# Patient Record
Sex: FEMALE | Race: WHITE | NOT HISPANIC OR LATINO | URBAN - METROPOLITAN AREA
[De-identification: names, ages, dates, MRNs, and addresses within clinical notes are randomized per-mention and may not be internally consistent; named-entity substitution may affect disease eponyms.]

---

## 2018-06-03 ENCOUNTER — EMERGENCY (EMERGENCY)
Facility: HOSPITAL | Age: 48
LOS: 1 days | Discharge: HOME | End: 2018-06-03
Admitting: EMERGENCY MEDICINE
Payer: COMMERCIAL

## 2018-06-03 PROCEDURE — 85027 COMPLETE CBC AUTOMATED: CPT

## 2018-06-03 PROCEDURE — 70450 CT HEAD/BRAIN W/O DYE: CPT

## 2018-06-03 PROCEDURE — 70450 CT HEAD/BRAIN W/O DYE: CPT | Mod: 26

## 2018-06-03 PROCEDURE — 81025 URINE PREGNANCY TEST: CPT

## 2018-06-03 PROCEDURE — 99284 EMERGENCY DEPT VISIT MOD MDM: CPT

## 2018-06-03 PROCEDURE — 93005 ELECTROCARDIOGRAM TRACING: CPT

## 2018-06-03 PROCEDURE — 93010 ELECTROCARDIOGRAM REPORT: CPT

## 2018-06-03 PROCEDURE — 80048 BASIC METABOLIC PNL TOTAL CA: CPT

## 2018-06-03 PROCEDURE — 36415 COLL VENOUS BLD VENIPUNCTURE: CPT

## 2018-06-13 ENCOUNTER — RESULT REVIEW (OUTPATIENT)
Age: 48
End: 2018-06-13

## 2019-10-24 ENCOUNTER — RESULT REVIEW (OUTPATIENT)
Age: 49
End: 2019-10-24

## 2020-10-28 ENCOUNTER — RESULT REVIEW (OUTPATIENT)
Age: 50
End: 2020-10-28

## 2020-11-12 ENCOUNTER — OUTPATIENT (OUTPATIENT)
Dept: OUTPATIENT SERVICES | Facility: HOSPITAL | Age: 50
LOS: 1 days | End: 2020-11-12
Payer: COMMERCIAL

## 2020-11-12 VITALS
DIASTOLIC BLOOD PRESSURE: 74 MMHG | HEART RATE: 69 BPM | OXYGEN SATURATION: 100 % | TEMPERATURE: 99 F | SYSTOLIC BLOOD PRESSURE: 118 MMHG | RESPIRATION RATE: 14 BRPM | WEIGHT: 123.9 LBS | HEIGHT: 64 IN

## 2020-11-12 DIAGNOSIS — Z90.89 ACQUIRED ABSENCE OF OTHER ORGANS: Chronic | ICD-10-CM

## 2020-11-12 DIAGNOSIS — N84.0 POLYP OF CORPUS UTERI: ICD-10-CM

## 2020-11-12 DIAGNOSIS — Z98.890 OTHER SPECIFIED POSTPROCEDURAL STATES: Chronic | ICD-10-CM

## 2020-11-12 DIAGNOSIS — Z01.818 ENCOUNTER FOR OTHER PREPROCEDURAL EXAMINATION: ICD-10-CM

## 2020-11-12 LAB
HCT VFR BLD CALC: 31.2 % — LOW (ref 34.5–45)
HGB BLD-MCNC: 9.7 G/DL — LOW (ref 11.5–15.5)
MCHC RBC-ENTMCNC: 23.8 PG — LOW (ref 27–34)
MCHC RBC-ENTMCNC: 31.1 GM/DL — LOW (ref 32–36)
MCV RBC AUTO: 76.5 FL — LOW (ref 80–100)
NRBC # BLD: 0 /100 WBCS — SIGNIFICANT CHANGE UP (ref 0–0)
PLATELET # BLD AUTO: 325 K/UL — SIGNIFICANT CHANGE UP (ref 150–400)
RBC # BLD: 4.08 M/UL — SIGNIFICANT CHANGE UP (ref 3.8–5.2)
RBC # FLD: 15.1 % — HIGH (ref 10.3–14.5)
WBC # BLD: 6.12 K/UL — SIGNIFICANT CHANGE UP (ref 3.8–10.5)
WBC # FLD AUTO: 6.12 K/UL — SIGNIFICANT CHANGE UP (ref 3.8–10.5)

## 2020-11-12 PROCEDURE — G0463: CPT

## 2020-11-12 PROCEDURE — 85027 COMPLETE CBC AUTOMATED: CPT

## 2020-11-12 RX ORDER — LIDOCAINE HCL 20 MG/ML
0.2 VIAL (ML) INJECTION ONCE
Refills: 0 | Status: DISCONTINUED | OUTPATIENT
Start: 2020-11-17 | End: 2020-12-01

## 2020-11-12 RX ORDER — SODIUM CHLORIDE 9 MG/ML
3 INJECTION INTRAMUSCULAR; INTRAVENOUS; SUBCUTANEOUS EVERY 8 HOURS
Refills: 0 | Status: DISCONTINUED | OUTPATIENT
Start: 2020-11-17 | End: 2020-12-01

## 2020-11-12 NOTE — H&P PST ADULT - HISTORY OF PRESENT ILLNESS
49 yo female. . LMP 10/19/2020. c/o PMH fibroid, c/o RLQ pain and menorrhagia.  evaluated by Dr. Shah, diagnosed with fibroid and endometrial polyp. now presents to [facility] scheduled to D&C, removal of endometrial polyp.   covid test scheduled on  at [facility].

## 2020-11-12 NOTE — H&P PST ADULT - NSICDXPASTSURGICALHX_GEN_ALL_CORE_FT
PAST SURGICAL HISTORY:  H/O oral surgery under general anesthesia during childhood    S/P D&C (status post dilation and curettage) x2    S/P laparoscopy as part of infertility work up    S/P tonsillectomy

## 2020-11-12 NOTE — H&P PST ADULT - NSANTHOSAYNRD_GEN_A_CORE
No. DEANGELO screening performed.  STOP BANG Legend: 0-2 = LOW Risk; 3-4 = INTERMEDIATE Risk; 5-8 = HIGH Risk

## 2020-11-12 NOTE — H&P PST ADULT - NSICDXPROBLEM_GEN_ALL_CORE_FT
PROBLEM DIAGNOSES  Problem: Polyp of corpus uteri  Assessment and Plan: D&C  operative hysteroscopy  removal of endometrial polyp

## 2020-11-14 ENCOUNTER — OUTPATIENT (OUTPATIENT)
Dept: OUTPATIENT SERVICES | Facility: HOSPITAL | Age: 50
LOS: 1 days | End: 2020-11-14
Payer: COMMERCIAL

## 2020-11-14 DIAGNOSIS — Z11.59 ENCOUNTER FOR SCREENING FOR OTHER VIRAL DISEASES: ICD-10-CM

## 2020-11-14 DIAGNOSIS — Z90.89 ACQUIRED ABSENCE OF OTHER ORGANS: Chronic | ICD-10-CM

## 2020-11-14 DIAGNOSIS — Z98.890 OTHER SPECIFIED POSTPROCEDURAL STATES: Chronic | ICD-10-CM

## 2020-11-14 LAB — SARS-COV-2 RNA SPEC QL NAA+PROBE: SIGNIFICANT CHANGE UP

## 2020-11-14 PROCEDURE — U0003: CPT

## 2020-11-16 ENCOUNTER — TRANSCRIPTION ENCOUNTER (OUTPATIENT)
Age: 50
End: 2020-11-16

## 2020-11-16 RX ORDER — ONDANSETRON 8 MG/1
4 TABLET, FILM COATED ORAL ONCE
Refills: 0 | Status: DISCONTINUED | OUTPATIENT
Start: 2020-11-17 | End: 2020-12-01

## 2020-11-16 RX ORDER — OXYCODONE HYDROCHLORIDE 5 MG/1
5 TABLET ORAL ONCE
Refills: 0 | Status: DISCONTINUED | OUTPATIENT
Start: 2020-11-17 | End: 2020-11-17

## 2020-11-16 RX ORDER — SODIUM CHLORIDE 9 MG/ML
1000 INJECTION, SOLUTION INTRAVENOUS
Refills: 0 | Status: DISCONTINUED | OUTPATIENT
Start: 2020-11-17 | End: 2020-12-01

## 2020-11-17 ENCOUNTER — OUTPATIENT (OUTPATIENT)
Dept: OUTPATIENT SERVICES | Facility: HOSPITAL | Age: 50
LOS: 1 days | End: 2020-11-17
Payer: COMMERCIAL

## 2020-11-17 ENCOUNTER — RESULT REVIEW (OUTPATIENT)
Age: 50
End: 2020-11-17

## 2020-11-17 VITALS
OXYGEN SATURATION: 100 % | RESPIRATION RATE: 14 BRPM | HEART RATE: 86 BPM | DIASTOLIC BLOOD PRESSURE: 59 MMHG | SYSTOLIC BLOOD PRESSURE: 108 MMHG | TEMPERATURE: 98 F

## 2020-11-17 VITALS
WEIGHT: 123.9 LBS | DIASTOLIC BLOOD PRESSURE: 72 MMHG | SYSTOLIC BLOOD PRESSURE: 110 MMHG | TEMPERATURE: 98 F | OXYGEN SATURATION: 100 % | HEIGHT: 64 IN | HEART RATE: 76 BPM | RESPIRATION RATE: 18 BRPM

## 2020-11-17 DIAGNOSIS — Z98.890 OTHER SPECIFIED POSTPROCEDURAL STATES: Chronic | ICD-10-CM

## 2020-11-17 DIAGNOSIS — Z90.89 ACQUIRED ABSENCE OF OTHER ORGANS: Chronic | ICD-10-CM

## 2020-11-17 DIAGNOSIS — N84.0 POLYP OF CORPUS UTERI: ICD-10-CM

## 2020-11-17 DIAGNOSIS — N93.9 ABNORMAL UTERINE AND VAGINAL BLEEDING, UNSPECIFIED: ICD-10-CM

## 2020-11-17 DIAGNOSIS — Z01.818 ENCOUNTER FOR OTHER PREPROCEDURAL EXAMINATION: ICD-10-CM

## 2020-11-17 PROCEDURE — 88305 TISSUE EXAM BY PATHOLOGIST: CPT | Mod: 26

## 2020-11-17 PROCEDURE — 88305 TISSUE EXAM BY PATHOLOGIST: CPT

## 2020-11-17 PROCEDURE — 58558 HYSTEROSCOPY BIOPSY: CPT

## 2020-11-17 NOTE — ASU DISCHARGE PLAN (ADULT/PEDIATRIC) - NURSING INSTRUCTIONS
******************************************************************************************  Next dose of TYLENOL may be taken at or after _______5:30______ PM if needed. DO NOT take any additional products containing TYLENOL or ACETAMINOPHEN, such as VICODIN, PERCOCET, NORCO, EXCEDRIN, and any over-the-counter cold medications until this time. DO NOT CONSUME MORE THAN 0043-9071 MG OF TYLENOL (acetaminophen) in a 24-hour period.   ******************************************************************************************

## 2020-11-17 NOTE — BRIEF OPERATIVE NOTE - NSICDXBRIEFPROCEDURE_GEN_ALL_CORE_FT
PROCEDURES:  Hysteroscopy with dilation and curettage of uterus 17-Nov-2020 12:35:08  Cecile Shah Y

## 2020-11-17 NOTE — ASU PATIENT PROFILE, ADULT - PSH
H/O oral surgery  under general anesthesia during childhood  S/P D&C (status post dilation and curettage)  x2  S/P laparoscopy  as part of infertility work up  S/P tonsillectomy

## 2020-11-17 NOTE — BRIEF OPERATIVE NOTE - NSICDXBRIEFPREOP_GEN_ALL_CORE_FT
PRE-OP DIAGNOSIS:  Abnormal uterine bleeding due to endometrial polyp 17-Nov-2020 12:35:20  Cecile Shah Y

## 2020-11-17 NOTE — ASU DISCHARGE PLAN (ADULT/PEDIATRIC) - CARE PROVIDER_API CALL
[place] [address]  Phone: [phone]  Fax: [phone]  Established Patient  Follow Up Time: 2 weeks

## 2020-11-17 NOTE — BRIEF OPERATIVE NOTE - NSICDXBRIEFPOSTOP_GEN_ALL_CORE_FT
POST-OP DIAGNOSIS:  Abnormal uterine bleeding due to endometrial polyp 17-Nov-2020 12:35:32  Cecile Shah Y

## 2020-11-17 NOTE — ASU DISCHARGE PLAN (ADULT/PEDIATRIC) - ACTIVITY LEVEL
No tub baths/Nothing per rectum/No intercourse/No heavy lifting/No sports/gym/Nothing per vagina/No douching/No tampons/No excercise

## 2020-11-17 NOTE — ASU DISCHARGE PLAN (ADULT/PEDIATRIC) - CALL YOUR DOCTOR IF YOU HAVE ANY OF THE FOLLOWING:
Nausea and vomiting that does not stop/Unable to urinate/Excessive diarrhea/Inability to tolerate liquids or foods/Increased irritability or sluggishness/Fever greater than (need to indicate Fahrenheit or Celsius)/Bleeding that does not stop/Swelling that gets worse/Wound/Surgical Site with redness, or foul smelling discharge or pus/Numbness, tingling, color or temperature change to extremity/Pain not relieved by Medications

## 2020-11-17 NOTE — ASU DISCHARGE PLAN (ADULT/PEDIATRIC) - ASU DC SPECIAL INSTRUCTIONSFT
POSTOPERATIVE INSTRUCTIONS FOR HYSTEROSCOPY, D&C    After your surgery it is normal to experience:    •	Vaginal bleeding- can last 1-2 weeks and should not be heavier than a period. It may come and go and be red, brown or pink. Use pads, not tampons.  •	Cramping- Is common especially within the first 24 hours. This should be relieved by taking over the counter motrin, advil or Tylenol.  •	Watery discharge- can be seen for a day or two after hysteroscopy because some of the fluid that is put into the uterus during surgery may continue to leak out for a day or two.  •	Vaginal soreness/irritation- can occur in the first few days after surgery because of the instruments that were used in the vagina. Soreness can be treated with ice pack and irritation can be taken care of with an emollient such as balmex or aquaphor that you can put directly on the irritated area.    Restrictions: For 10-14 days after the surgery you should avoid the following:    •	Tampons  •	Sex  •	Vigorous gym exercise  •	Swimming  pools and tub baths  •	Wait a day or two before going back to work    Anesthesia Precautions:  For the next 12 hours do not:   •	drive a car,  •	 operate power tools or machinery,  •	drink alcohol, beer, or wine,   •	make important personal or business decisions    Diet:   •	Resume Regular diet but Progress diet slowly     Physician Notification- Warning signs to look out for  •	Heavy Vaginal Bleeding   •	Shortness of breath or chest pain  •	Severe Abdominal Pain  •	Persistent nausea and vomiting  •	Pain not relieved by medications  •	Fever greater than 100.5®F  •	Inability to tolerate liquids or foods  •	Unable to urinate after 8 hours    Discharge and Disposition  •	Discharge to home    Follow Up Care:  •	In the event that you develop a complication and you are unable to reach your own physician, you may contact:  [phone] or go to the nearest Emergency Room.   •	Please contact the office to make a follow up appointment with Dr. Shah in 2 weeks.

## 2021-02-07 ENCOUNTER — TRANSCRIPTION ENCOUNTER (OUTPATIENT)
Age: 51
End: 2021-02-07

## 2021-03-31 ENCOUNTER — APPOINTMENT (OUTPATIENT)
Dept: PEDIATRIC DEVELOPMENTAL SERVICES | Facility: CLINIC | Age: 51
End: 2021-03-31
Payer: COMMERCIAL

## 2021-03-31 PROBLEM — D21.9 BENIGN NEOPLASM OF CONNECTIVE AND OTHER SOFT TISSUE, UNSPECIFIED: Chronic | Status: ACTIVE | Noted: 2020-11-12

## 2021-03-31 PROCEDURE — 90791 PSYCH DIAGNOSTIC EVALUATION: CPT | Mod: 95

## 2021-04-28 ENCOUNTER — APPOINTMENT (OUTPATIENT)
Dept: PEDIATRIC DEVELOPMENTAL SERVICES | Facility: CLINIC | Age: 51
End: 2021-04-28
Payer: COMMERCIAL

## 2021-04-28 PROCEDURE — 90834 PSYTX W PT 45 MINUTES: CPT | Mod: 95

## 2021-05-26 ENCOUNTER — APPOINTMENT (OUTPATIENT)
Dept: PEDIATRIC DEVELOPMENTAL SERVICES | Facility: CLINIC | Age: 51
End: 2021-05-26
Payer: COMMERCIAL

## 2021-05-26 PROCEDURE — 90834 PSYTX W PT 45 MINUTES: CPT | Mod: 95

## 2021-06-23 ENCOUNTER — APPOINTMENT (OUTPATIENT)
Dept: PEDIATRIC DEVELOPMENTAL SERVICES | Facility: CLINIC | Age: 51
End: 2021-06-23
Payer: COMMERCIAL

## 2021-06-23 PROCEDURE — 90834 PSYTX W PT 45 MINUTES: CPT | Mod: 95

## 2021-07-21 ENCOUNTER — APPOINTMENT (OUTPATIENT)
Dept: PEDIATRIC DEVELOPMENTAL SERVICES | Facility: CLINIC | Age: 51
End: 2021-07-21
Payer: COMMERCIAL

## 2021-07-21 PROCEDURE — 90834 PSYTX W PT 45 MINUTES: CPT | Mod: 95

## 2021-08-31 NOTE — PRE-ANESTHESIA EVALUATION ADULT - NSANTHDISPORD_GEN_ALL_CORE
Left detailed message for mom to call the office back to re-schedule pts WCE for tomorrow.    Also asked mom in message if pt was seen in ER which was advised per Nurse Triage this am when mom called.   Other...

## 2021-09-03 NOTE — H&P PST ADULT - GENERAL
Pt accepted to [address]  Pt cleared to d/c there today but CM was unable to secure transportation  Over 12 companies called   Weekend CM will need to secure transport negative

## 2021-10-20 ENCOUNTER — APPOINTMENT (OUTPATIENT)
Dept: PEDIATRIC DEVELOPMENTAL SERVICES | Facility: CLINIC | Age: 51
End: 2021-10-20
Payer: COMMERCIAL

## 2021-10-20 PROCEDURE — 90834 PSYTX W PT 45 MINUTES: CPT | Mod: 95

## 2021-11-03 ENCOUNTER — APPOINTMENT (OUTPATIENT)
Dept: PEDIATRIC DEVELOPMENTAL SERVICES | Facility: CLINIC | Age: 51
End: 2021-11-03
Payer: COMMERCIAL

## 2021-11-03 PROCEDURE — 90834 PSYTX W PT 45 MINUTES: CPT | Mod: 95

## 2021-11-17 ENCOUNTER — APPOINTMENT (OUTPATIENT)
Dept: PEDIATRIC DEVELOPMENTAL SERVICES | Facility: CLINIC | Age: 51
End: 2021-11-17

## 2021-12-01 ENCOUNTER — APPOINTMENT (OUTPATIENT)
Dept: PEDIATRIC DEVELOPMENTAL SERVICES | Facility: CLINIC | Age: 51
End: 2021-12-01
Payer: COMMERCIAL

## 2021-12-01 PROCEDURE — 90834 PSYTX W PT 45 MINUTES: CPT

## 2021-12-05 ENCOUNTER — TRANSCRIPTION ENCOUNTER (OUTPATIENT)
Age: 51
End: 2021-12-05

## 2021-12-11 ENCOUNTER — TRANSCRIPTION ENCOUNTER (OUTPATIENT)
Age: 51
End: 2021-12-11

## 2021-12-15 ENCOUNTER — APPOINTMENT (OUTPATIENT)
Dept: PEDIATRIC DEVELOPMENTAL SERVICES | Facility: CLINIC | Age: 51
End: 2021-12-15
Payer: COMMERCIAL

## 2021-12-15 PROCEDURE — 90834 PSYTX W PT 45 MINUTES: CPT

## 2021-12-23 ENCOUNTER — APPOINTMENT (OUTPATIENT)
Dept: OBGYN | Facility: CLINIC | Age: 51
End: 2021-12-23
Payer: COMMERCIAL

## 2021-12-23 VITALS
WEIGHT: 122 LBS | HEIGHT: 64 IN | BODY MASS INDEX: 20.83 KG/M2 | SYSTOLIC BLOOD PRESSURE: 110 MMHG | DIASTOLIC BLOOD PRESSURE: 70 MMHG

## 2021-12-23 DIAGNOSIS — Z12.39 ENCOUNTER FOR OTHER SCREENING FOR MALIGNANT NEOPLASM OF BREAST: ICD-10-CM

## 2021-12-23 DIAGNOSIS — Z00.00 ENCOUNTER FOR GENERAL ADULT MEDICAL EXAMINATION W/OUT ABNORMAL FINDINGS: ICD-10-CM

## 2021-12-23 PROCEDURE — 82274 ASSAY TEST FOR BLOOD FECAL: CPT | Mod: QW

## 2021-12-23 PROCEDURE — 99396 PREV VISIT EST AGE 40-64: CPT

## 2021-12-23 NOTE — PLAN
[FreeTextEntry1] : Routine Gyn Exam:\par BSE taught\par Pap smear conducted - HPV + \par Rx given for mammogram and breast sonogram**\par Advised pt. to schedule colonoscopy**\par RTO in 1 year or PRN\par \par Menhorragia, fibroid uterus and anemia 2/2 blood loss\par RX TXA to be taken 1st two days of menses\par d/w pt IUD vs hysterectomy. will f/u with pelvic sono and to discuss further options (h/o ENDO)

## 2021-12-23 NOTE — HISTORY OF PRESENT ILLNESS
[FreeTextEntry1] : 2021. FELICITAS MCCLELLAND 51 year old female  LMP recently, presents for annual gyn exam, PMH\par \par \par She reports monthly menses which are heavy with passage of clots. Currently not on any OCPs for mgmt of bleeding.  She was seen at the PMD and found to have significant anemia H/H 8.7/28.5 and has been started on iron. \par \par \par She denies intermenstrual bleeding, abn vaginal discharge or vaginitis symptoms. No urinary complaints. BM is normal per patient. She denies abdominal and pelvic pain.\par \par Pt is   x 3 years. Is currently not sexually active.  Denies sexual dysfunction.\par \par \par \par PSH: 2006 C/S x1 \par  laparoscopy  for ENDOMETRIOSIS\par 2019 polypectomy \par \par  [Patient reported PAP Smear was abnormal] : Patient reported PAP Smear was abnormal [Mammogramdate] : 2020 [BreastSonogramDate] : 2020 [PapSmeardate] : 2020 [TextBox_37] : to be scheduled

## 2021-12-23 NOTE — PHYSICAL EXAM
[Appropriately responsive] : appropriately responsive [Alert] : alert [No Acute Distress] : no acute distress [No Lymphadenopathy] : no lymphadenopathy [Regular Rate Rhythm] : regular rate rhythm [No Murmurs] : no murmurs [Clear to Auscultation B/L] : clear to auscultation bilaterally [Soft] : soft [Non-tender] : non-tender [Non-distended] : non-distended [No HSM] : No HSM [No Lesions] : no lesions [No Mass] : no mass [Oriented x3] : oriented x3 [Examination Of The Breasts] : a normal appearance [No Masses] : no breast masses were palpable [Labia Majora] : normal [Labia Minora] : normal [Normal] : normal [Uterine Adnexae] : normal [FreeTextEntry6] : 18wk uterus, palpable fibroid right lateral nontender

## 2021-12-27 LAB — HPV HIGH+LOW RISK DNA PNL CVX: NOT DETECTED

## 2021-12-30 LAB — CYTOLOGY CVX/VAG DOC THIN PREP: ABNORMAL

## 2022-02-07 ENCOUNTER — TRANSCRIPTION ENCOUNTER (OUTPATIENT)
Age: 52
End: 2022-02-07

## 2022-02-10 ENCOUNTER — APPOINTMENT (OUTPATIENT)
Dept: OBGYN | Facility: CLINIC | Age: 52
End: 2022-02-10
Payer: COMMERCIAL

## 2022-02-10 DIAGNOSIS — R10.31 RIGHT LOWER QUADRANT PAIN: ICD-10-CM

## 2022-02-10 PROCEDURE — 58120 DILATION AND CURETTAGE: CPT

## 2022-02-10 PROCEDURE — 81025 URINE PREGNANCY TEST: CPT

## 2022-02-10 PROCEDURE — 36415 COLL VENOUS BLD VENIPUNCTURE: CPT

## 2022-02-10 PROCEDURE — 99214 OFFICE O/P EST MOD 30 MIN: CPT | Mod: 25

## 2022-02-11 NOTE — HISTORY OF PRESENT ILLNESS
[FreeTextEntry1] : 51 yr old  with reg cycles. large fibroids uterus with menorrhagia periods very heavy for 4 days and hx of anemia ( last Hgb/hct  8. by PMD iron deficient) and periods last 11 days.  No BTB otherwise.  Pt reports of right sided discomfort. and feels like fibroids are growing.  No fever, chllls, n/v, loss of appetite or urinary or bowel complaints.  No abl discharge.  Pain is mild-not doubled over.

## 2022-02-11 NOTE — PLAN
[FreeTextEntry1] : 51 yr old  hx of large fibroid uterus, AUB and Menorrhagia and iron deficiency anemia-mild abd pain-not acute abdomen\par \par UCG neg\par \par 1. D&C done with 2 passes with rocket pipelle\par 2. check FSH, LH, TSH, free T4 and estradiol, CBC, anemia panel\par 3. f/u with Heme for IV iron\par 4. poss pevic MRI\par 5. pt has rx for Lysteda and told to take-other alternative options Lupron, oriannh poss XLAP, BETO, SHARRON. d/wpt briefly\par 6. pt has TVS sched next week\par

## 2022-02-11 NOTE — PHYSICAL EXAM
[Soft] : soft [Labia Majora] : normal [Labia Minora] : normal [Normal] : normal [Enlarged ___ wks] : enlarged [unfilled] ~Uweeks [Uterine Adnexae] : normal [FreeTextEntry7] : palp fibroids about 18 weeks, no rebound or guarding or cvat tenderness [FreeTextEntry6] : Guaiac test negative, no masses noted

## 2022-02-11 NOTE — PROCEDURE
[Endometrial Biopsy] : Endometrial biopsy [Time out performed] : Pre-procedure time out performed.  Patient's name, date of birth and procedure confirmed. [Consent Obtained] : Consent obtained [Irregular Bleeding] : irregular uterine bleeding [Risks] : risks [Benefits] : benefits [Alternatives] : alternatives [Negative] : negative pregnancy test [None] : none [Tenaculum] : Tenaculum [Sounded to ___ cm] : sounded to [unfilled] ~Ucm [Moderate] : moderate [Sent to Pathology] : placed in buffered formalin and sent for pathology [Tolerated Well] : Patient tolerated the procedure well [No Complications] : No complications [de-identified] : rocket D&C used since fibroids 2 passes

## 2022-02-12 LAB
C TRACH RRNA SPEC QL NAA+PROBE: NOT DETECTED
N GONORRHOEA RRNA SPEC QL NAA+PROBE: NOT DETECTED
SOURCE AMPLIFICATION: NORMAL

## 2022-02-21 ENCOUNTER — ASOB RESULT (OUTPATIENT)
Age: 52
End: 2022-02-21

## 2022-02-21 ENCOUNTER — APPOINTMENT (OUTPATIENT)
Dept: OBGYN | Facility: CLINIC | Age: 52
End: 2022-02-21
Payer: COMMERCIAL

## 2022-02-21 PROCEDURE — 76830 TRANSVAGINAL US NON-OB: CPT

## 2022-02-24 ENCOUNTER — NON-APPOINTMENT (OUTPATIENT)
Age: 52
End: 2022-02-24

## 2022-02-25 LAB
BASOPHILS # BLD AUTO: 0.06 K/UL
BASOPHILS NFR BLD AUTO: 0.7 %
EOSINOPHIL # BLD AUTO: 0.05 K/UL
EOSINOPHIL NFR BLD AUTO: 0.6 %
ESTRADIOL SERPL-MCNC: 78 PG/ML
FERRITIN SERPL-MCNC: 48 NG/ML
FOLATE SERPL-MCNC: >20 NG/ML
FSH SERPL-MCNC: 21.5 IU/L
HAPTOGLOB SERPL-MCNC: 463 MG/DL
HCT VFR BLD CALC: 36.1 %
HGB A MFR BLD: 97.9 %
HGB A2 MFR BLD: 2.1 %
HGB BLD-MCNC: 10.8 G/DL
HGB FRACT BLD-IMP: NORMAL
IMM GRANULOCYTES NFR BLD AUTO: 0.2 %
IRON SATN MFR SERPL: 6 %
IRON SERPL-MCNC: 26 UG/DL
LH SERPL-ACNC: 31.3 IU/L
LYMPHOCYTES # BLD AUTO: 1.66 K/UL
LYMPHOCYTES NFR BLD AUTO: 20.7 %
MAN DIFF?: NORMAL
MCHC RBC-ENTMCNC: 23.4 PG
MCHC RBC-ENTMCNC: 29.9 GM/DL
MCV RBC AUTO: 78.1 FL
MONOCYTES # BLD AUTO: 0.62 K/UL
MONOCYTES NFR BLD AUTO: 7.7 %
NEUTROPHILS # BLD AUTO: 5.61 K/UL
NEUTROPHILS NFR BLD AUTO: 70.1 %
PLATELET # BLD AUTO: 433 K/UL
RBC # BLD: 4.62 M/UL
RBC # BLD: 4.62 M/UL
RBC # FLD: 21.4 %
RETICS # AUTO: 0.7 %
RETICS AGGREG/RBC NFR: 31.9 K/UL
T4 FREE SERPL-MCNC: 1.4 NG/DL
TIBC SERPL-MCNC: 415 UG/DL
TSH SERPL-ACNC: 1.44 UIU/ML
UIBC SERPL-MCNC: 389 UG/DL
VIT B12 SERPL-MCNC: 579 PG/ML
WBC # FLD AUTO: 8.02 K/UL

## 2022-08-22 ENCOUNTER — EMERGENCY (EMERGENCY)
Facility: HOSPITAL | Age: 52
LOS: 1 days | Discharge: HOME | End: 2022-08-22
Attending: EMERGENCY MEDICINE
Payer: COMMERCIAL

## 2022-08-22 VITALS
SYSTOLIC BLOOD PRESSURE: 123 MMHG | HEART RATE: 75 BPM | TEMPERATURE: 98 F | RESPIRATION RATE: 16 BRPM | OXYGEN SATURATION: 100 % | DIASTOLIC BLOOD PRESSURE: 64 MMHG

## 2022-08-22 VITALS
OXYGEN SATURATION: 100 % | HEART RATE: 94 BPM | HEIGHT: 64 IN | DIASTOLIC BLOOD PRESSURE: 82 MMHG | TEMPERATURE: 98 F | WEIGHT: 110.01 LBS | SYSTOLIC BLOOD PRESSURE: 135 MMHG | RESPIRATION RATE: 16 BRPM

## 2022-08-22 DIAGNOSIS — Z98.890 OTHER SPECIFIED POSTPROCEDURAL STATES: Chronic | ICD-10-CM

## 2022-08-22 DIAGNOSIS — Z90.89 ACQUIRED ABSENCE OF OTHER ORGANS: Chronic | ICD-10-CM

## 2022-08-22 LAB
ALBUMIN SERPL ELPH-MCNC: 4.7 G/DL — SIGNIFICANT CHANGE UP (ref 3.3–5)
ALP SERPL-CCNC: 48 U/L — SIGNIFICANT CHANGE UP (ref 40–120)
ALT FLD-CCNC: 10 U/L — SIGNIFICANT CHANGE UP (ref 10–45)
ANION GAP SERPL CALC-SCNC: 12 MMOL/L — SIGNIFICANT CHANGE UP (ref 5–17)
APPEARANCE UR: CLEAR — SIGNIFICANT CHANGE UP
AST SERPL-CCNC: 19 U/L — SIGNIFICANT CHANGE UP (ref 10–40)
BACTERIA # UR AUTO: NEGATIVE — SIGNIFICANT CHANGE UP
BASOPHILS # BLD AUTO: 0.04 K/UL — SIGNIFICANT CHANGE UP (ref 0–0.2)
BASOPHILS NFR BLD AUTO: 0.8 % — SIGNIFICANT CHANGE UP (ref 0–2)
BILIRUB SERPL-MCNC: 0.2 MG/DL — SIGNIFICANT CHANGE UP (ref 0.2–1.2)
BILIRUB UR-MCNC: NEGATIVE — SIGNIFICANT CHANGE UP
BUN SERPL-MCNC: 10 MG/DL — SIGNIFICANT CHANGE UP (ref 7–23)
CALCIUM SERPL-MCNC: 9 MG/DL — SIGNIFICANT CHANGE UP (ref 8.4–10.5)
CHLORIDE SERPL-SCNC: 102 MMOL/L — SIGNIFICANT CHANGE UP (ref 96–108)
CO2 SERPL-SCNC: 24 MMOL/L — SIGNIFICANT CHANGE UP (ref 22–31)
COLOR SPEC: SIGNIFICANT CHANGE UP
CREAT SERPL-MCNC: 0.7 MG/DL — SIGNIFICANT CHANGE UP (ref 0.5–1.3)
D DIMER BLD IA.RAPID-MCNC: 245 NG/ML DDU — HIGH
DIFF PNL FLD: NEGATIVE — SIGNIFICANT CHANGE UP
EGFR: 103 ML/MIN/1.73M2 — SIGNIFICANT CHANGE UP
EOSINOPHIL # BLD AUTO: 0.03 K/UL — SIGNIFICANT CHANGE UP (ref 0–0.5)
EOSINOPHIL NFR BLD AUTO: 0.6 % — SIGNIFICANT CHANGE UP (ref 0–6)
EPI CELLS # UR: 0 /HPF — SIGNIFICANT CHANGE UP
GLUCOSE SERPL-MCNC: 84 MG/DL — SIGNIFICANT CHANGE UP (ref 70–99)
GLUCOSE UR QL: NEGATIVE — SIGNIFICANT CHANGE UP
HCG UR QL: NEGATIVE — SIGNIFICANT CHANGE UP
HCT VFR BLD CALC: 31 % — LOW (ref 34.5–45)
HGB BLD-MCNC: 9.6 G/DL — LOW (ref 11.5–15.5)
HYALINE CASTS # UR AUTO: 0 /LPF — SIGNIFICANT CHANGE UP (ref 0–2)
IMM GRANULOCYTES NFR BLD AUTO: 0.4 % — SIGNIFICANT CHANGE UP (ref 0–1.5)
KETONES UR-MCNC: NEGATIVE — SIGNIFICANT CHANGE UP
LEUKOCYTE ESTERASE UR-ACNC: NEGATIVE — SIGNIFICANT CHANGE UP
LIDOCAIN IGE QN: 20 U/L — SIGNIFICANT CHANGE UP (ref 7–60)
LYMPHOCYTES # BLD AUTO: 0.69 K/UL — LOW (ref 1–3.3)
LYMPHOCYTES # BLD AUTO: 13.8 % — SIGNIFICANT CHANGE UP (ref 13–44)
MCHC RBC-ENTMCNC: 24.3 PG — LOW (ref 27–34)
MCHC RBC-ENTMCNC: 31 GM/DL — LOW (ref 32–36)
MCV RBC AUTO: 78.5 FL — LOW (ref 80–100)
MONOCYTES # BLD AUTO: 0.29 K/UL — SIGNIFICANT CHANGE UP (ref 0–0.9)
MONOCYTES NFR BLD AUTO: 5.8 % — SIGNIFICANT CHANGE UP (ref 2–14)
NEUTROPHILS # BLD AUTO: 3.94 K/UL — SIGNIFICANT CHANGE UP (ref 1.8–7.4)
NEUTROPHILS NFR BLD AUTO: 78.6 % — HIGH (ref 43–77)
NITRITE UR-MCNC: NEGATIVE — SIGNIFICANT CHANGE UP
NRBC # BLD: 0 /100 WBCS — SIGNIFICANT CHANGE UP (ref 0–0)
PH UR: 6 — SIGNIFICANT CHANGE UP (ref 5–8)
PLATELET # BLD AUTO: 416 K/UL — HIGH (ref 150–400)
POTASSIUM SERPL-MCNC: 4 MMOL/L — SIGNIFICANT CHANGE UP (ref 3.5–5.3)
POTASSIUM SERPL-SCNC: 4 MMOL/L — SIGNIFICANT CHANGE UP (ref 3.5–5.3)
PROT SERPL-MCNC: 7.4 G/DL — SIGNIFICANT CHANGE UP (ref 6–8.3)
PROT UR-MCNC: NEGATIVE — SIGNIFICANT CHANGE UP
RBC # BLD: 3.95 M/UL — SIGNIFICANT CHANGE UP (ref 3.8–5.2)
RBC # FLD: 15.6 % — HIGH (ref 10.3–14.5)
RBC CASTS # UR COMP ASSIST: 0 /HPF — SIGNIFICANT CHANGE UP (ref 0–4)
SODIUM SERPL-SCNC: 138 MMOL/L — SIGNIFICANT CHANGE UP (ref 135–145)
SP GR SPEC: 1.01 — SIGNIFICANT CHANGE UP (ref 1.01–1.02)
UROBILINOGEN FLD QL: NEGATIVE — SIGNIFICANT CHANGE UP
WBC # BLD: 5.01 K/UL — SIGNIFICANT CHANGE UP (ref 3.8–10.5)
WBC # FLD AUTO: 5.01 K/UL — SIGNIFICANT CHANGE UP (ref 3.8–10.5)
WBC UR QL: 0 /HPF — SIGNIFICANT CHANGE UP (ref 0–5)

## 2022-08-22 PROCEDURE — 93010 ELECTROCARDIOGRAM REPORT: CPT

## 2022-08-22 PROCEDURE — 85025 COMPLETE CBC W/AUTO DIFF WBC: CPT

## 2022-08-22 PROCEDURE — 81001 URINALYSIS AUTO W/SCOPE: CPT

## 2022-08-22 PROCEDURE — 36415 COLL VENOUS BLD VENIPUNCTURE: CPT

## 2022-08-22 PROCEDURE — 80053 COMPREHEN METABOLIC PANEL: CPT

## 2022-08-22 PROCEDURE — 99285 EMERGENCY DEPT VISIT HI MDM: CPT

## 2022-08-22 PROCEDURE — 74177 CT ABD & PELVIS W/CONTRAST: CPT | Mod: MA

## 2022-08-22 PROCEDURE — 84484 ASSAY OF TROPONIN QUANT: CPT

## 2022-08-22 PROCEDURE — 85379 FIBRIN DEGRADATION QUANT: CPT

## 2022-08-22 PROCEDURE — 81025 URINE PREGNANCY TEST: CPT

## 2022-08-22 PROCEDURE — 99285 EMERGENCY DEPT VISIT HI MDM: CPT | Mod: 25

## 2022-08-22 PROCEDURE — 74177 CT ABD & PELVIS W/CONTRAST: CPT | Mod: 26,MA

## 2022-08-22 PROCEDURE — 83690 ASSAY OF LIPASE: CPT

## 2022-08-22 PROCEDURE — 71046 X-RAY EXAM CHEST 2 VIEWS: CPT | Mod: 26

## 2022-08-22 PROCEDURE — 93005 ELECTROCARDIOGRAM TRACING: CPT

## 2022-08-22 PROCEDURE — 71046 X-RAY EXAM CHEST 2 VIEWS: CPT

## 2022-08-22 PROCEDURE — 71275 CT ANGIOGRAPHY CHEST: CPT | Mod: MA

## 2022-08-22 PROCEDURE — 71275 CT ANGIOGRAPHY CHEST: CPT | Mod: 26,MA

## 2022-08-22 RX ORDER — METHOCARBAMOL 500 MG/1
1 TABLET, FILM COATED ORAL
Qty: 28 | Refills: 0
Start: 2022-08-22 | End: 2022-08-28

## 2022-08-22 RX ORDER — SODIUM CHLORIDE 9 MG/ML
1000 INJECTION INTRAMUSCULAR; INTRAVENOUS; SUBCUTANEOUS ONCE
Refills: 0 | Status: COMPLETED | OUTPATIENT
Start: 2022-08-22 | End: 2022-08-22

## 2022-08-22 RX ORDER — ACETAMINOPHEN 500 MG
975 TABLET ORAL ONCE
Refills: 0 | Status: COMPLETED | OUTPATIENT
Start: 2022-08-22 | End: 2022-08-22

## 2022-08-22 RX ADMIN — Medication 975 MILLIGRAM(S): at 10:12

## 2022-08-22 RX ADMIN — SODIUM CHLORIDE 1000 MILLILITER(S): 9 INJECTION INTRAMUSCULAR; INTRAVENOUS; SUBCUTANEOUS at 10:12

## 2022-08-22 NOTE — ED PROVIDER NOTE - OBJECTIVE STATEMENT
52-year-old female with history of fibroid, who presents with left upper quadrant and left rib pain that started gradually 1 week ago, but worsened today without acute injury.  Patient reports pain worse with movement, including sitting up and to the side/performing a crunch movement, as well as taking deep breaths.  Patient denies any chest pain, shortness of breath, fevers, chills, hemoptysis, leg swelling, immobilization, surgery, trauma, history of blood clots in herself or family.  Patient denies any change in her bowel movements, no nausea, vomiting, diarrhea, constipation, no dysuria or hematuria.  No family history of MI at an early age, father had history of rheumatic heart disease, status post valve replacement and heart failure.

## 2022-08-22 NOTE — ED PROVIDER NOTE - PHYSICAL EXAMINATION
Physical Exam:   GEN: in no acute distress, AAOx3  HENT: NCAT, MMM, no stridor  EYES: PERRLA, EOMI  RESP: CTAB, no respiratory distress  CV: normal rate and regular rhythm, S1 S2  ABD: soft and ND, (+) minimal TTP to the LUQ  EXT: No edema, No bony deformity of extremities  SKIN: No skin breaks, skin color normal for race, no rash  NEURO: CN grossly intact, No focal motor or sensory deficits.

## 2022-08-22 NOTE — ED PROVIDER NOTE - NSFOLLOWUPINSTRUCTIONS_ED_ALL_ED_FT
1) Please follow-up with your Primary Medical Doctor in 3-5 days. If you do not have a primary doctor, please call the Physician Referral Service. If you have trouble making an appointment inform the office that you were recently seen in the Emergency Department and it is an urgent matter. Bring a copy of your results with you to your appointment.  2) Return to the Emergency Department for persistent, worsening, or new symptoms, or if you experience fever, chills, chest pain, shortness of breath, dizziness, fainting, abdominal pain, nausea or vomiting, inability to eat or drink, difficulty with urination, numbness, weakness, or inability to walk safely.   3) To alleviate the pain, please rest and take Tylenol 650 mg once every 6 hours and/or Naproxen 500 mg twice a day as needed. Take Robaxin as needed as a muscle relaxant, avoid driving on this medication as it can cause drowsiness.

## 2022-08-22 NOTE — ED ADULT NURSE NOTE - NS ED NURSE RECORD ANOTHER HT AND WT
Diego Meng is a 39 year old male who left voicemail stating he had right knee surgery on 9/7/18.  His surgery date got changed and his post op appointment is scheduled for 9/20/18.   He is wondering if it should be moved up to have his sutures removed.     Discussed with surgery scheduling. Ok to move appointment to 7-10 days post op.   Phone call to patient and appointment rescheduled to 9/17/18.  He verbalized understanding.     SERGIO Cazares RN    
No

## 2022-08-22 NOTE — ED ADULT TRIAGE NOTE - CHIEF COMPLAINT QUOTE
LUQ pain, worse when taking a deep breath and movement. denies trauma. denies SOB. denies nausea, vomiting, fever, diarrhea. reports normal BM this morning.

## 2022-08-22 NOTE — ED ADULT NURSE NOTE - OBJECTIVE STATEMENT
52 year old female c/o abdominal pain. Pt A+Ox3, reports intermittent LUQ abdominal pain x1 week and worsening this morning. Pt reports deep breaths and movement exacerbates pain, and laying down alleviates it. Upon assessment, pt presents well-appearing. Pt denies dizziness, chest pain, SOB, N/V, urinary or bowel symptoms, fevers or chills.

## 2022-08-22 NOTE — ED PROVIDER NOTE - PATIENT PORTAL LINK FT
You can access the FollowMyHealth Patient Portal offered by [facility] by registering at the following website: [email or web address]. By joining [employer]’s FollowMyHealth portal, you will also be able to view your health information using other applications (apps) compatible with our system.

## 2022-08-22 NOTE — ED ADULT NURSE REASSESSMENT NOTE - NS ED NURSE REASSESS COMMENT FT1
Pt A+Ox3, VSS, lab and CT results unremarkable. Pt cleared for d/c home with Naproxen and follow up with PCP.

## 2022-08-22 NOTE — ED PROVIDER NOTE - CLINICAL SUMMARY MEDICAL DECISION MAKING FREE TEXT BOX
Left upper quadrant/left rib pain that is atraumatic and worse with movement and deep breathing.  Differential is broad but includes pneumonia, pneumothorax, PE, renal colic, pyelonephritis, intra-abdominal pathology including spleen, pancreas, gastritis/GERD.  Also consider MSK etiology.  Patient has no PE risk factors, however patient cannot PERC out, will obtain D-dimer.  Will obtain EKG and troponin to assess for atypical presentation of ACS, however not likely.

## 2022-08-24 ENCOUNTER — NON-APPOINTMENT (OUTPATIENT)
Age: 52
End: 2022-08-24

## 2022-08-25 ENCOUNTER — APPOINTMENT (OUTPATIENT)
Dept: OBGYN | Facility: CLINIC | Age: 52
End: 2022-08-25

## 2022-09-13 ENCOUNTER — NON-APPOINTMENT (OUTPATIENT)
Age: 52
End: 2022-09-13

## 2022-09-21 ENCOUNTER — NON-APPOINTMENT (OUTPATIENT)
Age: 52
End: 2022-09-21

## 2022-10-13 ENCOUNTER — APPOINTMENT (OUTPATIENT)
Dept: OBGYN | Facility: CLINIC | Age: 52
End: 2022-10-13

## 2023-01-10 ENCOUNTER — LABORATORY RESULT (OUTPATIENT)
Age: 53
End: 2023-01-10

## 2023-01-10 ENCOUNTER — APPOINTMENT (OUTPATIENT)
Dept: OBGYN | Facility: CLINIC | Age: 53
End: 2023-01-10
Payer: COMMERCIAL

## 2023-01-10 ENCOUNTER — NON-APPOINTMENT (OUTPATIENT)
Age: 53
End: 2023-01-10

## 2023-01-10 VITALS
BODY MASS INDEX: 18.78 KG/M2 | WEIGHT: 110 LBS | SYSTOLIC BLOOD PRESSURE: 133 MMHG | HEIGHT: 64 IN | DIASTOLIC BLOOD PRESSURE: 74 MMHG

## 2023-01-10 DIAGNOSIS — Z01.419 ENCOUNTER FOR GYNECOLOGICAL EXAMINATION (GENERAL) (ROUTINE) W/OUT ABNORMAL FINDINGS: ICD-10-CM

## 2023-01-10 PROCEDURE — 99396 PREV VISIT EST AGE 40-64: CPT

## 2023-01-10 PROCEDURE — 36415 COLL VENOUS BLD VENIPUNCTURE: CPT

## 2023-01-24 LAB
CYTOLOGY CVX/VAG DOC THIN PREP: NORMAL
HBV SURFACE AG SER QL: NONREACTIVE
HCV AB SER QL: NONREACTIVE
HCV S/CO RATIO: 0.14 S/CO
HIV1+2 AB SPEC QL IA.RAPID: NONREACTIVE
T PALLIDUM AB SER QL IA: NEGATIVE

## 2023-01-26 LAB
BASOPHILS # BLD AUTO: 0.06 K/UL
BASOPHILS NFR BLD AUTO: 0.9 %
EOSINOPHIL # BLD AUTO: 0.18 K/UL
EOSINOPHIL NFR BLD AUTO: 2.6 %
HCT VFR BLD CALC: 31.9 %
HGB BLD-MCNC: 9.2 G/DL
HPV HIGH+LOW RISK DNA PNL CVX: DETECTED
LYMPHOCYTES # BLD AUTO: 1.91 K/UL
LYMPHOCYTES NFR BLD AUTO: 28.1 %
MAN DIFF?: NORMAL
MCHC RBC-ENTMCNC: 22.2 PG
MCHC RBC-ENTMCNC: 28.8 GM/DL
MCV RBC AUTO: 77.1 FL
MONOCYTES # BLD AUTO: 0.48 K/UL
MONOCYTES NFR BLD AUTO: 7 %
NEUTROPHILS # BLD AUTO: 4.05 K/UL
NEUTROPHILS NFR BLD AUTO: 59.6 %
PLATELET # BLD AUTO: 365 K/UL
RBC # BLD: 4.14 M/UL
RBC # FLD: 20.5 %
WBC # FLD AUTO: 6.8 K/UL

## 2023-04-05 ENCOUNTER — ASOB RESULT (OUTPATIENT)
Age: 53
End: 2023-04-05

## 2023-04-05 ENCOUNTER — APPOINTMENT (OUTPATIENT)
Dept: OBGYN | Facility: CLINIC | Age: 53
End: 2023-04-05
Payer: COMMERCIAL

## 2023-04-05 PROCEDURE — 76830 TRANSVAGINAL US NON-OB: CPT

## 2023-04-25 ENCOUNTER — NON-APPOINTMENT (OUTPATIENT)
Age: 53
End: 2023-04-25

## 2023-08-10 ENCOUNTER — NON-APPOINTMENT (OUTPATIENT)
Age: 53
End: 2023-08-10

## 2023-09-12 ENCOUNTER — NON-APPOINTMENT (OUTPATIENT)
Age: 53
End: 2023-09-12

## 2023-09-14 ENCOUNTER — APPOINTMENT (OUTPATIENT)
Dept: OBGYN | Facility: CLINIC | Age: 53
End: 2023-09-14
Payer: COMMERCIAL

## 2023-09-14 VITALS — WEIGHT: 110 LBS | SYSTOLIC BLOOD PRESSURE: 111 MMHG | BODY MASS INDEX: 18.88 KG/M2 | DIASTOLIC BLOOD PRESSURE: 76 MMHG

## 2023-09-14 DIAGNOSIS — N63.41 UNSPECIFIED LUMP IN RIGHT BREAST, SUBAREOLAR: ICD-10-CM

## 2023-09-14 LAB — HCG UR QL: NEGATIVE

## 2023-09-14 PROCEDURE — 99213 OFFICE O/P EST LOW 20 MIN: CPT

## 2023-09-14 PROCEDURE — 81025 URINE PREGNANCY TEST: CPT

## 2023-11-09 ENCOUNTER — NON-APPOINTMENT (OUTPATIENT)
Age: 53
End: 2023-11-09

## 2023-11-15 ENCOUNTER — EMERGENCY (EMERGENCY)
Facility: HOSPITAL | Age: 53
LOS: 1 days | Discharge: HOME | End: 2023-11-15
Attending: STUDENT IN AN ORGANIZED HEALTH CARE EDUCATION/TRAINING PROGRAM
Payer: COMMERCIAL

## 2023-11-15 VITALS
SYSTOLIC BLOOD PRESSURE: 109 MMHG | WEIGHT: 115.08 LBS | DIASTOLIC BLOOD PRESSURE: 77 MMHG | OXYGEN SATURATION: 97 % | TEMPERATURE: 99 F | RESPIRATION RATE: 18 BRPM | HEIGHT: 64 IN | HEART RATE: 113 BPM

## 2023-11-15 VITALS
DIASTOLIC BLOOD PRESSURE: 70 MMHG | HEART RATE: 72 BPM | SYSTOLIC BLOOD PRESSURE: 106 MMHG | RESPIRATION RATE: 18 BRPM | TEMPERATURE: 98 F | OXYGEN SATURATION: 98 %

## 2023-11-15 DIAGNOSIS — Z98.890 OTHER SPECIFIED POSTPROCEDURAL STATES: Chronic | ICD-10-CM

## 2023-11-15 DIAGNOSIS — Z90.89 ACQUIRED ABSENCE OF OTHER ORGANS: Chronic | ICD-10-CM

## 2023-11-15 LAB
ALBUMIN SERPL ELPH-MCNC: 3.6 G/DL — SIGNIFICANT CHANGE UP (ref 3.3–5)
ALBUMIN SERPL ELPH-MCNC: 3.6 G/DL — SIGNIFICANT CHANGE UP (ref 3.3–5)
ALP SERPL-CCNC: 75 U/L — SIGNIFICANT CHANGE UP (ref 40–120)
ALP SERPL-CCNC: 75 U/L — SIGNIFICANT CHANGE UP (ref 40–120)
ALT FLD-CCNC: 5 U/L — LOW (ref 10–45)
ALT FLD-CCNC: 5 U/L — LOW (ref 10–45)
ANION GAP SERPL CALC-SCNC: 14 MMOL/L — SIGNIFICANT CHANGE UP (ref 5–17)
ANION GAP SERPL CALC-SCNC: 14 MMOL/L — SIGNIFICANT CHANGE UP (ref 5–17)
APTT BLD: 29.9 SEC — SIGNIFICANT CHANGE UP (ref 24.5–35.6)
APTT BLD: 29.9 SEC — SIGNIFICANT CHANGE UP (ref 24.5–35.6)
AST SERPL-CCNC: 19 U/L — SIGNIFICANT CHANGE UP (ref 10–40)
AST SERPL-CCNC: 19 U/L — SIGNIFICANT CHANGE UP (ref 10–40)
BASOPHILS # BLD AUTO: 0 K/UL — SIGNIFICANT CHANGE UP (ref 0–0.2)
BASOPHILS # BLD AUTO: 0 K/UL — SIGNIFICANT CHANGE UP (ref 0–0.2)
BASOPHILS NFR BLD AUTO: 0 % — SIGNIFICANT CHANGE UP (ref 0–2)
BASOPHILS NFR BLD AUTO: 0 % — SIGNIFICANT CHANGE UP (ref 0–2)
BILIRUB SERPL-MCNC: 0.4 MG/DL — SIGNIFICANT CHANGE UP (ref 0.2–1.2)
BILIRUB SERPL-MCNC: 0.4 MG/DL — SIGNIFICANT CHANGE UP (ref 0.2–1.2)
BLD GP AB SCN SERPL QL: NEGATIVE — SIGNIFICANT CHANGE UP
BLD GP AB SCN SERPL QL: NEGATIVE — SIGNIFICANT CHANGE UP
BUN SERPL-MCNC: 11 MG/DL — SIGNIFICANT CHANGE UP (ref 7–23)
BUN SERPL-MCNC: 11 MG/DL — SIGNIFICANT CHANGE UP (ref 7–23)
BURR CELLS BLD QL SMEAR: PRESENT — SIGNIFICANT CHANGE UP
BURR CELLS BLD QL SMEAR: PRESENT — SIGNIFICANT CHANGE UP
CALCIUM SERPL-MCNC: 9 MG/DL — SIGNIFICANT CHANGE UP (ref 8.4–10.5)
CALCIUM SERPL-MCNC: 9 MG/DL — SIGNIFICANT CHANGE UP (ref 8.4–10.5)
CHLORIDE SERPL-SCNC: 97 MMOL/L — SIGNIFICANT CHANGE UP (ref 96–108)
CHLORIDE SERPL-SCNC: 97 MMOL/L — SIGNIFICANT CHANGE UP (ref 96–108)
CO2 SERPL-SCNC: 22 MMOL/L — SIGNIFICANT CHANGE UP (ref 22–31)
CO2 SERPL-SCNC: 22 MMOL/L — SIGNIFICANT CHANGE UP (ref 22–31)
CREAT SERPL-MCNC: 0.62 MG/DL — SIGNIFICANT CHANGE UP (ref 0.5–1.3)
CREAT SERPL-MCNC: 0.62 MG/DL — SIGNIFICANT CHANGE UP (ref 0.5–1.3)
EGFR: 105 ML/MIN/1.73M2 — SIGNIFICANT CHANGE UP
EGFR: 105 ML/MIN/1.73M2 — SIGNIFICANT CHANGE UP
EOSINOPHIL # BLD AUTO: 0.19 K/UL — SIGNIFICANT CHANGE UP (ref 0–0.5)
EOSINOPHIL # BLD AUTO: 0.19 K/UL — SIGNIFICANT CHANGE UP (ref 0–0.5)
EOSINOPHIL NFR BLD AUTO: 1.8 % — SIGNIFICANT CHANGE UP (ref 0–6)
EOSINOPHIL NFR BLD AUTO: 1.8 % — SIGNIFICANT CHANGE UP (ref 0–6)
GIANT PLATELETS BLD QL SMEAR: PRESENT — SIGNIFICANT CHANGE UP
GIANT PLATELETS BLD QL SMEAR: PRESENT — SIGNIFICANT CHANGE UP
GLUCOSE SERPL-MCNC: 111 MG/DL — HIGH (ref 70–99)
GLUCOSE SERPL-MCNC: 111 MG/DL — HIGH (ref 70–99)
HCT VFR BLD CALC: 23.5 % — LOW (ref 34.5–45)
HCT VFR BLD CALC: 23.5 % — LOW (ref 34.5–45)
HCT VFR BLD CALC: 24.9 % — LOW (ref 34.5–45)
HCT VFR BLD CALC: 24.9 % — LOW (ref 34.5–45)
HGB BLD-MCNC: 7.1 G/DL — LOW (ref 11.5–15.5)
HGB BLD-MCNC: 7.1 G/DL — LOW (ref 11.5–15.5)
HGB BLD-MCNC: 7.6 G/DL — LOW (ref 11.5–15.5)
HGB BLD-MCNC: 7.6 G/DL — LOW (ref 11.5–15.5)
HYPOCHROMIA BLD QL: SLIGHT — SIGNIFICANT CHANGE UP
HYPOCHROMIA BLD QL: SLIGHT — SIGNIFICANT CHANGE UP
INR BLD: 1.45 RATIO — HIGH (ref 0.85–1.18)
INR BLD: 1.45 RATIO — HIGH (ref 0.85–1.18)
LYMPHOCYTES # BLD AUTO: 1.51 K/UL — SIGNIFICANT CHANGE UP (ref 1–3.3)
LYMPHOCYTES # BLD AUTO: 1.51 K/UL — SIGNIFICANT CHANGE UP (ref 1–3.3)
LYMPHOCYTES # BLD AUTO: 14 % — SIGNIFICANT CHANGE UP (ref 13–44)
LYMPHOCYTES # BLD AUTO: 14 % — SIGNIFICANT CHANGE UP (ref 13–44)
MANUAL SMEAR VERIFICATION: SIGNIFICANT CHANGE UP
MANUAL SMEAR VERIFICATION: SIGNIFICANT CHANGE UP
MCHC RBC-ENTMCNC: 22 PG — LOW (ref 27–34)
MCHC RBC-ENTMCNC: 22 PG — LOW (ref 27–34)
MCHC RBC-ENTMCNC: 22.4 PG — LOW (ref 27–34)
MCHC RBC-ENTMCNC: 22.4 PG — LOW (ref 27–34)
MCHC RBC-ENTMCNC: 30.2 GM/DL — LOW (ref 32–36)
MCHC RBC-ENTMCNC: 30.2 GM/DL — LOW (ref 32–36)
MCHC RBC-ENTMCNC: 30.5 GM/DL — LOW (ref 32–36)
MCHC RBC-ENTMCNC: 30.5 GM/DL — LOW (ref 32–36)
MCV RBC AUTO: 73 FL — LOW (ref 80–100)
MCV RBC AUTO: 73 FL — LOW (ref 80–100)
MCV RBC AUTO: 73.2 FL — LOW (ref 80–100)
MCV RBC AUTO: 73.2 FL — LOW (ref 80–100)
MICROCYTES BLD QL: SLIGHT — SIGNIFICANT CHANGE UP
MICROCYTES BLD QL: SLIGHT — SIGNIFICANT CHANGE UP
MONOCYTES # BLD AUTO: 0.75 K/UL — SIGNIFICANT CHANGE UP (ref 0–0.9)
MONOCYTES # BLD AUTO: 0.75 K/UL — SIGNIFICANT CHANGE UP (ref 0–0.9)
MONOCYTES NFR BLD AUTO: 7 % — SIGNIFICANT CHANGE UP (ref 2–14)
MONOCYTES NFR BLD AUTO: 7 % — SIGNIFICANT CHANGE UP (ref 2–14)
NEUTROPHILS # BLD AUTO: 8.32 K/UL — HIGH (ref 1.8–7.4)
NEUTROPHILS # BLD AUTO: 8.32 K/UL — HIGH (ref 1.8–7.4)
NEUTROPHILS NFR BLD AUTO: 77.2 % — HIGH (ref 43–77)
NEUTROPHILS NFR BLD AUTO: 77.2 % — HIGH (ref 43–77)
NRBC # BLD: 0 /100 WBCS — SIGNIFICANT CHANGE UP (ref 0–0)
NRBC # BLD: 0 /100 WBCS — SIGNIFICANT CHANGE UP (ref 0–0)
OVALOCYTES BLD QL SMEAR: SLIGHT — SIGNIFICANT CHANGE UP
OVALOCYTES BLD QL SMEAR: SLIGHT — SIGNIFICANT CHANGE UP
PLAT MORPH BLD: NORMAL — SIGNIFICANT CHANGE UP
PLAT MORPH BLD: NORMAL — SIGNIFICANT CHANGE UP
PLATELET # BLD AUTO: 407 K/UL — HIGH (ref 150–400)
PLATELET # BLD AUTO: 407 K/UL — HIGH (ref 150–400)
PLATELET # BLD AUTO: 432 K/UL — HIGH (ref 150–400)
PLATELET # BLD AUTO: 432 K/UL — HIGH (ref 150–400)
POIKILOCYTOSIS BLD QL AUTO: SLIGHT — SIGNIFICANT CHANGE UP
POIKILOCYTOSIS BLD QL AUTO: SLIGHT — SIGNIFICANT CHANGE UP
POLYCHROMASIA BLD QL SMEAR: SLIGHT — SIGNIFICANT CHANGE UP
POLYCHROMASIA BLD QL SMEAR: SLIGHT — SIGNIFICANT CHANGE UP
POTASSIUM SERPL-MCNC: 3.9 MMOL/L — SIGNIFICANT CHANGE UP (ref 3.5–5.3)
POTASSIUM SERPL-MCNC: 3.9 MMOL/L — SIGNIFICANT CHANGE UP (ref 3.5–5.3)
POTASSIUM SERPL-SCNC: 3.9 MMOL/L — SIGNIFICANT CHANGE UP (ref 3.5–5.3)
POTASSIUM SERPL-SCNC: 3.9 MMOL/L — SIGNIFICANT CHANGE UP (ref 3.5–5.3)
PROT SERPL-MCNC: 7.5 G/DL — SIGNIFICANT CHANGE UP (ref 6–8.3)
PROT SERPL-MCNC: 7.5 G/DL — SIGNIFICANT CHANGE UP (ref 6–8.3)
PROTHROM AB SERPL-ACNC: 15.1 SEC — HIGH (ref 9.5–13)
PROTHROM AB SERPL-ACNC: 15.1 SEC — HIGH (ref 9.5–13)
RBC # BLD: 3.22 M/UL — LOW (ref 3.8–5.2)
RBC # BLD: 3.22 M/UL — LOW (ref 3.8–5.2)
RBC # BLD: 3.4 M/UL — LOW (ref 3.8–5.2)
RBC # BLD: 3.4 M/UL — LOW (ref 3.8–5.2)
RBC # FLD: 16.2 % — HIGH (ref 10.3–14.5)
RBC # FLD: 16.2 % — HIGH (ref 10.3–14.5)
RBC # FLD: 16.3 % — HIGH (ref 10.3–14.5)
RBC # FLD: 16.3 % — HIGH (ref 10.3–14.5)
RBC BLD AUTO: ABNORMAL
RBC BLD AUTO: ABNORMAL
RH IG SCN BLD-IMP: POSITIVE — SIGNIFICANT CHANGE UP
RH IG SCN BLD-IMP: POSITIVE — SIGNIFICANT CHANGE UP
SCHISTOCYTES BLD QL AUTO: SLIGHT — SIGNIFICANT CHANGE UP
SCHISTOCYTES BLD QL AUTO: SLIGHT — SIGNIFICANT CHANGE UP
SODIUM SERPL-SCNC: 133 MMOL/L — LOW (ref 135–145)
SODIUM SERPL-SCNC: 133 MMOL/L — LOW (ref 135–145)
WBC # BLD: 10.71 K/UL — HIGH (ref 3.8–10.5)
WBC # BLD: 10.71 K/UL — HIGH (ref 3.8–10.5)
WBC # BLD: 10.78 K/UL — HIGH (ref 3.8–10.5)
WBC # BLD: 10.78 K/UL — HIGH (ref 3.8–10.5)
WBC # FLD AUTO: 10.71 K/UL — HIGH (ref 3.8–10.5)
WBC # FLD AUTO: 10.71 K/UL — HIGH (ref 3.8–10.5)
WBC # FLD AUTO: 10.78 K/UL — HIGH (ref 3.8–10.5)
WBC # FLD AUTO: 10.78 K/UL — HIGH (ref 3.8–10.5)

## 2023-11-15 PROCEDURE — 86850 RBC ANTIBODY SCREEN: CPT

## 2023-11-15 PROCEDURE — 96374 THER/PROPH/DIAG INJ IV PUSH: CPT

## 2023-11-15 PROCEDURE — 86923 COMPATIBILITY TEST ELECTRIC: CPT

## 2023-11-15 PROCEDURE — 99285 EMERGENCY DEPT VISIT HI MDM: CPT

## 2023-11-15 PROCEDURE — 76856 US EXAM PELVIC COMPLETE: CPT | Mod: 26

## 2023-11-15 PROCEDURE — 80053 COMPREHEN METABOLIC PANEL: CPT

## 2023-11-15 PROCEDURE — 85730 THROMBOPLASTIN TIME PARTIAL: CPT

## 2023-11-15 PROCEDURE — 36430 TRANSFUSION BLD/BLD COMPNT: CPT

## 2023-11-15 PROCEDURE — 86901 BLOOD TYPING SEROLOGIC RH(D): CPT

## 2023-11-15 PROCEDURE — 85027 COMPLETE CBC AUTOMATED: CPT

## 2023-11-15 PROCEDURE — 85610 PROTHROMBIN TIME: CPT

## 2023-11-15 PROCEDURE — P9040: CPT

## 2023-11-15 PROCEDURE — 99285 EMERGENCY DEPT VISIT HI MDM: CPT | Mod: 25

## 2023-11-15 PROCEDURE — 36415 COLL VENOUS BLD VENIPUNCTURE: CPT

## 2023-11-15 PROCEDURE — 86900 BLOOD TYPING SEROLOGIC ABO: CPT

## 2023-11-15 PROCEDURE — 85025 COMPLETE CBC W/AUTO DIFF WBC: CPT

## 2023-11-15 PROCEDURE — 76856 US EXAM PELVIC COMPLETE: CPT

## 2023-11-15 RX ORDER — TRANEXAMIC ACID 100 MG/ML
2 INJECTION, SOLUTION INTRAVENOUS
Qty: 84 | Refills: 0
Start: 2023-11-15 | End: 2023-11-28

## 2023-11-15 RX ORDER — KETOROLAC TROMETHAMINE 30 MG/ML
30 SYRINGE (ML) INJECTION ONCE
Refills: 0 | Status: DISCONTINUED | OUTPATIENT
Start: 2023-11-15 | End: 2023-11-15

## 2023-11-15 RX ADMIN — Medication 30 MILLIGRAM(S): at 03:59

## 2023-11-15 RX ADMIN — Medication 30 MILLIGRAM(S): at 04:16

## 2023-11-15 NOTE — ED ADULT NURSE REASSESSMENT NOTE - NS ED NURSE REASSESS COMMENT FT1
2 RN's present at bedside for confirmation to begin 1 unit of PRBC at this time. Consent signed and placed in chart. Pt educated on s/s of transfusion reaction and verbalizes understanding.

## 2023-11-15 NOTE — ED PROVIDER NOTE - PATIENT PORTAL LINK FT
You can access the FollowMyHealth Patient Portal offered by [facility] by registering at the following website: [email or web address]. By joining [employer]’s FollowMyHealth portal, you will also be able to view your health information using other applications (apps) compatible with our system.

## 2023-11-15 NOTE — ED ADULT NURSE NOTE - NSFALLUNIVINTERV_ED_ALL_ED
Bed/Stretcher in lowest position, wheels locked, appropriate side rails in place/Call bell, personal items and telephone in reach/Instruct patient to call for assistance before getting out of bed/chair/stretcher/Non-slip footwear applied when patient is off stretcher/[place] to call system/Physically safe environment - no spills, clutter or unnecessary equipment/Purposeful proactive rounding/Room/bathroom lighting operational, light cord in reach

## 2023-11-15 NOTE — ED PROVIDER NOTE - PROGRESS NOTE DETAILS
Sandra Rick PA-C: spoke with gyn. pending consult at this time Sandra Rick PA-C: spoke with gyn. requesting repage after imaging resulted. Sandra Rick PA-C: patient seen by gyn. recommend 1 unit prbc. states patient can go home after 1 unit and be discharged on TXA for 14 days. states patient should follow up with her gyn in 2 weeks. patient verbalized understanding and is in agreement with plan.

## 2023-11-15 NOTE — ED PROVIDER NOTE - OBJECTIVE STATEMENT
54 y/o female, hx of fibroids, presents to the ER for vaginal bleeding and pelvic pain. states has a very large fibroid that has been monitored. currently not scheduled for surgery. states supposed to have an US tomorrow but due to the pain came to the ER. states at times passes clots. denies f/n/v/d, CP, SOB, HA, dizziness, urinary symptoms, cough.

## 2023-11-15 NOTE — ED ADULT NURSE NOTE - OBJECTIVE STATEMENT
Pt is a 54 y/o female with PMH uterine fibroids presenting to the ED c/o vaginal bleeding. Pt reports heavy bleeding with clots x4 days, pt states known h/x of fibroids that she has been following with outpatient GYN, was supposed to have outpatient ultrasound done today but came to ED due to severe R suprapubic pain with persistent bleeding. Pt denies AC use, dizziness, SOB, chest pain, lightheadedness. Round protruding mass noted to R suprapubic region. Abdomen soft, tender to palpation around mass.

## 2023-11-15 NOTE — ED PROVIDER NOTE - ATTENDING APP SHARED VISIT CONTRIBUTION OF CARE
I, Marvin Irvin, performed a history and physical exam of the patient and discussed their management with the resident and/or advanced care provider. I reviewed the resident and/or advanced care provider's note and agree with the documented findings and plan of care. I was present and available for all procedures.    54 y/o female, hx of fibroids, presents to the ER for vaginal bleeding and pelvic pain. states has a very large fibroid that has been monitored. currently not scheduled for surgery. states supposed to have an US tomorrow but due to the pain came to the ER. states at times passes clots. denies f/n/v/d, CP, SOB, HA, dizziness, urinary symptoms, cough.    Well appearing and in NAD, head normal appearing atraumatic, trachea midline, no respiratory distress, lungs cta bilaterally, rrr no murmurs, soft NT ND, Palpable fibroid transabdominally abdomen, no visible extremity deformities, Alert and oriented, non focal neuro exam, skin warm and dry, normal affect and mood, no leg swelling, calf ttp or jvd    Patient presenting with heavy vaginal bleeding as well as possibly symptomatic anemia with fatigue otherwise acute on chronic lower abdominal pain around the fibroid no blood thinners will evaluate with screening labs ultrasound OB consult disposition pending work-up and reevaluation discussed with patient agreeable with plan unlikely appendicitis or perforated viscus

## 2023-11-15 NOTE — ED PROVIDER NOTE - NS ED ATTENDING STATEMENT MOD
This was a shared visit with the CANDELARIA. I reviewed and verified the documentation and independently performed the documented:

## 2023-11-15 NOTE — CONSULT NOTE ADULT - SUBJECTIVE AND OBJECTIVE BOX
FELICITAS MCCLELLAND  53y  Female [id number]    HPI:        Name of GYN Physician:   OBHx:    GynHx: Denies fibroids, cysts, endometriosis, STI's, Abnormal pap smears   PMH:  PSH:  Meds:  Allx:  Social History:  Denies smoking use, drug use, alcohol use.   +occasional social alcohol use    Vital Signs Last 24 Hrs  T(C): 37.1 (15 Nov 2023 02:46), Max: 37.1 (15 Nov 2023 02:46)  T(F): 98.8 (15 Nov 2023 02:46), Max: 98.8 (15 Nov 2023 02:46)  HR: 113 (15 Nov 2023 02:46) (113 - 113)  BP: 109/77 (15 Nov 2023 02:46) (109/77 - 109/77)  BP(mean): --  RR: 18 (15 Nov 2023 02:46) (18 - 18)  SpO2: 97% (15 Nov 2023 02:46) (97% - 97%)    Parameters below as of 15 Nov 2023 02:46  Patient On (Oxygen Delivery Method): room air        Physical Exam:   General: sitting comfortably in bed, NAD   HEENT: neck supple, full ROM  CV: RRR  Lungs: CTA b/l, good air flow b/l   Back: No CVA tenderness  Abd: Soft, non-tender, non-distended.  Bowel sounds present.    :  No bleeding on pad.    External labia wnl.  Bimanual exam with no cervical motion tenderness, uterus wnl, adnexa non palpable b/l.  Cervix closed vs. Cervix dilated  Speculum Exam: No active bleeding from os.  Physiologic discharge.    Ext: non-tender b/l, no edema     LABS:                I&O's Detail          RADIOLOGY & ADDITIONAL STUDIES:     FELICITAS MCCLELLAND  53y  Female [id number]    HPI:  53y  LMP 23 PMH fibroid uterus and endometriosis presents to the ED with vaginal bleeding and abdominal pain. Pt states that the bleeding is normal for her but the pain has been worse than usual. Historically she had regular periods however over the last six months to a year she has had irregular bleeding. She went as long as two months without getting her period and when she does bleed it tends to be heavy. She has never been on medication to help her bleeding and her only blood transfusion was s/p  section. Pt states that she has been using 6 pads daily, saturating them with some clots. She took Midol at home which didn't help but feels some relief after Toradol in the ED. She endorses some chills and had some diarrhea after taking a stool softener. Denies She denies dizziness/lightheadedness, fever, SOB, palpitations, dysuria, hematuria, urinary frequency.       Name of GYN Physician: Dr. Shah  OBHx:    CS x1 c/b PPH requiring blood transfusion   x1  MAB x3 s/p D&C x3  GynHx: fibroid uterus, endometriosis s/p resection. Denies cysts, STI's, Abnormal pap smears   PMH: anemia  PSH: CS x1, D&C x3, resection of endometriosis, tonsillectomy, oral surgery  Meds: Midol, PO iron  Allx: NKDA  Social History:  Denies smoking use, drug use,  +occasional social alcohol use    Vital Signs Last 24 Hrs  T(C): 37.1 (15 Nov 2023 02:46), Max: 37.1 (15 Nov 2023 02:46)  T(F): 98.8 (15 Nov 2023 02:46), Max: 98.8 (15 Nov 2023 02:46)  HR: 113 (15 Nov 2023 02:46) (113 - 113)  BP: 109/77 (15 Nov 2023 02:46) (109/77 - 109/77)  BP(mean): --  RR: 18 (15 Nov 2023 02:46) (18 - 18)  SpO2: 97% (15 Nov 2023 02:46) (97% - 97%)    Parameters below as of 15 Nov 2023 02:46  Patient On (Oxygen Delivery Method): room air        Physical Exam:   General: sitting comfortably in bed, NAD   Lungs: breathing comfortably on RA  Abd: Soft, non-tender, non-distended, large fibroid uterus palpable above umbilicus  :  pad 50% saturated with red blood, last changed an hour prior.  External labia wnl.  Bimanual exam with no cervical motion tenderness, large fibroid uterus,  adnexa non palpable b/l.    Speculum Exam: 20cc of blood and clot evacuated from vault. No active bleeding from os.   Ext: non-tender b/l, no edema     LABS:                          7.1    10.71 )-----------( 407      ( 15 Nov 2023 06:06 )             23.5   11-15-23 @ 04:00    133<L>  |  97  |  11             --------------------------< 111<H>     3.9  |  22  | 0.62    eGFR AA: --  eGFR N-AA: --    Calcium: 9.0  Phosphorus: --  Magnesium: --    AST: 19    ALT: 5<L>  AlkPhos: 75  Protein: 7.5  Albumin: 3.6  TBili: 0.4  D-Bili: --      RADIOLOGY & ADDITIONAL STUDIES:  < from: US Pelvis Complete (11.15.23 @ 04:59) >  ACC: [id number] EXAM:  US PELVIC COMPLETE   ORDERED BY: YENI LARSON     PROCEDURE DATE:  11/15/2023          INTERPRETATION:  CLINICAL INFORMATION: Vaginal bleeding for 3 days.   History of fibroids.    LMP: 2023    COMPARISON: CT abdomen and pelvis with IV contrast 2022   sonohysterogram 2014 pelvic ultrasound 2014    TECHNIQUE:  Transabdominal pelvic sonogram only.    FINDINGS:  Uterus: 17.9 cm x 10.0 cm x 13.1 cm. uterus is enlarged with multiple   fibroids including a 9.4 cm fundal intramural leiomyoma and a 3.9 cm   intramural leiomyoma within the lower uterine segment.  Endometrium: 10 mm. Within normal limits.    Right ovary: 4.2 cm x 3.1 cm x 3.7 cm. a 3.3 cm simple ovarian cyst is   noted.  Left ovary: 4.4 cm x 2.5 cm x 4.6 cm. Within normal limits.    Fluid: None.    IMPRESSION:  Fibroid uterus.        --- End of Report ---          MARGARITA MCGOVERN MD; Resident Radiologist  This document has been electronically signed.  TERRELL GREGG MD; Attending Radiologist  This document has been electronically signed. Nov 15 2023  5:30AM    < end of copied text >

## 2023-11-15 NOTE — CONSULT NOTE ADULT - ASSESSMENT
53y  LMP 23 PMH fibroid uterus and endometriosis presents to the ED with vaginal bleeding and abdominal pain. Pt with Hgb of 7.1, denies symptoms of anemia. No active bleeding on exam. Clinically stable.     - Transfuse for Hgb >8  - Start TXA 1300mg q8h until bleeding stops (prescription for 2-3 weeks to be sent by ED)  - Continue pain management with Motrin and Tylenol  - Pt will f/u with her OBGYN Dr. Shah in 2 weeks for further workup including endometrial biopsy    D/w Dr. Pablo Pierce PGY2

## 2023-11-15 NOTE — ED ADULT NURSE REASSESSMENT NOTE - NS ED NURSE REASSESS COMMENT FT1
Received report from Nata RICHTER. Patient resting comfortably in stretcher. A&Ox4. Patient in no acute distress. Patient currently has 1 unit of PRBCs infusing; Denies chest pain, SOB, headache, N/V/D, abdominal pain, fever/chills currently. Plan of care discussed. Safety and comfort measures maintained.

## 2023-11-16 ENCOUNTER — APPOINTMENT (OUTPATIENT)
Dept: OBGYN | Facility: CLINIC | Age: 53
End: 2023-11-16
Payer: COMMERCIAL

## 2023-11-16 VITALS — DIASTOLIC BLOOD PRESSURE: 66 MMHG | SYSTOLIC BLOOD PRESSURE: 102 MMHG

## 2023-11-16 PROCEDURE — 99213 OFFICE O/P EST LOW 20 MIN: CPT | Mod: 25

## 2023-11-16 PROCEDURE — 81025 URINE PREGNANCY TEST: CPT

## 2023-11-16 PROCEDURE — 36415 COLL VENOUS BLD VENIPUNCTURE: CPT

## 2023-11-16 PROCEDURE — 58100 BIOPSY OF UTERUS LINING: CPT

## 2023-11-19 LAB
BASOPHILS # BLD AUTO: 0.06 K/UL
BASOPHILS NFR BLD AUTO: 0.7 %
EOSINOPHIL # BLD AUTO: 0.27 K/UL
EOSINOPHIL NFR BLD AUTO: 3 %
HCG UR QL: NEGATIVE
HCT VFR BLD CALC: 27.2 %
HGB BLD-MCNC: 8.3 G/DL
IMM GRANULOCYTES NFR BLD AUTO: 0.3 %
LYMPHOCYTES # BLD AUTO: 1 K/UL
LYMPHOCYTES NFR BLD AUTO: 11.2 %
MAN DIFF?: NORMAL
MCHC RBC-ENTMCNC: 22.8 PG
MCHC RBC-ENTMCNC: 30.5 GM/DL
MCV RBC AUTO: 74.7 FL
MONOCYTES # BLD AUTO: 1.05 K/UL
MONOCYTES NFR BLD AUTO: 11.7 %
NEUTROPHILS # BLD AUTO: 6.54 K/UL
NEUTROPHILS NFR BLD AUTO: 73.1 %
PLATELET # BLD AUTO: 476 K/UL
RBC # BLD: 3.64 M/UL
RBC # FLD: 16.2 %
WBC # FLD AUTO: 8.95 K/UL

## 2023-11-27 LAB — CORE LAB BIOPSY: NORMAL

## 2023-11-29 ENCOUNTER — NON-APPOINTMENT (OUTPATIENT)
Age: 53
End: 2023-11-29

## 2023-12-06 ENCOUNTER — NON-APPOINTMENT (OUTPATIENT)
Age: 53
End: 2023-12-06

## 2023-12-06 ENCOUNTER — RESULT REVIEW (OUTPATIENT)
Age: 53
End: 2023-12-06

## 2023-12-12 ENCOUNTER — NON-APPOINTMENT (OUTPATIENT)
Age: 53
End: 2023-12-12

## 2023-12-17 ENCOUNTER — APPOINTMENT (OUTPATIENT)
Dept: MRI IMAGING | Facility: CLINIC | Age: 53
End: 2023-12-17
Payer: COMMERCIAL

## 2023-12-17 ENCOUNTER — OUTPATIENT (OUTPATIENT)
Dept: OUTPATIENT SERVICES | Facility: HOSPITAL | Age: 53
LOS: 1 days | End: 2023-12-17
Payer: COMMERCIAL

## 2023-12-17 DIAGNOSIS — D25.9 LEIOMYOMA OF UTERUS, UNSPECIFIED: ICD-10-CM

## 2023-12-17 DIAGNOSIS — Z98.890 OTHER SPECIFIED POSTPROCEDURAL STATES: Chronic | ICD-10-CM

## 2023-12-17 DIAGNOSIS — Z90.89 ACQUIRED ABSENCE OF OTHER ORGANS: Chronic | ICD-10-CM

## 2023-12-17 DIAGNOSIS — Z00.8 ENCOUNTER FOR OTHER GENERAL EXAMINATION: ICD-10-CM

## 2023-12-17 PROCEDURE — 74183 MRI ABD W/O CNTR FLWD CNTR: CPT | Mod: 26

## 2023-12-17 PROCEDURE — 72197 MRI PELVIS W/O & W/DYE: CPT | Mod: 26

## 2023-12-17 PROCEDURE — 74183 MRI ABD W/O CNTR FLWD CNTR: CPT

## 2023-12-17 PROCEDURE — A9585: CPT

## 2023-12-17 PROCEDURE — 72197 MRI PELVIS W/O & W/DYE: CPT

## 2023-12-18 ENCOUNTER — APPOINTMENT (OUTPATIENT)
Dept: OBGYN | Facility: CLINIC | Age: 53
End: 2023-12-18
Payer: COMMERCIAL

## 2023-12-18 VITALS — DIASTOLIC BLOOD PRESSURE: 71 MMHG | SYSTOLIC BLOOD PRESSURE: 116 MMHG

## 2023-12-18 PROCEDURE — 99213 OFFICE O/P EST LOW 20 MIN: CPT

## 2023-12-20 ENCOUNTER — NON-APPOINTMENT (OUTPATIENT)
Age: 53
End: 2023-12-20

## 2023-12-20 ENCOUNTER — APPOINTMENT (OUTPATIENT)
Dept: FAMILY MEDICINE | Facility: CLINIC | Age: 53
End: 2023-12-20
Payer: COMMERCIAL

## 2023-12-20 VITALS
TEMPERATURE: 97.6 F | OXYGEN SATURATION: 100 % | BODY MASS INDEX: 19.12 KG/M2 | DIASTOLIC BLOOD PRESSURE: 66 MMHG | HEART RATE: 98 BPM | HEIGHT: 64 IN | WEIGHT: 112 LBS | SYSTOLIC BLOOD PRESSURE: 108 MMHG | RESPIRATION RATE: 15 BRPM

## 2023-12-20 DIAGNOSIS — Z78.9 OTHER SPECIFIED HEALTH STATUS: ICD-10-CM

## 2023-12-20 DIAGNOSIS — Z87.42 PERSONAL HISTORY OF OTHER DISEASES OF THE FEMALE GENITAL TRACT: ICD-10-CM

## 2023-12-20 DIAGNOSIS — N92.0 EXCESSIVE AND FREQUENT MENSTRUATION WITH REGULAR CYCLE: ICD-10-CM

## 2023-12-20 DIAGNOSIS — K59.09 OTHER CONSTIPATION: ICD-10-CM

## 2023-12-20 DIAGNOSIS — Z00.00 ENCOUNTER FOR GENERAL ADULT MEDICAL EXAMINATION W/OUT ABNORMAL FINDINGS: ICD-10-CM

## 2023-12-20 DIAGNOSIS — Z82.49 FAMILY HISTORY OF ISCHEMIC HEART DISEASE AND OTHER DISEASES OF THE CIRCULATORY SYSTEM: ICD-10-CM

## 2023-12-20 PROCEDURE — 99214 OFFICE O/P EST MOD 30 MIN: CPT | Mod: 25

## 2023-12-20 PROCEDURE — 99396 PREV VISIT EST AGE 40-64: CPT | Mod: 25

## 2023-12-20 PROCEDURE — 36415 COLL VENOUS BLD VENIPUNCTURE: CPT

## 2023-12-20 PROCEDURE — 93000 ELECTROCARDIOGRAM COMPLETE: CPT

## 2023-12-21 PROBLEM — N92.0 HEAVY MENSTRUAL BLEEDING: Status: ACTIVE | Noted: 2021-12-23

## 2023-12-21 PROBLEM — K59.09 CHRONIC CONSTIPATION: Status: ACTIVE | Noted: 2023-12-20

## 2023-12-21 PROBLEM — Z00.00 ANNUAL PHYSICAL EXAM: Status: ACTIVE | Noted: 2023-12-20

## 2023-12-21 LAB
ALBUMIN SERPL ELPH-MCNC: 3.8 G/DL
ALP BLD-CCNC: 98 U/L
ALT SERPL-CCNC: 10 U/L
ANION GAP SERPL CALC-SCNC: 14 MMOL/L
AST SERPL-CCNC: 16 U/L
BASOPHILS # BLD AUTO: 0.07 K/UL
BASOPHILS NFR BLD AUTO: 0.8 %
BILIRUB SERPL-MCNC: 0.2 MG/DL
BUN SERPL-MCNC: 10 MG/DL
CALCIUM SERPL-MCNC: 9 MG/DL
CHLORIDE SERPL-SCNC: 93 MMOL/L
CHOLEST SERPL-MCNC: 130 MG/DL
CO2 SERPL-SCNC: 24 MMOL/L
CREAT SERPL-MCNC: 0.53 MG/DL
EGFR: 109 ML/MIN/1.73M2
EOSINOPHIL # BLD AUTO: 0.3 K/UL
EOSINOPHIL NFR BLD AUTO: 3.3 %
ESTIMATED AVERAGE GLUCOSE: 117 MG/DL
FERRITIN SERPL-MCNC: 218 NG/ML
FOLATE SERPL-MCNC: 11.3 NG/ML
GLUCOSE SERPL-MCNC: 71 MG/DL
HBA1C MFR BLD HPLC: 5.7 %
HCT VFR BLD CALC: 25.7 %
HDLC SERPL-MCNC: 35 MG/DL
HGB BLD-MCNC: 7.6 G/DL
IMM GRANULOCYTES NFR BLD AUTO: 0.3 %
IRON SATN MFR SERPL: 4 %
IRON SERPL-MCNC: 12 UG/DL
LDLC SERPL CALC-MCNC: 84 MG/DL
LYMPHOCYTES # BLD AUTO: 1.37 K/UL
LYMPHOCYTES NFR BLD AUTO: 15.3 %
MAN DIFF?: NORMAL
MCHC RBC-ENTMCNC: 21.3 PG
MCHC RBC-ENTMCNC: 29.6 GM/DL
MCV RBC AUTO: 72 FL
MONOCYTES # BLD AUTO: 1.07 K/UL
MONOCYTES NFR BLD AUTO: 11.9 %
NEUTROPHILS # BLD AUTO: 6.14 K/UL
NEUTROPHILS NFR BLD AUTO: 68.4 %
NONHDLC SERPL-MCNC: 95 MG/DL
PLATELET # BLD AUTO: 723 K/UL
POTASSIUM SERPL-SCNC: 4.8 MMOL/L
PROT SERPL-MCNC: 7.5 G/DL
RBC # BLD: 3.46 M/UL
RBC # BLD: 3.57 M/UL
RBC # FLD: 17.7 %
RETICS # AUTO: 0.8 %
RETICS AGGREG/RBC NFR: 27 K/UL
SODIUM SERPL-SCNC: 130 MMOL/L
TIBC SERPL-MCNC: 267 UG/DL
TRANSFERRIN SERPL-MCNC: 206 MG/DL
TRIGL SERPL-MCNC: 48 MG/DL
TSH SERPL-ACNC: 1.76 UIU/ML
UIBC SERPL-MCNC: 255 UG/DL
VIT B12 SERPL-MCNC: 710 PG/ML
WBC # FLD AUTO: 8.98 K/UL

## 2023-12-21 NOTE — HISTORY OF PRESENT ILLNESS
[FreeTextEntry1] : cc: new pt , physical , Anemia, fibroids ,constipation, tired   [de-identified] : Patient presented to establish care, needs physical, Pt denies CP,SOB,abd pian, pt was dgn with uterine fibroids, in process of MRI , possible surgical treatment. Pt with Hx of  recent blood transfusion due symptomatic Anemia to bleeding

## 2023-12-21 NOTE — REVIEW OF SYSTEMS
[Abdominal Pain] : no abdominal pain [Nausea] : no nausea [Constipation] : no constipation [Diarrhea] : diarrhea [Vomiting] : no vomiting [Heartburn] : no heartburn [Melena] : no melena [Negative] : Psychiatric [FreeTextEntry7] : + abd bloating  [FreeTextEntry8] : vaginal bleeding

## 2023-12-21 NOTE — PHYSICAL EXAM
[Soft] : abdomen soft [Non Tender] : non-tender [No HSM] : no HSM [Normal] : affect was normal and insight and judgment were intact [de-identified] : + suprapubic mass ( fibroid)

## 2023-12-21 NOTE — HEALTH RISK ASSESSMENT
[Good] : ~his/her~  mood as  good [Yes] : Yes [Monthly or less (1 pt)] : Monthly or less (1 point) [1 or 2 (0 pts)] : 1 or 2 (0 points) [Never (0 pts)] : Never (0 points) [No] : In the past 12 months have you used drugs other than those required for medical reasons? No [No falls in past year] : Patient reported no falls in the past year [0] : 1) Little interest or pleasure doing things: Not at all (0) [PHQ-2 Negative - No further assessment needed] : PHQ-2 Negative - No further assessment needed [Patient reported PAP Smear was normal] : Patient reported PAP Smear was normal [Patient reported colonoscopy was normal] : Patient reported colonoscopy was normal [None] : None [With Family] : lives with family [Employed] : employed [Graduate School] : graduate school [] :  [] :  [# Of Children ___] : has [unfilled] children [Sexually Active] : sexually active [Feels Safe at Home] : Feels safe at home [Smoke Detector] : smoke detector [Carbon Monoxide Detector] : carbon monoxide detector [Seat Belt] :  uses seat belt [Sunscreen] : uses sunscreen [With Patient/Caregiver] : , with patient/caregiver [Aggressive treatment] : aggressive treatment [Never] : Never [FreeTextEntry1] : fibroids, anemia ,constipation  [de-identified] : CRISTIAN [Audit-CScore] : 1 [de-identified] : walking  [de-identified] : healthy  [YPJ2Piqft] : 0 [Change in mental status noted] : No change in mental status noted [Language] : denies difficulty with language [MammogramDate] : 10/23 [PapSmearDate] : 01/23 [ColonoscopyDate] : 05/21 [ColonoscopyComments] : 10 years  [HIVDate] : 01/23 [HepatitisCDate] : 01/23 [AdvancecareDate] : 12/23

## 2024-01-03 ENCOUNTER — TRANSCRIPTION ENCOUNTER (OUTPATIENT)
Age: 54
End: 2024-01-03

## 2024-01-04 ENCOUNTER — APPOINTMENT (OUTPATIENT)
Dept: FAMILY MEDICINE | Facility: CLINIC | Age: 54
End: 2024-01-04
Payer: COMMERCIAL

## 2024-01-04 VITALS
DIASTOLIC BLOOD PRESSURE: 86 MMHG | WEIGHT: 112 LBS | SYSTOLIC BLOOD PRESSURE: 122 MMHG | HEART RATE: 110 BPM | RESPIRATION RATE: 16 BRPM | TEMPERATURE: 97.3 F | BODY MASS INDEX: 19.12 KG/M2 | HEIGHT: 64 IN | OXYGEN SATURATION: 98 %

## 2024-01-04 DIAGNOSIS — F41.8 OTHER SPECIFIED ANXIETY DISORDERS: ICD-10-CM

## 2024-01-04 DIAGNOSIS — D50.0 IRON DEFICIENCY ANEMIA SECONDARY TO BLOOD LOSS (CHRONIC): ICD-10-CM

## 2024-01-04 DIAGNOSIS — R93.89 ABNORMAL FINDINGS ON DIAGNOSTIC IMAGING OF OTHER SPECIFIED BODY STRUCTURES: ICD-10-CM

## 2024-01-04 DIAGNOSIS — E87.1 HYPO-OSMOLALITY AND HYPONATREMIA: ICD-10-CM

## 2024-01-04 PROCEDURE — 99215 OFFICE O/P EST HI 40 MIN: CPT

## 2024-01-04 NOTE — HISTORY OF PRESENT ILLNESS
[Family Member] : family member [FreeTextEntry1] : cc; abn MRI . Anemia, low Na [de-identified] : Patient presented with her siter, worried abut her last abd MRI results -uterine  fibroids problem and ling nodule ,new, Pt is non smoker, She denies CP,SOB,abd pain, Her loast BW show ANemia due to menorrhagia,, Hematol eval 01/16/24 re: FE IV  her NA is low. too. pt is very Anxious due to her dgn and incidental lung, finding,

## 2024-01-04 NOTE — HEALTH RISK ASSESSMENT
[Yes] : Yes [1 or 2 (0 pts)] : 1 or 2 (0 points) [Never (0 pts)] : Never (0 points) [No] : In the past 12 months have you used drugs other than those required for medical reasons? No [Audit-CScore] : 1

## 2024-01-04 NOTE — PHYSICAL EXAM
[No Edema] : there was no peripheral edema [Soft] : abdomen soft [No HSM] : no HSM [Normal] : affect was normal and insight and judgment were intact [de-identified] : + enlarge uterus

## 2024-01-04 NOTE — REVIEW OF SYSTEMS
[Dysmenorrhea] : dysmenorrhea [Insomnia] : insomnia [Anxiety] : anxiety [Depression] : no depression [Negative] : Neurological

## 2024-01-08 ENCOUNTER — APPOINTMENT (OUTPATIENT)
Dept: PULMONOLOGY | Facility: CLINIC | Age: 54
End: 2024-01-08
Payer: COMMERCIAL

## 2024-01-08 VITALS
HEIGHT: 64 IN | BODY MASS INDEX: 19.12 KG/M2 | OXYGEN SATURATION: 100 % | SYSTOLIC BLOOD PRESSURE: 130 MMHG | DIASTOLIC BLOOD PRESSURE: 83 MMHG | HEART RATE: 115 BPM | WEIGHT: 112 LBS

## 2024-01-08 PROCEDURE — ZZZZZ: CPT

## 2024-01-08 PROCEDURE — 94726 PLETHYSMOGRAPHY LUNG VOLUMES: CPT

## 2024-01-08 PROCEDURE — 94729 DIFFUSING CAPACITY: CPT

## 2024-01-08 PROCEDURE — 99204 OFFICE O/P NEW MOD 45 MIN: CPT | Mod: 25

## 2024-01-08 PROCEDURE — 94010 BREATHING CAPACITY TEST: CPT

## 2024-01-08 NOTE — HISTORY OF PRESENT ILLNESS
[TextBox_4] : 53F with uterine fibroid, went for MRI abdomen which showed a 2cm lung lesion.  no prior history of pulmonary disease. no sob/cough/wheeze no asthma  never smoker.

## 2024-01-08 NOTE — PROCEDURE
[FreeTextEntry1] : PFT: Normal spirometry.  Lung volumes normal. DLCO normal.  Reviewed:  EXAM: [id number] - MR PELVIS WAW IC - ORDERED BY: PETER JASON  EXAM: [id number] - MR ABDOMEN WAW IC - ORDERED BY: PETER JASON   PROCEDURE DATE: 2023    INTERPRETATION: CLINICAL INFORMATION: Uterine fibroids.. Endometriosis. Surgical planning.  COMPARISON: CT chest abdomen pelvis 2022. Pelvic sonogram 11/15/2023.  CONTRAST/COMPLICATIONS: IV Contrast: Gadavist 5 cc administered 2.5 cc discarded Oral Contrast: NONE Complications: None reported at time of study completion  PROCEDURE: MRI of the abdomen and pelvis was performed.   FINDINGS: LOWER CHEST: A new right lower lobe 2.1 cm enhancing nodule.  LIVER: Within normal limits. BILE DUCTS: Normal caliber. GALLBLADDER: Within normal limits. SPLEEN: Within normal limits. PANCREAS: Within normal limits. ADRENALS: Within normal limits. KIDNEYS/URETERS: Within normal limits.  BLADDER: Within normal limits. REPRODUCTIVE ORGANS: Status post . Enlarged leiomyomatous uterus measuring 19.1 x 9.9 x 14.1 cm. Reference fibroids: -A left fundal subserosal broad-based exophytic fibroid with moderate enhancement measuring 4.4 x 4.0 x 5.2 cm. -An anterior intramural fibroid with areas of degeneration demonstrating heterogeneous enhancement measuring 11.4 x 7.4 x 9.7 cm with endometrial distortion. -An anterior lower uterine segment intramural fibroid measuring 4.8 x 4.1 x 4.1 cm with homogeneous enhancement with central necrosis causing endometrial distortion. Additional smaller intramural fibroids without significant enhancement.  BOWEL: No bowel obstruction. PERITONEUM: No ascites. VESSELS: Within normal limits. RETROPERITONEUM/LYMPH NODES: No lymphadenopathy. ABDOMINAL WALL: Within normal limits. BONES: Within normal limits.  IMPRESSION: 1. Enlarged uterus with multiple uterine fibroids as described above. 2. No evidence of endometriosis. 3. A new 2.1 cm right lower lobe enhancing nodule. Recommend further evaluation with CT chest.   --- End of Report ---       AZ COKER MD; Attending Radiologist This document has been electronically signed. Dec 29 2023 8:53AM     ACC: [id number] EXAM: CT ANGIO CHEST PULM ART [facility] ACC: [id number] EXAM: CT ABDOMEN AND PELVIS IC  PROCEDURE DATE: 2022    INTERPRETATION: CLINICAL INFORMATION: Elevated d-dimer. Left chest and left upper quadrant pain  COMPARISON: CT abdomen pelvis 2007  CONTRAST/COMPLICATIONS: IV Contrast: IV contrast documented in associated exam (accession [id number]), Omnipaque 350 (accession [id number]) 90 cc administered 0 cc discarded Oral Contrast: NONE Complications: None reported at time of study completion  PROCEDURE: CT Angiography of the Chest was performed followed by portal venous phase imaging of the Abdomen and Pelvis. Sagittal and coronal reformats were performed as well as 3D (MIP) reconstructions.  FINDINGS: Motion artifacts degrade some of the imaging. CHEST: LUNGS AND LARGE AIRWAYS: Respiratory motion artifact. Mild dependent atelectasis. Mild biapical scarring. Few scattered noncalcified pulmonary nodules measuring under 3 mm for example within the right lower lobe PLEURA: No pleural effusion. VESSELS: Within normal limits. HEART: Heart size is normal. No pericardial effusion. MEDIASTINUM AND PEDRO LUIS: No lymphadenopathy. CHEST WALL AND LOWER NECK: No significant acute abnormality.  ABDOMEN AND PELVIS: LIVER: Within normal limits. BILE DUCTS: Normal caliber. GALLBLADDER: Suggests sludge versus noncalcified gallstones. SPLEEN: Within normal limits. PANCREAS: Within normal limits. ADRENALS: Within normal limits. KIDNEYS/URETERS: Symmetric renal enhancement without hydronephrosis.  BLADDER: Within normal limits. REPRODUCTIVE ORGANS: Enlarged lobular leiomyomatous uterus with fundus which almost extends to the level of the umbilicus and containing numerous some sizable myomata. Right adnexal 1.8 cm cystic lesion  BOWEL: No bowel obstruction. Appendix is not visualized. No evidence of inflammation in the pericecal region. Colonic diverticulosis. Decompressed stomach likely accentuates gastric wall thickness PERITONEUM: No ascites. VESSELS: Within normal limits. RETROPERITONEUM/LYMPH NODES: No lymphadenopathy. ABDOMINAL WALL: Mid abdominal contour deformity to the right of midline is secondary to the enlarged leiomyomatous uterus BONES: No significant acute bony abnormality.  IMPRESSION:  No acute pulmonary embolism or acute pulmonary disease  Enlarged leiomyomatous uterus which results in abdominal surface contour deformity   --- End of Report ---      YOLI COSTA MD; Attending Radiologist This document has been electronically signed. Aug 22 2022 1:15PM

## 2024-01-09 ENCOUNTER — OUTPATIENT (OUTPATIENT)
Dept: OUTPATIENT SERVICES | Facility: HOSPITAL | Age: 54
LOS: 1 days | Discharge: HOME | End: 2024-01-09

## 2024-01-09 DIAGNOSIS — D50.9 IRON DEFICIENCY ANEMIA, UNSPECIFIED: ICD-10-CM

## 2024-01-09 DIAGNOSIS — Z98.890 OTHER SPECIFIED POSTPROCEDURAL STATES: Chronic | ICD-10-CM

## 2024-01-09 DIAGNOSIS — Z90.89 ACQUIRED ABSENCE OF OTHER ORGANS: Chronic | ICD-10-CM

## 2024-01-11 ENCOUNTER — APPOINTMENT (OUTPATIENT)
Dept: OBGYN | Facility: CLINIC | Age: 54
End: 2024-01-11
Payer: COMMERCIAL

## 2024-01-11 VITALS
HEIGHT: 64 IN | WEIGHT: 109 LBS | DIASTOLIC BLOOD PRESSURE: 87 MMHG | BODY MASS INDEX: 18.61 KG/M2 | SYSTOLIC BLOOD PRESSURE: 132 MMHG

## 2024-01-11 PROCEDURE — 99213 OFFICE O/P EST LOW 20 MIN: CPT

## 2024-01-11 PROCEDURE — 99203 OFFICE O/P NEW LOW 30 MIN: CPT

## 2024-01-13 ENCOUNTER — APPOINTMENT (OUTPATIENT)
Dept: CT IMAGING | Facility: CLINIC | Age: 54
End: 2024-01-13
Payer: COMMERCIAL

## 2024-01-13 ENCOUNTER — OUTPATIENT (OUTPATIENT)
Dept: OUTPATIENT SERVICES | Facility: HOSPITAL | Age: 54
LOS: 1 days | End: 2024-01-13
Payer: COMMERCIAL

## 2024-01-13 DIAGNOSIS — Z90.89 ACQUIRED ABSENCE OF OTHER ORGANS: Chronic | ICD-10-CM

## 2024-01-13 DIAGNOSIS — Z98.890 OTHER SPECIFIED POSTPROCEDURAL STATES: Chronic | ICD-10-CM

## 2024-01-13 DIAGNOSIS — R91.8 OTHER NONSPECIFIC ABNORMAL FINDING OF LUNG FIELD: ICD-10-CM

## 2024-01-13 PROCEDURE — 71250 CT THORAX DX C-: CPT

## 2024-01-13 PROCEDURE — 71250 CT THORAX DX C-: CPT | Mod: 26

## 2024-01-15 PROBLEM — Z63.5 DIVORCED: Status: ACTIVE | Noted: 2024-01-15

## 2024-01-15 NOTE — PLAN
[FreeTextEntry1] : Discussed the option of continued conservative observation of fibroids vs surgical removal. Treatment options of myomectomy, hysterectomy, ufe and continued observation were also outlined. A detailed discussion regarding the option of myomectomy vs hysterectomy was also held and the risks, benefits, and alternatives provided. All questions answered with large mass and mri findings to schedule SHARRON bilateral salpingectomy pt seeing pulmonary/Hem. eval of abn MR/CT. During this visit 40 minutes were spent face-to-face with greater than 50% of the time dedicated to counseling.

## 2024-01-15 NOTE — HISTORY OF PRESENT ILLNESS
[FreeTextEntry1] : Pt is a 54 y/o  referred by Dr. Shah for consultation for laparoscopic hysterectomy for uterine fibroids. She first noticed the bulk a few years ago while lying flat. She bleeds heavily for long periods of time and passes large blood clots. She has had multiple blood transfusions for symptomatic anemia and is now on iron pills twice a day. Periods are painful. Reports occasional constipation as well. She is currently taking medroxyprogesterone to help with bleeding as well as Lysteda during periods. Last Hgb 7.6 on 23. Not actively bleeding at this time.  MRI 23: REPRODUCTIVE ORGANS: Status post . Enlarged leiomyomatous uterus measuring 19.1 x 9.9 x 14.1 cm. Reference fibroids: -A left fundal subserosal broad-based exophytic fibroid with moderate enhancement measuring 4.4 x 4.0 x 5.2 cm. -An anterior intramural fibroid with areas of degeneration demonstrating heterogeneous enhancement measuring 11.4 x 7.4 x 9.7 cm with endometrial distortion. -An anterior lower uterine segment intramural fibroid measuring 4.8 x 4.1 x 4.1 cm with homogeneous enhancement with central necrosis causing endometrial distortion. Additional smaller intramural fibroids without significant enhancement. IMPRESSION: 1.  Enlarged uterus with multiple uterine fibroids as described above. 2.  No evidence of endometriosis. 3.  A new 2.1 cm right lower lobe enhancing nodule. Recommend further evaluation with CT chest.  PMH: lung nodule > now s/p pulm c/s and awaiting CT scan PSH: 1x ?classical CS, endometrial polypectomy, 2xD&C, laparosopic removal of endometriosis OBHx: 1xSVD, 1xCS with hemorrhage, 2xTAB, 1xSAB GYNHx: h/o NILM, HPV+, endometriosis Meds: medroxyprogesterone, lysteda All: shellfish SocHx: 2 glasses of wine weekly, no smoking/drug use FamHx: maternal grandmother with endometrial ca (dx at 73), no bleeding/clotting disorders

## 2024-01-15 NOTE — PHYSICAL EXAM
[Chaperone Present] : A chaperone was present in the examining room during all aspects of the physical examination [FreeTextEntry1] : radha [Appropriately responsive] : appropriately responsive [No Lymphadenopathy] : no lymphadenopathy [Soft] : soft [Non-tender] : non-tender [FreeTextEntry7] : large mass 22-24 wk bulky mass

## 2024-01-16 ENCOUNTER — APPOINTMENT (OUTPATIENT)
Dept: HEMATOLOGY ONCOLOGY | Facility: CLINIC | Age: 54
End: 2024-01-16
Payer: COMMERCIAL

## 2024-01-16 ENCOUNTER — NON-APPOINTMENT (OUTPATIENT)
Age: 54
End: 2024-01-16

## 2024-01-16 VITALS
RESPIRATION RATE: 17 BRPM | WEIGHT: 112.41 LBS | HEIGHT: 63.78 IN | TEMPERATURE: 98.1 F | BODY MASS INDEX: 19.43 KG/M2 | DIASTOLIC BLOOD PRESSURE: 72 MMHG | SYSTOLIC BLOOD PRESSURE: 113 MMHG | HEART RATE: 100 BPM | OXYGEN SATURATION: 99 %

## 2024-01-16 DIAGNOSIS — Z63.5 DISRUPTION OF FAMILY BY SEPARATION AND DIVORCE: ICD-10-CM

## 2024-01-16 DIAGNOSIS — Z87.42 PERSONAL HISTORY OF OTHER DISEASES OF THE FEMALE GENITAL TRACT: ICD-10-CM

## 2024-01-16 PROCEDURE — 99205 OFFICE O/P NEW HI 60 MIN: CPT

## 2024-01-16 SDOH — SOCIAL STABILITY - SOCIAL INSECURITY: DISRUPTION OF FAMILY BY SEPARATION AND DIVORCE: Z63.5

## 2024-01-16 NOTE — CONSULT LETTER
[Dear  ___] : Dear  [unfilled], [Consult Letter:] : I had the pleasure of evaluating your patient, [unfilled]. [Please see my note below.] : Please see my note below. [Consult Closing:] : Thank you very much for allowing me to participate in the care of this patient.  If you have any questions, please do not hesitate to contact me. [Sincerely,] : Sincerely, [FreeTextEntry2] : Vanessa Schaefer MD [FreeTextEntry3] : AYANA BUSTOS M.D. Hematology/ Oncology [place] [address] Telephone: [phone] Fax: [phone]

## 2024-01-16 NOTE — HISTORY OF PRESENT ILLNESS
[de-identified] : Ms. MCCLELLAND is a 53 F, perimenopausal, with PMHx of uterine fibroid ( 24 week sized uterus), symptomatic anemia requiring PRBC transfusion x 1 in November 2023, endometriosis, referred for hematologic consultation of iron deficiency anemia ( hemoglobin 7.6g/dL, iron saturation 4%). Patient is premenopausal, mother of 2, endorsing heavy, irregular menses, sometimes lasting 1 month, some dizziness, fatigue. She underwent trial of Provera per GYN, now discontinued. Continues on Lysteda. Received PRBC transfusion , but never parenteral iron supplementation. Gives family history of endometrial cancer ( mother) and CHF ( father) Patient is preparing for total hysterectomy in February, under the care of Dr. Bruce London.    [FreeTextEntry1] :  parenteral iron supplementation

## 2024-01-16 NOTE — ASSESSMENT
[FreeTextEntry1] : Ms. MCCLELLAND 's questions were answered to her satisfaction.  She  expressed her  understanding and willingness to comply with the above recommendations, and  will return to the office in  6 weeks.

## 2024-01-17 ENCOUNTER — APPOINTMENT (OUTPATIENT)
Dept: PULMONOLOGY | Facility: CLINIC | Age: 54
End: 2024-01-17
Payer: COMMERCIAL

## 2024-01-17 PROCEDURE — 99213 OFFICE O/P EST LOW 20 MIN: CPT | Mod: 95

## 2024-01-18 ENCOUNTER — APPOINTMENT (OUTPATIENT)
Dept: FAMILY MEDICINE | Facility: CLINIC | Age: 54
End: 2024-01-18
Payer: COMMERCIAL

## 2024-01-18 VITALS
SYSTOLIC BLOOD PRESSURE: 118 MMHG | DIASTOLIC BLOOD PRESSURE: 78 MMHG | HEIGHT: 63.78 IN | TEMPERATURE: 97.5 F | HEART RATE: 79 BPM | OXYGEN SATURATION: 99 % | RESPIRATION RATE: 17 BRPM | BODY MASS INDEX: 19.36 KG/M2 | WEIGHT: 112 LBS

## 2024-01-18 DIAGNOSIS — R39.15 URGENCY OF URINATION: ICD-10-CM

## 2024-01-18 PROCEDURE — 99213 OFFICE O/P EST LOW 20 MIN: CPT

## 2024-01-18 NOTE — ASSESSMENT
[FreeTextEntry1] : Nonspecific urinary symptoms-mild suspicion for UTI-will send out UA/UC to further evaluate Encouraged to increase hydration

## 2024-01-18 NOTE — HISTORY OF PRESENT ILLNESS
[FreeTextEntry8] : Patient presents with symptoms of urinary urgency, and darker than usual urine. Mild odor.  Denies fever, chills, or back pains. Is suffering from intermittent abdominal pain that is likely from fibroids per patient. Symptoms overall mild.  Taking azo

## 2024-01-18 NOTE — PHYSICAL EXAM
[Normal] : no joint swelling and grossly normal strength and tone [de-identified] : Suprapubic mass noted

## 2024-01-19 LAB
APPEARANCE: CLEAR
BACTERIA: NEGATIVE /HPF
BILIRUBIN URINE: NEGATIVE
BLOOD URINE: NEGATIVE
CAST: 0 /LPF
COLOR: YELLOW
EPITHELIAL CELLS: 2 /HPF
GLUCOSE QUALITATIVE U: NEGATIVE MG/DL
KETONES URINE: NEGATIVE MG/DL
LEUKOCYTE ESTERASE URINE: ABNORMAL
MICROSCOPIC-UA: NORMAL
NITRITE URINE: NEGATIVE
PH URINE: 7.5
PROTEIN URINE: NEGATIVE MG/DL
RED BLOOD CELLS URINE: 2 /HPF
SPECIFIC GRAVITY URINE: 1.01
UROBILINOGEN URINE: 0.2 MG/DL
WHITE BLOOD CELLS URINE: 7 /HPF

## 2024-01-22 ENCOUNTER — APPOINTMENT (OUTPATIENT)
Dept: INFUSION THERAPY | Facility: HOSPITAL | Age: 54
End: 2024-01-22

## 2024-01-22 LAB — BACTERIA UR CULT: ABNORMAL

## 2024-01-22 RX ORDER — IBUPROFEN 200 MG
1 TABLET ORAL
Qty: 0 | Refills: 0 | DISCHARGE

## 2024-01-22 RX ORDER — ACETAMINOPHEN 500 MG
2 TABLET ORAL
Qty: 0 | Refills: 0 | DISCHARGE

## 2024-01-24 ENCOUNTER — NON-APPOINTMENT (OUTPATIENT)
Age: 54
End: 2024-01-24

## 2024-01-28 ENCOUNTER — NON-APPOINTMENT (OUTPATIENT)
Age: 54
End: 2024-01-28

## 2024-01-29 ENCOUNTER — APPOINTMENT (OUTPATIENT)
Dept: THORACIC SURGERY | Facility: CLINIC | Age: 54
End: 2024-01-29
Payer: COMMERCIAL

## 2024-01-29 ENCOUNTER — APPOINTMENT (OUTPATIENT)
Dept: INFUSION THERAPY | Facility: HOSPITAL | Age: 54
End: 2024-01-29

## 2024-01-29 ENCOUNTER — NON-APPOINTMENT (OUTPATIENT)
Age: 54
End: 2024-01-29

## 2024-01-29 VITALS
RESPIRATION RATE: 16 BRPM | HEIGHT: 64 IN | OXYGEN SATURATION: 99 % | DIASTOLIC BLOOD PRESSURE: 65 MMHG | WEIGHT: 112 LBS | BODY MASS INDEX: 19.12 KG/M2 | HEART RATE: 125 BPM | SYSTOLIC BLOOD PRESSURE: 111 MMHG

## 2024-01-29 PROCEDURE — 99244 OFF/OP CNSLTJ NEW/EST MOD 40: CPT

## 2024-01-29 RX ORDER — NITROFURANTOIN (MONOHYDRATE/MACROCRYSTALS) 25; 75 MG/1; MG/1
100 CAPSULE ORAL
Qty: 10 | Refills: 0 | Status: COMPLETED | COMMUNITY
Start: 2024-01-19 | End: 2024-01-29

## 2024-01-29 RX ORDER — TRANEXAMIC ACID 650 MG/1
650 TABLET ORAL
Qty: 10 | Refills: 4 | Status: COMPLETED | COMMUNITY
Start: 2021-12-23 | End: 2024-01-29

## 2024-01-29 RX ORDER — AZITHROMYCIN 250 MG/1
250 TABLET, FILM COATED ORAL
Qty: 6 | Refills: 0 | Status: COMPLETED | COMMUNITY
Start: 2023-07-26 | End: 2024-01-29

## 2024-02-01 NOTE — PHYSICAL EXAM
[General Appearance - Alert] : alert [General Appearance - In No Acute Distress] : in no acute distress [Sclera] : the sclera and conjunctiva were normal [PERRL With Normal Accommodation] : pupils were equal in size, round, and reactive to light [Extraocular Movements] : extraocular movements were intact [Outer Ear] : the ears and nose were normal in appearance [Oropharynx] : the oropharynx was normal [Neck Appearance] : the appearance of the neck was normal [Neck Cervical Mass (___cm)] : no neck mass was observed [Jugular Venous Distention Increased] : there was no jugular-venous distention [Thyroid Diffuse Enlargement] : the thyroid was not enlarged [Thyroid Nodule] : there were no palpable thyroid nodules [Auscultation Breath Sounds / Voice Sounds] : lungs were clear to auscultation bilaterally [Heart Rate And Rhythm] : heart rate was normal and rhythm regular [Heart Sounds] : normal S1 and S2 [Heart Sounds Gallop] : no gallops [Murmurs] : no murmurs [Heart Sounds Pericardial Friction Rub] : no pericardial rub [Examination Of The Chest] : the chest was normal in appearance [Chest Visual Inspection Thoracic Asymmetry] : no chest asymmetry [Diminished Respiratory Excursion] : normal chest expansion [2+] : left 2+ [Breast Appearance] : normal in appearance [Breast Palpation Mass] : no palpable masses [Bowel Sounds] : normal bowel sounds [Abdomen Tenderness] : non-tender [Cervical Lymph Nodes Enlarged Posterior Bilaterally] : posterior cervical [Cervical Lymph Nodes Enlarged Anterior Bilaterally] : anterior cervical [Supraclavicular Lymph Nodes Enlarged Bilaterally] : supraclavicular [No CVA Tenderness] : no ~M costovertebral angle tenderness [No Spinal Tenderness] : no spinal tenderness [Abnormal Walk] : normal gait [Nail Clubbing] : no clubbing  or cyanosis of the fingernails [Musculoskeletal - Swelling] : no joint swelling seen [Motor Tone] : muscle strength and tone were normal [Skin Color & Pigmentation] : normal skin color and pigmentation [Skin Turgor] : normal skin turgor [] : no rash [Deep Tendon Reflexes (DTR)] : deep tendon reflexes were 2+ and symmetric [Sensation] : the sensory exam was normal to light touch and pinprick [No Focal Deficits] : no focal deficits [Oriented To Time, Place, And Person] : oriented to person, place, and time [Impaired Insight] : insight and judgment were intact [Affect] : the affect was normal [FreeTextEntry1] : Deferred

## 2024-02-01 NOTE — CONSULT LETTER
[FreeTextEntry2] : Ora Andino MD (ref/pulm) [FreeTextEntry3] : Melissa Pope MD Attending Surgeon Division of Thoracic Surgery , [facility] [employer] [facility]

## 2024-02-01 NOTE — HISTORY OF PRESENT ILLNESS
Pt went into ER on 12/24 after being in A Fib all day Sunday. Pt took the PRN dose of Diltiazem, which did nothing. Pt was told to move up her OV from 1/17, but Pt feels that she is doing fine and would like to keep as is.  Pt is aware that if she has another episode to call the clinic and will get in for an EKG and plan. Pt states understanding. JNelsonRN   [FreeTextEntry1] : Ms. FELICITAS MCCLELLAND, 53 year old female, never smoker, w/ hx of TING, anxiety, uterine fibroid f/u GYN, plan for hysterectomy on 03/05/2024, preop MRI abdomen showed lung nodules incidentally, who referred by Dr. Andino for evaluation.   PFTs on 1/8/24: %, FEV1 93%, DLCO 75%.   CT Chest on 1/13/24: - multiple new solid nodules in both lungs, largest 2.5 cm in the posterior basal segment of the right lower lobe - new punctate branching nodules in the left lower lobe, likely due to bronchiolar impaction  Pt presents today for CT Sx consultation. Patient had cold when she was taking CT chest, no Abx. Patient denies shortness of breath, cough, chest pain, fever, chills, loss of appetite, weight loss, or hemoptysis.

## 2024-02-06 ENCOUNTER — APPOINTMENT (OUTPATIENT)
Dept: OBGYN | Facility: CLINIC | Age: 54
End: 2024-02-06
Payer: COMMERCIAL

## 2024-02-06 VITALS — DIASTOLIC BLOOD PRESSURE: 71 MMHG | SYSTOLIC BLOOD PRESSURE: 112 MMHG

## 2024-02-06 DIAGNOSIS — Z01.818 ENCOUNTER FOR OTHER PREPROCEDURAL EXAMINATION: ICD-10-CM

## 2024-02-06 DIAGNOSIS — B97.7 PAPILLOMAVIRUS AS THE CAUSE OF DISEASES CLASSIFIED ELSEWHERE: ICD-10-CM

## 2024-02-06 PROCEDURE — 99214 OFFICE O/P EST MOD 30 MIN: CPT

## 2024-02-07 ENCOUNTER — APPOINTMENT (OUTPATIENT)
Dept: NUCLEAR MEDICINE | Facility: CLINIC | Age: 54
End: 2024-02-07
Payer: COMMERCIAL

## 2024-02-07 ENCOUNTER — OUTPATIENT (OUTPATIENT)
Dept: OUTPATIENT SERVICES | Facility: HOSPITAL | Age: 54
LOS: 1 days | End: 2024-02-07
Payer: COMMERCIAL

## 2024-02-07 DIAGNOSIS — Z98.890 OTHER SPECIFIED POSTPROCEDURAL STATES: Chronic | ICD-10-CM

## 2024-02-07 DIAGNOSIS — R91.1 SOLITARY PULMONARY NODULE: ICD-10-CM

## 2024-02-07 DIAGNOSIS — Z90.89 ACQUIRED ABSENCE OF OTHER ORGANS: Chronic | ICD-10-CM

## 2024-02-07 PROCEDURE — 78815 PET IMAGE W/CT SKULL-THIGH: CPT | Mod: 26,PI

## 2024-02-07 PROCEDURE — 78815 PET IMAGE W/CT SKULL-THIGH: CPT

## 2024-02-07 PROCEDURE — A9552: CPT

## 2024-02-13 DIAGNOSIS — R39.9 UNSPECIFIED SYMPTOMS AND SIGNS INVOLVING THE GENITOURINARY SYSTEM: ICD-10-CM

## 2024-02-13 LAB
BACTERIA UR CULT: ABNORMAL
CYTOLOGY CVX/VAG DOC THIN PREP: ABNORMAL
HPV HIGH+LOW RISK DNA PNL CVX: NOT DETECTED

## 2024-02-14 ENCOUNTER — APPOINTMENT (OUTPATIENT)
Dept: PULMONOLOGY | Facility: CLINIC | Age: 54
End: 2024-02-14
Payer: COMMERCIAL

## 2024-02-14 VITALS
SYSTOLIC BLOOD PRESSURE: 124 MMHG | TEMPERATURE: 97 F | DIASTOLIC BLOOD PRESSURE: 77 MMHG | WEIGHT: 109 LBS | HEART RATE: 112 BPM | BODY MASS INDEX: 18.61 KG/M2 | RESPIRATION RATE: 15 BRPM | OXYGEN SATURATION: 100 % | HEIGHT: 64 IN

## 2024-02-14 DIAGNOSIS — D25.9 LEIOMYOMA OF UTERUS, UNSPECIFIED: ICD-10-CM

## 2024-02-14 LAB
APTT BLD: 38.3 SEC
INR PPP: 1.24 RATIO
PT BLD: 14.1 SEC

## 2024-02-14 PROCEDURE — 99205 OFFICE O/P NEW HI 60 MIN: CPT

## 2024-02-14 NOTE — PHYSICAL EXAM
[No Acute Distress] : no acute distress [Normal Oropharynx] : normal oropharynx [I] : Mallampati Class: I [Normal Appearance] : normal appearance [No Neck Mass] : no neck mass [No Resp Distress] : no resp distress [Rhonchi] : rhonchi [No Abnormalities] : no abnormalities [Normal Gait] : normal gait [No Clubbing] : no clubbing [No Cyanosis] : no cyanosis [No Edema] : no edema [Normal Color/ Pigmentation] : normal color/ pigmentation [No Focal Deficits] : no focal deficits [Oriented x3] : oriented x3 [Normal Affect] : normal affect [TextBox_54] : tachycardic regular rhythm [TextBox_89] : tender, with lower quadrant midline mass noted on palpation

## 2024-02-14 NOTE — REVIEW OF SYSTEMS
[Fatigue] : fatigue [Recent Wt Loss (___ Lbs)] : ~T recent [unfilled] lb weight loss [Abdominal Pain] : abdominal pain [Constipation] : constipation [Irregular Menses] : irregular menses [Fever] : no fever [Chills] : no chills [Cough] : no cough [Hemoptysis] : no hemoptysis [Sputum] : no sputum [Dyspnea] : no dyspnea [Edema] : no edema [Palpitations] : no palpitations

## 2024-02-14 NOTE — HISTORY OF PRESENT ILLNESS
[Never] : never [TextBox_4] : Interventional Pulmonology Consultation Note First Visit with IP:   Ms. MCCLELLAND is a 53 year old never smoker with history of uterine fibroid with previously planned hysterectomy who is here today for nodule evaluation found on preop MRI.  History of increasing pelvic pain and menorrhagia starting 11/2023. Multiple fibroids noted (9.4cm at largest). a CT done 12/2023 demonstrated a new right lower lobe 2.1 cm enhancing nodule. Repeat ct 1/2024 demonstrating now multiple nodules (largest 2.5cm in posterior basal segment of RLL. Some noted to be cavitary. No pleural effusion noted. PFT at the time with Normal breanna / volumes / dlco.  Sent to Thoracic surgery who then referred to IP for evaluation in the context of PET scan 2/2024 showing FDG avid and enlarging uterine mass along with enlarging FDG avid nodules and highly avid (SUV30) RLL mass 3.1x2.9cm  (grown from 2.6 x2.6cm 1/2024 CT). No previous lung imaging. no history of pulmonary infection, nodules, previous lung history. History of endometriosis 2003.   Today denies shortness of breath or hemoptysis. Notes abdominal fullness, bowel movement changes and weight loss 5lbs in last month.     Emphysema: no Personal History of Lung Cancer: no Family History of Lung Cancer: no Previous imaging or biopsy:  no History of Fungal or Mycobacterial Infections:  no History of Autoimmune Conditions: no

## 2024-02-14 NOTE — DISCUSSION/SUMMARY
[FreeTextEntry1] : The patients most recent CT scan was reviewed by my independently and my interpretation is stated below:  12/2023 demonstrated a new right lower lobe 2.1 cm enhancing nodule. Repeat ct 1/2024 demonstrating now multiple nodules (largest 2.5cm in.  PET scan 2/2024 showing FDG avid and enlarging uterine mass along with enlarging FDG avid nodules and highly avid (SUV30) RLL mass 3.1x2.9cm (grown from 2.6 x2.6cm 1/2024 CT) posterior basal segment of RLL. Some noted to be cavitary. No pleural effusion noted

## 2024-02-14 NOTE — CONSULT LETTER
[Dear  ___] : Dear  [unfilled], [Consult Letter:] : I had the pleasure of evaluating your patient, [unfilled]. [Please see my note below.] : Please see my note below. [Consult Closing:] : Thank you very much for allowing me to participate in the care of this patient.  If you have any questions, please do not hesitate to contact me. [Sincerely,] : Sincerely, [FreeTextEntry3] : Bruce Blevins MD Interventional Pulmonology Pulmonary and Critical Care Medicine [facility] Office: [phone]

## 2024-02-14 NOTE — ASSESSMENT
[FreeTextEntry1] : Ms. MCCLELLAND is a 53 year old never smoker with history of uterine fibroid with previously planned hysterectomy who is here today for lung mass evaluation  (3.1 x 2.9cm- SUV 30) with scattered JANIE.   PET scan 2/2024 showing FDG avid and enlarging uterine mass along with enlarging FDG avid nodules and highly avid (SUV30) RLL mass 3.1x2.9cm (grown from 2.6 x2.6cm 1/2024 CT). Given growing and pet avid uterine mass concern for malignancy, Other causes of DPLD (LIP)  and infection are less likely but possible.   The patient was advised the importance of follow up and further work up as malignancy is in the differential for this lung nodule. The patient was advised that in the absence of diagnosis and without surveillance imaging and adequate follow up as recommended by current guidelines, there is a risk of progression of malignancy. The importance of surveillance imaging as per the current Fleischner Society/Lung Rads guidelines was discussed. The patient demonstrated understanding of the same with verbal feedback.  The risk and benefits of bronchoscopy with transbronchial biopsy including risk of bleeding and risk pneumothorax was discussed with the patient and they demonstrated understanding. In case of pneumothorax, we discussed the need for in hospital monitoring and chest tube placement. In case of bleeding, we explained that they may require inpatient or ICU admission if persistent. Educational reading material regarding the procedure, risks and benefits provided to the patient in paper format.The risk and benefits of bronchoscopy with transbronchial biopsy including risk of bleeding and risk pneumothorax was discussed with the patient and they demonstrated understanding. In case of pneumothorax, we discussed the need for in hospital monitoring and chest tube placement. In case of bleeding, we explained that they may require inpatient or ICU admission if persistent. Educational reading material regarding the procedure, risks and benefits provided to the patient in paper format.  Plan - Galaxy RLL Biopsy EBUS evaluation     Bruce Blevins MD Interventional Pulmonology Pulmonary and Critical Care Medicine [facility] Office: [phone]

## 2024-02-15 ENCOUNTER — NON-APPOINTMENT (OUTPATIENT)
Age: 54
End: 2024-02-15

## 2024-02-15 LAB
ALBUMIN SERPL ELPH-MCNC: 3.7 G/DL
ALP BLD-CCNC: 97 U/L
ALT SERPL-CCNC: 13 U/L
ANION GAP SERPL CALC-SCNC: 17 MMOL/L
AST SERPL-CCNC: 14 U/L
BILIRUB SERPL-MCNC: 0.2 MG/DL
BUN SERPL-MCNC: 10 MG/DL
CALCIUM SERPL-MCNC: 9.7 MG/DL
CHLORIDE SERPL-SCNC: 96 MMOL/L
CO2 SERPL-SCNC: 21 MMOL/L
CREAT SERPL-MCNC: 0.56 MG/DL
EGFR: 108 ML/MIN/1.73M2
GLUCOSE SERPL-MCNC: 114 MG/DL
POTASSIUM SERPL-SCNC: 5.1 MMOL/L
PROT SERPL-MCNC: 7.6 G/DL
SODIUM SERPL-SCNC: 134 MMOL/L

## 2024-02-20 ENCOUNTER — NON-APPOINTMENT (OUTPATIENT)
Age: 54
End: 2024-02-20

## 2024-02-28 ENCOUNTER — APPOINTMENT (OUTPATIENT)
Dept: PULMONOLOGY | Facility: HOSPITAL | Age: 54
End: 2024-02-28

## 2024-02-29 ENCOUNTER — APPOINTMENT (OUTPATIENT)
Dept: HEMATOLOGY ONCOLOGY | Facility: CLINIC | Age: 54
End: 2024-02-29

## 2024-02-29 DIAGNOSIS — D50.9 IRON DEFICIENCY ANEMIA, UNSPECIFIED: ICD-10-CM

## 2024-03-01 PROBLEM — D50.9 IRON DEFICIENCY ANEMIA: Status: ACTIVE | Noted: 2021-12-23

## 2024-03-01 NOTE — HISTORY OF PRESENT ILLNESS
[de-identified] : Here for follow up; last seen in office in January; received 2 doses of parenteral iron supplementation with iron sucrose, last 11/29/2024.  Feeling better, more energetic, increased her range of physical activity.  B symptoms resolved.  No new medications reported, no hospitalizations or transfusion requirements.  Of note, patient presented with a hemoglobin of 7.6 g/dL and reactive thrombocytosis at 723,000.  On 1/29/2024 patient had a PET scan to assess pulmonary nodules.  PET scan was consistent with and FDG avid enlarging uterine masses with multiple lobulated appearance concerning for malignancy, several areas of FDG avidity outside of the uterus suggest extrauterine extension or possible lymphadenopathy for which biopsy was recommended.  The pulmonary nodules with were FDG avid, multiple, enlarging, concerning for malignancy.  Right lower lobe mass biopsy recommended.      [FreeTextEntry1] :  parenteral iron supplementation [de-identified] : 53 F, perimenopausal, PMHx of uterine fibroid ( 24 week sized uterus), symptomatic anemia requiring PRBC transfusion x 1 in November 2023, endometriosis, returning for hematology follow-up of iron deficiency anemia.  CASE SYNOPSIS:  1/13/2024 CT chest-IMPRESSION: Multiple round lung nodules, some cavitary, new since August 2022, and largest 2.5 cm in the right lower lobe which corresponds to abnormality on recent MRI abdomen/pelvis. In the setting of fibroid uterus, these lung nodules may be leiomyomas. Differential diagnosis includes other neoplasm, infection (consider fungus or granulomatous disease) and if prior nodules (such as vasculitis).  2/7/2024 PET-IMPRESSION: 1. FDG avid enlarging uterus masses with multiple lobulated masses, concerning for malignancy. Several areas of FDG avidity outside of the uterus suggest extrauterine extension or possible lymphadenopathy, as above. Recommend correlation with biopsy and consider updated contrast-enhanced anatomic imaging. 2. Multiple enlarging FDG avid pulmonary nodules, concerning for malignancy. Recommend correlation with upcoming biopsy of right lower lobe mass.

## 2024-03-01 NOTE — ASSESSMENT
[FreeTextEntry1] : Ms. MCCLELLAND 's questions were answered to her satisfaction.  She  expressed her  understanding and willingness to comply with the above recommendations, and  will return to the office in 3-month.  Anemia iron deficiency- reviewed with patient results of BW; hematologic picture stable since last parenteral iron supplementation. Today's blood work consistent with hemoglobin of  g/dL; pending serum ferritin. No need for further parenteral iron supplementation for now.  Reviewed results of PET scan from 2/7/2024; patient follows up with pulmonary for lung mass biopsy as well as GYN oncology for workup of suspicion of uterine neoplasm. Will have regular blood work ; advised to continue on oral vitamin C 500 mg daily and follow up in office in 3 months.

## 2024-03-05 ENCOUNTER — APPOINTMENT (OUTPATIENT)
Dept: OBGYN | Facility: HOSPITAL | Age: 54
End: 2024-03-05

## 2024-03-05 ENCOUNTER — APPOINTMENT (OUTPATIENT)
Dept: OBGYN | Facility: CLINIC | Age: 54
End: 2024-03-05

## 2024-03-11 ENCOUNTER — INPATIENT (INPATIENT)
Facility: HOSPITAL | Age: 54
LOS: 10 days | Discharge: HOME HEALTH | DRG: 167 | End: 2024-03-22
Attending: SURGERY | Admitting: HOSPITALIST
Payer: COMMERCIAL

## 2024-03-11 VITALS
OXYGEN SATURATION: 100 % | DIASTOLIC BLOOD PRESSURE: 79 MMHG | TEMPERATURE: 98 F | HEART RATE: 110 BPM | HEIGHT: 64 IN | WEIGHT: 108.91 LBS | SYSTOLIC BLOOD PRESSURE: 124 MMHG | RESPIRATION RATE: 18 BRPM

## 2024-03-11 DIAGNOSIS — F41.9 ANXIETY DISORDER, UNSPECIFIED: ICD-10-CM

## 2024-03-11 DIAGNOSIS — Z98.890 OTHER SPECIFIED POSTPROCEDURAL STATES: Chronic | ICD-10-CM

## 2024-03-11 DIAGNOSIS — C54.1 MALIGNANT NEOPLASM OF ENDOMETRIUM: ICD-10-CM

## 2024-03-11 DIAGNOSIS — D63.0 ANEMIA IN NEOPLASTIC DISEASE: ICD-10-CM

## 2024-03-11 DIAGNOSIS — J93.12 SECONDARY SPONTANEOUS PNEUMOTHORAX: ICD-10-CM

## 2024-03-11 DIAGNOSIS — C78.02 SECONDARY MALIGNANT NEOPLASM OF LEFT LUNG: ICD-10-CM

## 2024-03-11 DIAGNOSIS — C78.00 SECONDARY MALIGNANT NEOPLASM OF UNSPECIFIED LUNG: ICD-10-CM

## 2024-03-11 DIAGNOSIS — Z90.89 ACQUIRED ABSENCE OF OTHER ORGANS: Chronic | ICD-10-CM

## 2024-03-11 LAB
ALBUMIN SERPL ELPH-MCNC: 2.4 G/DL — LOW (ref 3.3–5)
ALP SERPL-CCNC: 112 U/L — SIGNIFICANT CHANGE UP (ref 40–120)
ALT FLD-CCNC: 19 U/L — SIGNIFICANT CHANGE UP (ref 12–78)
ANION GAP SERPL CALC-SCNC: 7 MMOL/L — SIGNIFICANT CHANGE UP (ref 5–17)
APTT BLD: 36.2 SEC — HIGH (ref 24.5–35.6)
AST SERPL-CCNC: 26 U/L — SIGNIFICANT CHANGE UP (ref 15–37)
BASOPHILS # BLD AUTO: 0.07 K/UL — SIGNIFICANT CHANGE UP (ref 0–0.2)
BASOPHILS NFR BLD AUTO: 0.8 % — SIGNIFICANT CHANGE UP (ref 0–2)
BILIRUB SERPL-MCNC: 0.3 MG/DL — SIGNIFICANT CHANGE UP (ref 0.2–1.2)
BUN SERPL-MCNC: 17 MG/DL — SIGNIFICANT CHANGE UP (ref 7–23)
CALCIUM SERPL-MCNC: 10 MG/DL — SIGNIFICANT CHANGE UP (ref 8.5–10.1)
CHLORIDE SERPL-SCNC: 104 MMOL/L — SIGNIFICANT CHANGE UP (ref 96–108)
CO2 SERPL-SCNC: 25 MMOL/L — SIGNIFICANT CHANGE UP (ref 22–31)
CREAT SERPL-MCNC: 0.61 MG/DL — SIGNIFICANT CHANGE UP (ref 0.5–1.3)
EGFR: 106 ML/MIN/1.73M2 — SIGNIFICANT CHANGE UP
EOSINOPHIL # BLD AUTO: 0.34 K/UL — SIGNIFICANT CHANGE UP (ref 0–0.5)
EOSINOPHIL NFR BLD AUTO: 3.7 % — SIGNIFICANT CHANGE UP (ref 0–6)
GLUCOSE SERPL-MCNC: 90 MG/DL — SIGNIFICANT CHANGE UP (ref 70–99)
HCT VFR BLD CALC: 29.4 % — LOW (ref 34.5–45)
HGB BLD-MCNC: 8.7 G/DL — LOW (ref 11.5–15.5)
HYPOCHROMIA BLD QL: SLIGHT — SIGNIFICANT CHANGE UP
IMM GRANULOCYTES NFR BLD AUTO: 0.7 % — SIGNIFICANT CHANGE UP (ref 0–0.9)
INR BLD: 1.45 RATIO — HIGH (ref 0.85–1.18)
LYMPHOCYTES # BLD AUTO: 1.31 K/UL — SIGNIFICANT CHANGE UP (ref 1–3.3)
LYMPHOCYTES # BLD AUTO: 14.4 % — SIGNIFICANT CHANGE UP (ref 13–44)
MANUAL SMEAR VERIFICATION: SIGNIFICANT CHANGE UP
MCHC RBC-ENTMCNC: 23.6 PG — LOW (ref 27–34)
MCHC RBC-ENTMCNC: 29.6 GM/DL — LOW (ref 32–36)
MCV RBC AUTO: 79.7 FL — LOW (ref 80–100)
MICROCYTES BLD QL: SLIGHT — SIGNIFICANT CHANGE UP
MONOCYTES # BLD AUTO: 0.49 K/UL — SIGNIFICANT CHANGE UP (ref 0–0.9)
MONOCYTES NFR BLD AUTO: 5.4 % — SIGNIFICANT CHANGE UP (ref 2–14)
NEUTROPHILS # BLD AUTO: 6.81 K/UL — SIGNIFICANT CHANGE UP (ref 1.8–7.4)
NEUTROPHILS NFR BLD AUTO: 75 % — SIGNIFICANT CHANGE UP (ref 43–77)
PLAT MORPH BLD: NORMAL — SIGNIFICANT CHANGE UP
PLATELET # BLD AUTO: 799 K/UL — HIGH (ref 150–400)
POLYCHROMASIA BLD QL SMEAR: SLIGHT — SIGNIFICANT CHANGE UP
POTASSIUM SERPL-MCNC: 5 MMOL/L — SIGNIFICANT CHANGE UP (ref 3.5–5.3)
POTASSIUM SERPL-SCNC: 5 MMOL/L — SIGNIFICANT CHANGE UP (ref 3.5–5.3)
PROT SERPL-MCNC: 8.5 GM/DL — HIGH (ref 6–8.3)
PROTHROM AB SERPL-ACNC: 16.2 SEC — HIGH (ref 9.5–13)
RBC # BLD: 3.69 M/UL — LOW (ref 3.8–5.2)
RBC # FLD: 20.7 % — HIGH (ref 10.3–14.5)
RBC BLD AUTO: SIGNIFICANT CHANGE UP
SODIUM SERPL-SCNC: 136 MMOL/L — SIGNIFICANT CHANGE UP (ref 135–145)
TROPONIN I, HIGH SENSITIVITY RESULT: <3 NG/L — SIGNIFICANT CHANGE UP
TROPONIN I, HIGH SENSITIVITY RESULT: <3 NG/L — SIGNIFICANT CHANGE UP
WBC # BLD: 9.08 K/UL — SIGNIFICANT CHANGE UP (ref 3.8–10.5)
WBC # FLD AUTO: 9.08 K/UL — SIGNIFICANT CHANGE UP (ref 3.8–10.5)

## 2024-03-11 PROCEDURE — 88307 TISSUE EXAM BY PATHOLOGIST: CPT

## 2024-03-11 PROCEDURE — 36430 TRANSFUSION BLD/BLD COMPNT: CPT

## 2024-03-11 PROCEDURE — 82306 VITAMIN D 25 HYDROXY: CPT

## 2024-03-11 PROCEDURE — 83735 ASSAY OF MAGNESIUM: CPT

## 2024-03-11 PROCEDURE — 93005 ELECTROCARDIOGRAM TRACING: CPT

## 2024-03-11 PROCEDURE — 99291 CRITICAL CARE FIRST HOUR: CPT

## 2024-03-11 PROCEDURE — S2900: CPT

## 2024-03-11 PROCEDURE — 86923 COMPATIBILITY TEST ELECTRIC: CPT

## 2024-03-11 PROCEDURE — 86850 RBC ANTIBODY SCREEN: CPT

## 2024-03-11 PROCEDURE — 82746 ASSAY OF FOLIC ACID SERUM: CPT

## 2024-03-11 PROCEDURE — 93970 EXTREMITY STUDY: CPT

## 2024-03-11 PROCEDURE — 85025 COMPLETE CBC W/AUTO DIFF WBC: CPT

## 2024-03-11 PROCEDURE — 77012 CT SCAN FOR NEEDLE BIOPSY: CPT

## 2024-03-11 PROCEDURE — 32557 INSERT CATH PLEURA W/ IMAGE: CPT | Mod: LT

## 2024-03-11 PROCEDURE — 80053 COMPREHEN METABOLIC PANEL: CPT

## 2024-03-11 PROCEDURE — 82607 VITAMIN B-12: CPT

## 2024-03-11 PROCEDURE — 85610 PROTHROMBIN TIME: CPT

## 2024-03-11 PROCEDURE — 36415 COLL VENOUS BLD VENIPUNCTURE: CPT

## 2024-03-11 PROCEDURE — 71045 X-RAY EXAM CHEST 1 VIEW: CPT

## 2024-03-11 PROCEDURE — 99497 ADVNCD CARE PLAN 30 MIN: CPT | Mod: 25

## 2024-03-11 PROCEDURE — 88341 IMHCHEM/IMCYTCHM EA ADD ANTB: CPT

## 2024-03-11 PROCEDURE — 71275 CT ANGIOGRAPHY CHEST: CPT | Mod: 26,MC

## 2024-03-11 PROCEDURE — 88342 IMHCHEM/IMCYTCHM 1ST ANTB: CPT

## 2024-03-11 PROCEDURE — 88172 CYTP DX EVAL FNA 1ST EA SITE: CPT

## 2024-03-11 PROCEDURE — 83550 IRON BINDING TEST: CPT

## 2024-03-11 PROCEDURE — 71046 X-RAY EXAM CHEST 2 VIEWS: CPT | Mod: 26

## 2024-03-11 PROCEDURE — 85027 COMPLETE CBC AUTOMATED: CPT

## 2024-03-11 PROCEDURE — 86901 BLOOD TYPING SEROLOGIC RH(D): CPT

## 2024-03-11 PROCEDURE — 84443 ASSAY THYROID STIM HORMONE: CPT

## 2024-03-11 PROCEDURE — 49180 BIOPSY ABDOMINAL MASS: CPT

## 2024-03-11 PROCEDURE — 99223 1ST HOSP IP/OBS HIGH 75: CPT

## 2024-03-11 PROCEDURE — C1889: CPT

## 2024-03-11 PROCEDURE — 93306 TTE W/DOPPLER COMPLETE: CPT

## 2024-03-11 PROCEDURE — 71045 X-RAY EXAM CHEST 1 VIEW: CPT | Mod: 26,59

## 2024-03-11 PROCEDURE — 82728 ASSAY OF FERRITIN: CPT

## 2024-03-11 PROCEDURE — P9016: CPT

## 2024-03-11 PROCEDURE — 83540 ASSAY OF IRON: CPT

## 2024-03-11 PROCEDURE — 80048 BASIC METABOLIC PNL TOTAL CA: CPT

## 2024-03-11 PROCEDURE — 88305 TISSUE EXAM BY PATHOLOGIST: CPT

## 2024-03-11 PROCEDURE — 85730 THROMBOPLASTIN TIME PARTIAL: CPT

## 2024-03-11 PROCEDURE — 99252 IP/OBS CONSLTJ NEW/EST SF 35: CPT | Mod: 25

## 2024-03-11 PROCEDURE — 86140 C-REACTIVE PROTEIN: CPT

## 2024-03-11 PROCEDURE — 84100 ASSAY OF PHOSPHORUS: CPT

## 2024-03-11 PROCEDURE — 86900 BLOOD TYPING SEROLOGIC ABO: CPT

## 2024-03-11 RX ORDER — ALPRAZOLAM 0.25 MG
0.25 TABLET ORAL ONCE
Refills: 0 | Status: DISCONTINUED | OUTPATIENT
Start: 2024-03-11 | End: 2024-03-11

## 2024-03-11 RX ORDER — ACETAMINOPHEN 500 MG
650 TABLET ORAL ONCE
Refills: 0 | Status: DISCONTINUED | OUTPATIENT
Start: 2024-03-11 | End: 2024-03-11

## 2024-03-11 RX ORDER — ALPRAZOLAM 0.25 MG
0.12 TABLET ORAL AT BEDTIME
Refills: 0 | Status: DISCONTINUED | OUTPATIENT
Start: 2024-03-11 | End: 2024-03-14

## 2024-03-11 RX ORDER — ACETAMINOPHEN 500 MG
750 TABLET ORAL ONCE
Refills: 0 | Status: COMPLETED | OUTPATIENT
Start: 2024-03-11 | End: 2024-03-11

## 2024-03-11 RX ORDER — LIDOCAINE 4 G/100G
1 CREAM TOPICAL DAILY
Refills: 0 | Status: DISCONTINUED | OUTPATIENT
Start: 2024-03-11 | End: 2024-03-12

## 2024-03-11 RX ORDER — SODIUM CHLORIDE 9 MG/ML
500 INJECTION INTRAMUSCULAR; INTRAVENOUS; SUBCUTANEOUS ONCE
Refills: 0 | Status: COMPLETED | OUTPATIENT
Start: 2024-03-11 | End: 2024-03-11

## 2024-03-11 RX ORDER — ACETAMINOPHEN 500 MG
650 TABLET ORAL EVERY 6 HOURS
Refills: 0 | Status: DISCONTINUED | OUTPATIENT
Start: 2024-03-11 | End: 2024-03-22

## 2024-03-11 RX ORDER — MORPHINE SULFATE 50 MG/1
2 CAPSULE, EXTENDED RELEASE ORAL EVERY 4 HOURS
Refills: 0 | Status: DISCONTINUED | OUTPATIENT
Start: 2024-03-11 | End: 2024-03-12

## 2024-03-11 RX ORDER — MORPHINE SULFATE 50 MG/1
4 CAPSULE, EXTENDED RELEASE ORAL ONCE
Refills: 0 | Status: DISCONTINUED | OUTPATIENT
Start: 2024-03-11 | End: 2024-03-11

## 2024-03-11 RX ORDER — MEDROXYPROGESTERONE ACETATE 150 MG/ML
10 INJECTION, SUSPENSION, EXTENDED RELEASE INTRAMUSCULAR DAILY
Refills: 0 | Status: DISCONTINUED | OUTPATIENT
Start: 2024-03-11 | End: 2024-03-22

## 2024-03-11 RX ADMIN — LIDOCAINE 1 PATCH: 4 CREAM TOPICAL at 17:20

## 2024-03-11 RX ADMIN — Medication 0.25 MILLIGRAM(S): at 15:52

## 2024-03-11 RX ADMIN — MORPHINE SULFATE 4 MILLIGRAM(S): 50 CAPSULE, EXTENDED RELEASE ORAL at 17:30

## 2024-03-11 RX ADMIN — MORPHINE SULFATE 2 MILLIGRAM(S): 50 CAPSULE, EXTENDED RELEASE ORAL at 23:28

## 2024-03-11 RX ADMIN — Medication 750 MILLIGRAM(S): at 17:30

## 2024-03-11 RX ADMIN — SODIUM CHLORIDE 1000 MILLILITER(S): 9 INJECTION INTRAMUSCULAR; INTRAVENOUS; SUBCUTANEOUS at 15:10

## 2024-03-11 RX ADMIN — MORPHINE SULFATE 4 MILLIGRAM(S): 50 CAPSULE, EXTENDED RELEASE ORAL at 16:21

## 2024-03-11 RX ADMIN — LIDOCAINE 1 PATCH: 4 CREAM TOPICAL at 20:40

## 2024-03-11 RX ADMIN — Medication 300 MILLIGRAM(S): at 17:19

## 2024-03-11 NOTE — CONSULT NOTE ADULT - ASSESSMENT
50 y/o female with a PMHx of fibroids, Pt of [facility] [place]. s/p  IR biopsy Rt lung nodule at [facility] 2/26 .  PT currently undergoing workup for recently diagnosed endometrial cancer presents to the ED c/o left sided CP. Pt reports she had a pain in her left chest that felt like a gas bubble that was progressively worsening. Denies rash. No other complaints at this time.  Pt noted to have a moderate PTX on left side . Now s/p Left pigtail placement     Plan  Admit to medicine  maintain pigtail to WS   IS  pain mgmt  will contact [facility] in am   pt thinking of transfering to [facility] being that she has a planned biopsy this Thursday   Pt onc is Dr Calixto at [facility]  CXR in am   DW Dr Cuevas

## 2024-03-11 NOTE — ED STATDOCS - NS ED ATTENDING STATEMENT MOD
This was a shared visit with the CANDELARIA. I reviewed and verified the documentation. I have personally provided the amount of critical care time documented below excluding time spent on separate procedures.

## 2024-03-11 NOTE — ED ADULT TRIAGE NOTE - CHIEF COMPLAINT QUOTE
pt presents to Ed with complaints of chest pain x 1 hr PTA. no cardiac hx. endorses pain started while at work. endorses a lot of recent stressors. EKG in progress.

## 2024-03-11 NOTE — H&P ADULT - ASSESSMENT
- discussed pt's presentation with Oncology consultant Dr. Black, who will see pt Pt is admitted w/    Left side chest pain  Spontaneous Left  Pneumothorax  Abd pain  Endometrial ca work up in progress  Anemia  Abn CT chest with New small left pneumothorax and Increased size and number of pulmonary metastases, some cavitary. ( see full report)    - adm to [address] [place] [address]  - s/p chest tube placed  in the ED  - s/p iv ofirmev, lidocaine patch and  Morphine ,  cont  pain meds,  will add oxycodone PRN  - s/p 500 ml NS IVF in ED  - s/p po xanax x1 in ED  - apprec CT surg  consult   - discussed pt's presentation with Oncology consultant Dr. Black, who will see pt  - pt and family are concerned about bx scheduled for 3/14/24 at [facility]  -  check AM labs  - DVT proph :  lovenox  - Full Code

## 2024-03-11 NOTE — H&P ADULT - HISTORY OF PRESENT ILLNESS
50 y/o female with a PMHx of fibroids, currently undergoing workup for recently diagnosed endometrial cancer presents to the ED c/o left sided CP. Pt reports she had a pain in her left chest that felt like a gas bubble that was progressively worsening. Denies rash. Pt is a pleasant  52 y/o female with a PMHx of fibroids, who is under evaluation  for recently diagnosed endometrial cancer (with planned biopsy on 3/14/24) and now presents to [facility] c/o left sided chest pain.   Pt was at work today at 11:30 am when she experienced a pain in her left chest that felt like a gas bubble that was progressively worsening and associated with a tingling in her left arm.   Pt reports having recent  abdominal pain related to her uterine mass in her left lower abdomen and a diffuse crampy discomfort.  For this she took  2 tylenols at 8pm last night,  200mg advil at 10pm last night and 0.125mg of xanax at 3am this morning.      Pt denies any fevers, no cough, no nausea/vomiting/diarrhea,  no urinary or resp complaints, pt denies rash.

## 2024-03-11 NOTE — H&P ADULT - NSHPLABSRESULTS_GEN_ALL_CORE
my interpretation    EKG Sinus tachycardia 105 bpm      < from: CT Angio Chest PE Protocol w/ IV Cont (03.11.24 @ 13:51) >    ACC: [id number] EXAM:  CT ANGIO CHEST PULM ART WAWIC   ORDERED BY: RHINA GRANDE     PROCEDURE DATE:  03/11/2024      INTERPRETATION:  INDICATION: Chest pain, endometrial cancer    TECHNIQUE: Helical acquisition of the chest after the administration of  70 mL of Omnipaque 350. Maximum intensity projection images were   generated.    COMPARISON: PET/CT 2/7/2024    FINDINGS:    HEART/VASCULATURE: No pulmonary embolus. Normal heart size. No   pericardial effusion.    LUNGS/AIRWAYS/PLEURA: Patent trachea and bronchi. New small left   pneumothorax. No pleural effusion. Increased size and number of cavitary   and noncavitary nodules and masses in both lungs, for example, 3.4 cm   right lower lobe mass (2-84), prior 3.0 cm, and new 0.7 cm cavitary   nodule in the left upper lobe (2-37).    LYMPH NODES/MEDIASTINUM: No lymphadenopathy.  UPPER ABDOMEN: Unremarkable.  BONES/SOFT TISSUES: Unremarkable.      IMPRESSION:    Since 2/7/2024:    New small left pneumothorax. This was discussed with Dr. Grande at 2:01 PM   on 3/11/2024.    Increased size and number of pulmonary metastases, some cavitary.    --- End of Report ---    ANTONIO JOHNSON M.D., ATTENDING RADIOLOGIST  This document has been electronically signed. Mar 11 2024  2:02PM    < end of copied text >

## 2024-03-11 NOTE — ED STATDOCS - CLINICAL SUMMARY MEDICAL DECISION MAKING FREE TEXT BOX
Differential diagnosis includes stress/anxiety reaction vs PE vs hemorrhage. Pt with sudden onset of left sided chest discomfort radiating to left arm. Pt with outpatient CT scan at the end of Feb showing nodules with microhemorrhage. Plan: labs, CT.

## 2024-03-11 NOTE — ED STATDOCS - PROGRESS NOTE DETAILS
50 y/o F with PMH of endometrial CA not yet started on treatment presents with left sided CP radiating into left arm this AM which has since subsided. Described pain as a gas bubble. Denies fever, chills SOB, cough. Pt had outpatient CT last week which showed concern for pulmonary mets and intraparenchymal hemorrhage. Pt admits to having high level of stress over new diagnosis. PE: Well appearing. Cardiac: s1s2, RRR. lungs: CTAB. Abdomen: NBS x4, soft, nontender. PV:No LE edema, calf tenderness. A/p: r/o ACS, PE, plan for labs, CTA chest, EKG, reassess. - Dwight Mcguire PA-C CT read pending. Observed pneumothorax on CTA. Thoracic called for consult with no answer, left message to return call to ED. - Dwight Mcguire PA-C No PE on CTA. +PTX. D/w thoracic, will see patient in ED. - Dwight Mcguire PA-C

## 2024-03-11 NOTE — ED STATDOCS - ATTENDING APP SHARED VISIT CONTRIBUTION OF CARE
I, Christa Dong MD,  performed the initial face to face bedside interview with this patient regarding history of present illness, review of symptoms and relevant past medical, social and family history.  I completed an independent physical examination.  I was the initial provider who evaluated this patient.   I personally saw the patient and performed a substantive portion of the visit including all aspects of the medical decision making.  I have signed out the follow up of any pending tests (i.e. labs, radiological studies) to the CANDELARIA.  I have communicated the patient’s plan of care and disposition with the CANDELARIA.  The history, relevant review of systems, past medical and surgical history, medical decision making, and physical examination was documented by the scribe in my presence and I attest to the accuracy of the documentation.

## 2024-03-11 NOTE — ED ADULT NURSE REASSESSMENT NOTE - NS ED NURSE REASSESS COMMENT FT1
Assumed care of patient, patient AxOx4, in no acute distress. Left pigtail catheter in place to suction. Patient tolerating well, dressing clean, dry and intact. Patient denies pain at present, eating dinner, significant other at bedside. Comfort and safety measures maintained, side rails up. All needs addressed. Updated on plan of care, all questions and concerns addressed. Will continue to monitor.

## 2024-03-11 NOTE — ED STATDOCS - OBJECTIVE STATEMENT
52 y/o female with a PMHx of fibroids, currently undergoing workup for recently diagnosed endometrial cancer presents to the ED c/o left sided CP. Pt reports she had a pain in her left chest that felt like a gas bubble that was progressively worsening. Denies rash. No other complaints at this time.

## 2024-03-11 NOTE — ED ADULT NURSE REASSESSMENT NOTE - NS ED NURSE REASSESS COMMENT FT1
Report given to ROBER DUMONT. Transport pending. Patient updated and resting comfortably in bed. Chest tube to suction. Will continue to monitor.

## 2024-03-11 NOTE — ED ADULT NURSE REASSESSMENT NOTE - NS ED NURSE REASSESS COMMENT FT1
Pt care assumed from previous RN. Pt brought to main and placed in stretcher. pt c/o 5/10 left-sided chest pain. JILL Quintanilla aware. pt aware of POC for thoracic consult. Safety and comfort measures in place.

## 2024-03-11 NOTE — PHARMACOTHERAPY INTERVENTION NOTE - COMMENTS
Medication reconciliation completed.  Reviewed Medication list and confirmed med allergies with patient; confirmed with Dr. First Medtae.

## 2024-03-11 NOTE — H&P ADULT - NSHPPHYSICALEXAM_GEN_ALL_CORE
ICU Vital Signs Last 24 Hrs  T(C): 36.6 (11 Mar 2024 20:40), Max: 36.8 (11 Mar 2024 19:32)  T(F): 97.8 (11 Mar 2024 20:40), Max: 98.2 (11 Mar 2024 19:32)  HR: 98 (11 Mar 2024 20:40) (88 - 120)  BP: 110/63 (11 Mar 2024 20:40) (105/60 - 131/81)  BP(mean): 74 (11 Mar 2024 19:32) (74 - 74)    RR: 17 (11 Mar 2024 20:40) (17 - 20)  SpO2: 100% (11 Mar 2024 20:40) (100% - 100%)    O2 Parameters below as of 11 Mar 2024 20:40  Patient On (Oxygen Delivery Method): room air

## 2024-03-11 NOTE — H&P ADULT - CONVERSATION DETAILS
Pt is Full code.   Pt states she is Full Code and would like all measures of resuscitation including  CPR, intubation, IVF, antibiotics,  HD, feeding tube as needed.      She states  her sisters ,  would help with decision making as her  HCP, including   1) Suzie Olguin  2)Carol Fuentes
Kalpana Fonseca

## 2024-03-11 NOTE — H&P ADULT - NSICDXFAMILYHX_GEN_ALL_CORE_FT
FAMILY HISTORY:  Mother  Still living? Unknown  Family history of uterine cancer, Age at diagnosis: 71-80

## 2024-03-11 NOTE — ED ADULT NURSE REASSESSMENT NOTE - NS ED NURSE REASSESS COMMENT FT1
Pt received A+Ox4. VSS. Pt c/o 6/10 pleuritic LCW pain. Morphine given with good results. LCW pigtail placed by JILL Gaines. Pt tolerated without difficulty. LCT-LCS as ordered. Pt c/o pain after procedure to L anterior CW. IV tylenol and lidoderm applied as ordered. Pt more comfortable now. No crepitus, dssg CD+I. Pt instructed in incentive spirometer. Family at bedside. Monitored closely. Call bell in reach.

## 2024-03-11 NOTE — ED STATDOCS - CRITICAL CARE ATTENDING CONTRIBUTION TO CARE
Elements for critical care include direct patient care (not related to procedure), additional history taking, interpretation of diagnostic studies, documentation, consultation with other physicians, consult w/ pt's family directly relating to pts condition     I, Christa Dong MD, personally saw the patient with CANDELARIA.  I have personally performed a face to face diagnostic evaluation on this patient.  I have reviewed the CANDELARIA note and agree with the history, exam, and plan of care, except as noted.  I personally saw the patient and performed a substantive portion of the visit including all aspects of the medical decision making.    This was an CANDELARIA attending shared visit

## 2024-03-11 NOTE — PATIENT PROFILE ADULT - FALL HARM RISK - RISK INTERVENTIONS
Assistance with ambulation/Communicate Fall Risk and Risk Factors to all staff, patient, and family/Reinforce activity limits and safety measures with patient and family/Visual Cue: Yellow wristband/Bed in lowest position, wheels locked, appropriate side rails in place/Call bell, personal items and telephone in reach/Instruct patient to call for assistance before getting out of bed or chair/Non-slip footwear when patient is out of bed/[place] to call system/Physically safe environment - no spills, clutter or unnecessary equipment/Purposeful Proactive Rounding/Room/bathroom lighting operational, light cord in reach

## 2024-03-11 NOTE — H&P ADULT - NSHPSOCIALHISTORY_GEN_ALL_CORE
Pt lives with her significant other.  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.  She  denies tob/drug use. She reports 4-5 glasses of wine on the weekends.

## 2024-03-12 LAB
ANION GAP SERPL CALC-SCNC: 7 MMOL/L — SIGNIFICANT CHANGE UP (ref 5–17)
BUN SERPL-MCNC: 10 MG/DL — SIGNIFICANT CHANGE UP (ref 7–23)
CALCIUM SERPL-MCNC: 9.4 MG/DL — SIGNIFICANT CHANGE UP (ref 8.5–10.1)
CHLORIDE SERPL-SCNC: 106 MMOL/L — SIGNIFICANT CHANGE UP (ref 96–108)
CO2 SERPL-SCNC: 24 MMOL/L — SIGNIFICANT CHANGE UP (ref 22–31)
CREAT SERPL-MCNC: 0.46 MG/DL — LOW (ref 0.5–1.3)
EGFR: 113 ML/MIN/1.73M2 — SIGNIFICANT CHANGE UP
GLUCOSE SERPL-MCNC: 93 MG/DL — SIGNIFICANT CHANGE UP (ref 70–99)
HCT VFR BLD CALC: 26.3 % — LOW (ref 34.5–45)
HGB BLD-MCNC: 7.7 G/DL — LOW (ref 11.5–15.5)
MCHC RBC-ENTMCNC: 23.3 PG — LOW (ref 27–34)
MCHC RBC-ENTMCNC: 29.3 GM/DL — LOW (ref 32–36)
MCV RBC AUTO: 79.7 FL — LOW (ref 80–100)
PLATELET # BLD AUTO: 639 K/UL — HIGH (ref 150–400)
POTASSIUM SERPL-MCNC: 4.3 MMOL/L — SIGNIFICANT CHANGE UP (ref 3.5–5.3)
POTASSIUM SERPL-SCNC: 4.3 MMOL/L — SIGNIFICANT CHANGE UP (ref 3.5–5.3)
RBC # BLD: 3.3 M/UL — LOW (ref 3.8–5.2)
RBC # FLD: 20.6 % — HIGH (ref 10.3–14.5)
SODIUM SERPL-SCNC: 137 MMOL/L — SIGNIFICANT CHANGE UP (ref 135–145)
WBC # BLD: 7.28 K/UL — SIGNIFICANT CHANGE UP (ref 3.8–10.5)
WBC # FLD AUTO: 7.28 K/UL — SIGNIFICANT CHANGE UP (ref 3.8–10.5)

## 2024-03-12 PROCEDURE — 71045 X-RAY EXAM CHEST 1 VIEW: CPT | Mod: 26,76

## 2024-03-12 PROCEDURE — 99233 SBSQ HOSP IP/OBS HIGH 50: CPT

## 2024-03-12 PROCEDURE — 99231 SBSQ HOSP IP/OBS SF/LOW 25: CPT

## 2024-03-12 RX ORDER — LIDOCAINE 4 G/100G
2 CREAM TOPICAL DAILY
Refills: 0 | Status: DISCONTINUED | OUTPATIENT
Start: 2024-03-12 | End: 2024-03-22

## 2024-03-12 RX ORDER — SENNA PLUS 8.6 MG/1
2 TABLET ORAL AT BEDTIME
Refills: 0 | Status: DISCONTINUED | OUTPATIENT
Start: 2024-03-12 | End: 2024-03-18

## 2024-03-12 RX ORDER — ENOXAPARIN SODIUM 100 MG/ML
40 INJECTION SUBCUTANEOUS EVERY 24 HOURS
Refills: 0 | Status: DISCONTINUED | OUTPATIENT
Start: 2024-03-12 | End: 2024-03-14

## 2024-03-12 RX ORDER — OXYCODONE HYDROCHLORIDE 5 MG/1
2.5 TABLET ORAL
Refills: 0 | Status: DISCONTINUED | OUTPATIENT
Start: 2024-03-12 | End: 2024-03-12

## 2024-03-12 RX ORDER — OXYCODONE HYDROCHLORIDE 5 MG/1
2.5 TABLET ORAL EVERY 4 HOURS
Refills: 0 | Status: DISCONTINUED | OUTPATIENT
Start: 2024-03-12 | End: 2024-03-15

## 2024-03-12 RX ORDER — MORPHINE SULFATE 50 MG/1
2 CAPSULE, EXTENDED RELEASE ORAL ONCE
Refills: 0 | Status: DISCONTINUED | OUTPATIENT
Start: 2024-03-12 | End: 2024-03-12

## 2024-03-12 RX ORDER — LIDOCAINE 4 G/100G
1 CREAM TOPICAL ONCE
Refills: 0 | Status: COMPLETED | OUTPATIENT
Start: 2024-03-12 | End: 2024-03-12

## 2024-03-12 RX ORDER — MORPHINE SULFATE 50 MG/1
2 CAPSULE, EXTENDED RELEASE ORAL EVERY 4 HOURS
Refills: 0 | Status: DISCONTINUED | OUTPATIENT
Start: 2024-03-12 | End: 2024-03-13

## 2024-03-12 RX ORDER — ACETAMINOPHEN 500 MG
650 TABLET ORAL EVERY 8 HOURS
Refills: 0 | Status: DISCONTINUED | OUTPATIENT
Start: 2024-03-12 | End: 2024-03-15

## 2024-03-12 RX ADMIN — MORPHINE SULFATE 2 MILLIGRAM(S): 50 CAPSULE, EXTENDED RELEASE ORAL at 06:40

## 2024-03-12 RX ADMIN — MEDROXYPROGESTERONE ACETATE 10 MILLIGRAM(S): 150 INJECTION, SUSPENSION, EXTENDED RELEASE INTRAMUSCULAR at 08:02

## 2024-03-12 RX ADMIN — Medication 650 MILLIGRAM(S): at 08:02

## 2024-03-12 RX ADMIN — LIDOCAINE 2 PATCH: 4 CREAM TOPICAL at 21:45

## 2024-03-12 RX ADMIN — MORPHINE SULFATE 2 MILLIGRAM(S): 50 CAPSULE, EXTENDED RELEASE ORAL at 16:12

## 2024-03-12 RX ADMIN — Medication 650 MILLIGRAM(S): at 15:04

## 2024-03-12 RX ADMIN — Medication 650 MILLIGRAM(S): at 22:14

## 2024-03-12 RX ADMIN — OXYCODONE HYDROCHLORIDE 2.5 MILLIGRAM(S): 5 TABLET ORAL at 12:00

## 2024-03-12 RX ADMIN — LIDOCAINE 1 PATCH: 4 CREAM TOPICAL at 00:35

## 2024-03-12 RX ADMIN — LIDOCAINE 1 PATCH: 4 CREAM TOPICAL at 13:11

## 2024-03-12 RX ADMIN — Medication 650 MILLIGRAM(S): at 02:20

## 2024-03-12 RX ADMIN — Medication 650 MILLIGRAM(S): at 15:55

## 2024-03-12 RX ADMIN — OXYCODONE HYDROCHLORIDE 2.5 MILLIGRAM(S): 5 TABLET ORAL at 11:20

## 2024-03-12 RX ADMIN — OXYCODONE HYDROCHLORIDE 2.5 MILLIGRAM(S): 5 TABLET ORAL at 23:52

## 2024-03-12 RX ADMIN — MORPHINE SULFATE 2 MILLIGRAM(S): 50 CAPSULE, EXTENDED RELEASE ORAL at 00:00

## 2024-03-12 RX ADMIN — LIDOCAINE 1 PATCH: 4 CREAM TOPICAL at 05:20

## 2024-03-12 RX ADMIN — MORPHINE SULFATE 2 MILLIGRAM(S): 50 CAPSULE, EXTENDED RELEASE ORAL at 02:00

## 2024-03-12 RX ADMIN — Medication 650 MILLIGRAM(S): at 21:44

## 2024-03-12 RX ADMIN — MORPHINE SULFATE 2 MILLIGRAM(S): 50 CAPSULE, EXTENDED RELEASE ORAL at 21:03

## 2024-03-12 RX ADMIN — LIDOCAINE 1 PATCH: 4 CREAM TOPICAL at 07:30

## 2024-03-12 RX ADMIN — MORPHINE SULFATE 2 MILLIGRAM(S): 50 CAPSULE, EXTENDED RELEASE ORAL at 01:28

## 2024-03-12 RX ADMIN — Medication 650 MILLIGRAM(S): at 01:46

## 2024-03-12 RX ADMIN — MORPHINE SULFATE 2 MILLIGRAM(S): 50 CAPSULE, EXTENDED RELEASE ORAL at 21:33

## 2024-03-12 RX ADMIN — Medication 650 MILLIGRAM(S): at 08:50

## 2024-03-12 RX ADMIN — ENOXAPARIN SODIUM 40 MILLIGRAM(S): 100 INJECTION SUBCUTANEOUS at 15:03

## 2024-03-12 RX ADMIN — MORPHINE SULFATE 2 MILLIGRAM(S): 50 CAPSULE, EXTENDED RELEASE ORAL at 15:57

## 2024-03-12 NOTE — DIETITIAN INITIAL EVALUATION ADULT - ADD RECOMMEND
1. C/w regular to maximize caloric and protein intake   2. Encourage protein-rich foods, maximize food preferences   3. Ensure Max BID to optimize nutritional needs (provides 150 kcal, 30 g protein/ shake)   4. Consider obtaining vitamin D 25OH level to assess nutriture   5. Monitor bowel movements, if no BM for >3 days, consider implementing bowel regimen.   6. Recommend MVI w/ minerals daily to ensure 100% RDA met   7. If wt loss continues despite adequate PO intake w/ high-kcal, high-protein diet, would consider TF if remains within GOC   RD will continue to monitor PO intake, labs, hydration, and wt prn.

## 2024-03-12 NOTE — PROGRESS NOTE ADULT - SUBJECTIVE AND OBJECTIVE BOX
HPI:  Pt is a pleasant  50 y/o female with a PMHx of fibroids, who is under evaluation  for recently diagnosed endometrial cancer (with planned biopsy on 3/14/24) and now presents to [place] ED c/o left sided chest pain.   Pt was at work today at 11:30 am when she experienced a pain in her left chest that felt like a gas bubble that was progressively worsening and associated with a tingling in her left arm.   Pt reports having recent  abdominal pain related to her uterine mass in her left lower abdomen and a diffuse crampy discomfort.  For this she took  2 tylenols at 8pm last night,  200mg advil at 10pm last night and 0.125mg of xanax at 3am this morning.      Pt denies any fevers, no cough, no nausea/vomiting/diarrhea,  no urinary or resp complaints, pt denies rash.    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 The patient recently had a CT guided biopsy of the lung lesion and was noted to had chest pain and found to have PTX. paitetn now with Chest tube placed. Now with pain a t site of chest tube   PAST MEDICAL & SURGICAL HISTORY:  Fibroid      H/O oral surgery  under general anesthesia during childhood      S/P D&C (status post dilation and curettage)  x2      S/P laparoscopy  as part of infertility work up      S/P tonsillectomy          Allergies    No Known Allergies    Intolerances    shellfish (Vomiting)      MEDICATIONS  (STANDING):  acetaminophen     Tablet .. 650 milliGRAM(s) Oral every 8 hours  enoxaparin Injectable 40 milliGRAM(s) SubCutaneous every 24 hours  lidocaine   4% Patch 2 Patch Transdermal daily  medroxyPROGESTERone 10 milliGRAM(s) Oral daily    MEDICATIONS  (PRN):  acetaminophen     Tablet .. 650 milliGRAM(s) Oral every 6 hours PRN Mild Pain (1 - 3)  ALPRAZolam 0.125 milliGRAM(s) Oral at bedtime PRN sleep  morphine  - Injectable 2 milliGRAM(s) IV Push every 4 hours PRN Severe Pain (7 - 10)  oxyCODONE    IR 2.5 milliGRAM(s) Oral every 4 hours PRN Moderate Pain (4 - 6)  senna 2 Tablet(s) Oral at bedtime PRN Constipation      FAMILY HISTORY:  Family history of uterine cancer (Mother)        SOCIAL HISTORY: No EtOH, no tobacco    REVIEW OF SYSTEMS:    CONSTITUTIONAL: No weakness, fevers or chills  EYES/ENT: No visual changes;  No vertigo or throat pain   NECK: No pain or stiffness  RESPIRATORY: No cough, wheezing, hemoptysis; No shortness of breath  CARDIOVASCULAR: No chest pain or palpitations  GASTROINTESTINAL: No abdominal or epigastric pain. No nausea, vomiting, or hematemesis; No diarrhea or constipation. No melena or hematochezia.  GENITOURINARY: No dysuria, frequency or hematuria  NEUROLOGICAL: No numbness or weakness  SKIN: No itching, burning, rashes, or lesions   All other review of systems is negative unless indicated above.        T(F): 98.4 (03-12-24 @ 08:43), Max: 98.5 (03-12-24 @ 05:08)  HR: 100 (03-12-24 @ 08:43)  BP: 112/68 (03-12-24 @ 08:43)  RR: 17 (03-12-24 @ 08:43)  SpO2: 97% (03-12-24 @ 08:43)  Wt(kg): --    GENERAL: NAD, well-developed  HEAD:  Atraumatic, Normocephalic  EYES: EOMI, PERRLA, conjunctiva and sclera clear  NECK: Supple, No JVD  CHEST/LUNG: Clear to auscultation bilaterally; No wheeze  HEART: Regular rate and rhythm; No murmurs, rubs, or gallops  ABDOMEN: Soft, Nontender, Nondistended; Bowel sounds present  EXTREMITIES:  2+ Peripheral Pulses, No clubbing, cyanosis, or edema  NEUROLOGY: non-focal  SKIN: No rashes or lesions                          7.7    7.28  )-----------( 639      ( 12 Mar 2024 07:07 )             26.3       03-12    137  |  106  |  10  ----------------------------<  93  4.3   |  24  |  0.46<L>    Ca    9.4      12 Mar 2024 07:07    TPro  8.5<H>  /  Alb  2.4<L>  /  TBili  0.3  /  DBili  x   /  AST  26  /  ALT  19  /  AlkPhos  112  03-11          PT/INR - ( 11 Mar 2024 13:27 )   PT: 16.2 sec;   INR: 1.45 ratio         PTT - ( 11 Mar 2024 13:27 )  PTT:36.2 sec

## 2024-03-12 NOTE — DIETITIAN INITIAL EVALUATION ADULT - OTHER INFO
Pt is a pleasant  50 y/o female with a PMHx of fibroids, who is under evaluation  for recently diagnosed endometrial cancer (with planned biopsy on 3/14/24) and now presents to [place] ED c/o left sided chest pain. Pt was at work today at 11:30 am when she experienced a pain in her left chest that felt like a gas bubble that was progressively worsening and associated with a tingling in her left arm. Pt reports having recent abdominal pain related to her uterine mass in her left lower abdomen and a diffuse crampy discomfort. Admit for secondary spontaneous pneumothorax.     S/p left pigtail placement yesterday 2/2 pneumothorax. Upon RD visit this morning, obtained limited diet / wt hx 2/2 pt uncomfortable and in pain s/p chest tube placement. Reports "good" appetite, observed breakfast of oatmeal, avocado toast, scrambled eggs, and fruit delivered upon visit this morning, pt was preparing to eat, likely consuming ~75% ENN. Not forthcoming w/ UBW, however, states she has had trouble keeping wt on x last few months and noticed continuos wt loss despite having a "good" appetite. Wt hx as per previous triage notes: 115# on 11/15/23 and 110# on 8/22/22, likely accurate. RD unable to obtain bed scale wt 2/2 bed scale error upon visit. EMR wt doc'd wt 109# on 3/11, appears accurate. Unintentional wt loss 6# / 5.2% x4 mo - not clin sig. Appears thin. Limited NFPE obtained 2/2 not appropriate at time of visit as pt was in pain, however, observable severe upper muscle wasting noted. Currently on regular diet, recommend to continue to maximize caloric and protein intake. Will trial Ensure Max BID as pt requested ONS lower in sugar to optimize nutritional needs (provides 150 kcal, 30 g protein/ shake), pt was receptive. Noted FULL CODE status - desires all measures including feeding tube as per GOC conversation on 3/11. Planned for biopsy 3/14, ? w/ IR vs. MSK as per thoracic surgery. RD provided verbal and written nutrition education on high-kcal, high protein diet using NCM "High-Kcal, High-Protein Nutrition Therapy" and "Tips for Increasing Kcals" handouts, pt was very receptive. See below for other recs.  Pt is a pleasant  50 y/o female with a PMHx of fibroids, who is under evaluation  for recently diagnosed endometrial cancer (with planned biopsy on 3/14/24) and now presents to [place] ED c/o left sided chest pain. Pt was at work today at 11:30 am when she experienced a pain in her left chest that felt like a gas bubble that was progressively worsening and associated with a tingling in her left arm. Pt reports having recent abdominal pain related to her uterine mass in her left lower abdomen and a diffuse crampy discomfort. Admit for secondary spontaneous pneumothorax.     S/p left pigtail placement yesterday 2/2 pneumothorax. Upon RD visit this morning, obtained limited diet / wt hx 2/2 pt uncomfortable and in pain s/p chest tube placement. Reports "good" appetite, observed breakfast of oatmeal, avocado toast, scrambled eggs, and fruit delivered upon visit this morning, pt was preparing to eat, likely consuming ~75% ENN. Not forthcoming w/ UBW, however, states she has had trouble keeping wt on x last few months and noticed continuos wt loss despite having a "good" appetite. Wt hx as per previous triage notes: 115# on 11/15/23 and 110# on 8/22/22, likely accurate. RD unable to obtain bed scale wt 2/2 bed scale error upon visit. EMR wt doc'd wt 109# on 3/11, appears accurate. Unintentional wt loss 6# / 5.2% x4 mo - not clin sig. Appears thin. Limited NFPE obtained 2/2 not appropriate at time of visit as pt was in pain, however, observable severe upper muscle wasting noted - does not currently meet criteria for PCM, however, likely at HIGH RISK for malnutrition. Currently on regular diet, recommend to continue to maximize caloric and protein intake. Will trial Ensure Max BID as pt requested ONS lower in sugar to optimize nutritional needs (provides 150 kcal, 30 g protein/ shake), pt was receptive. Noted FULL CODE status - desires all measures including feeding tube as per GOC conversation on 3/11. Planned for biopsy 3/14, ? w/ IR vs. MSK as per thoracic surgery. RD provided verbal and written nutrition education on high-kcal, high protein diet using NCM "High-Kcal, High-Protein Nutrition Therapy" and "Tips for Increasing Kcals" handouts, pt was very receptive. See below for other recs.

## 2024-03-12 NOTE — DIETITIAN INITIAL EVALUATION ADULT - NUTRITION CONSULT
Care Management Initial Consult    General Information  Assessment completed with: Patient,    Type of CM/SW Visit: Initial Assessment    Primary Care Provider verified and updated as needed: Yes   Readmission within the last 30 days: no previous admission in last 30 days      Reason for Consult: discharge planning  Advance Care Planning: Advance Care Planning Reviewed: present on chart          Communication Assessment  Patient's communication style: spoken language (English or Bilingual)    Hearing Difficulty or Deaf: no   Wear Glasses or Blind: yes    Cognitive  Cognitive/Neuro/Behavioral: WDL        Orientation: oriented x 4  Mood/Behavior: calm, cooperative, behavior appropriate to situation     Speech: clear, spontaneous, logical    Living Environment:   People in home: other (see comments)  Anibal Salmeron  Current living Arrangements: house      Able to return to prior arrangements: yes       Family/Social Support:  Care provided by: self  Provides care for: no one, unable/limited ability to care for self  Marital Status:   Children, Other (specify) (niece)          Description of Support System: Supportive, Involved         Current Resources:   Patient receiving home care services: No     Community Resources: Other (see comment) ([facility] meals)  Equipment currently used at home: walker, rolling, wheelchair, manual, grab bar, tub/shower, shower chair, grab bar, toilet, raised toilet seat, orthosis, dressing device  Supplies currently used at home: Incontinence Supplies    Employment/Financial:  Employment Status: retired        Financial Concerns:             Does the patient's insurance plan have a 3 day qualifying hospital stay waiver?  No    Lifestyle & Psychosocial Needs:  Social Determinants of Health     Food Insecurity: Not on file   Depression: Not on file   Housing Stability: Not on file   Tobacco Use: Medium Risk (2/7/2024)    Patient History     Smoking Tobacco Use: Former     Smokeless  Tobacco Use: Unknown     Passive Exposure: Not on file   Financial Resource Strain: Not on file   Alcohol Use: Not on file   Transportation Needs: Not on file   Physical Activity: Not on file   Interpersonal Safety: Not on file   Stress: Not on file   Social Connections: Not on file       Functional Status:  Prior to admission patient needed assistance:   Dependent ADLs:: Ambulation-walker, Independent  Dependent IADLs:: Cleaning, Laundry, Shopping, Medication Management, Transportation, Cooking       Mental Health Status:  Mental Health Status: No Current Concerns       Chemical Dependency Status:  Chemical Dependency Status: No Current Concerns             Values/Beliefs:  Spiritual, Cultural Beliefs, Baptist Practices, Values that affect care:                 Additional Information:  Met with pt. Introduced self and CM role. Pt lives alone in her home. She does have a mother-in-law apartment in the house and her niece Faviola lives there with her family. Faviola helps the pt out daily 1.5 hrs/day. Faviola helps with cleaning, medications, laundry, cooking and pt's daughter helps with shopping. Pt is independent with ADLs. Pt does receive a meal once a week from [facility].    Pt ambulates with a walker at home and has a w/c for when she goes out. Pt does have an orthosis that she has in her shoe d/t one leg being longer than the other.     Pt's daughter will transport the pt back to home.     Jossue Levine RN       no

## 2024-03-12 NOTE — PROGRESS NOTE ADULT - ASSESSMENT
50 yo female with histoyr of endometrial sarcomatoid carcinoma now here with pneumothorax   - patient now seen by CTX   - CT placed and doing better   - Dsaily chest xray   - Discussed case with Dr. Calixto and would recommend holding on biopsy here at  due to the need for a targeted biopsy guided by FDG avidity noted on prior PET CT   - - continue with supportive care.   - no plan to transfer

## 2024-03-12 NOTE — PROGRESS NOTE ADULT - SUBJECTIVE AND OBJECTIVE BOX
Subjective:  Chief complain :   Patient is a 53y old  Female who presents with a chief complaint of Left side chest pain due to L Pneumothorax     HPI:       50 y/o female with a PMHx of fibroids, who is under evaluation  for recently diagnosed endometrial cancer (with planned biopsy on 3/14/24) and now presents to [place] ED on 3/11/24 with  c/o left sided chest pain.   Pt was at work today at 11:30 am when she experienced a pain in her left chest that felt like a gas bubble that was progressively worsening and associated with a tingling in her left arm.   Pt reports having recent  abdominal pain related to her uterine mass in her left lower abdomen and a diffuse crampy discomfort.  For this she took  2 tylenols at 8pm last night,  200mg advil at 10pm last night and 0.125mg of xanax at 3am this morning.      3/12 -  Patient seen and examined at bedside earlier today, left sided chest pain persist, denies dyspnea, abdominal pain, tolerating po diet, afebrile, plan discussed     Review of system- Rest of the review of system are negative except mentioned in HPI     Vital sings reviewed for last 24 h  T(C): 37.4 (03-12-24 @ 15:28), Max: 37.4 (03-12-24 @ 15:28)  HR: 111 (03-12-24 @ 15:28) (88 - 111)  BP: 109/66 (03-12-24 @ 15:28) (105/60 - 124/83)  RR: 18 (03-12-24 @ 15:28) (17 - 18)  SpO2: 98% (03-12-24 @ 15:28) (96% - 100%)      Physical exam:   General : NAD, appear to be of stated age , well groomed   NERVOUS SYSTEM:  Alert & Oriented X3, non- focal exam, Motor Strength 5/5 B/L upper and lower extremities; DTRs 2+ intact and symmetric  HEAD:  Atraumatic, Normocephalic  EYES: EOMI, PERRLA, conjunctiva and sclera clear  HEENT: Moist mucous membranes, Supple neck , No JVD  CHEST: BS decreased at bases,  No rales, no rhonchi, no wheezing, + Chest tube  HEART: Regular rate and rhythm; No murmurs, no rubs or gallops  ABDOMEN: Soft, Non-tender, Non-distended; Bowel sounds present, no guarding , no peritoneal irritation   GENITOURINARY- Voiding, no suprapubic tenderness  EXTREMITIES:  2+ Peripheral Pulses, No clubbing, cyanosis,   edema  MUSCULOSKELETAL:- No muscle tenderness, Muscle tone normal, No joint tenderness, no Joint swelling,  Joint ROM –normal   SKIN-no rash, no lesion    Labs radiologic and other test : all reviewed and interpreted :                         7.7    7.28  )-----------( 639      ( 12 Mar 2024 07:07 )             26.3     03-12    137  |  106  |  10  ----------------------------<  93  4.3   |  24  |  0.46<L>    Ca    9.4      12 Mar 2024 07:07    TPro  8.5<H>  /  Alb  2.4<L>  /  TBili  0.3  /  DBili  x   /  AST  26  /  ALT  19  /  AlkPhos  112  03-11    PT/INR - ( 11 Mar 2024 13:27 )   PT: 16.2 sec;   INR: 1.45 ratio         PTT - ( 11 Mar 2024 13:27 )  PTT:36.2 sec       Xray Chest 1 View- PORTABLE-Urgent (Xray Chest 1 View- PORTABLE-Urgent .) (03.12.24 @ 16:07) >    Heart normal for projection. Catheter left chest tube remains.    On present examination left pneumothorax seen earlier in the day is   markedly improved with slight residual.    IMPRESSION: Markedly improved left pneumothorax.      < from: Xray Chest 1 View- PORTABLE-Urgent (Xray Chest 1 View- PORTABLE-Urgent .) (03.12.24 @ 14:55) >  INTERPRETATION:  AP chest on March 12, 2024 2:47 PM. Patient is being   followed for left chest tube. There is history of uterinecancer with   local spread and PET positive lung metastases.    Heart normal for projection.    Catheter left chest tube remains.    There is increasing left pneumothorax now approximately 10-15% compared   to March 12 earlier study.      Small nodular densities in the right midlung field and more at the right   base laterally again noted.  IMPRESSION: Increasing left pneumothorax noted now 15%. Persistent lung   nodules.    < end of copied text >    < from: CT Angio Chest PE Protocol w/ IV Cont (03.11.24 @ 13:51) >  IMPRESSION:    Since 2/7/2024:    New small left pneumothorax. This was discussed with Dr. Dong at 2:01 PM   on 3/11/2024.    Increased size and number of pulmonary metastases, some cavitary.      < end of copied text >      RECENT CULTURES:      Cardiac testing : reviewed   EKG < from: 12 Lead ECG (03.11.24 @ 12:26) >  Ventricular Rate 103 BPM    Atrial Rate 103 BPM    P-R Interval 124 ms    QRS Duration 72 ms    Q-T Interval 348 ms    QTC Calculation(Bazett) 455 ms    P Axis 69 degrees    R Axis 51 degrees    T Axis 30 degrees    Diagnosis Line Sinus tachycardia  Otherwise normal ECG  No previous ECGs available    < end of copied text >  Troponin I, High Sensitivity Result: <3.00: Serial measurements of high sensitivity troponin I (hs TnI) showing rapid  upward or downward changes suggest acute myocardial injury.  Please note  that a sustained elevation of hsTnI can be caused by renal disease,  chronic heart failure, sepsis, pulmonary embolism and other clinical  conditions.  High Sensitivity Troponin and New Male & Female Reference ranges in  effect as of 10/05/2021.     Female reference range < 53.7 ng/L     Male reference range <78.5 ng/L ng/L (03.11.24 @ 14:39)        Procedures :     Devices:     Current medications:  acetaminophen     Tablet .. 650 milliGRAM(s) Oral every 6 hours PRN  acetaminophen     Tablet .. 650 milliGRAM(s) Oral every 8 hours  ALPRAZolam 0.125 milliGRAM(s) Oral at bedtime PRN  enoxaparin Injectable 40 milliGRAM(s) SubCutaneous every 24 hours  lidocaine   4% Patch 2 Patch Transdermal daily  medroxyPROGESTERone 10 milliGRAM(s) Oral daily  morphine  - Injectable 2 milliGRAM(s) IV Push every 4 hours PRN  oxyCODONE    IR 2.5 milliGRAM(s) Oral every 4 hours PRN  senna 2 Tablet(s) Oral at bedtime PRN

## 2024-03-12 NOTE — CHART NOTE - NSCHARTNOTEFT_GEN_A_CORE
CXR on waterseal w moderate PTX. Pigtail flushed and placed to suction w repeat CXR lung reexpanded. Pt w much discomfort in chest. Chest tube placed to waterseal, morphine given and instructed RN to place to -10 suction at 4:30pm.   Will cont to moniter. Chest tube remains in place.

## 2024-03-12 NOTE — PROGRESS NOTE ADULT - SUBJECTIVE AND OBJECTIVE BOX
Subjective:  Pt seen, anxious to have biopsy done to start treatment. Pt breathing better since chest tube but states feels air bubbles in chest on left side and discomfort from chest    Vital Signs:  Vital Signs Last 24 Hrs  T(C): 36.9 (03-12-24 @ 08:43), Max: 36.9 (03-12-24 @ 05:08)  T(F): 98.4 (03-12-24 @ 08:43), Max: 98.5 (03-12-24 @ 05:08)  HR: 100 (03-12-24 @ 08:43) (88 - 120)  BP: 112/68 (03-12-24 @ 08:43) (105/60 - 131/81)  RR: 17 (03-12-24 @ 08:43) (17 - 20)  SpO2: 97% (03-12-24 @ 08:43) (96% - 100%) on (O2)    Telemetry/Alarms:    Relevant labs, radiology and Medications reviewed                        7.7    7.28  )-----------( 639      ( 12 Mar 2024 07:07 )             26.3     03-12    137  |  106  |  10  ----------------------------<  93  4.3   |  24  |  0.46<L>    Ca    9.4      12 Mar 2024 07:07    TPro  8.5<H>  /  Alb  2.4<L>  /  TBili  0.3  /  DBili  x   /  AST  26  /  ALT  19  /  AlkPhos  112  03-11    PT/INR - ( 11 Mar 2024 13:27 )   PT: 16.2 sec;   INR: 1.45 ratio         PTT - ( 11 Mar 2024 13:27 )  PTT:36.2 sec  MEDICATIONS  (STANDING):  acetaminophen     Tablet .. 650 milliGRAM(s) Oral every 8 hours  enoxaparin Injectable 40 milliGRAM(s) SubCutaneous every 24 hours  lidocaine   4% Patch 2 Patch Transdermal daily  medroxyPROGESTERone 10 milliGRAM(s) Oral daily    MEDICATIONS  (PRN):  acetaminophen     Tablet .. 650 milliGRAM(s) Oral every 6 hours PRN Mild Pain (1 - 3)  ALPRAZolam 0.125 milliGRAM(s) Oral at bedtime PRN sleep  morphine  - Injectable 2 milliGRAM(s) IV Push every 4 hours PRN Severe Pain (7 - 10)  oxyCODONE    IR 2.5 milliGRAM(s) Oral every 4 hours PRN Moderate Pain (4 - 6)  senna 2 Tablet(s) Oral at bedtime PRN Constipation      Physical exam  Gen NAD  Neuro AAOx3  Card RRR  Pulm equal  Abd distended, slighly hard  Ext warm    Tubes: left pigtail to suction, no AL    I&O's Summary      Assessment  53y Female  w/ PAST MEDICAL & SURGICAL HISTORY:  Fibroid      H/O oral surgery  under general anesthesia during childhood      S/P D&C (status post dilation and curettage)  x2      S/P laparoscopy  as part of infertility work up      S/P tonsillectomy      admitted with complaints of Patient is a 53y old  Female who presents with a chief complaint of Left side chest pain  L Pneumothorax (11 Mar 2024 22:49)  .  52 y/o female with a PMHx of fibroids, Pt of [facility] [place]. s/p  IR biopsy Rt lung nodule at [facility] 2/26 .  PT currently undergoing workup for recently diagnosed endometrial cancer presents to the ED c/o left sided CP. Pt reports she had a pain in her left chest that felt like a gas bubble that was progressively worsening. Denies rash. No other complaints at this time.  Pt noted to have a moderate PTX on left side . Now s/p Left pigtail placement     Plan  switched pigtail to WS , CXR this afternoon  IS  pain mgmt  possible biopsy w IR vs [facility] being that she has a planned biopsy this Thursday   Dr. Black to follow up  Pt onc is Dr Calixto at [facility]  CXR in am     Discussed with Cardiothoracic Team at AM rounds.

## 2024-03-12 NOTE — DIETITIAN INITIAL EVALUATION ADULT - PERTINENT MEDS FT
MEDICATIONS  (STANDING):  acetaminophen     Tablet .. 650 milliGRAM(s) Oral every 8 hours  enoxaparin Injectable 40 milliGRAM(s) SubCutaneous every 24 hours  lidocaine   4% Patch 2 Patch Transdermal daily  medroxyPROGESTERone 10 milliGRAM(s) Oral daily    MEDICATIONS  (PRN):  acetaminophen     Tablet .. 650 milliGRAM(s) Oral every 6 hours PRN Mild Pain (1 - 3)  ALPRAZolam 0.125 milliGRAM(s) Oral at bedtime PRN sleep  morphine  - Injectable 2 milliGRAM(s) IV Push every 4 hours PRN Severe Pain (7 - 10)  oxyCODONE    IR 2.5 milliGRAM(s) Oral every 4 hours PRN Moderate Pain (4 - 6)  senna 2 Tablet(s) Oral at bedtime PRN Constipation

## 2024-03-12 NOTE — DIETITIAN INITIAL EVALUATION ADULT - PERTINENT LABORATORY DATA
03-12    137  |  106  |  10  ----------------------------<  93  4.3   |  24  |  0.46<L>    Ca    9.4      12 Mar 2024 07:07    TPro  8.5<H>  /  Alb  2.4<L>  /  TBili  0.3  /  DBili  x   /  AST  26  /  ALT  19  /  AlkPhos  112  03-11

## 2024-03-12 NOTE — DIETITIAN INITIAL EVALUATION ADULT - ORAL INTAKE PTA/DIET HISTORY
Obtained limited details w/ diet hx 2/2 pt uncomfortable and in pain s/p chest tube placement this AM. Reports "good" appetite, usually eats 3 meals/day plus snacks, likely consuming ~75% ENN, however, states that she has trouble keeping wt on x lat few months. States she usually has abdominal pain r/t known uterine mass. Planned for biopsy of uterine mass (? endometrial CA) this Thursday 3/14 as per H&P.

## 2024-03-12 NOTE — PROGRESS NOTE ADULT - ASSESSMENT
52 y/o female with a PMHx of fibroids, who is under evaluation  for recently diagnosed endometrial cancer (with planned biopsy on 3/14/24) and now presents to [facility] on 3/11/24 with  c/o left sided chest pain.   Pt was at work today at 11:30 am when she experienced a pain in her left chest that felt like a gas bubble that was progressively worsening and associated with a tingling in her left arm.   Pt reports having recent  abdominal pain related to her uterine mass in her left lower abdomen and a diffuse crampy discomfort.  For this she took  2 tylenols at 8pm last night,  200mg advil at 10pm last night and 0.125mg of xanax at 3am this morning.      Left side chest pain due to left sided Spontaneous  Pneumothorax s/p left pigtail chest tube placement   - as per CT surgery team   - EKG no ischemic changes, troponin neg   - CT management   - serial CXR   - pain management - oxycodone, morphine IV , lidocaine, tylenol    Abd pain due to Endometrial ca work up in progress  Pulmonary metastasis seen on CTA   - [place] [facility] [place]   - d/w Dr Calixto primary oncologist cell [phone] , no point of transferring the patient while has chest tube , biopsy can be postponed   - CTA - no PE, +left sided PTX , pulmonary masses   -Oncology consultant Dr. Black     Microcytic anemia   - check iron studies, FOBT     Anxiety   -  xanax  prn     DVT proph :  lovenox    - Full Code       d/w family at bedside

## 2024-03-13 LAB
24R-OH-CALCIDIOL SERPL-MCNC: 14 NG/ML — LOW (ref 30–80)
ALBUMIN SERPL ELPH-MCNC: 2.2 G/DL — LOW (ref 3.3–5)
ALP SERPL-CCNC: 105 U/L — SIGNIFICANT CHANGE UP (ref 40–120)
ALT FLD-CCNC: 17 U/L — SIGNIFICANT CHANGE UP (ref 12–78)
ANION GAP SERPL CALC-SCNC: 6 MMOL/L — SIGNIFICANT CHANGE UP (ref 5–17)
AST SERPL-CCNC: 13 U/L — LOW (ref 15–37)
BASOPHILS # BLD AUTO: 0.06 K/UL — SIGNIFICANT CHANGE UP (ref 0–0.2)
BASOPHILS NFR BLD AUTO: 0.7 % — SIGNIFICANT CHANGE UP (ref 0–2)
BILIRUB SERPL-MCNC: 0.3 MG/DL — SIGNIFICANT CHANGE UP (ref 0.2–1.2)
BUN SERPL-MCNC: 12 MG/DL — SIGNIFICANT CHANGE UP (ref 7–23)
CALCIUM SERPL-MCNC: 9.3 MG/DL — SIGNIFICANT CHANGE UP (ref 8.5–10.1)
CHLORIDE SERPL-SCNC: 104 MMOL/L — SIGNIFICANT CHANGE UP (ref 96–108)
CO2 SERPL-SCNC: 26 MMOL/L — SIGNIFICANT CHANGE UP (ref 22–31)
CREAT SERPL-MCNC: 0.59 MG/DL — SIGNIFICANT CHANGE UP (ref 0.5–1.3)
CRP SERPL-MCNC: 206 MG/L — HIGH
EGFR: 106 ML/MIN/1.73M2 — SIGNIFICANT CHANGE UP
EOSINOPHIL # BLD AUTO: 0.35 K/UL — SIGNIFICANT CHANGE UP (ref 0–0.5)
EOSINOPHIL NFR BLD AUTO: 4 % — SIGNIFICANT CHANGE UP (ref 0–6)
FERRITIN SERPL-MCNC: 849 NG/ML — HIGH (ref 13–330)
FOLATE SERPL-MCNC: 5.9 NG/ML — SIGNIFICANT CHANGE UP
GLUCOSE SERPL-MCNC: 99 MG/DL — SIGNIFICANT CHANGE UP (ref 70–99)
HCT VFR BLD CALC: 28.5 % — LOW (ref 34.5–45)
HGB BLD-MCNC: 8.2 G/DL — LOW (ref 11.5–15.5)
IMM GRANULOCYTES NFR BLD AUTO: 0.7 % — SIGNIFICANT CHANGE UP (ref 0–0.9)
IRON SATN MFR SERPL: 11 UG/DL — LOW (ref 30–160)
IRON SATN MFR SERPL: 7 % — LOW (ref 14–50)
LYMPHOCYTES # BLD AUTO: 1.5 K/UL — SIGNIFICANT CHANGE UP (ref 1–3.3)
LYMPHOCYTES # BLD AUTO: 17 % — SIGNIFICANT CHANGE UP (ref 13–44)
MAGNESIUM SERPL-MCNC: 2.2 MG/DL — SIGNIFICANT CHANGE UP (ref 1.6–2.6)
MCHC RBC-ENTMCNC: 23 PG — LOW (ref 27–34)
MCHC RBC-ENTMCNC: 28.8 GM/DL — LOW (ref 32–36)
MCV RBC AUTO: 79.8 FL — LOW (ref 80–100)
MONOCYTES # BLD AUTO: 0.87 K/UL — SIGNIFICANT CHANGE UP (ref 0–0.9)
MONOCYTES NFR BLD AUTO: 9.8 % — SIGNIFICANT CHANGE UP (ref 2–14)
NEUTROPHILS # BLD AUTO: 6 K/UL — SIGNIFICANT CHANGE UP (ref 1.8–7.4)
NEUTROPHILS NFR BLD AUTO: 67.8 % — SIGNIFICANT CHANGE UP (ref 43–77)
PHOSPHATE SERPL-MCNC: 4.4 MG/DL — SIGNIFICANT CHANGE UP (ref 2.5–4.5)
PLATELET # BLD AUTO: 732 K/UL — HIGH (ref 150–400)
POTASSIUM SERPL-MCNC: 4.1 MMOL/L — SIGNIFICANT CHANGE UP (ref 3.5–5.3)
POTASSIUM SERPL-SCNC: 4.1 MMOL/L — SIGNIFICANT CHANGE UP (ref 3.5–5.3)
PROT SERPL-MCNC: 7.9 GM/DL — SIGNIFICANT CHANGE UP (ref 6–8.3)
RBC # BLD: 3.57 M/UL — LOW (ref 3.8–5.2)
RBC # FLD: 20.5 % — HIGH (ref 10.3–14.5)
SODIUM SERPL-SCNC: 136 MMOL/L — SIGNIFICANT CHANGE UP (ref 135–145)
TIBC SERPL-MCNC: 164 UG/DL — LOW (ref 220–430)
TSH SERPL-MCNC: 1.36 UU/ML — SIGNIFICANT CHANGE UP (ref 0.34–4.82)
UIBC SERPL-MCNC: 153 UG/DL — SIGNIFICANT CHANGE UP (ref 110–370)
VIT B12 SERPL-MCNC: 608 PG/ML — SIGNIFICANT CHANGE UP (ref 232–1245)
WBC # BLD: 8.84 K/UL — SIGNIFICANT CHANGE UP (ref 3.8–10.5)
WBC # FLD AUTO: 8.84 K/UL — SIGNIFICANT CHANGE UP (ref 3.8–10.5)

## 2024-03-13 PROCEDURE — 32551 INSERTION OF CHEST TUBE: CPT | Mod: RT

## 2024-03-13 PROCEDURE — 71045 X-RAY EXAM CHEST 1 VIEW: CPT | Mod: 26,77

## 2024-03-13 PROCEDURE — 99233 SBSQ HOSP IP/OBS HIGH 50: CPT

## 2024-03-13 PROCEDURE — 93010 ELECTROCARDIOGRAM REPORT: CPT

## 2024-03-13 PROCEDURE — 99233 SBSQ HOSP IP/OBS HIGH 50: CPT | Mod: 25

## 2024-03-13 PROCEDURE — 71045 X-RAY EXAM CHEST 1 VIEW: CPT | Mod: 26

## 2024-03-13 RX ORDER — MORPHINE SULFATE 50 MG/1
4 CAPSULE, EXTENDED RELEASE ORAL EVERY 4 HOURS
Refills: 0 | Status: DISCONTINUED | OUTPATIENT
Start: 2024-03-13 | End: 2024-03-15

## 2024-03-13 RX ORDER — ZOLPIDEM TARTRATE 10 MG/1
5 TABLET ORAL AT BEDTIME
Refills: 0 | Status: DISCONTINUED | OUTPATIENT
Start: 2024-03-13 | End: 2024-03-18

## 2024-03-13 RX ORDER — LIDOCAINE 4 G/100G
1 CREAM TOPICAL DAILY
Refills: 0 | Status: DISCONTINUED | OUTPATIENT
Start: 2024-03-13 | End: 2024-03-22

## 2024-03-13 RX ADMIN — ZOLPIDEM TARTRATE 5 MILLIGRAM(S): 10 TABLET ORAL at 21:55

## 2024-03-13 RX ADMIN — OXYCODONE HYDROCHLORIDE 2.5 MILLIGRAM(S): 5 TABLET ORAL at 17:30

## 2024-03-13 RX ADMIN — OXYCODONE HYDROCHLORIDE 2.5 MILLIGRAM(S): 5 TABLET ORAL at 07:14

## 2024-03-13 RX ADMIN — LIDOCAINE 2 PATCH: 4 CREAM TOPICAL at 07:00

## 2024-03-13 RX ADMIN — Medication 650 MILLIGRAM(S): at 14:45

## 2024-03-13 RX ADMIN — LIDOCAINE 2 PATCH: 4 CREAM TOPICAL at 10:22

## 2024-03-13 RX ADMIN — Medication 650 MILLIGRAM(S): at 21:01

## 2024-03-13 RX ADMIN — Medication 650 MILLIGRAM(S): at 14:03

## 2024-03-13 RX ADMIN — LIDOCAINE 2 PATCH: 4 CREAM TOPICAL at 19:30

## 2024-03-13 RX ADMIN — MORPHINE SULFATE 4 MILLIGRAM(S): 50 CAPSULE, EXTENDED RELEASE ORAL at 13:19

## 2024-03-13 RX ADMIN — Medication 650 MILLIGRAM(S): at 05:49

## 2024-03-13 RX ADMIN — MEDROXYPROGESTERONE ACETATE 10 MILLIGRAM(S): 150 INJECTION, SUSPENSION, EXTENDED RELEASE INTRAMUSCULAR at 10:21

## 2024-03-13 RX ADMIN — Medication 650 MILLIGRAM(S): at 06:19

## 2024-03-13 RX ADMIN — OXYCODONE HYDROCHLORIDE 2.5 MILLIGRAM(S): 5 TABLET ORAL at 16:31

## 2024-03-13 RX ADMIN — MORPHINE SULFATE 4 MILLIGRAM(S): 50 CAPSULE, EXTENDED RELEASE ORAL at 13:50

## 2024-03-13 RX ADMIN — LIDOCAINE 2 PATCH: 4 CREAM TOPICAL at 10:20

## 2024-03-13 RX ADMIN — OXYCODONE HYDROCHLORIDE 2.5 MILLIGRAM(S): 5 TABLET ORAL at 00:20

## 2024-03-13 RX ADMIN — LIDOCAINE 2 PATCH: 4 CREAM TOPICAL at 22:02

## 2024-03-13 RX ADMIN — LIDOCAINE 1 PATCH: 4 CREAM TOPICAL at 21:01

## 2024-03-13 RX ADMIN — MORPHINE SULFATE 4 MILLIGRAM(S): 50 CAPSULE, EXTENDED RELEASE ORAL at 18:04

## 2024-03-13 NOTE — PROCEDURE NOTE - NSINFORMCONSENT_GEN_A_CORE
Benefits, risks, and possible complications of procedure explained to patient/caregiver who verbalized understanding and gave verbal consent.
Benefits, risks, and possible complications of procedure explained to patient/caregiver who verbalized understanding and gave written consent.
None known

## 2024-03-13 NOTE — PROGRESS NOTE ADULT - SUBJECTIVE AND OBJECTIVE BOX
Subjective:  Pt seen, chest pain much improved. C/o abd pain.    Vital Signs:  Vital Signs Last 24 Hrs  T(C): 36.8 (03-13-24 @ 08:03), Max: 37.4 (03-12-24 @ 15:28)  T(F): 98.3 (03-13-24 @ 08:03), Max: 99.3 (03-12-24 @ 15:28)  HR: 105 (03-13-24 @ 08:03) (104 - 111)  BP: 108/57 (03-13-24 @ 08:03) (108/57 - 119/73)  RR: 17 (03-13-24 @ 08:03) (17 - 18)  SpO2: 97% (03-13-24 @ 08:03) (96% - 98%) on (O2)    Telemetry/Alarms:    Relevant labs, radiology and Medications reviewed                        8.2    8.84  )-----------( 732      ( 13 Mar 2024 06:31 )             28.5     03-13    136  |  104  |  12  ----------------------------<  99  4.1   |  26  |  0.59    Ca    9.3      13 Mar 2024 06:31  Phos  4.4     03-13  Mg     2.2     03-13    TPro  7.9  /  Alb  2.2<L>  /  TBili  0.3  /  DBili  x   /  AST  13<L>  /  ALT  17  /  AlkPhos  105  03-13    PT/INR - ( 11 Mar 2024 13:27 )   PT: 16.2 sec;   INR: 1.45 ratio         PTT - ( 11 Mar 2024 13:27 )  PTT:36.2 sec  MEDICATIONS  (STANDING):  acetaminophen     Tablet .. 650 milliGRAM(s) Oral every 8 hours  enoxaparin Injectable 40 milliGRAM(s) SubCutaneous every 24 hours  lidocaine   4% Patch 2 Patch Transdermal daily  medroxyPROGESTERone 10 milliGRAM(s) Oral daily    MEDICATIONS  (PRN):  acetaminophen     Tablet .. 650 milliGRAM(s) Oral every 6 hours PRN Mild Pain (1 - 3)  ALPRAZolam 0.125 milliGRAM(s) Oral at bedtime PRN sleep  morphine  - Injectable 2 milliGRAM(s) IV Push every 4 hours PRN Severe Pain (7 - 10)  oxyCODONE    IR 2.5 milliGRAM(s) Oral every 4 hours PRN Moderate Pain (4 - 6)  senna 2 Tablet(s) Oral at bedtime PRN Constipation      Physical exam  Gen NAD  Neuro AAOx3  Card RRR  Pulm equal  Abd distended, slighly hard  Ext warm    Tubes: left pigtail to suction, intermittent AL noted    I&O's Summary      Assessment  53y Female  w/ PAST MEDICAL & SURGICAL HISTORY:  Fibroid      H/O oral surgery  under general anesthesia during childhood      S/P D&C (status post dilation and curettage)  x2      S/P laparoscopy  as part of infertility work up      S/P tonsillectomy      admitted with complaints of Patient is a 53y old  Female who presents with a chief complaint of Left side chest pain  L Pneumothorax (12 Mar 2024 17:00)    52 y/o female with a PMHx of fibroids, Pt of [facility] [place] city. s/p  IR biopsy Rt lung nodule at [facility] 2/26 .  PT currently undergoing workup for recently diagnosed endometrial cancer presents to the ED c/o left sided CP. Pt reports she had a pain in her left chest that felt like a gas bubble that was progressively worsening. Denies rash. No other complaints at this time.  Pt noted to have a moderate PTX on left side . Now s/p Left pigtail placement     Failed WS trial yesterday (tube most likely clogged), CXR w PTX, tube flushed and placed to -10 suction w much improvement  CXR this am w small apical PTX, suction increased to -20 and tube flushed again  cont tube to suction today  CXR in AM  pain control  d/w pt and family at bedside, Dr. Templeton, Faith Black    Discussed with Cardiothoracic Team at AM rounds.

## 2024-03-13 NOTE — CONSULT NOTE ADULT - ATTENDING COMMENTS
Fellow Attestation: patient seen and examined at bedside. In brief this is a 53y  w recent diagnosis of endometrial cancer and lung mets, gyn onc consulted for management of vaginal bleeding.  Overall feels well and notes VB improved since overnight. She denies any anemia symptoms and denies any pelvic pain. Notes Provera previously improved bleeding and currently on daily use. The r/b/a of continuing proveria discussed with patient in detail, and patients priortiy of continuing care at [facility] [place] [facility] initially discussed. Given Bx with [facility] scheduled for this week patient currently electing to continue care with [facility] [place] [facility]. If that changes patient will let primary team know and further recommendations to be discussed with patient. Bleeding precautions reviewed. d/w Dr. Hubbard Fellow Attestation: patient seen and examined at bedside. In brief this is a 53y  w recent diagnosis of endometrial cancer and lung mets, gyn onc consulted for management of vaginal bleeding.  Overall feels well and notes VB improved since overnight. She denies any anemia symptoms and denies any pelvic pain. Notes Provera previously improved bleeding and currently on daily use. The r/b/a of continuing provera discussed with patient in detail, and patients priortiy of continuing care at [facility] initially discussed. Given Bx with [facility] scheduled for this week patient currently electing to continue care with [facility]. If that changes patient will let primary team know and further recommendations to be discussed with patient. Bleeding precautions reviewed. d/w Dr. Hubbard    Attending Attestation  - Delay in documentation; Agreed with above plan at time of initial consult. Pt now transferring care to [facility]. Please see subsequent documentation for further details.    Abbi Hubbard MD  Gynecologic Oncology  3/17/24

## 2024-03-13 NOTE — PROCEDURAL SAFETY CHECKLIST WITH OR WITHOUT SEDATION - NSPOSTCOMMENTFT_GEN_ALL_CORE
pt tolerated procedure without incident. right chest tube placed to -20 wall suction
Pt tolerated procedure without difficulty..

## 2024-03-13 NOTE — CONSULT NOTE ADULT - ASSESSMENT
53y  w recent diagnosis of endometrial cancer and lung mets, gyn onc consulted for management of vaginal bleeding.     - declined management by our gyn onc team. Prefers to continue with primary management by [facility] [place] [place].   - declines EMB in house.   - VE without heavy vaginal bleeding. Can uptitrate provera to TID.   - In case of catastrophic vaginal bleeding, intervention would be necessary, and please call back.   - Recommend pRBC transfusion for Hgb <8.    discussed with Dr. Pham, pelvic fellow and Dr. Hubbard, gyn onc attending

## 2024-03-13 NOTE — PROGRESS NOTE ADULT - SUBJECTIVE AND OBJECTIVE BOX
INTERVAL HPI/OVERNIGHT EVENTS:  Patient S&E at bedside.   Patent noted with on     VITAL SIGNS:  T(F): 98.7 (03-13-24 @ 16:05)  HR: 112 (03-13-24 @ 16:05)  BP: 117/74 (03-13-24 @ 16:05)  RR: 17 (03-13-24 @ 16:05)  SpO2: 99% (03-13-24 @ 16:05)  Wt(kg): --    PHYSICAL EXAM:    Constitutional: NAD  Eyes: EOMI, sclera non-icteric  Neck: supple, no masses, no JVD  Respiratory: CTA b/l, good air entry b/l  Gastrointestinal: soft, NTND, no masses palpable, + BS, no hepatosplenomegaly  Extremities: no c/c/e      MEDICATIONS  (STANDING):  acetaminophen     Tablet .. 650 milliGRAM(s) Oral every 8 hours  enoxaparin Injectable 40 milliGRAM(s) SubCutaneous every 24 hours  lidocaine   4% Patch 1 Patch Transdermal daily  lidocaine   4% Patch 2 Patch Transdermal daily  medroxyPROGESTERone 10 milliGRAM(s) Oral daily    MEDICATIONS  (PRN):  acetaminophen     Tablet .. 650 milliGRAM(s) Oral every 6 hours PRN Mild Pain (1 - 3)  ALPRAZolam 0.125 milliGRAM(s) Oral at bedtime PRN sleep  morphine  - Injectable 4 milliGRAM(s) IV Push every 4 hours PRN Severe Pain (7 - 10)  oxyCODONE    IR 2.5 milliGRAM(s) Oral every 4 hours PRN Moderate Pain (4 - 6)  senna 2 Tablet(s) Oral at bedtime PRN Constipation  zolpidem 5 milliGRAM(s) Oral at bedtime PRN Insomnia      Allergies    No Known Allergies    Intolerances    shellfish (Vomiting)      LABS:                        8.2    8.84  )-----------( 732      ( 13 Mar 2024 06:31 )             28.5     03-13    136  |  104  |  12  ----------------------------<  99  4.1   |  26  |  0.59    Ca    9.3      13 Mar 2024 06:31  Phos  4.4     03-13  Mg     2.2     03-13    TPro  7.9  /  Alb  2.2<L>  /  TBili  0.3  /  DBili  x   /  AST  13<L>  /  ALT  17  /  AlkPhos  105  03-13      Urinalysis Basic - ( 13 Mar 2024 06:31 )    Color: x / Appearance: x / SG: x / pH: x  Gluc: 99 mg/dL / Ketone: x  / Bili: x / Urobili: x   Blood: x / Protein: x / Nitrite: x   Leuk Esterase: x / RBC: x / WBC x   Sq Epi: x / Non Sq Epi: x / Bacteria: x        RADIOLOGY & ADDITIONAL TESTS:  Studies reviewed.    ASSESSMENT & PLAN:

## 2024-03-13 NOTE — PROGRESS NOTE ADULT - ASSESSMENT
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 Calixto and would recommend holding on biopsy here at  due to the need for a targeted biopsy guided by FDG avidity noted on prior PET CT   - - continue with supportive care.

## 2024-03-13 NOTE — CHART NOTE - NSCHARTNOTEFT_GEN_A_CORE
Called by RN for right sided chest pain and tachycardic. Requested urgent CXR w demonstrated moderate right PTX.  Urgent right pigtail cath placed after obtaining consent.   Dr. Cuevas at bedside. Pt w relief and post placement CXR shows expanded lung on right.    Plan for Left VATS, pleurodesis, pleurectomy and possible wedge resection.  Will order preop labs for AM  medical clearance requested from Dr. Templeton.   Dr. Cuevas d/w family at bedside.

## 2024-03-13 NOTE — PROGRESS NOTE ADULT - ASSESSMENT
52 y/o female with a PMHx of fibroids, who is under evaluation  for recently diagnosed endometrial cancer (with planned biopsy on 3/14/24) and now presents to [place] ED on 3/11/24 with  c/o left sided chest pain.   Pt was at work today at 11:30 am when she experienced a pain in her left chest that felt like a gas bubble that was progressively worsening and associated with a tingling in her left arm.   Pt reports having recent  abdominal pain related to her uterine mass in her left lower abdomen and a diffuse crampy discomfort.  For this she took  2 tylenols at 8pm last night,  200mg advil at 10pm last night and 0.125mg of xanax at 3am this morning.      # Left side chest pain due to left sided Spontaneous  Pneumothorax s/p left pigtail chest tube placement   - as per CT surgery team   - EKG no ischemic changes, troponin neg   - CT management   - serial CXR   - pain management - oxycodone, morphine IV , lidocaine, tylenol    # Abd pain due to Endometrial ca work up in progress  # Pulmonary metastasis seen on CTA   - plan for endometrial biopsy at [facility] on Thursday 3/14   - d/w Dr Calixto primary oncologist cell [phone] , no point of transferring the patient while has chest tube , biopsy can be postponed   - CTA - no PE, +left sided PTX , pulmonary masses   -Oncology consultant Dr. Black     # Anxiety   -  xanax  prn     # DVT proph :    - lovenox     50 y/o female with a PMHx of fibroids, who is under evaluation  for recently diagnosed endometrial cancer (with planned biopsy on 3/14/24) and now presents to [place] ED on 3/11/24 with  c/o left sided chest pain.   Pt was at work today at 11:30 am when she experienced a pain in her left chest that felt like a gas bubble that was progressively worsening and associated with a tingling in her left arm.   Pt reports having recent  abdominal pain related to her uterine mass in her left lower abdomen and a diffuse crampy discomfort.  For this she took  2 tylenols at 8pm last night,  200mg advil at 10pm last night and 0.125mg of xanax at 3am this morning.      # Left side chest pain due to left sided Spontaneous  Pneumothorax s/p left pigtail chest tube placement   - as per CT surgery team   - EKG no ischemic changes, troponin neg   - CT management   - serial CXR   - pain management - oxycodone, morphine IV , lidocaine, tylenol  - for Left VATS, pleurodesis, pleurectomy and possible wedge resection  - New right side pneumothorax, sp CT  - Patient optimized for Left vats/wedge    # Abd pain due to Endometrial ca work up in progress  # Pulmonary metastasis seen on CTA   - [place] [facility] [place]   - d/w Dr Calixto primary oncologist cell [phone] , no point of transferring the patient while has chest tube , biopsy can be postponed   - CTA - no PE, +left sided PTX , pulmonary masses   -Oncology consultant Dr. Black     # Anxiety   -  xanax prn     # DVT proph :    - lovenox

## 2024-03-13 NOTE — PROGRESS NOTE ADULT - SUBJECTIVE AND OBJECTIVE BOX
HOSPITALIST ATTENDING PROGRESS NOTE    Chart and meds reviewed.  Patient seen and examined.    CC: SOB    Subjective: Pain well controlled. no chest pain. Some pleuritic CP.    All other systems reviewed and found to be negative with the exception of what has been described above.    MEDICATIONS  (STANDING):  acetaminophen     Tablet .. 650 milliGRAM(s) Oral every 8 hours  enoxaparin Injectable 40 milliGRAM(s) SubCutaneous every 24 hours  lidocaine   4% Patch 2 Patch Transdermal daily  medroxyPROGESTERone 10 milliGRAM(s) Oral daily    MEDICATIONS  (PRN):  acetaminophen     Tablet .. 650 milliGRAM(s) Oral every 6 hours PRN Mild Pain (1 - 3)  ALPRAZolam 0.125 milliGRAM(s) Oral at bedtime PRN sleep  morphine  - Injectable 2 milliGRAM(s) IV Push every 4 hours PRN Severe Pain (7 - 10)  oxyCODONE    IR 2.5 milliGRAM(s) Oral every 4 hours PRN Moderate Pain (4 - 6)  senna 2 Tablet(s) Oral at bedtime PRN Constipation      Vital Signs Last 24 Hrs  T(C): 36.8 (13 Mar 2024 08:03), Max: 37.4 (12 Mar 2024 15:28)  T(F): 98.3 (13 Mar 2024 08:03), Max: 99.3 (12 Mar 2024 15:28)  HR: 105 (13 Mar 2024 08:03) (104 - 111)  BP: 108/57 (13 Mar 2024 08:03) (108/57 - 119/73)  RR: 17 (13 Mar 2024 08:03) (17 - 18)  SpO2: 97% (13 Mar 2024 08:03) (96% - 98%)    GEN: NAD  HEENT:  pupils equal and reactive, EOMI, no oropharyngeal lesions, erythema, exudates, oral thrush  NECK:   supple, no carotid bruits, no palpable lymph nodes, no thyromegaly  CV:  +S1, +S2, regular, no murmurs or rubs  RESP:   lungs clear to auscultation bilaterally, no wheezing, rales, rhonchi, good air entry bilaterally + CT  GI:  abdomen soft, non-tender, non-distended, normal BS, no bruits, no abdominal masses, no palpable masses  EXT:  no clubbing, no cyanosis, no edema, no calf pain, swelling or erythema  NEURO:  AAOX3, no focal neurological deficits, follows all commands, able to move extremities spontaneously  SKIN:  no ulcers, lesions or rashes    LABS:                          8.2    8.84  )-----------( 732      ( 13 Mar 2024 06:31 )             28.5     03-13    136  |  104  |  12  ----------------------------<  99  4.1   |  26  |  0.59    Ca    9.3      13 Mar 2024 06:31  Phos  4.4     03-13  Mg     2.2     03-13    TPro  7.9  /  Alb  2.2<L>  /  TBili  0.3  /  DBili  x   /  AST  13<L>  /  ALT  17  /  AlkPhos  105  03-13    LIVER FUNCTIONS - ( 13 Mar 2024 06:31 )  Alb: 2.2 g/dL / Pro: 7.9 gm/dL / ALK PHOS: 105 U/L / ALT: 17 U/L / AST: 13 U/L / GGT: x           PT/INR - ( 11 Mar 2024 13:27 )   PT: 16.2 sec;   INR: 1.45 ratio    PTT - ( 11 Mar 2024 13:27 )  PTT:36.2 sec    Urinalysis Basic - ( 13 Mar 2024 06:31 )  Color: x / Appearance: x / SG: x / pH: x  Gluc: 99 mg/dL / Ketone: x  / Bili: x / Urobili: x   Blood: x / Protein: x / Nitrite: x   Leuk Esterase: x / RBC: x / WBC x   Sq Epi: x / Non Sq Epi: x / Bacteria: x

## 2024-03-14 LAB
ANION GAP SERPL CALC-SCNC: 5 MMOL/L — SIGNIFICANT CHANGE UP (ref 5–17)
APTT BLD: 40.6 SEC — HIGH (ref 24.5–35.6)
BLD GP AB SCN SERPL QL: SIGNIFICANT CHANGE UP
BUN SERPL-MCNC: 12 MG/DL — SIGNIFICANT CHANGE UP (ref 7–23)
CALCIUM SERPL-MCNC: 9.9 MG/DL — SIGNIFICANT CHANGE UP (ref 8.5–10.1)
CHLORIDE SERPL-SCNC: 102 MMOL/L — SIGNIFICANT CHANGE UP (ref 96–108)
CO2 SERPL-SCNC: 29 MMOL/L — SIGNIFICANT CHANGE UP (ref 22–31)
CREAT SERPL-MCNC: 0.64 MG/DL — SIGNIFICANT CHANGE UP (ref 0.5–1.3)
EGFR: 104 ML/MIN/1.73M2 — SIGNIFICANT CHANGE UP
GLUCOSE SERPL-MCNC: 114 MG/DL — HIGH (ref 70–99)
HCT VFR BLD CALC: 27.1 % — LOW (ref 34.5–45)
HGB BLD-MCNC: 7.9 G/DL — LOW (ref 11.5–15.5)
INR BLD: 1.44 RATIO — HIGH (ref 0.85–1.18)
MCHC RBC-ENTMCNC: 23.4 PG — LOW (ref 27–34)
MCHC RBC-ENTMCNC: 29.2 GM/DL — LOW (ref 32–36)
MCV RBC AUTO: 80.4 FL — SIGNIFICANT CHANGE UP (ref 80–100)
PLATELET # BLD AUTO: 695 K/UL — HIGH (ref 150–400)
POTASSIUM SERPL-MCNC: 4.6 MMOL/L — SIGNIFICANT CHANGE UP (ref 3.5–5.3)
POTASSIUM SERPL-SCNC: 4.6 MMOL/L — SIGNIFICANT CHANGE UP (ref 3.5–5.3)
PROTHROM AB SERPL-ACNC: 16.1 SEC — HIGH (ref 9.5–13)
RBC # BLD: 3.37 M/UL — LOW (ref 3.8–5.2)
RBC # FLD: 20.3 % — HIGH (ref 10.3–14.5)
SODIUM SERPL-SCNC: 136 MMOL/L — SIGNIFICANT CHANGE UP (ref 135–145)
WBC # BLD: 9.97 K/UL — SIGNIFICANT CHANGE UP (ref 3.8–10.5)
WBC # FLD AUTO: 9.97 K/UL — SIGNIFICANT CHANGE UP (ref 3.8–10.5)

## 2024-03-14 PROCEDURE — 99232 SBSQ HOSP IP/OBS MODERATE 35: CPT

## 2024-03-14 PROCEDURE — 71045 X-RAY EXAM CHEST 1 VIEW: CPT | Mod: 26,76

## 2024-03-14 RX ORDER — ALPRAZOLAM 0.25 MG
0.5 TABLET ORAL ONCE
Refills: 0 | Status: DISCONTINUED | OUTPATIENT
Start: 2024-03-14 | End: 2024-03-14

## 2024-03-14 RX ORDER — POLYETHYLENE GLYCOL 3350 17 G/17G
17 POWDER, FOR SOLUTION ORAL ONCE
Refills: 0 | Status: COMPLETED | OUTPATIENT
Start: 2024-03-14 | End: 2024-03-14

## 2024-03-14 RX ORDER — SODIUM CHLORIDE 9 MG/ML
1000 INJECTION INTRAMUSCULAR; INTRAVENOUS; SUBCUTANEOUS
Refills: 0 | Status: DISCONTINUED | OUTPATIENT
Start: 2024-03-14 | End: 2024-03-15

## 2024-03-14 RX ORDER — IRON SUCROSE 20 MG/ML
100 INJECTION, SOLUTION INTRAVENOUS ONCE
Refills: 0 | Status: COMPLETED | OUTPATIENT
Start: 2024-03-14 | End: 2024-03-14

## 2024-03-14 RX ORDER — HEPARIN SODIUM 5000 [USP'U]/ML
5000 INJECTION INTRAVENOUS; SUBCUTANEOUS EVERY 8 HOURS
Refills: 0 | Status: DISCONTINUED | OUTPATIENT
Start: 2024-03-14 | End: 2024-03-14

## 2024-03-14 RX ORDER — IBUPROFEN 200 MG
400 TABLET ORAL ONCE
Refills: 0 | Status: COMPLETED | OUTPATIENT
Start: 2024-03-14 | End: 2024-03-14

## 2024-03-14 RX ORDER — ALPRAZOLAM 0.25 MG
0.25 TABLET ORAL EVERY 8 HOURS
Refills: 0 | Status: DISCONTINUED | OUTPATIENT
Start: 2024-03-14 | End: 2024-03-20

## 2024-03-14 RX ADMIN — MORPHINE SULFATE 4 MILLIGRAM(S): 50 CAPSULE, EXTENDED RELEASE ORAL at 15:10

## 2024-03-14 RX ADMIN — Medication 650 MILLIGRAM(S): at 21:39

## 2024-03-14 RX ADMIN — Medication 650 MILLIGRAM(S): at 14:52

## 2024-03-14 RX ADMIN — ZOLPIDEM TARTRATE 5 MILLIGRAM(S): 10 TABLET ORAL at 21:37

## 2024-03-14 RX ADMIN — IRON SUCROSE 210 MILLIGRAM(S): 20 INJECTION, SOLUTION INTRAVENOUS at 16:34

## 2024-03-14 RX ADMIN — Medication 650 MILLIGRAM(S): at 15:10

## 2024-03-14 RX ADMIN — OXYCODONE HYDROCHLORIDE 2.5 MILLIGRAM(S): 5 TABLET ORAL at 23:57

## 2024-03-14 RX ADMIN — Medication 650 MILLIGRAM(S): at 06:04

## 2024-03-14 RX ADMIN — POLYETHYLENE GLYCOL 3350 17 GRAM(S): 17 POWDER, FOR SOLUTION ORAL at 08:30

## 2024-03-14 RX ADMIN — Medication 400 MILLIGRAM(S): at 02:15

## 2024-03-14 RX ADMIN — MEDROXYPROGESTERONE ACETATE 10 MILLIGRAM(S): 150 INJECTION, SUSPENSION, EXTENDED RELEASE INTRAMUSCULAR at 09:36

## 2024-03-14 RX ADMIN — Medication 400 MILLIGRAM(S): at 02:45

## 2024-03-14 RX ADMIN — Medication 0.5 MILLIGRAM(S): at 17:30

## 2024-03-14 RX ADMIN — MORPHINE SULFATE 4 MILLIGRAM(S): 50 CAPSULE, EXTENDED RELEASE ORAL at 14:51

## 2024-03-14 NOTE — PROGRESS NOTE ADULT - ASSESSMENT
50 y/o female with a PMHx of fibroids, who is under evaluation  for recently diagnosed endometrial cancer (with planned biopsy on 3/14/24) and now presents to [place] ED on 3/11/24 with  c/o left sided chest pain.   Pt was at work today at 11:30 am when she experienced a pain in her left chest that felt like a gas bubble that was progressively worsening and associated with a tingling in her left arm.   Pt reports having recent  abdominal pain related to her uterine mass in her left lower abdomen and a diffuse crampy discomfort.  For this she took  2 tylenols at 8pm last night,  200mg advil at 10pm last night and 0.125mg of xanax at 3am this morning.      # Left side chest pain due to left sided Spontaneous  Pneumothorax s/p left pigtail chest tube placement   - as per CT surgery team   - EKG no ischemic changes, troponin neg   - CT management   - serial CXR   - pain management - oxycodone, morphine IV , lidocaine, tylenol  - for Left VATS, pleurodesis, pleurectomy and possible wedge resection  - New right side pneumothorax, sp CT 3/13  - Patient optimized for Left vats/wedge 3/15    # Abd pain due to Endometrial ca work up in progress  # Pulmonary metastasis seen on CTA   - plan for endometrial biopsy at [facility] on Thursday 3/14   - d/w Dr Calixto primary oncologist cell [phone] , no point of transferring the patient while has chest tube , biopsy can be postponed   - CTA - no PE, +left sided PTX , pulmonary masses   -Oncology consultant Dr. Black   - Gyn consult appreciated    # Anxiety   -  xanax prn     # DVT proph :    - lovenox

## 2024-03-14 NOTE — PROGRESS NOTE ADULT - ASSESSMENT
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  The patient initially presented with a spontaneous contralateral pneumothorax.  The patient had undergone a right-sided percutaneous biopsy of the lung mass.  While here her hospital course has been complicated whereby she failed waterseal test 2 nights ago and subsequently following developed acute right-sided chest discomfort and pain and was found to have developed a subsequent right spontaneous pneumothorax.  The patient at this time is presently being actively managed by cardiothoracic surgery.  She has bilateral chest tubes in place.  She is presently pending cardiothoracic evaluation and intervention tomorrow whereby she will undergo VATS with pleurodesis.  She is following with Dr. Millan and tentatively is planning to likely initiate on anthracycline based chemotherapy.  The patient was also noted to have had a PET/CT which demonstrated multiple areas of FDG avidity within the uterus concerning for malignancy whereby she was planned to undergo a percutaneous biopsy in order to better elucidate and clarify site of primary and tissue procurement.  At this time it appears as if this patient discussion between cardiothoracic as well as interventional radiology about proceeding with percutaneous biopsy of the endometrial mass while inpatient as her inpatient stay is anticipated to be throughout the weekend.  - Discussed case with CT surgery and will be planning to proceed iwht with VATS tomorrow due to pTX   Anemia secondary to menorrhagia and VENOFER has been administered.

## 2024-03-14 NOTE — PROGRESS NOTE ADULT - SUBJECTIVE AND OBJECTIVE BOX
HOSPITALIST ATTENDING PROGRESS NOTE    Chart and meds reviewed.  Patient seen and examined.    CC: SOB    Subjective: Pain well controlled. No fever. SP Spontaneous Pneumothorax on right side with new CT    All other systems reviewed and found to be negative with the exception of what has been described above.    MEDICATIONS  (STANDING):  acetaminophen     Tablet .. 650 milliGRAM(s) Oral every 8 hours  enoxaparin Injectable 40 milliGRAM(s) SubCutaneous every 24 hours  lidocaine   4% Patch 2 Patch Transdermal daily  lidocaine   4% Patch 1 Patch Transdermal daily  medroxyPROGESTERone 10 milliGRAM(s) Oral daily    MEDICATIONS  (PRN):  acetaminophen     Tablet .. 650 milliGRAM(s) Oral every 6 hours PRN Mild Pain (1 - 3)  ALPRAZolam 0.125 milliGRAM(s) Oral at bedtime PRN sleep  morphine  - Injectable 4 milliGRAM(s) IV Push every 4 hours PRN Severe Pain (7 - 10)  oxyCODONE    IR 2.5 milliGRAM(s) Oral every 4 hours PRN Moderate Pain (4 - 6)  senna 2 Tablet(s) Oral at bedtime PRN Constipation  zolpidem 5 milliGRAM(s) Oral at bedtime PRN Insomnia    Vital Signs Last 24 Hrs  T(C): 36.2 (14 Mar 2024 08:11), Max: 37.7 (14 Mar 2024 00:15)  T(F): 97.2 (14 Mar 2024 08:11), Max: 99.8 (14 Mar 2024 00:15)  HR: 96 (14 Mar 2024 08:11) (96 - 139)  BP: 109/62 (14 Mar 2024 08:11) (109/62 - 140/72)  BP(mean): 83 (13 Mar 2024 20:35) (83 - 83)  RR: 18 (14 Mar 2024 08:11) (17 - 19)  SpO2: 100% (14 Mar 2024 08:11) (99% - 100%)    GEN: NAD  HEENT:  pupils equal and reactive, EOMI, no oropharyngeal lesions, erythema, exudates, oral thrush  NECK:   supple, no carotid bruits, no palpable lymph nodes, no thyromegaly  CV:  +S1, +S2, regular, no murmurs or rubs  RESP:   lungs clear to auscultation bilaterally, no wheezing, rales, rhonchi, good air entry bilaterally + Bilateral CT  GI:  abdomen soft, non-tender, non-distended, normal BS, no bruits, no abdominal masses, no palpable masses  EXT:  no clubbing, no cyanosis, no edema, no calf pain, swelling or erythema  NEURO:  AAOX3, no focal neurological deficits, follows all commands, able to move extremities spontaneously  SKIN:  no ulcers, lesions or rashes    LABS:                          7.9    9.97  )-----------( 695      ( 14 Mar 2024 05:52 )             27.1     03-14    136  |  102  |  12  ----------------------------<  114<H>  4.6   |  29  |  0.64    Ca    9.9      14 Mar 2024 05:52  Phos  4.4     03-13  Mg     2.2     03-13    TPro  7.9  /  Alb  2.2<L>  /  TBili  0.3  /  DBili  x   /  AST  13<L>  /  ALT  17  /  AlkPhos  105  03-13        LIVER FUNCTIONS - ( 13 Mar 2024 06:31 )  Alb: 2.2 g/dL / Pro: 7.9 gm/dL / ALK PHOS: 105 U/L / ALT: 17 U/L / AST: 13 U/L / GGT: x           PT/INR - ( 14 Mar 2024 05:52 )   PT: 16.1 sec;   INR: 1.44 ratio   PTT - ( 14 Mar 2024 05:52 )  PTT:40.6 sec    Urinalysis Basic - ( 14 Mar 2024 05:52 )  Color: x / Appearance: x / SG: x / pH: x  Gluc: 114 mg/dL / Ketone: x  / Bili: x / Urobili: x   Blood: x / Protein: x / Nitrite: x   Leuk Esterase: x / RBC: x / WBC x   Sq Epi: x / Non Sq Epi: x / Bacteria: x

## 2024-03-14 NOTE — PROGRESS NOTE ADULT - ASSESSMENT
52 y/o female with a PMHx of fibroids, [place] s/p  IR biopsy Rt lung nodule at [facility] [place] 2/26 .  PT currently undergoing workup for recently diagnosed endometrial cancer presents to the ED c/o left sided CP. Pt reports she had a pain in her left chest that felt like a gas bubble that was progressively worsening. Denies rash. No other complaints at this time.  Pt noted to have a moderate PTX on left side . Now s/p Left pigtail placement  3/11 , Rt Pigtail placed 3/13     Plan   NPO p midnight for OR in am - Left VATS pleurectomy pleurodesis   labs in am   maintain pigtails to -20 LWCS today   start LR at 75cc/hr tonight   Dr Cuevas spoke with Dr King, will perform uterine biopsy next week   Lovenox d/c, heparin SQ started   CXR in AM  pain control  Will infuse Iron today for anemia   2 units on hold for or tomorrow , T&S completed   d/w pt and family at bedside    Discussed with Cardiothoracic Team at AM rounds.

## 2024-03-14 NOTE — PROGRESS NOTE ADULT - SUBJECTIVE AND OBJECTIVE BOX
Subjective:  pt in bed NAD no issues overnight . Noted airleak from Rt pigtail today     T(C): 36.2 (03-14-24 @ 08:11), Max: 37.7 (03-14-24 @ 00:15)  HR: 96 (03-14-24 @ 08:11) (96 - 112)  BP: 109/62 (03-14-24 @ 08:11) (109/62 - 121/75)  ABP: --  ABP(mean): --  RR: 18 (03-14-24 @ 08:11) (17 - 18)  SpO2: 100% (03-14-24 @ 08:11) (99% - 100%) 2 L NC   Wt(kg): --  CVP(mm Hg): --  CO: --  CI: --  PA: --                                              Tele: SR    CHEST TUBE: Rt 10cc + AL    Lt 15cc  - AL                              OUTPUT:     per 24 hours             03-14    136  |  102  |  12  ----------------------------<  114<H>  4.6   |  29  |  0.64    Ca    9.9      14 Mar 2024 05:52  Phos  4.4     03-13  Mg     2.2     03-13    TPro  7.9  /  Alb  2.2<L>  /  TBili  0.3  /  DBili  x   /  AST  13<L>  /  ALT  17  /  AlkPhos  105  03-13                               7.9    9.97  )-----------( 695      ( 14 Mar 2024 05:52 )             27.1        PT/INR - ( 14 Mar 2024 05:52 )   PT: 16.1 sec;   INR: 1.44 ratio         PTT - ( 14 Mar 2024 05:52 )  PTT:40.6 sec         CAPILLARY BLOOD GLUCOSE               CXR:  < from: Xray Chest 1 View- PORTABLE-Urgent (Xray Chest 1 View- PORTABLE-Urgent .) (03.13.24 @ 14:05) >  1. Interval insertion of a right basilar chest tube with marked   improvement in the right pneumothorax, now seen to the level of the right   third posterior rib.  2. Left-sided chest tube remains unchanged as does the left pneumothorax,   extending to the left fourth posterior rib.  3. Bilateral pulmonary nodules consistent with metastatic disease, the   largestseen near the cardiophrenic angle on the right, measuring   approximately 3.5 cm.    < end of copied text >          Exam   Neuro:  Alert Awake NAD   Pulm: decreased b/l + b/l pigtails   CV: RRR  Abd: soft nt mild distention   Extremities: warm           Assessment:  53yFemale    with PAST MEDICAL & SURGICAL HISTORY:  Fibroid      H/O oral surgery  under general anesthesia during childhood      S/P D&C (status post dilation and curettage)  x2      S/P laparoscopy  as part of infertility work up      S/P tonsillectomy            Plan:

## 2024-03-14 NOTE — PROGRESS NOTE ADULT - SUBJECTIVE AND OBJECTIVE BOX
INTERVAL HPI/OVERNIGHT EVENTS:  Patient S&E at bedside. No o/n events, patient resting comfortably.  patient noted with bilateral chest tubes   VITAL SIGNS:  T(F): 98 (03-14-24 @ 15:33)  HR: 120 (03-14-24 @ 15:33)  BP: 120/69 (03-14-24 @ 15:33)  RR: 18 (03-14-24 @ 15:33)  SpO2: 100% (03-14-24 @ 15:33)  Wt(kg): --    PHYSICAL EXAM:    Constitutional: NAD  Eyes: EOMI, sclera non-icteric  Neck: supple, no masses, no JVD  Respiratory: CTA b/l, good air entry b/l/ bilateral chest tubes  Cardiovascular: RRR, no M/R/G  Gastrointestinal: soft, NTND, no masses palpable, + BS, no hepatosplenomegaly  Extremities: no c/c/e  Neurological: AAOx3      MEDICATIONS  (STANDING):  acetaminophen     Tablet .. 650 milliGRAM(s) Oral every 8 hours  lidocaine   4% Patch 1 Patch Transdermal daily  lidocaine   4% Patch 2 Patch Transdermal daily  medroxyPROGESTERone 10 milliGRAM(s) Oral daily  sodium chloride 0.9%. 1000 milliLiter(s) (75 mL/Hr) IV Continuous <Continuous>    MEDICATIONS  (PRN):  acetaminophen     Tablet .. 650 milliGRAM(s) Oral every 6 hours PRN Mild Pain (1 - 3)  ALPRAZolam 0.25 milliGRAM(s) Oral every 8 hours PRN anxiety  morphine  - Injectable 4 milliGRAM(s) IV Push every 4 hours PRN Severe Pain (7 - 10)  oxyCODONE    IR 2.5 milliGRAM(s) Oral every 4 hours PRN Moderate Pain (4 - 6)  senna 2 Tablet(s) Oral at bedtime PRN Constipation  zolpidem 5 milliGRAM(s) Oral at bedtime PRN Insomnia      Allergies    No Known Allergies    Intolerances    shellfish (Vomiting)      LABS:                        7.9    9.97  )-----------( 695      ( 14 Mar 2024 05:52 )             27.1     03-14    136  |  102  |  12  ----------------------------<  114<H>  4.6   |  29  |  0.64    Ca    9.9      14 Mar 2024 05:52  Phos  4.4     03-13  Mg     2.2     03-13    TPro  7.9  /  Alb  2.2<L>  /  TBili  0.3  /  DBili  x   /  AST  13<L>  /  ALT  17  /  AlkPhos  105  03-13    PT/INR - ( 14 Mar 2024 05:52 )   PT: 16.1 sec;   INR: 1.44 ratio         PTT - ( 14 Mar 2024 05:52 )  PTT:40.6 sec  Urinalysis Basic - ( 14 Mar 2024 05:52 )    Color: x / Appearance: x / SG: x / pH: x  Gluc: 114 mg/dL / Ketone: x  / Bili: x / Urobili: x   Blood: x / Protein: x / Nitrite: x   Leuk Esterase: x / RBC: x / WBC x   Sq Epi: x / Non Sq Epi: x / Bacteria: x        RADIOLOGY & ADDITIONAL TESTS:  Studies reviewed.    ASSESSMENT & PLAN:

## 2024-03-15 ENCOUNTER — APPOINTMENT (OUTPATIENT)
Dept: THORACIC SURGERY | Facility: HOSPITAL | Age: 54
End: 2024-03-15

## 2024-03-15 ENCOUNTER — RESULT REVIEW (OUTPATIENT)
Age: 54
End: 2024-03-15

## 2024-03-15 DIAGNOSIS — N85.8 OTHER SPECIFIED NONINFLAMMATORY DISORDERS OF UTERUS: ICD-10-CM

## 2024-03-15 LAB
ANION GAP SERPL CALC-SCNC: 6 MMOL/L — SIGNIFICANT CHANGE UP (ref 5–17)
ANION GAP SERPL CALC-SCNC: 9 MMOL/L — SIGNIFICANT CHANGE UP (ref 5–17)
ANION GAP SERPL CALC-SCNC: 9 MMOL/L — SIGNIFICANT CHANGE UP (ref 5–17)
BASOPHILS # BLD AUTO: 0.02 K/UL — SIGNIFICANT CHANGE UP (ref 0–0.2)
BASOPHILS NFR BLD AUTO: 0.2 % — SIGNIFICANT CHANGE UP (ref 0–2)
BUN SERPL-MCNC: 12 MG/DL — SIGNIFICANT CHANGE UP (ref 7–23)
BUN SERPL-MCNC: 12 MG/DL — SIGNIFICANT CHANGE UP (ref 7–23)
BUN SERPL-MCNC: 13 MG/DL — SIGNIFICANT CHANGE UP (ref 7–23)
CALCIUM SERPL-MCNC: 9.3 MG/DL — SIGNIFICANT CHANGE UP (ref 8.5–10.1)
CALCIUM SERPL-MCNC: 9.5 MG/DL — SIGNIFICANT CHANGE UP (ref 8.5–10.1)
CALCIUM SERPL-MCNC: 9.6 MG/DL — SIGNIFICANT CHANGE UP (ref 8.5–10.1)
CHLORIDE SERPL-SCNC: 104 MMOL/L — SIGNIFICANT CHANGE UP (ref 96–108)
CHLORIDE SERPL-SCNC: 105 MMOL/L — SIGNIFICANT CHANGE UP (ref 96–108)
CHLORIDE SERPL-SCNC: 105 MMOL/L — SIGNIFICANT CHANGE UP (ref 96–108)
CO2 SERPL-SCNC: 24 MMOL/L — SIGNIFICANT CHANGE UP (ref 22–31)
CO2 SERPL-SCNC: 25 MMOL/L — SIGNIFICANT CHANGE UP (ref 22–31)
CO2 SERPL-SCNC: 26 MMOL/L — SIGNIFICANT CHANGE UP (ref 22–31)
CREAT SERPL-MCNC: 0.4 MG/DL — LOW (ref 0.5–1.3)
CREAT SERPL-MCNC: 0.4 MG/DL — LOW (ref 0.5–1.3)
CREAT SERPL-MCNC: 0.47 MG/DL — LOW (ref 0.5–1.3)
EGFR: 112 ML/MIN/1.73M2 — SIGNIFICANT CHANGE UP
EGFR: 117 ML/MIN/1.73M2 — SIGNIFICANT CHANGE UP
EGFR: 117 ML/MIN/1.73M2 — SIGNIFICANT CHANGE UP
EOSINOPHIL # BLD AUTO: 0 K/UL — SIGNIFICANT CHANGE UP (ref 0–0.5)
EOSINOPHIL NFR BLD AUTO: 0 % — SIGNIFICANT CHANGE UP (ref 0–6)
GLUCOSE SERPL-MCNC: 121 MG/DL — HIGH (ref 70–99)
GLUCOSE SERPL-MCNC: 148 MG/DL — HIGH (ref 70–99)
GLUCOSE SERPL-MCNC: 99 MG/DL — SIGNIFICANT CHANGE UP (ref 70–99)
HCT VFR BLD CALC: 25.7 % — LOW (ref 34.5–45)
HCT VFR BLD CALC: 28.6 % — LOW (ref 34.5–45)
HGB BLD-MCNC: 7.8 G/DL — LOW (ref 11.5–15.5)
HGB BLD-MCNC: 8.7 G/DL — LOW (ref 11.5–15.5)
IMM GRANULOCYTES NFR BLD AUTO: 0.5 % — SIGNIFICANT CHANGE UP (ref 0–0.9)
LYMPHOCYTES # BLD AUTO: 0.53 K/UL — LOW (ref 1–3.3)
LYMPHOCYTES # BLD AUTO: 4.1 % — LOW (ref 13–44)
MAGNESIUM SERPL-MCNC: 2 MG/DL — SIGNIFICANT CHANGE UP (ref 1.6–2.6)
MCHC RBC-ENTMCNC: 24.2 PG — LOW (ref 27–34)
MCHC RBC-ENTMCNC: 24.5 PG — LOW (ref 27–34)
MCHC RBC-ENTMCNC: 30.4 GM/DL — LOW (ref 32–36)
MCHC RBC-ENTMCNC: 30.4 GM/DL — LOW (ref 32–36)
MCV RBC AUTO: 79.7 FL — LOW (ref 80–100)
MCV RBC AUTO: 80.8 FL — SIGNIFICANT CHANGE UP (ref 80–100)
MONOCYTES # BLD AUTO: 0.24 K/UL — SIGNIFICANT CHANGE UP (ref 0–0.9)
MONOCYTES NFR BLD AUTO: 1.9 % — LOW (ref 2–14)
NEUTROPHILS # BLD AUTO: 12.08 K/UL — HIGH (ref 1.8–7.4)
NEUTROPHILS NFR BLD AUTO: 93.3 % — HIGH (ref 43–77)
PHOSPHATE SERPL-MCNC: 4 MG/DL — SIGNIFICANT CHANGE UP (ref 2.5–4.5)
PLATELET # BLD AUTO: 635 K/UL — HIGH (ref 150–400)
PLATELET # BLD AUTO: 645 K/UL — HIGH (ref 150–400)
POTASSIUM SERPL-MCNC: 3.9 MMOL/L — SIGNIFICANT CHANGE UP (ref 3.5–5.3)
POTASSIUM SERPL-MCNC: 4.3 MMOL/L — SIGNIFICANT CHANGE UP (ref 3.5–5.3)
POTASSIUM SERPL-MCNC: 4.3 MMOL/L — SIGNIFICANT CHANGE UP (ref 3.5–5.3)
POTASSIUM SERPL-SCNC: 3.9 MMOL/L — SIGNIFICANT CHANGE UP (ref 3.5–5.3)
POTASSIUM SERPL-SCNC: 4.3 MMOL/L — SIGNIFICANT CHANGE UP (ref 3.5–5.3)
POTASSIUM SERPL-SCNC: 4.3 MMOL/L — SIGNIFICANT CHANGE UP (ref 3.5–5.3)
RBC # BLD: 3.18 M/UL — LOW (ref 3.8–5.2)
RBC # BLD: 3.59 M/UL — LOW (ref 3.8–5.2)
RBC # FLD: 18.9 % — HIGH (ref 10.3–14.5)
RBC # FLD: 19.1 % — HIGH (ref 10.3–14.5)
SODIUM SERPL-SCNC: 137 MMOL/L — SIGNIFICANT CHANGE UP (ref 135–145)
SODIUM SERPL-SCNC: 138 MMOL/L — SIGNIFICANT CHANGE UP (ref 135–145)
SODIUM SERPL-SCNC: 138 MMOL/L — SIGNIFICANT CHANGE UP (ref 135–145)
WBC # BLD: 12.93 K/UL — HIGH (ref 3.8–10.5)
WBC # BLD: 8.85 K/UL — SIGNIFICANT CHANGE UP (ref 3.8–10.5)
WBC # FLD AUTO: 12.93 K/UL — HIGH (ref 3.8–10.5)
WBC # FLD AUTO: 8.85 K/UL — SIGNIFICANT CHANGE UP (ref 3.8–10.5)

## 2024-03-15 PROCEDURE — 32666 THORACOSCOPY W/WEDGE RESECT: CPT | Mod: LT

## 2024-03-15 PROCEDURE — S2900 ROBOTIC SURGICAL SYSTEM: CPT | Mod: NC

## 2024-03-15 PROCEDURE — 32650 THORACOSCOPY W/PLEURODESIS: CPT | Mod: AS,LT,59

## 2024-03-15 PROCEDURE — 99232 SBSQ HOSP IP/OBS MODERATE 35: CPT

## 2024-03-15 PROCEDURE — 32656 THORACOSCOPY W/PLEURECTOMY: CPT | Mod: LT

## 2024-03-15 PROCEDURE — 32650 THORACOSCOPY W/PLEURODESIS: CPT | Mod: 59,LT

## 2024-03-15 PROCEDURE — 71045 X-RAY EXAM CHEST 1 VIEW: CPT | Mod: 26

## 2024-03-15 PROCEDURE — 99221 1ST HOSP IP/OBS SF/LOW 40: CPT

## 2024-03-15 PROCEDURE — 88307 TISSUE EXAM BY PATHOLOGIST: CPT | Mod: 26

## 2024-03-15 PROCEDURE — 32666 THORACOSCOPY W/WEDGE RESECT: CPT | Mod: AS,LT

## 2024-03-15 PROCEDURE — 32656 THORACOSCOPY W/PLEURECTOMY: CPT | Mod: AS,LT

## 2024-03-15 PROCEDURE — 88305 TISSUE EXAM BY PATHOLOGIST: CPT | Mod: 26

## 2024-03-15 RX ORDER — NALOXONE HYDROCHLORIDE 4 MG/.1ML
0.1 SPRAY NASAL
Refills: 0 | Status: DISCONTINUED | OUTPATIENT
Start: 2024-03-15 | End: 2024-03-22

## 2024-03-15 RX ORDER — ONDANSETRON 8 MG/1
4 TABLET, FILM COATED ORAL EVERY 6 HOURS
Refills: 0 | Status: DISCONTINUED | OUTPATIENT
Start: 2024-03-15 | End: 2024-03-22

## 2024-03-15 RX ORDER — ONDANSETRON 8 MG/1
4 TABLET, FILM COATED ORAL EVERY 6 HOURS
Refills: 0 | Status: DISCONTINUED | OUTPATIENT
Start: 2024-03-15 | End: 2024-03-18

## 2024-03-15 RX ORDER — HYDROMORPHONE HYDROCHLORIDE 2 MG/ML
30 INJECTION INTRAMUSCULAR; INTRAVENOUS; SUBCUTANEOUS
Refills: 0 | Status: DISCONTINUED | OUTPATIENT
Start: 2024-03-15 | End: 2024-03-18

## 2024-03-15 RX ORDER — HYDROMORPHONE HYDROCHLORIDE 2 MG/ML
0.5 INJECTION INTRAMUSCULAR; INTRAVENOUS; SUBCUTANEOUS
Refills: 0 | Status: DISCONTINUED | OUTPATIENT
Start: 2024-03-15 | End: 2024-03-18

## 2024-03-15 RX ORDER — HYDROMORPHONE HYDROCHLORIDE 2 MG/ML
0.5 INJECTION INTRAMUSCULAR; INTRAVENOUS; SUBCUTANEOUS
Refills: 0 | Status: DISCONTINUED | OUTPATIENT
Start: 2024-03-15 | End: 2024-03-15

## 2024-03-15 RX ORDER — SODIUM CHLORIDE 9 MG/ML
1000 INJECTION, SOLUTION INTRAVENOUS
Refills: 0 | Status: DISCONTINUED | OUTPATIENT
Start: 2024-03-15 | End: 2024-03-15

## 2024-03-15 RX ORDER — SODIUM CHLORIDE 9 MG/ML
1000 INJECTION, SOLUTION INTRAVENOUS
Refills: 0 | Status: DISCONTINUED | OUTPATIENT
Start: 2024-03-15 | End: 2024-03-18

## 2024-03-15 RX ORDER — FENTANYL CITRATE 50 UG/ML
50 INJECTION INTRAVENOUS
Refills: 0 | Status: DISCONTINUED | OUTPATIENT
Start: 2024-03-15 | End: 2024-03-15

## 2024-03-15 RX ORDER — SODIUM CHLORIDE 9 MG/ML
1000 INJECTION INTRAMUSCULAR; INTRAVENOUS; SUBCUTANEOUS
Refills: 0 | Status: DISCONTINUED | OUTPATIENT
Start: 2024-03-15 | End: 2024-03-15

## 2024-03-15 RX ORDER — ONDANSETRON 8 MG/1
4 TABLET, FILM COATED ORAL ONCE
Refills: 0 | Status: DISCONTINUED | OUTPATIENT
Start: 2024-03-15 | End: 2024-03-15

## 2024-03-15 RX ADMIN — HYDROMORPHONE HYDROCHLORIDE 30 MILLILITER(S): 2 INJECTION INTRAMUSCULAR; INTRAVENOUS; SUBCUTANEOUS at 10:45

## 2024-03-15 RX ADMIN — MEDROXYPROGESTERONE ACETATE 10 MILLIGRAM(S): 150 INJECTION, SUSPENSION, EXTENDED RELEASE INTRAMUSCULAR at 16:54

## 2024-03-15 RX ADMIN — LIDOCAINE 2 PATCH: 4 CREAM TOPICAL at 16:11

## 2024-03-15 RX ADMIN — SODIUM CHLORIDE 75 MILLILITER(S): 9 INJECTION, SOLUTION INTRAVENOUS at 11:08

## 2024-03-15 RX ADMIN — HYDROMORPHONE HYDROCHLORIDE 0.5 MILLIGRAM(S): 2 INJECTION INTRAMUSCULAR; INTRAVENOUS; SUBCUTANEOUS at 12:39

## 2024-03-15 RX ADMIN — LIDOCAINE 2 PATCH: 4 CREAM TOPICAL at 20:24

## 2024-03-15 RX ADMIN — SODIUM CHLORIDE 75 MILLILITER(S): 9 INJECTION INTRAMUSCULAR; INTRAVENOUS; SUBCUTANEOUS at 00:55

## 2024-03-15 NOTE — PROGRESS NOTE ADULT - ASSESSMENT
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  The patient initially presented with a spontaneous contralateral pneumothorax.  The patient had undergone a right-sided percutaneous biopsy of the lung mass.  While here her hospital course has been complicated whereby she failed waterseal test 2 nights ago and subsequently following developed acute right-sided chest discomfort and pain and was found to have developed a subsequent right spontaneous pneumothorax.  The patient at this time is presently being actively managed by cardiothoracic surgery.    s/p  VATS with pleurodesis.    As per GYN ONC planned for possible adjuvant therapy ? yet patient with noted metastatic disease with biopsy proven parenchymal lung disease  therapy at this time is likely to be palliative in the setting of metastatic disease.   per GYN planned for hysterectomy and ? debulking sx   - at this time discussed with patient and she is opting to transition her care to [facility]   - Will sign off case

## 2024-03-15 NOTE — PROGRESS NOTE ADULT - SUBJECTIVE AND OBJECTIVE BOX
HOSPITALIST ATTENDING PROGRESS NOTE    Chart and meds reviewed.  Patient seen and examined.    CC: SOB    Subjective: Patient ready for OR. No acute issues overnight. No fever.     All other systems reviewed and found to be negative with the exception of what has been described above.    MEDICATIONS  (STANDING):  lidocaine   4% Patch 1 Patch Transdermal daily  lidocaine   4% Patch 2 Patch Transdermal daily  medroxyPROGESTERone 10 milliGRAM(s) Oral daily    MEDICATIONS  (PRN):  acetaminophen     Tablet .. 650 milliGRAM(s) Oral every 6 hours PRN Mild Pain (1 - 3)  ALPRAZolam 0.25 milliGRAM(s) Oral every 8 hours PRN anxiety  senna 2 Tablet(s) Oral at bedtime PRN Constipation  zolpidem 5 milliGRAM(s) Oral at bedtime PRN Insomnia      Vital Signs Last 24 Hrs  T(C): 37.3 (15 Mar 2024 00:52), Max: 37.3 (15 Mar 2024 00:52)  T(F): 99.2 (15 Mar 2024 00:52), Max: 99.2 (15 Mar 2024 00:52)  HR: 116 (15 Mar 2024 00:52) (116 - 121)  BP: 110/66 (15 Mar 2024 00:52) (110/66 - 120/69)  RR: 18 (15 Mar 2024 00:52) (18 - 18)  SpO2: 97% (15 Mar 2024 00:52) (97% - 100%)    GEN: NAD  HEENT:  pupils equal and reactive, EOMI, no oropharyngeal lesions, erythema, exudates, oral thrush  NECK:   supple, no carotid bruits, no palpable lymph nodes, no thyromegaly  CV:  +S1, +S2, regular, no murmurs or rubs  RESP:   lungs clear to auscultation bilaterally, no wheezing, rales, rhonchi, good air entry bilaterally B/L CT  GI:  abdomen soft, non-tender, non-distended, normal BS, no bruits, no abdominal masses, no palpable masses  EXT:  no clubbing, no cyanosis, no edema, no calf pain, swelling or erythema  NEURO:  AAOX3, no focal neurological deficits, follows all commands, able to move extremities spontaneously  SKIN:  no ulcers, lesions or rashes    LABS:                          8.7    8.85  )-----------( 645      ( 15 Mar 2024 07:14 )             28.6     03-15    138  |  104  |  12  ----------------------------<  99  3.9   |  25  |  0.40<L>    Ca    9.5      15 Mar 2024 07:14  Phos  4.0     03-15  Mg     2.0     03-15            PT/INR - ( 14 Mar 2024 05:52 )   PT: 16.1 sec;   INR: 1.44 ratio    PTT - ( 14 Mar 2024 05:52 )  PTT:40.6 sec    Urinalysis Basic - ( 15 Mar 2024 07:14 )  Color: x / Appearance: x / SG: x / pH: x  Gluc: 99 mg/dL / Ketone: x  / Bili: x / Urobili: x   Blood: x / Protein: x / Nitrite: x   Leuk Esterase: x / RBC: x / WBC x   Sq Epi: x / Non Sq Epi: x / Bacteria: x                                            Additional results/Imaging, I have personally reviewed:    Telemetry, personally reviewed:

## 2024-03-15 NOTE — PROGRESS NOTE ADULT - ASSESSMENT
50 y/o female with a PMHx of fibroids, who is under evaluation  for recently diagnosed endometrial cancer (with planned biopsy on 3/14/24) and now presents to [place] ED on 3/11/24 with  c/o left sided chest pain.   Pt was at work today at 11:30 am when she experienced a pain in her left chest that felt like a gas bubble that was progressively worsening and associated with a tingling in her left arm.   Pt reports having recent  abdominal pain related to her uterine mass in her left lower abdomen and a diffuse crampy discomfort.  For this she took  2 tylenols at 8pm last night,  200mg advil at 10pm last night and 0.125mg of xanax at 3am this morning.      # Left side chest pain due to left sided Spontaneous  Pneumothorax s/p left pigtail chest tube placement   - as per CT surgery team   - EKG no ischemic changes, troponin neg   - CT management   - serial CXR   - pain management - oxycodone, morphine IV , lidocaine, tylenol  - for Left VATS, pleurodesis, pleurectomy and possible wedge resection  - New right side pneumothorax, sp CT 3/13  - Patient optimized for Left vats/wedge 3/15    # Abd pain due to Endometrial ca work up in progress  # Pulmonary metastasis seen on CTA   - plan for endometrial biopsy at [facility] on Thursday 3/14   - d/w Dr Calixto primary oncologist cell [phone] , no point of transferring the patient while has chest tube , biopsy can be postponed   - CTA - no PE, +left sided PTX , pulmonary masses   -Oncology consultant Dr. Black   - Gyn consult appreciated    # Anxiety   -  xanax prn     # DVT proph :    - lovenox

## 2024-03-15 NOTE — PROGRESS NOTE ADULT - SUBJECTIVE AND OBJECTIVE BOX
Patient is a 53y old  Female who presents with a chief complaint of Left side chest pain  L Pneumothorax (15 Mar 2024 13:48)      BRIEF HOSPITAL COURSE: 52 yo female with a PMHx of fibroids, who is under evaluation  for recently diagnosed endometrial cancer (with planned biopsy on 3/14/24) and now presents to [place] ED c/o left sided chest pain.   Pt was at work today at 11:30 am when she experienced a pain in her left chest that felt like a gas bubble that was progressively worsening and associated with a tingling in her left arm.   Pt reports having recent  abdominal pain related to her uterine mass in her left lower abdomen and a diffuse crampy discomfort.    Admitted for spontaneous L PTX  Had L pigtail placed by CTS 3/11    3/15 pt seen in PACU, having pain at chest tube sites. breathing feels ok. no air leak on L side.     PAST MEDICAL & SURGICAL HISTORY:  Fibroid  H/O oral surgery  under general anesthesia during childhoo  S/P D&C (status post dilation and curettage)  x2  S/P laparoscopy  as part of infertility work up  S/P tonsillectomy        Medications:  acetaminophen     Tablet .. 650 milliGRAM(s) Oral every 6 hours PRN  ALPRAZolam 0.25 milliGRAM(s) Oral every 8 hours PRN  HYDROmorphone PCA (1 mG/mL) 30 milliLiter(s) PCA Continuous PCA Continuous  HYDROmorphone PCA (1 mG/mL) Rescue Clinician Bolus 0.5 milliGRAM(s) IV Push every 15 minutes PRN  ondansetron Injectable 4 milliGRAM(s) IV Push every 6 hours PRN  ondansetron Injectable 4 milliGRAM(s) IV Push every 6 hours PRN  zolpidem 5 milliGRAM(s) Oral at bedtime PRN  senna 2 Tablet(s) Oral at bedtime PRN  medroxyPROGESTERone 10 milliGRAM(s) Oral daily  sodium chloride 0.9%. 1000 milliLiter(s) IV Continuous <Continuous>  lidocaine   4% Patch 2 Patch Transdermal daily  lidocaine   4% Patch 1 Patch Transdermal daily  naloxone Injectable 0.1 milliGRAM(s) IV Push every 3 minutes PRN    ICU Vital Signs Last 24 Hrs  T(C): 36.1 (15 Mar 2024 13:00), Max: 37.3 (15 Mar 2024 00:52)  T(F): 97 (15 Mar 2024 13:00), Max: 99.2 (15 Mar 2024 00:52)  HR: 90 (15 Mar 2024 13:00) (90 - 121)  BP: 91/62 (15 Mar 2024 13:00) (90/72 - 120/69)  BP(mean): --  ABP: 94/58 (15 Mar 2024 13:00) (91/54 - 116/65)  ABP(mean): --  RR: 10 (15 Mar 2024 13:00) (10 - 29)  SpO2: 100% (15 Mar 2024 13:00) (93% - 100%)    O2 Parameters below as of 15 Mar 2024 13:00  Patient On (Oxygen Delivery Method): nasal cannula  O2 Flow (L/min): 2      I&O's Detail    14 Mar 2024 07:01  -  15 Mar 2024 07:00  --------------------------------------------------------  IN:    PRBCs (Packed Red Blood Cells): 330 mL  Total IN: 330 mL    OUT:    Chest Tube (mL): 15 mL    Chest Tube (mL): 10 mL  Total OUT: 25 mL    Total NET: 305 mL      15 Mar 2024 07:01  -  15 Mar 2024 15:05  --------------------------------------------------------  IN:    Lactated Ringers: 339 mL    Other (mL): 1000 mL  Total IN: 1339 mL    OUT:    Chest Tube (mL): 30 mL    Chest Tube (mL): 25 mL    Indwelling Catheter - Urethral (mL): 1650 mL    Other (mL): 100 mL  Total OUT: 1805 mL    Total NET: -466 mL      LABS:                        8.7    8.85  )-----------( 645      ( 15 Mar 2024 07:14 )             28.6     03-15    138  |  105  |  12  ----------------------------<  148<H>  4.3   |  24  |  0.47<L>    Ca    9.3      15 Mar 2024 10:40  Phos  4.0     03-15  Mg     2.0     03-15      CAPILLARY BLOOD GLUCOSE    PT/INR - ( 14 Mar 2024 05:52 )   PT: 16.1 sec;   INR: 1.44 ratio      PTT - ( 14 Mar 2024 05:52 )  PTT:40.6 sec  Urinalysis Basic - ( 15 Mar 2024 10:40 )    Color: x / Appearance: x / SG: x / pH: x  Gluc: 148 mg/dL / Ketone: x  / Bili: x / Urobili: x   Blood: x / Protein: x / Nitrite: x   Leuk Esterase: x / RBC: x / WBC x   Sq Epi: x / Non Sq Epi: x / Bacteria: x    CULTURES:    Physical Examination:    General: No acute distress.    HEENT: Pupils equal, reactive to light.  Symmetric.  PULM: Clear to auscultation bilaterally  CVS: Regular rate and rhythm, no murmurs  ABD: Soft, nondistended, nontender  EXT: No edema, nontender  SKIN: Warm and well perfused, no rashes noted.  NEURO: Alert, oriented, interactive,    DEVICES:        Patient is a 53y old  Female who presents with a chief complaint of Left side chest pain  L Pneumothorax (15 Mar 2024 13:48)      BRIEF HOSPITAL COURSE: 52 yo female with a PMHx of fibroids, who is under evaluation  for recently diagnosed endometrial cancer (with planned biopsy on 3/14/24) and now presents to [place] ED c/o left sided chest pain.   Pt was at work today at 11:30 am when she experienced a pain in her left chest that felt like a gas bubble that was progressively worsening and associated with a tingling in her left arm.   Pt reports having recent  abdominal pain related to her uterine mass in her left lower abdomen and a diffuse crampy discomfort.      Admitted for spontaneous L PTX  Had L pigtail placed by CTS 3/11  Developed R PTX with subsequent R pigtail placement on 3/14    Take to OR on 3/15 for robot assisted L VATS parietal pleurectomy, pleurodesis, SERINA wedge resection   EBL 50cc    3/15 pt seen in PACU, having pain at chest tube sites. breathing feels ok. no air leak on L side.     PAST MEDICAL & SURGICAL HISTORY:  Fibroid  H/O oral surgery  under general anesthesia during childhoo  S/P D&C (status post dilation and curettage)  x2  S/P laparoscopy  as part of infertility work up  S/P tonsillectomy        Medications:  acetaminophen     Tablet .. 650 milliGRAM(s) Oral every 6 hours PRN  ALPRAZolam 0.25 milliGRAM(s) Oral every 8 hours PRN  HYDROmorphone PCA (1 mG/mL) 30 milliLiter(s) PCA Continuous PCA Continuous  HYDROmorphone PCA (1 mG/mL) Rescue Clinician Bolus 0.5 milliGRAM(s) IV Push every 15 minutes PRN  ondansetron Injectable 4 milliGRAM(s) IV Push every 6 hours PRN  ondansetron Injectable 4 milliGRAM(s) IV Push every 6 hours PRN  zolpidem 5 milliGRAM(s) Oral at bedtime PRN  senna 2 Tablet(s) Oral at bedtime PRN  medroxyPROGESTERone 10 milliGRAM(s) Oral daily  sodium chloride 0.9%. 1000 milliLiter(s) IV Continuous <Continuous>  lidocaine   4% Patch 2 Patch Transdermal daily  lidocaine   4% Patch 1 Patch Transdermal daily  naloxone Injectable 0.1 milliGRAM(s) IV Push every 3 minutes PRN    ICU Vital Signs Last 24 Hrs  T(C): 36.1 (15 Mar 2024 13:00), Max: 37.3 (15 Mar 2024 00:52)  T(F): 97 (15 Mar 2024 13:00), Max: 99.2 (15 Mar 2024 00:52)  HR: 90 (15 Mar 2024 13:00) (90 - 121)  BP: 91/62 (15 Mar 2024 13:00) (90/72 - 120/69)  BP(mean): --  ABP: 94/58 (15 Mar 2024 13:00) (91/54 - 116/65)  ABP(mean): --  RR: 10 (15 Mar 2024 13:00) (10 - 29)  SpO2: 100% (15 Mar 2024 13:00) (93% - 100%)    O2 Parameters below as of 15 Mar 2024 13:00  Patient On (Oxygen Delivery Method): nasal cannula  O2 Flow (L/min): 2      I&O's Detail    14 Mar 2024 07:01  -  15 Mar 2024 07:00  --------------------------------------------------------  IN:    PRBCs (Packed Red Blood Cells): 330 mL  Total IN: 330 mL    OUT:    Chest Tube (mL): 15 mL    Chest Tube (mL): 10 mL  Total OUT: 25 mL    Total NET: 305 mL      15 Mar 2024 07:01  -  15 Mar 2024 15:05  --------------------------------------------------------  IN:    Lactated Ringers: 339 mL    Other (mL): 1000 mL  Total IN: 1339 mL    OUT:    Chest Tube (mL): 30 mL    Chest Tube (mL): 25 mL    Indwelling Catheter - Urethral (mL): 1650 mL    Other (mL): 100 mL  Total OUT: 1805 mL    Total NET: -466 mL      LABS:                        8.7    8.85  )-----------( 645      ( 15 Mar 2024 07:14 )             28.6     03-15    138  |  105  |  12  ----------------------------<  148<H>  4.3   |  24  |  0.47<L>    Ca    9.3      15 Mar 2024 10:40  Phos  4.0     03-15  Mg     2.0     03-15      CAPILLARY BLOOD GLUCOSE    PT/INR - ( 14 Mar 2024 05:52 )   PT: 16.1 sec;   INR: 1.44 ratio      PTT - ( 14 Mar 2024 05:52 )  PTT:40.6 sec  Urinalysis Basic - ( 15 Mar 2024 10:40 )    Color: x / Appearance: x / SG: x / pH: x  Gluc: 148 mg/dL / Ketone: x  / Bili: x / Urobili: x   Blood: x / Protein: x / Nitrite: x   Leuk Esterase: x / RBC: x / WBC x   Sq Epi: x / Non Sq Epi: x / Bacteria: x    CULTURES:    Physical Examination:    General: No acute distress.    HEENT: Pupils equal, reactive to light.  Symmetric.  PULM: dec breath sounds at bases b/l. L CT dark sanguinous, R pigtail - serosanguinous  CVS: Regular rate and rhythm, no murmurs  ABD: Soft, nondistended, nontender  EXT: No edema, nontender  SKIN: Warm and well perfused, no rashes noted.  NEURO: Alert, oriented, interactive,    DEVICES:   R pigtail  L CT  hernandez  A line

## 2024-03-15 NOTE — CONSULT NOTE ADULT - PROBLEM SELECTOR RECOMMENDATION 9
- Reviewed with Dr. King, plan to review imaging with Dr. Corbin before making final decision on biopsy

## 2024-03-15 NOTE — BRIEF OPERATIVE NOTE - NSICDXBRIEFPOSTOP_GEN_ALL_CORE_FT
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

## 2024-03-15 NOTE — CHART NOTE - NSCHARTNOTEFT_GEN_A_CORE
Gyn Oncology Consult Note    Ms. 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 Gyn Oncology consulted per patient request for transfer of care from [facility] to [facility].    Pt has a history of uterine fibroids, which have become progressively more symptomatic with pain and heavy vaginal bleeding over the past few months, requiring numerous blood transfusions for symptomatic anemia and initiation of progesterone and Lysteda during menses. She was scheduled to undergo surgical resection, however underwent pre-operative MRI (12/17/2023) which demonstrated incidental 2.1cm right lower lobe lung nodule. She subsequently presented to Pul and underwent CT chest (01/13/24) which demonstrated numerous pulmonary nodules c/f metastases. Pt underwent PET CT (2/7/24) which demonstrated FDG avid enlarging uterine masses c/f malignancy, with several areas of FDG avidity outside of uterus suggesting extrauterine extension/possible lymphadenopathy; Multiple enlarging FDG avid pulmonary noduels c/f malignancy. Pt underwent consult at [facility] and had an IR guided biopsy of RLL nodule (2/26/24). Gyn Oncology Consult Note    Ms. 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 Gyn Oncology consulted per patient request for transfer of care from [facility] to [facility].    Pt has a history of uterine fibroids, which have become progressively more symptomatic with pain and heavy vaginal bleeding over the past few months, requiring numerous blood transfusions for symptomatic anemia and initiation of progesterone and Lysteda during menses. She was scheduled to undergo surgical resection, however underwent pre-operative MRI (12/17/2023) which demonstrated incidental 2.1cm right lower lobe lung nodule. She subsequently presented to Pul and underwent CT chest (01/13/24) which demonstrated numerous pulmonary nodules c/f metastases. Pt underwent PET CT (2/7/24) which demonstrated FDG avid enlarging uterine masses c/f malignancy, with several areas of FDG avidity outside of uterus suggesting extrauterine extension/possible lymphadenopathy; Multiple enlarging FDG avid pulmonary nodules c/f malignancy. Pt underwent consult at [facility] and had an IR guided biopsy of RLL nodule (2/26/24). Pathology: Malignant high grade mesenchymal neoplasm; Overall findings consistent with high grade mesenchymal neoplasm; DDx includes metastatic high grade endometrial stromal sarcoma and GLI-1 rearranged sarcoma, among others. Additional IHC stains and Salazar RNA sequencing are in progress and will help to further characterize the tumor. Gyn Oncology Consult Note    Ms. 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 Gyn Oncology consulted per patient request for transfer of care from [facility] to [facility].    Pt has a history of uterine fibroids, which have become progressively more symptomatic with pain and heavy vaginal bleeding over the past few months, requiring numerous blood transfusions for symptomatic anemia and initiation of progesterone and Lysteda during menses. EMB 11/15/23: benign endometrial tissue. She was scheduled to undergo surgical resection, however underwent pre-operative MRI (12/17/2023) which demonstrated incidental 2.1cm right lower lobe lung nodule. She subsequently presented to Pul and underwent CT chest (01/13/24) which demonstrated numerous pulmonary nodules c/f metastases.    Pt underwent PET CT (2/7/24) which demonstrated FDG avid enlarging uterine masses c/f malignancy, with several areas of FDG avidity outside of uterus suggesting extrauterine extension/possible lymphadenopathy; Multiple enlarging FDG avid pulmonary nodules c/f malignancy. ABDOMEN/PELVIS/REPRODUCTIVE ORGANS: Enlarged uterus measuring 19 x 9.9 x 14 cm on MR pelvis 12/17/2023. There are multiple areas of FDG avidity within the uterus,, with central photopenia suggesting areas of necrosis or retained blood products, SUV 39 (image 196). Extension is evident into the mesentery along the posterior lateral right aspect of the uterus, SUV 19 (image 192). Other multifocal areas of FDG avidity around the enlarged uterus suggest extrauterine extension, for example an FDG avid focus on image 180 to the right anterior aspect, and a focus in the left pelvic sidewall which may be an enlarged lymph node, image 221. These are difficult to correlate on noncontrast CT. There does appear to be interval enlargement of these mass lesions including a 6 x 4 cm peripherally FDG avid mass at the most superior aspect just right of midline in the abdomen, image 174, not evident to this extent on MR 12/17/2023.    Pt underwent consult at [facility] and had an IR guided biopsy of RLL nodule (2/26/24).   Pathology: Malignant high grade mesenchymal neoplasm; Overall findings consistent with high grade mesenchymal neoplasm;   IHC  Pos stain: CD10, SMA, ER (focal weak), CD56, and DOG1 (weak)  Does not express: claudin-4, pan-CK (AE1/AE3), TTF-1, p40, GATA3, PAX8, desmin, SOX10, S100, synaptophysin, chormogranin, CD31, STAT6  ERG shows weak patchy, non-specific staining  DDx includes metastatic high grade endometrial stromal sarcoma and GLI-1 rearranged sarcoma, among others. Additional IHC stains and Funes RNA sequencing are in progress and will help to further characterize the tumor.    Pt subsequently underwent CT C/A/P (3/7/24) at [facility]:  Lungs: Incresed pulm mets since 1/13/24; RLL 3.4 x2.8cm  Pelvic Organs: Dominant heterogenous partially necrotic partially calcified uterine mass measuring up to 12.2 x 11.9cm on coronal. Soft tissue density in the right gonadal vein, probably tumor thrombus measuring 2.7 x 0.8cm. Dilated gonadal veins.  Mesentery/Bowel: Increased mid abdominal mass which is inseparable form the uterine mass showing multiple internal septations measuring up to 11.3 x 8.9cm previously 7.9 x 5.4cm; No bowel obstruction. No ascites. No intra-abdominal fluid collection.    Pt last seen at [facility] on 3/7/24 by Dr. 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 FUNES RNA testing pending for further characterization of disease.    Pt presented to [facility] Gyn Oncology Note    Ms. 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 Gyn Oncology consulted per patient request for transfer of care from [facility].    Pt has a history of uterine fibroids, which have become progressively more symptomatic with pain and heavy vaginal bleeding over the past few months, requiring numerous blood transfusions for symptomatic anemia and initiation of progesterone and Lysteda during menses. EMB 11/15/23: benign endometrial tissue. She was scheduled to undergo surgical resection, however underwent pre-operative MRI (12/17/2023) which demonstrated incidental 2.1cm right lower lobe lung nodule. She subsequently presented to Pul and underwent CT chest (01/13/24) which demonstrated numerous pulmonary nodules c/f metastases.    Pt underwent PET CT (2/7/24) which demonstrated FDG avid enlarging uterine masses c/f malignancy, with several areas of FDG avidity outside of uterus suggesting extrauterine extension/possible lymphadenopathy; Multiple enlarging FDG avid pulmonary nodules c/f malignancy. ABDOMEN/PELVIS/REPRODUCTIVE ORGANS: Enlarged uterus measuring 19 x 9.9 x 14 cm on MR pelvis 12/17/2023. There are multiple areas of FDG avidity within the uterus,, with central photopenia suggesting areas of necrosis or retained blood products, SUV 39 (image 196). Extension is evident into the mesentery along the posterior lateral right aspect of the uterus, SUV 19 (image 192). Other multifocal areas of FDG avidity around the enlarged uterus suggest extrauterine extension, for example an FDG avid focus on image 180 to the right anterior aspect, and a focus in the left pelvic sidewall which may be an enlarged lymph node, image 221. These are difficult to correlate on noncontrast CT. There does appear to be interval enlargement of these mass lesions including a 6 x 4 cm peripherally FDG avid mass at the most superior aspect just right of midline in the abdomen, image 174, not evident to this extent on MR 12/17/2023.    Pt underwent consult at [facility] and had an IR guided biopsy of RLL nodule (2/26/24).   Pathology: Malignant high grade mesenchymal neoplasm; Overall findings consistent with high grade mesenchymal neoplasm;   IHC  Pos stain: CD10, SMA, ER (focal weak), CD56, and DOG1 (weak)  Does not express: claudin-4, pan-CK (AE1/AE3), TTF-1, p40, GATA3, PAX8, desmin, SOX10, S100, synaptophysin, chormogranin, CD31, STAT6  ERG shows weak patchy, non-specific staining  DDx includes metastatic high grade endometrial stromal sarcoma and GLI-1 rearranged sarcoma, among others. Additional IHC stains and Funes RNA sequencing are in progress and will help to further characterize the tumor.    Pt subsequently underwent CT C/A/P (3/7/24) at [facility]:  Lungs: Incresed pulm mets since 1/13/24; RLL 3.4 x2.8cm  Pelvic Organs: Dominant heterogenous partially necrotic partially calcified uterine mass measuring up to 12.2 x 11.9cm on coronal. Soft tissue density in the right gonadal vein, probably tumor thrombus measuring 2.7 x 0.8cm. Dilated gonadal veins.  Mesentery/Bowel: Increased mid abdominal mass which is inseparable form the uterine mass showing multiple internal septations measuring up to 11.3 x 8.9cm previously 7.9 x 5.4cm; No bowel obstruction. No ascites. No intra-abdominal fluid collection.    Pt last seen at [facility] on 3/7/24 by Dr. 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 FUNES RNA testing pending for further characterization of disease.    Pt presented to  on 03/11/24 with complaints of dyspnea/left sided chest pain. Pt was noted to have left pneumothorax and chest tube was placed. Pt has been monitored closely by Pulmonology. Gyn Oncology originally given pt's ongoing malignancy work-up and heavy vaginal bleeding. Pt at that time initially declined evaluation by [facility] Gyn Oncology team and had preferred to maintain care with [facility]. Since that time, pt has changed her mind and Gyn Oncology reconsulted. Pt underwent VATS procedure and chemical pleurodesis today (3/15/24). Chest tube in left side remains to suction at this time. Small to moderate apical pneumothorax on right also noted (no chest tube or intervention at this time). Medical history reviewed with patient and release of information paperwork signed to obtain complete paperwork from [facility]. Pt's family had copies of some paperwork at bedside which I have summarized and included in this note. Given pt's ongoing vaginal bleeding, discussed potential need for palliative hysterectomy/debulking procedure once she is more stable from a respiratory standpoint. Reviewed that the goal of this procedure would be to remove the uterus and diseased tissue in the abdomen, but this would not be a completely cytoreductive surgery given the widespread burden of disease into the thoracic cavity. Pt will need adjuvant chemotherapy. Alternatively, pt may be a candidate for neoadjuvant chemotherapy to try to reduce the size of the uterine mass/metastases and decrease the joellen-operative associated risks. Will try to reach [facility] to assess need for IR biopsy fo further characterization of tumor. Overall, strong suspicion of widely metastatic HGESS Gyn Oncology will continue to follow.    Abbi Hubbard MD  Gynecologic Oncology  3/15/24 Gyn Oncology Note    Ms. 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 Gyn Oncology consulted per patient request for transfer of care from [facility] to [facility].    Pt has a history of uterine fibroids, which have become progressively more symptomatic with pain and heavy vaginal bleeding over the past few months, requiring numerous blood transfusions for symptomatic anemia and initiation of progesterone and Lysteda during menses. EMB 11/15/23: benign endometrial tissue. She was scheduled to undergo surgical resection, however underwent pre-operative MRI (12/17/2023) which demonstrated incidental 2.1cm right lower lobe lung nodule. She subsequently presented to Pul and underwent CT chest (01/13/24) which demonstrated numerous pulmonary nodules c/f metastases.    Pt underwent PET CT (2/7/24) which demonstrated FDG avid enlarging uterine masses c/f malignancy, with several areas of FDG avidity outside of uterus suggesting extrauterine extension/possible lymphadenopathy; Multiple enlarging FDG avid pulmonary nodules c/f malignancy. ABDOMEN/PELVIS/REPRODUCTIVE ORGANS: Enlarged uterus measuring 19 x 9.9 x 14 cm on MR pelvis 12/17/2023. There are multiple areas of FDG avidity within the uterus,, with central photopenia suggesting areas of necrosis or retained blood products, SUV 39 (image 196). Extension is evident into the mesentery along the posterior lateral right aspect of the uterus, SUV 19 (image 192). Other multifocal areas of FDG avidity around the enlarged uterus suggest extrauterine extension, for example an FDG avid focus on image 180 to the right anterior aspect, and a focus in the left pelvic sidewall which may be an enlarged lymph node, image 221. These are difficult to correlate on noncontrast CT. There does appear to be interval enlargement of these mass lesions including a 6 x 4 cm peripherally FDG avid mass at the most superior aspect just right of midline in the abdomen, image 174, not evident to this extent on MR 12/17/2023.    Pt underwent consult at [facility] and had an IR guided biopsy of RLL nodule (2/26/24).   Pathology: Malignant high grade mesenchymal neoplasm; Overall findings consistent with high grade mesenchymal neoplasm;   IHC  Pos stain: CD10, SMA, ER (focal weak), CD56, and DOG1 (weak)  Does not express: claudin-4, pan-CK (AE1/AE3), TTF-1, p40, GATA3, PAX8, desmin, SOX10, S100, synaptophysin, chormogranin, CD31, STAT6  ERG shows weak patchy, non-specific staining  DDx includes metastatic high grade endometrial stromal sarcoma and GLI-1 rearranged sarcoma, among others. Additional IHC stains and Funes RNA sequencing are in progress and will help to further characterize the tumor.    Pt subsequently underwent CT C/A/P (3/7/24) at [facility]:  Lungs: Incresed pulm mets since 1/13/24; RLL 3.4 x2.8cm  Pelvic Organs: Dominant heterogenous partially necrotic partially calcified uterine mass measuring up to 12.2 x 11.9cm on coronal. Soft tissue density in the right gonadal vein, probably tumor thrombus measuring 2.7 x 0.8cm. Dilated gonadal veins.  Mesentery/Bowel: Increased mid abdominal mass which is inseparable form the uterine mass showing multiple internal septations measuring up to 11.3 x 8.9cm previously 7.9 x 5.4cm; No bowel obstruction. No ascites. No intra-abdominal fluid collection.    Pt last seen at [facility] on 3/7/24 by Dr. 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 FUNES RNA testing pending for further characterization of disease.    Pt presented to  on 03/11/24 with complaints of dyspnea/left sided chest pain. Pt was noted to have left pneumothorax and chest tube was placed. Pt has been monitored closely by Pulmonology. Gyn Oncology originally consulted given pt's ongoing malignancy work-up and heavy vaginal bleeding. Pt at that time initially declined evaluation by [facility] Gyn Oncology team and had preferred to maintain care with [facility]. Since that time, pt has changed her mind and Gyn Oncology reconsulted. Pt underwent VATS procedure and chemical pleurodesis today (3/15/24). Chest tube in left side remains to suction at this time. Small to moderate apical pneumothorax on right also noted (no chest tube or intervention at this time). Medical history reviewed with patient and release of information paperwork signed to obtain complete paperwork from [facility]. Pt's family had copies of some paperwork at bedside which I have summarized and included in this note. Given pt's ongoing vaginal bleeding, discussed potential need for palliative hysterectomy/debulking procedure once she is more stable from a respiratory standpoint. Reviewed that the goal of this procedure would be to remove the uterus and diseased tissue in the abdomen, but this would not be a completely cytoreductive surgery given the widespread burden of disease into the thoracic cavity. Pt will need adjuvant chemotherapy. Alternatively, pt may be a candidate for neoadjuvant chemotherapy to try to reduce the size of the uterine mass/metastases and decrease the joellen-operative associated risks. Will try to reach [facility] to assess need for IR biopsy fo further characterization of tumor. Overall, strong suspicion of widely metastatic HGESS. Gyn Oncology will continue to follow.    Abbi Hubbard MD  Gynecologic Oncology  3/15/24

## 2024-03-15 NOTE — PROGRESS NOTE ADULT - SUBJECTIVE AND OBJECTIVE BOX
INTERVAL HPI/OVERNIGHT EVENTS:  Patient S&E at bedside.  Patient went for L VATS and pleurodiesis     VITAL SIGNS:  T(F): 97.4 (03-15-24 @ 16:55)  HR: 95 (03-15-24 @ 18:00)  BP: 109/70 (03-15-24 @ 18:00)  RR: 26 (03-15-24 @ 18:00)  SpO2: 99% (03-15-24 @ 18:00)  Wt(kg): --    PHYSICAL EXAM:    Constitutional: NAD  Eyes: EOMI, sclera non-icteric  Respiratory:  unlabored breathing  Extremities: no c/c/e      MEDICATIONS  (STANDING):  HYDROmorphone PCA (1 mG/mL) 30 milliLiter(s) PCA Continuous PCA Continuous  lidocaine   4% Patch 2 Patch Transdermal daily  lidocaine   4% Patch 1 Patch Transdermal daily  medroxyPROGESTERone 10 milliGRAM(s) Oral daily  sodium chloride 0.9%. 1000 milliLiter(s) (125 mL/Hr) IV Continuous <Continuous>    MEDICATIONS  (PRN):  acetaminophen     Tablet .. 650 milliGRAM(s) Oral every 6 hours PRN Mild Pain (1 - 3)  ALPRAZolam 0.25 milliGRAM(s) Oral every 8 hours PRN anxiety  HYDROmorphone PCA (1 mG/mL) Rescue Clinician Bolus 0.5 milliGRAM(s) IV Push every 15 minutes PRN for Pain Scale GREATER THAN 6  naloxone Injectable 0.1 milliGRAM(s) IV Push every 3 minutes PRN For ANY of the following changes in patient status:  A. RR LESS THAN 10 breaths per minute, B. Oxygen saturation LESS THAN 90%, C. Sedation score of 6  ondansetron Injectable 4 milliGRAM(s) IV Push every 6 hours PRN Nausea  ondansetron Injectable 4 milliGRAM(s) IV Push every 6 hours PRN Nausea  senna 2 Tablet(s) Oral at bedtime PRN Constipation  zolpidem 5 milliGRAM(s) Oral at bedtime PRN Insomnia      Allergies    No Known Allergies    Intolerances    shellfish (Vomiting)      LABS:                        7.8    12.93 )-----------( 635      ( 15 Mar 2024 16:48 )             25.7     03-15    137  |  105  |  13  ----------------------------<  121<H>  4.3   |  26  |  0.40<L>    Ca    9.6      15 Mar 2024 16:48  Phos  4.0     03-15  Mg     2.0     03-15      PT/INR - ( 14 Mar 2024 05:52 )   PT: 16.1 sec;   INR: 1.44 ratio         PTT - ( 14 Mar 2024 05:52 )  PTT:40.6 sec  Urinalysis Basic - ( 15 Mar 2024 16:48 )    Color: x / Appearance: x / SG: x / pH: x  Gluc: 121 mg/dL / Ketone: x  / Bili: x / Urobili: x   Blood: x / Protein: x / Nitrite: x   Leuk Esterase: x / RBC: x / WBC x   Sq Epi: x / Non Sq Epi: x / Bacteria: x        RADIOLOGY & ADDITIONAL TESTS:  Studies reviewed.    ASSESSMENT & PLAN:

## 2024-03-15 NOTE — CHART NOTE - NSCHARTNOTEFT_GEN_A_CORE
Patient/ family asked  GYN oncology to be involved in her care.  Dr Mart medical GYN Onc came here in the morning-  patient in OR.  Patient seen in later today in PACU.   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 PET Feb 2024 showed enlarged uterus with multiple FDG avid lobular masses with extrauterine extension/ adenopathy and multiple avid pulmonary nodules. S/p biopsy of RLL lesion at [facility]. Pathology - at [facility]. Evaluated by medical oncology at [facility] Dr Calixto. Plan was for FDG avidity guided biopsy of uterine mass by IR at [facility] next week but now she is  admitted to  with bilat pneumothorax s/p bilat chest tubes. Today she underwent left thoracoscopy, parietal pleural biopsy and chemical pleurodesis.  Patient is now in PACU stable, recovering from surgery. Sleepy but able to answer simple questions and asking about  uterine biopsy here on Monday- arranged by medical team with IR Dr King.  Dr Mart will follow up on Monday.

## 2024-03-15 NOTE — PROGRESS NOTE ADULT - ASSESSMENT
ASSESSMENT  52 yo female with a PMHx of fibroids, who is under evaluation  for recently diagnosed endometrial cancer (with planned biopsy on 3/14/24) and now presents to [place] ED c/o left sided chest pain.   Pt reports having recent  abdominal pain related to her uterine mass in her left lower abdomen and a diffuse crampy discomfort.      Admitted for spontaneous L PTX  Had L pigtail placed by CTS 3/11  Developed R PTX with subsequent R pigtail placement on 3/14    Take to OR on 3/15 for robot assisted L VATS parietal pleurectomy, pleurodesis, SERINA wedge resection   EBL 50cc    PLAN    Neuro: AAOx 3. pain control prn dilaudid PCA, tylenol, No nsaids.   CV: BP low normal. cont to monitor.   Pulm: on 2L sating 100%. post op CXR no PTX on L side, R side still with small PTX  GI: PO diet. PPI. bowel regimen prn  Nephro: monitor I & Os. Trend renal fxn. hernandez in. cont LR @ 125.   ID: monitor off abx for now. trend WBC. monitor temps..   Heme: start chemical DVT ppx when ok with thoracic. SCDs. heme onc following, IR was consulted for possible uterine mass biopsy. ( pt was supposed to have one at [facility] 3/14).   PT eval    Dispo: SICU. monitor CT output. pain control. IV fluids. f/u surgical pathology    Will discuss with Dr. Escobedo

## 2024-03-15 NOTE — BRIEF OPERATIVE NOTE - NSICDXBRIEFPROCEDURE_GEN_ALL_CORE_FT
PROCEDURES:  Robot-assisted thoracoscopy with parietal pleurectomy 15-Mar-2024 10:02:58 left sided. chemical pleurodesis Redd Hathaway   PROCEDURES:  Robot-assisted thoracoscopy with parietal pleurectomy 15-Mar-2024 10:02:58 left sided. chemical pleurodesis Redd Hathaway  Wedge resection, lung 15-Mar-2024 10:39:43 left upper lobe Rded Hathaway

## 2024-03-15 NOTE — CONSULT NOTE ADULT - ASSESSMENT
52yo with pelvic mass referred to IR for evaluation for biopsy  - On lovenox/heparin  - Platelets/INr OK  - VSS

## 2024-03-15 NOTE — BRIEF OPERATIVE NOTE - NSICDXBRIEFPREOP_GEN_ALL_CORE_FT
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

## 2024-03-16 LAB
ALBUMIN SERPL ELPH-MCNC: 1.8 G/DL — LOW (ref 3.3–5)
ALP SERPL-CCNC: 79 U/L — SIGNIFICANT CHANGE UP (ref 40–120)
ALT FLD-CCNC: 11 U/L — LOW (ref 12–78)
ANION GAP SERPL CALC-SCNC: 8 MMOL/L — SIGNIFICANT CHANGE UP (ref 5–17)
AST SERPL-CCNC: 13 U/L — LOW (ref 15–37)
BASOPHILS # BLD AUTO: 0.01 K/UL — SIGNIFICANT CHANGE UP (ref 0–0.2)
BASOPHILS NFR BLD AUTO: 0.1 % — SIGNIFICANT CHANGE UP (ref 0–2)
BILIRUB SERPL-MCNC: 0.2 MG/DL — SIGNIFICANT CHANGE UP (ref 0.2–1.2)
BUN SERPL-MCNC: 12 MG/DL — SIGNIFICANT CHANGE UP (ref 7–23)
CALCIUM SERPL-MCNC: 9.2 MG/DL — SIGNIFICANT CHANGE UP (ref 8.5–10.1)
CHLORIDE SERPL-SCNC: 103 MMOL/L — SIGNIFICANT CHANGE UP (ref 96–108)
CO2 SERPL-SCNC: 26 MMOL/L — SIGNIFICANT CHANGE UP (ref 22–31)
CREAT SERPL-MCNC: 0.45 MG/DL — LOW (ref 0.5–1.3)
EGFR: 114 ML/MIN/1.73M2 — SIGNIFICANT CHANGE UP
EOSINOPHIL # BLD AUTO: 0 K/UL — SIGNIFICANT CHANGE UP (ref 0–0.5)
EOSINOPHIL NFR BLD AUTO: 0 % — SIGNIFICANT CHANGE UP (ref 0–6)
GLUCOSE SERPL-MCNC: 142 MG/DL — HIGH (ref 70–99)
HCT VFR BLD CALC: 24.3 % — LOW (ref 34.5–45)
HGB BLD-MCNC: 7.4 G/DL — LOW (ref 11.5–15.5)
IMM GRANULOCYTES NFR BLD AUTO: 0.5 % — SIGNIFICANT CHANGE UP (ref 0–0.9)
LYMPHOCYTES # BLD AUTO: 1.45 K/UL — SIGNIFICANT CHANGE UP (ref 1–3.3)
LYMPHOCYTES # BLD AUTO: 11.2 % — LOW (ref 13–44)
MAGNESIUM SERPL-MCNC: 1.9 MG/DL — SIGNIFICANT CHANGE UP (ref 1.6–2.6)
MCHC RBC-ENTMCNC: 24.6 PG — LOW (ref 27–34)
MCHC RBC-ENTMCNC: 30.5 GM/DL — LOW (ref 32–36)
MCV RBC AUTO: 80.7 FL — SIGNIFICANT CHANGE UP (ref 80–100)
MONOCYTES # BLD AUTO: 1.02 K/UL — HIGH (ref 0–0.9)
MONOCYTES NFR BLD AUTO: 7.8 % — SIGNIFICANT CHANGE UP (ref 2–14)
NEUTROPHILS # BLD AUTO: 10.45 K/UL — HIGH (ref 1.8–7.4)
NEUTROPHILS NFR BLD AUTO: 80.4 % — HIGH (ref 43–77)
PHOSPHATE SERPL-MCNC: 3.5 MG/DL — SIGNIFICANT CHANGE UP (ref 2.5–4.5)
PLATELET # BLD AUTO: 631 K/UL — HIGH (ref 150–400)
POTASSIUM SERPL-MCNC: 4.2 MMOL/L — SIGNIFICANT CHANGE UP (ref 3.5–5.3)
POTASSIUM SERPL-SCNC: 4.2 MMOL/L — SIGNIFICANT CHANGE UP (ref 3.5–5.3)
PROT SERPL-MCNC: 6.9 GM/DL — SIGNIFICANT CHANGE UP (ref 6–8.3)
RBC # BLD: 3.01 M/UL — LOW (ref 3.8–5.2)
RBC # FLD: 19 % — HIGH (ref 10.3–14.5)
SODIUM SERPL-SCNC: 137 MMOL/L — SIGNIFICANT CHANGE UP (ref 135–145)
WBC # BLD: 13 K/UL — HIGH (ref 3.8–10.5)
WBC # FLD AUTO: 13 K/UL — HIGH (ref 3.8–10.5)

## 2024-03-16 PROCEDURE — 71045 X-RAY EXAM CHEST 1 VIEW: CPT | Mod: 26

## 2024-03-16 RX ORDER — ACETAMINOPHEN 500 MG
750 TABLET ORAL ONCE
Refills: 0 | Status: DISCONTINUED | OUTPATIENT
Start: 2024-03-16 | End: 2024-03-22

## 2024-03-16 RX ORDER — ACETAMINOPHEN 500 MG
750 TABLET ORAL ONCE
Refills: 0 | Status: COMPLETED | OUTPATIENT
Start: 2024-03-16 | End: 2024-03-16

## 2024-03-16 RX ADMIN — Medication 300 MILLIGRAM(S): at 23:46

## 2024-03-16 RX ADMIN — LIDOCAINE 1 PATCH: 4 CREAM TOPICAL at 21:34

## 2024-03-16 RX ADMIN — LIDOCAINE 2 PATCH: 4 CREAM TOPICAL at 21:34

## 2024-03-16 RX ADMIN — MEDROXYPROGESTERONE ACETATE 10 MILLIGRAM(S): 150 INJECTION, SUSPENSION, EXTENDED RELEASE INTRAMUSCULAR at 10:08

## 2024-03-16 RX ADMIN — LIDOCAINE 1 PATCH: 4 CREAM TOPICAL at 10:07

## 2024-03-16 RX ADMIN — LIDOCAINE 1 PATCH: 4 CREAM TOPICAL at 21:59

## 2024-03-16 RX ADMIN — LIDOCAINE 2 PATCH: 4 CREAM TOPICAL at 21:59

## 2024-03-16 RX ADMIN — LIDOCAINE 2 PATCH: 4 CREAM TOPICAL at 10:07

## 2024-03-16 RX ADMIN — LIDOCAINE 2 PATCH: 4 CREAM TOPICAL at 04:40

## 2024-03-16 NOTE — CHART NOTE - NSCHARTNOTEFT_GEN_A_CORE
Gyn Oncology Note    Ms. 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 Pt overall reports feeling relatively well this AM. Mild episode of left sided chest pain at site of VATS procedure, but otherwise minimal complaints. Reports light vaginal bleeding. Denies passage of clots. Denies abdominal pain. Tolerating PO without issue.      O:  CU Vital Signs Last 24 Hrs  T(C): 36.8 (16 Mar 2024 09:04), Max: 36.8 (16 Mar 2024 09:04)  T(F): 98.2 (16 Mar 2024 09:04), Max: 98.2 (16 Mar 2024 09:04)  HR: 97 (16 Mar 2024 08:00) (80 - 119)  BP: 117/73 (16 Mar 2024 08:00) (90/72 - 129/89)  BP(mean): 86 (16 Mar 2024 08:00) (72 - 101)  ABP: 105/60 (15 Mar 2024 18:00) (91/54 - 116/65)  ABP(mean): 78 (15 Mar 2024 18:00) (72 - 85)  RR: 20 (16 Mar 2024 08:00) (10 - 29)  SpO2: 99% (16 Mar 2024 08:00) (93% - 100%)    O2 Parameters below as of 16 Mar 2024 08:00  Patient On (Oxygen Delivery Method): nasal cannula  O2 Flow (L/min): 3      I&O's Detail    15 Mar 2024 07:01  -  16 Mar 2024 07:00  --------------------------------------------------------  IN:    Lactated Ringers: 339 mL    Lactated Ringers: 911 mL    Other (mL): 1000 mL    sodium chloride 0.9%: 574 mL  Total IN: 2824 mL    OUT:    Chest Tube (mL): 35 mL    Chest Tube (mL): 64 mL    Indwelling Catheter - Urethral (mL): 851 mL    Other (mL): 100 mL  Total OUT: 1050 mL    Total NET: 1774 mL    PE  Gen: Mod well appearing, thin; NAD  Abd: soft, non-tender; palpable mass measuring approx 2 finger breadths above umbilicus  : Deferred      LABS:    CBC Full  -  ( 16 Mar 2024 05:48 )  WBC Count : 13.00 K/uL  RBC Count : 3.01 M/uL  Hemoglobin : 7.4 g/dL  Hematocrit : 24.3 %  Platelet Count - Automated : 631 K/uL  Mean Cell Volume : 80.7 fl  Mean Cell Hemoglobin : 24.6 pg  Mean Cell Hemoglobin Concentration : 30.5 gm/dL  Auto Neutrophil # : 10.45 K/uL  Auto Lymphocyte # : 1.45 K/uL  Auto Monocyte # : 1.02 K/uL  Auto Eosinophil # : 0.00 K/uL  Auto Basophil # : 0.01 K/uL  Auto Neutrophil % : 80.4 %  Auto Lymphocyte % : 11.2 %  Auto Monocyte % : 7.8 %  Auto Eosinophil % : 0.0 %  Auto Basophil % : 0.1 %    03-16    137  |  103  |  12  ----------------------------<  142<H>  4.2   |  26  |  0.45<L>    Ca    9.2      16 Mar 2024 05:48  Phos  3.5     03-16  Mg     1.9     03-16    TPro  6.9  /  Alb  1.8<L>  /  TBili  0.2  /  DBili  x   /  AST  13<L>  /  ALT  11<L>  /  AlkPhos  79  03-16      A/P:  Overall, Ms. 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 Pt overall doing moderately well.Care per cardiothoracic/SICU team.  Please transfuse 1u pRBC to maintain Hbg >8.0 in pt with active malignancy. Given ongoing vaginal bleeding and mod chemoresponsiveness to HGESS, likely plan for initial cytoreductive surgery, will try to facilitate for scheduling, possibly this week, pending stability from cardiopulmonary standpoint. Unsure of benefit of IR biopsy of abdominal mass to assist in narrowing tissue diagnosis as surgical intervention with removal of uterus indicated and would provide larger source of pathologic tissue to evaluate. Gyn Oncology will continue to follow.    Abbi Hubbard MD  Gynecologic Oncology  3/16/24

## 2024-03-16 NOTE — PROGRESS NOTE ADULT - ASSESSMENT
A:    52 y/o female with a PMHx of fibroids, Pt of [facility] in the city. s/p  IR biopsy Rt lung nodule at [facility] 2/26 .  PT currently undergoing workup for recently diagnosed endometrial cancer presents to the ED c/o left sided CP. Pt reports she had a pain in her left chest that felt like a gas bubble that was progressively worsening. Denies rash. No other complaints at this time.  Pt noted to have a moderate PTX on left side . Now s/p Left pigtail placement  3/11 , Rt Pigtail placed 3/13    This patient requires a moderate level of care due to the complexity and severity of their condition which increases the amount and complexity of data to be reviewed and analyzed in addition to the risk of complications, morbidity and mortality of patient management    P:    POD # 1 s/p Lt sided wedge rxn, pleurodesis    Doing well overall post op  CXR reviewed, clear, tubes in place, no effusions  Maintain CTs to sxn through wknd    Pending pathology and final studies  Plan for GYN Onc hydetrectomy/debulking later this week    Cont pain control via PCA  Cont hydration for now, can likely d/c later today if PO intake adequate  Regular diet  IS, ambulate as able; Pt motivated to do so  VTE ppx  Anemia chronic disease + surgery; transfuse as needed to maintain Hgb > 7  f/u labs, replete lyes PRN    Remainder of care per medicine    Dispo: Cont care.    Time spent on this patient encounter: 55+ minutes    Date of entry of this note is equal to the date of services rendered    This includes assessment of the presenting problems with associated risks, reviewing the meeical record to prepare for the encounter and meeting face to face with the patient to obtain additional history. I have also performed an appropiate physical exam, made interventions listed and ordered and interpreted appropiate diagnostic studies as documented. This also included communication and coordination of care with the multidisciplinary team including the bedside nurse, appropriate attending of record and consultants as needed.

## 2024-03-16 NOTE — PROGRESS NOTE ADULT - SUBJECTIVE AND OBJECTIVE BOX
CTS Progress Note    HPI:    S:    Pt seen and examined  52 y/o female with a PMHx of fibroids, Pt of [facility] in the city. s/p  IR biopsy Rt lung nodule at [facility] 2/26 .  PT currently undergoing workup for recently diagnosed endometrial cancer presents to the ED c/o left sided CP. Pt reports she had a pain in her left chest that felt like a gas bubble that was progressively worsening. Denies rash. No other complaints at this time.  Pt noted to have a moderate PTX on left side . Now s/p Left pigtail placement  3/11 , Rt Pigtail placed 3/13     POD # 1 s/p Lt sided wedge rxn, pleurodesis    3/16: Doing well post op. B/L CT in [place] to sxn, output minimal. No AL. Pending cultures and path. GYN Onc note appreciated, seems plan for hysterectomy/debulking this coming week.     ROS: + post op pain  Remainder of systems reviewed, negative    Allergies    No Known Allergies    Intolerances    shellfish (Vomiting)      MEDICATIONS  (STANDING):  HYDROmorphone PCA (1 mG/mL) 30 milliLiter(s) PCA Continuous PCA Continuous  lactated ringers. 1000 milliLiter(s) (75 mL/Hr) IV Continuous <Continuous>  lidocaine   4% Patch 2 Patch Transdermal daily  lidocaine   4% Patch 1 Patch Transdermal daily  medroxyPROGESTERone 10 milliGRAM(s) Oral daily    MEDICATIONS  (PRN):  acetaminophen     Tablet .. 650 milliGRAM(s) Oral every 6 hours PRN Mild Pain (1 - 3)  ALPRAZolam 0.25 milliGRAM(s) Oral every 8 hours PRN anxiety  HYDROmorphone PCA (1 mG/mL) Rescue Clinician Bolus 0.5 milliGRAM(s) IV Push every 15 minutes PRN for Pain Scale GREATER THAN 6  naloxone Injectable 0.1 milliGRAM(s) IV Push every 3 minutes PRN For ANY of the following changes in patient status:  A. RR LESS THAN 10 breaths per minute, B. Oxygen saturation LESS THAN 90%, C. Sedation score of 6  ondansetron Injectable 4 milliGRAM(s) IV Push every 6 hours PRN Nausea  ondansetron Injectable 4 milliGRAM(s) IV Push every 6 hours PRN Nausea  senna 2 Tablet(s) Oral at bedtime PRN Constipation  zolpidem 5 milliGRAM(s) Oral at bedtime PRN Insomnia      Drug Dosing Weight  Height (cm): 162.6 (11 Mar 2024 12:18)  Weight (kg): 49.4 (11 Mar 2024 12:18)  BMI (kg/m2): 18.7 (11 Mar 2024 12:18)  BSA (m2): 1.51 (11 Mar 2024 12:18)    PAST MEDICAL & SURGICAL HISTORY:  Fibroid      H/O oral surgery  under general anesthesia during childhood      S/P D&C (status post dilation and curettage)  x2      S/P laparoscopy  as part of infertility work up      S/P tonsillectomy          FAMILY HISTORY:  Family history of uterine cancer (Mother)            O:    ICU Vital Signs Last 24 Hrs  T(C): 36.8 (16 Mar 2024 09:04), Max: 36.8 (16 Mar 2024 09:04)  T(F): 98.2 (16 Mar 2024 09:04), Max: 98.2 (16 Mar 2024 09:04)  HR: 97 (16 Mar 2024 08:00) (80 - 119)  BP: 117/73 (16 Mar 2024 08:00) (90/72 - 129/89)  BP(mean): 86 (16 Mar 2024 08:00) (72 - 101)  ABP: 105/60 (15 Mar 2024 18:00) (91/54 - 116/65)  ABP(mean): 78 (15 Mar 2024 18:00) (72 - 85)  RR: 20 (16 Mar 2024 08:00) (10 - 29)  SpO2: 99% (16 Mar 2024 08:00) (93% - 100%)    O2 Parameters below as of 16 Mar 2024 08:00  Patient On (Oxygen Delivery Method): nasal cannula  O2 Flow (L/min): 3              I&O's Detail    15 Mar 2024 07:01  -  16 Mar 2024 07:00  --------------------------------------------------------  IN:    Lactated Ringers: 339 mL    Lactated Ringers: 911 mL    Other (mL): 1000 mL    sodium chloride 0.9%: 574 mL  Total IN: 2824 mL    OUT:    Chest Tube (mL): 35 mL    Chest Tube (mL): 64 mL    Indwelling Catheter - Urethral (mL): 851 mL    Other (mL): 100 mL  Total OUT: 1050 mL    Total NET: 1774 mL              PE:    Adult lying in bed  No JVD trachea midline  Normocephalic, atraumatic  S1S2+  CTA B/L + B/L chest tube; R sided pigtail, Lt sided chest tube  Abd soft NTND  No leg swelling/edema noted  Awake and alert  Skin pink, warm    LABS:    CBC Full  -  ( 16 Mar 2024 05:48 )  WBC Count : 13.00 K/uL  RBC Count : 3.01 M/uL  Hemoglobin : 7.4 g/dL  Hematocrit : 24.3 %  Platelet Count - Automated : 631 K/uL  Mean Cell Volume : 80.7 fl  Mean Cell Hemoglobin : 24.6 pg  Mean Cell Hemoglobin Concentration : 30.5 gm/dL  Auto Neutrophil # : 10.45 K/uL  Auto Lymphocyte # : 1.45 K/uL  Auto Monocyte # : 1.02 K/uL  Auto Eosinophil # : 0.00 K/uL  Auto Basophil # : 0.01 K/uL  Auto Neutrophil % : 80.4 %  Auto Lymphocyte % : 11.2 %  Auto Monocyte % : 7.8 %  Auto Eosinophil % : 0.0 %  Auto Basophil % : 0.1 %    03-16    137  |  103  |  12  ----------------------------<  142<H>  4.2   |  26  |  0.45<L>    Ca    9.2      16 Mar 2024 05:48  Phos  3.5     03-16  Mg     1.9     03-16    TPro  6.9  /  Alb  1.8<L>  /  TBili  0.2  /  DBili  x   /  AST  13<L>  /  ALT  11<L>  /  AlkPhos  79  03-16      Urinalysis Basic - ( 16 Mar 2024 05:48 )    Color: x / Appearance: x / SG: x / pH: x  Gluc: 142 mg/dL / Ketone: x  / Bili: x / Urobili: x   Blood: x / Protein: x / Nitrite: x   Leuk Esterase: x / RBC: x / WBC x   Sq Epi: x / Non Sq Epi: x / Bacteria: x      CAPILLARY BLOOD GLUCOSE            LIVER FUNCTIONS - ( 16 Mar 2024 05:48 )  Alb: 1.8 g/dL / Pro: 6.9 gm/dL / ALK PHOS: 79 U/L / ALT: 11 U/L / AST: 13 U/L / GGT: x

## 2024-03-17 LAB
ALBUMIN SERPL ELPH-MCNC: 2 G/DL — LOW (ref 3.3–5)
ALP SERPL-CCNC: 89 U/L — SIGNIFICANT CHANGE UP (ref 40–120)
ALT FLD-CCNC: 20 U/L — SIGNIFICANT CHANGE UP (ref 12–78)
ANION GAP SERPL CALC-SCNC: 11 MMOL/L — SIGNIFICANT CHANGE UP (ref 5–17)
AST SERPL-CCNC: 24 U/L — SIGNIFICANT CHANGE UP (ref 15–37)
BILIRUB SERPL-MCNC: 0.3 MG/DL — SIGNIFICANT CHANGE UP (ref 0.2–1.2)
BUN SERPL-MCNC: 9 MG/DL — SIGNIFICANT CHANGE UP (ref 7–23)
CALCIUM SERPL-MCNC: 9.4 MG/DL — SIGNIFICANT CHANGE UP (ref 8.5–10.1)
CHLORIDE SERPL-SCNC: 97 MMOL/L — SIGNIFICANT CHANGE UP (ref 96–108)
CO2 SERPL-SCNC: 28 MMOL/L — SIGNIFICANT CHANGE UP (ref 22–31)
CREAT SERPL-MCNC: 0.51 MG/DL — SIGNIFICANT CHANGE UP (ref 0.5–1.3)
EGFR: 110 ML/MIN/1.73M2 — SIGNIFICANT CHANGE UP
GLUCOSE SERPL-MCNC: 85 MG/DL — SIGNIFICANT CHANGE UP (ref 70–99)
HCT VFR BLD CALC: 30.4 % — LOW (ref 34.5–45)
HGB BLD-MCNC: 9.1 G/DL — LOW (ref 11.5–15.5)
MAGNESIUM SERPL-MCNC: 2 MG/DL — SIGNIFICANT CHANGE UP (ref 1.6–2.6)
MCHC RBC-ENTMCNC: 24.5 PG — LOW (ref 27–34)
MCHC RBC-ENTMCNC: 29.9 GM/DL — LOW (ref 32–36)
MCV RBC AUTO: 81.7 FL — SIGNIFICANT CHANGE UP (ref 80–100)
PHOSPHATE SERPL-MCNC: 3.9 MG/DL — SIGNIFICANT CHANGE UP (ref 2.5–4.5)
PLATELET # BLD AUTO: 759 K/UL — HIGH (ref 150–400)
POTASSIUM SERPL-MCNC: 3.9 MMOL/L — SIGNIFICANT CHANGE UP (ref 3.5–5.3)
POTASSIUM SERPL-SCNC: 3.9 MMOL/L — SIGNIFICANT CHANGE UP (ref 3.5–5.3)
PROT SERPL-MCNC: 7.6 GM/DL — SIGNIFICANT CHANGE UP (ref 6–8.3)
RBC # BLD: 3.72 M/UL — LOW (ref 3.8–5.2)
RBC # FLD: 19.7 % — HIGH (ref 10.3–14.5)
SODIUM SERPL-SCNC: 136 MMOL/L — SIGNIFICANT CHANGE UP (ref 135–145)
WBC # BLD: 11.24 K/UL — HIGH (ref 3.8–10.5)
WBC # FLD AUTO: 11.24 K/UL — HIGH (ref 3.8–10.5)

## 2024-03-17 RX ORDER — ACETAMINOPHEN 500 MG
750 TABLET ORAL ONCE
Refills: 0 | Status: COMPLETED | OUTPATIENT
Start: 2024-03-17 | End: 2024-03-17

## 2024-03-17 RX ORDER — POLYETHYLENE GLYCOL 3350 17 G/17G
17 POWDER, FOR SOLUTION ORAL
Refills: 0 | Status: DISCONTINUED | OUTPATIENT
Start: 2024-03-17 | End: 2024-03-22

## 2024-03-17 RX ADMIN — LIDOCAINE 1 PATCH: 4 CREAM TOPICAL at 11:17

## 2024-03-17 RX ADMIN — LIDOCAINE 2 PATCH: 4 CREAM TOPICAL at 11:16

## 2024-03-17 RX ADMIN — Medication 300 MILLIGRAM(S): at 22:23

## 2024-03-17 RX ADMIN — LIDOCAINE 1 PATCH: 4 CREAM TOPICAL at 23:00

## 2024-03-17 RX ADMIN — LIDOCAINE 2 PATCH: 4 CREAM TOPICAL at 23:00

## 2024-03-17 RX ADMIN — MEDROXYPROGESTERONE ACETATE 10 MILLIGRAM(S): 150 INJECTION, SUSPENSION, EXTENDED RELEASE INTRAMUSCULAR at 11:16

## 2024-03-17 RX ADMIN — LIDOCAINE 1 PATCH: 4 CREAM TOPICAL at 20:20

## 2024-03-17 RX ADMIN — Medication 750 MILLIGRAM(S): at 22:23

## 2024-03-17 RX ADMIN — POLYETHYLENE GLYCOL 3350 17 GRAM(S): 17 POWDER, FOR SOLUTION ORAL at 06:02

## 2024-03-17 RX ADMIN — Medication 750 MILLIGRAM(S): at 00:01

## 2024-03-17 RX ADMIN — LIDOCAINE 2 PATCH: 4 CREAM TOPICAL at 20:20

## 2024-03-17 NOTE — PROGRESS NOTE ADULT - SUBJECTIVE AND OBJECTIVE BOX
Patient is a 53y old  Female who presents with a chief complaint of Left side chest pain  L Pneumothorax (17 Mar 2024 09:01)    BRIEF HOSPITAL COURSE:  50yo F with PMH: Fibroids, endometrial cancer recently diagnosed, undergoing workup at [facility]. Recent right lung nodule biopsy 2/26/24.    Presented to ED 3/11 with left chest pain, progressively worsening. Found to have pneumothorax on left side, left pigtail placed 3/11, right pigtail placed 3/13 after pneumo on that side occurred  Now POD2 from left sided wedge resection/VATS on 3/15  Minimal to no drainage, catheters kept to suction, no obvious air leak    Noted Hgb 7.4 yesterday, but repeat today 9.1, no need for urgent transfusion at this time.       PAST MEDICAL & SURGICAL HISTORY:  Fibroid  H/O oral surgery, under general anesthesia during childhood  S/P D&C (status post dilation and curettage), x2  S/P laparoscopy, as part of infertility work up  S/P tonsillectomy      Review of Systems:  CONSTITUTIONAL: No fever, chills, easily fatigued  EYES: No eye pain, visual disturbances, or discharge  ENMT:  No difficulty hearing, tinnitus, vertigo; No sinus or throat pain  NECK: No pain or stiffness  RESPIRATORY: No cough, wheezing, chills or hemoptysis; Feels shortness of breath with exertion  CARDIOVASCULAR: No chest pain, palpitations, dizziness, or leg swelling  GASTROINTESTINAL: + abdominal pain mild. No nausea, vomiting, or hematemesis; No diarrhea or constipation. No melena or hematochezia.  GENITOURINARY: No dysuria, frequency, hematuria, or incontinence  NEUROLOGICAL: No headaches, memory loss, loss of strength, numbness, or tremors  SKIN: No itching, burning, rashes, or lesions   MUSCULOSKELETAL: No joint pain or swelling; No muscle, back, or extremity pain  PSYCHIATRIC: Scared, anxious but No depression, anxiety, mood swings, or difficulty sleeping      Medications:    acetaminophen     Tablet .. 650 milliGRAM(s) Oral every 6 hours PRN  acetaminophen   IVPB .. 750 milliGRAM(s) IV Intermittent once PRN  ALPRAZolam 0.25 milliGRAM(s) Oral every 8 hours PRN  HYDROmorphone PCA (1 mG/mL) 30 milliLiter(s) PCA Continuous PCA Continuous  HYDROmorphone PCA (1 mG/mL) Rescue Clinician Bolus 0.5 milliGRAM(s) IV Push every 15 minutes PRN  ondansetron Injectable 4 milliGRAM(s) IV Push every 6 hours PRN  ondansetron Injectable 4 milliGRAM(s) IV Push every 6 hours PRN  zolpidem 5 milliGRAM(s) Oral at bedtime PRN    polyethylene glycol 3350 17 Gram(s) Oral two times a day PRN  senna 2 Tablet(s) Oral at bedtime PRN    medroxyPROGESTERone 10 milliGRAM(s) Oral daily    lactated ringers. 1000 milliLiter(s) IV Continuous <Continuous>    lidocaine   4% Patch 2 Patch Transdermal daily  lidocaine   4% Patch 1 Patch Transdermal daily    naloxone Injectable 0.1 milliGRAM(s) IV Push every 3 minutes PRN      ICU Vital Signs Last 24 Hrs  T(C): 36.7 (17 Mar 2024 09:11), Max: 36.9 (16 Mar 2024 20:39)  T(F): 98.1 (17 Mar 2024 09:11), Max: 98.4 (16 Mar 2024 20:39)  HR: 124 (17 Mar 2024 12:00) (94 - 124)  BP: 121/75 (17 Mar 2024 12:00) (105/66 - 133/115)  BP(mean): 89 (17 Mar 2024 12:00) (78 - 122)  RR: 25 (17 Mar 2024 12:00) (13 - 26)  SpO2: 93% (17 Mar 2024 12:00) (93% - 97%)    O2 Parameters below as of 17 Mar 2024 12:00  Patient On (Oxygen Delivery Method): room air    I&O's Detail    16 Mar 2024 07:01  -  17 Mar 2024 07:00  --------------------------------------------------------  IN:    Lactated Ringers: 331 mL  Total IN: 331 mL    OUT:    Chest Tube (mL): 15 mL    Chest Tube (mL): 5 mL    Indwelling Catheter - Urethral (mL): 550 mL    Voided (mL): 850 mL  Total OUT: 1420 mL    Total NET: -1089 mL    LABS:                        9.1    11.24 )-----------( 759      ( 17 Mar 2024 11:19 )             30.4     03-17    136  |  97  |  9   ----------------------------<  85  3.9   |  28  |  0.51    Ca    9.4      17 Mar 2024 11:19  Phos  3.9     03-17  Mg     2.0     03-17    TPro  7.6  /  Alb  2.0<L>  /  TBili  0.3  /  DBili  x   /  AST  24  /  ALT  20  /  AlkPhos  89  03-17      Urinalysis Basic - ( 17 Mar 2024 11:19 )  Color: x / Appearance: x / SG: x / pH: x  Gluc: 85 mg/dL / Ketone: x  / Bili: x / Urobili: x   Blood: x / Protein: x / Nitrite: x   Leuk Esterase: x / RBC: x / WBC x   Sq Epi: x / Non Sq Epi: x / Bacteria: x      CULTURES: NGSF      Physical Examination:  General: Pleasant, cooperative, tired appearing, no acute distress.    HEENT: Pupils equal, reactive to light.  Symmetric.  PULM: Clear to auscultation bilaterally, no significant sputum production, pigtail dressings intact, mild tender to palpate  NECK: Supple, no lymphadenopathy, trachea midline  CVS: Regular rate and rhythm, no murmurs, rubs, or gallops  ABD: Soft, nondistended, nontender, normoactive bowel sounds, no masses  EXT: No edema, nontender  SKIN: Warm and well perfused, no rashes noted.  NEURO: Alert, oriented, interactive, nonfocal    DEVICES: bilt chest tubes    RADIOLOGY:  Chest one view 3/14/2024 3:38 PM  Compared to the prior study, bilateral pneumothoraces have increased in   size.    Chest one view 3/15/2024 10:14 AM  Compared to the prior study, left chest tube replaces left pigtail   catheter. Right pneumothorax is smaller. No left pneumothorax is   identified.    Chest one view 3/15/2024 11:08 AM  Pneumothoraces as noted. The lungs show similar left upper chest nodule with smaller left apical pneumothorax and there is no evidence of. Right apical pneumothorax is slightly increased and there is no evidence of pleural effusion.      CRITICAL CARE TIME SPENT: 40

## 2024-03-17 NOTE — PROGRESS NOTE ADULT - ASSESSMENT
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     Neuro: Pain well controlled. Continue current pain regimen.  CV: Blood pressure well controlled.  Pulm: Saturating well  GI: Tolerating PO diet  : Voiding spontaneously.  Heme: Hgb 7.4, recommend transfusion of pRBC for goal Hgb >8.0 in pt with active malignancy. Recommend rpt labs this morning  ID: Afebrile  Endo: No active disease.   FEN: Electrolytes WNL  Skin: No active disease.   Psych: No active disease.   DVT ppx: Ambulation encouraged, SCDs when in bed, lovenox ordered.  Dispo: plan for cytoreductive surgery this week, pending cardiopulmonary stability and clearance    discussed with Dr. Hubbard

## 2024-03-17 NOTE — PROGRESS NOTE ADULT - ASSESSMENT
CTS Progress note:  52yo F with PMH: Fibroids, endometrial cancer recently diagnosed, [place] [facility] [place] nodule biopsy 2/26/24.    Presented to ED 3/11 with left chest pain, progressively worsening. Found to have pneumothorax on left side, left pigtail placed 3/11, right pigtail placed 3/13 after pneumo on that side occurred  Now POD2 from left sided wedge resection/VATS on 3/15  Minimal to no drainage, catheters kept to suction, no obvious air leak    Noted Hgb 7.4 yesterday, but repeat today 9.1, no need for urgent transfusion at this time.     This patient requires a moderate level of care due to the complexity and severity of their condition which increases the amount and complexity of data to be reviewed and analyzed in addition to the risk of complications, morbidity and mortality of patient management    Doing well overall post op  repeat CXR in AM to monitor  Maintain CTs to sxn through wknd    Pending pathology and final studies  Per GYN Onc- plan is for hydetrectomy/debulking later this week    Cont pain control via PCA  Cont hydration for now, will keep for another day  Regular diet, add protein supplements with albumin 1.8  Incentive spirometry, encourage ambulate as able-can have tubes to water seal for short period;  VTE ppx with SCDs only  Anemia chronic disease + surgery;  transfuse as needed to maintain Hgb > 7, although GYN suggests >8,   Daily labwork, maintain electrolytes, goals K>4.0, Phos>3.0, Mg>2.0  consider iron supplement with Iron level 11    Refer to medical team for remainder of care      Time spent on this patient encounter: 55+ minutes    Date of entry of this note is equal to the date of services rendered    This includes assessment of the presenting problems with associated risks, reviewing the meeical record to prepare for the encounter and meeting face to face with the patient to obtain additional history. I have also performed an appropiate physical exam, made interventions listed and ordered and interpreted appropiate diagnostic studies as documented. This also included communication and coordination of care with the multidisciplinary team including the bedside nurse, appropriate attending of record and consultants as needed.

## 2024-03-17 NOTE — PROGRESS NOTE ADULT - SUBJECTIVE AND OBJECTIVE BOX
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     Seen at bedside this AM.  Patient has no complaints other than chest discomfort 2/2 VATS procedure.   States her vaginal bleeding as been decreasing, no passage of clots. Denies lightheadedness/dizziness.  Denies abdominal pain.  Tolerating PO w/o n/v.   States she did not get a pRBC transfusion yesterday.     O:   T(C): 36.8 (03-17-24 @ 06:12), Max: 36.9 (03-16-24 @ 20:39)  HR: 101 (03-17-24 @ 08:00) (94 - 120)  BP: 110/72 (03-17-24 @ 08:00) (105/66 - 133/115)  RR: 16 (03-17-24 @ 08:00) (13 - 27)  SpO2: 96% (03-17-24 @ 08:00) (93% - 96%)    I&O's Detail    16 Mar 2024 07:01  -  17 Mar 2024 07:00  --------------------------------------------------------  IN:    Lactated Ringers: 331 mL  Total IN: 331 mL    OUT:    Chest Tube (mL): 15 mL    Chest Tube (mL): 5 mL    Indwelling Catheter - Urethral (mL): 550 mL    Voided (mL): 850 mL  Total OUT: 1420 mL    Total NET: -1089 mL      Gen: NAD, AAOx3  Resp: breathing comfortably on RA  Abdomen: Soft, nontender, palpable mass ~2 finger breadths above umbilicus  Pelvic: no active vaginal bleeding     Labs:                         7.4    13.00 )-----------( 631      ( 16 Mar 2024 05:48 )             24.3     03-16    137  |  103  |  12  ----------------------------<  142<H>  4.2   |  26  |  0.45<L>    Ca    9.2      16 Mar 2024 05:48  Phos  3.5     03-16  Mg     1.9     03-16    TPro  6.9  /  Alb  1.8<L>  /  TBili  0.2  /  DBili  x   /  AST  13<L>  /  ALT  11<L>  /  AlkPhos  79  03-16

## 2024-03-18 LAB
ALBUMIN SERPL ELPH-MCNC: 1.9 G/DL — LOW (ref 3.3–5)
ALP SERPL-CCNC: 82 U/L — SIGNIFICANT CHANGE UP (ref 40–120)
ALT FLD-CCNC: 15 U/L — SIGNIFICANT CHANGE UP (ref 12–78)
ANION GAP SERPL CALC-SCNC: 8 MMOL/L — SIGNIFICANT CHANGE UP (ref 5–17)
AST SERPL-CCNC: 16 U/L — SIGNIFICANT CHANGE UP (ref 15–37)
BILIRUB SERPL-MCNC: 0.4 MG/DL — SIGNIFICANT CHANGE UP (ref 0.2–1.2)
BUN SERPL-MCNC: 10 MG/DL — SIGNIFICANT CHANGE UP (ref 7–23)
CALCIUM SERPL-MCNC: 9.5 MG/DL — SIGNIFICANT CHANGE UP (ref 8.5–10.1)
CHLORIDE SERPL-SCNC: 98 MMOL/L — SIGNIFICANT CHANGE UP (ref 96–108)
CO2 SERPL-SCNC: 28 MMOL/L — SIGNIFICANT CHANGE UP (ref 22–31)
CREAT SERPL-MCNC: 0.4 MG/DL — LOW (ref 0.5–1.3)
EGFR: 117 ML/MIN/1.73M2 — SIGNIFICANT CHANGE UP
GLUCOSE SERPL-MCNC: 93 MG/DL — SIGNIFICANT CHANGE UP (ref 70–99)
HCT VFR BLD CALC: 26.6 % — LOW (ref 34.5–45)
HCT VFR BLD CALC: 29.4 % — LOW (ref 34.5–45)
HGB BLD-MCNC: 8 G/DL — LOW (ref 11.5–15.5)
HGB BLD-MCNC: 8.7 G/DL — LOW (ref 11.5–15.5)
MAGNESIUM SERPL-MCNC: 2 MG/DL — SIGNIFICANT CHANGE UP (ref 1.6–2.6)
MCHC RBC-ENTMCNC: 24.3 PG — LOW (ref 27–34)
MCHC RBC-ENTMCNC: 24.5 PG — LOW (ref 27–34)
MCHC RBC-ENTMCNC: 29.6 GM/DL — LOW (ref 32–36)
MCHC RBC-ENTMCNC: 30.1 GM/DL — LOW (ref 32–36)
MCV RBC AUTO: 81.3 FL — SIGNIFICANT CHANGE UP (ref 80–100)
MCV RBC AUTO: 82.1 FL — SIGNIFICANT CHANGE UP (ref 80–100)
PHOSPHATE SERPL-MCNC: 5.3 MG/DL — HIGH (ref 2.5–4.5)
PLATELET # BLD AUTO: 646 K/UL — HIGH (ref 150–400)
PLATELET # BLD AUTO: 710 K/UL — HIGH (ref 150–400)
POTASSIUM SERPL-MCNC: 4 MMOL/L — SIGNIFICANT CHANGE UP (ref 3.5–5.3)
POTASSIUM SERPL-SCNC: 4 MMOL/L — SIGNIFICANT CHANGE UP (ref 3.5–5.3)
PROT SERPL-MCNC: 7.1 GM/DL — SIGNIFICANT CHANGE UP (ref 6–8.3)
RBC # BLD: 3.27 M/UL — LOW (ref 3.8–5.2)
RBC # BLD: 3.58 M/UL — LOW (ref 3.8–5.2)
RBC # FLD: 19.9 % — HIGH (ref 10.3–14.5)
RBC # FLD: 20 % — HIGH (ref 10.3–14.5)
SODIUM SERPL-SCNC: 134 MMOL/L — LOW (ref 135–145)
WBC # BLD: 10.71 K/UL — HIGH (ref 3.8–10.5)
WBC # BLD: 10.77 K/UL — HIGH (ref 3.8–10.5)
WBC # FLD AUTO: 10.71 K/UL — HIGH (ref 3.8–10.5)
WBC # FLD AUTO: 10.77 K/UL — HIGH (ref 3.8–10.5)

## 2024-03-18 PROCEDURE — 99255 IP/OBS CONSLTJ NEW/EST HI 80: CPT

## 2024-03-18 PROCEDURE — 99232 SBSQ HOSP IP/OBS MODERATE 35: CPT | Mod: GC

## 2024-03-18 PROCEDURE — 71045 X-RAY EXAM CHEST 1 VIEW: CPT | Mod: 26

## 2024-03-18 PROCEDURE — 99232 SBSQ HOSP IP/OBS MODERATE 35: CPT

## 2024-03-18 RX ORDER — ACETAMINOPHEN 500 MG
1000 TABLET ORAL ONCE
Refills: 0 | Status: COMPLETED | OUTPATIENT
Start: 2024-03-18 | End: 2024-03-19

## 2024-03-18 RX ORDER — SODIUM CHLORIDE 9 MG/ML
1000 INJECTION INTRAMUSCULAR; INTRAVENOUS; SUBCUTANEOUS
Refills: 0 | Status: DISCONTINUED | OUTPATIENT
Start: 2024-03-18 | End: 2024-03-22

## 2024-03-18 RX ORDER — OXYCODONE HYDROCHLORIDE 5 MG/1
10 TABLET ORAL EVERY 6 HOURS
Refills: 0 | Status: DISCONTINUED | OUTPATIENT
Start: 2024-03-18 | End: 2024-03-20

## 2024-03-18 RX ORDER — SODIUM CHLORIDE 9 MG/ML
500 INJECTION INTRAMUSCULAR; INTRAVENOUS; SUBCUTANEOUS ONCE
Refills: 0 | Status: COMPLETED | OUTPATIENT
Start: 2024-03-18 | End: 2024-03-18

## 2024-03-18 RX ORDER — SENNA PLUS 8.6 MG/1
2 TABLET ORAL AT BEDTIME
Refills: 0 | Status: DISCONTINUED | OUTPATIENT
Start: 2024-03-18 | End: 2024-03-22

## 2024-03-18 RX ORDER — METOPROLOL TARTRATE 50 MG
2.5 TABLET ORAL ONCE
Refills: 0 | Status: COMPLETED | OUTPATIENT
Start: 2024-03-18 | End: 2024-03-18

## 2024-03-18 RX ORDER — HEPARIN SODIUM 5000 [USP'U]/ML
5000 INJECTION INTRAVENOUS; SUBCUTANEOUS EVERY 8 HOURS
Refills: 0 | Status: DISCONTINUED | OUTPATIENT
Start: 2024-03-18 | End: 2024-03-20

## 2024-03-18 RX ORDER — OXYCODONE HYDROCHLORIDE 5 MG/1
5 TABLET ORAL EVERY 4 HOURS
Refills: 0 | Status: DISCONTINUED | OUTPATIENT
Start: 2024-03-18 | End: 2024-03-20

## 2024-03-18 RX ADMIN — OXYCODONE HYDROCHLORIDE 10 MILLIGRAM(S): 5 TABLET ORAL at 21:01

## 2024-03-18 RX ADMIN — SODIUM CHLORIDE 500 MILLILITER(S): 9 INJECTION INTRAMUSCULAR; INTRAVENOUS; SUBCUTANEOUS at 12:42

## 2024-03-18 RX ADMIN — HYDROMORPHONE HYDROCHLORIDE 30 MILLILITER(S): 2 INJECTION INTRAMUSCULAR; INTRAVENOUS; SUBCUTANEOUS at 06:15

## 2024-03-18 RX ADMIN — LIDOCAINE 2 PATCH: 4 CREAM TOPICAL at 10:53

## 2024-03-18 RX ADMIN — OXYCODONE HYDROCHLORIDE 10 MILLIGRAM(S): 5 TABLET ORAL at 11:51

## 2024-03-18 RX ADMIN — OXYCODONE HYDROCHLORIDE 5 MILLIGRAM(S): 5 TABLET ORAL at 15:51

## 2024-03-18 RX ADMIN — ZOLPIDEM TARTRATE 5 MILLIGRAM(S): 10 TABLET ORAL at 21:42

## 2024-03-18 RX ADMIN — SODIUM CHLORIDE 500 MILLILITER(S): 9 INJECTION INTRAMUSCULAR; INTRAVENOUS; SUBCUTANEOUS at 15:46

## 2024-03-18 RX ADMIN — Medication 2.5 MILLIGRAM(S): at 18:45

## 2024-03-18 RX ADMIN — OXYCODONE HYDROCHLORIDE 5 MILLIGRAM(S): 5 TABLET ORAL at 15:01

## 2024-03-18 RX ADMIN — OXYCODONE HYDROCHLORIDE 10 MILLIGRAM(S): 5 TABLET ORAL at 20:01

## 2024-03-18 RX ADMIN — HEPARIN SODIUM 5000 UNIT(S): 5000 INJECTION INTRAVENOUS; SUBCUTANEOUS at 21:42

## 2024-03-18 RX ADMIN — SODIUM CHLORIDE 75 MILLILITER(S): 9 INJECTION INTRAMUSCULAR; INTRAVENOUS; SUBCUTANEOUS at 14:51

## 2024-03-18 RX ADMIN — MEDROXYPROGESTERONE ACETATE 10 MILLIGRAM(S): 150 INJECTION, SUSPENSION, EXTENDED RELEASE INTRAMUSCULAR at 10:52

## 2024-03-18 RX ADMIN — OXYCODONE HYDROCHLORIDE 10 MILLIGRAM(S): 5 TABLET ORAL at 10:51

## 2024-03-18 RX ADMIN — LIDOCAINE 1 PATCH: 4 CREAM TOPICAL at 10:53

## 2024-03-18 RX ADMIN — SENNA PLUS 2 TABLET(S): 8.6 TABLET ORAL at 21:42

## 2024-03-18 RX ADMIN — HEPARIN SODIUM 5000 UNIT(S): 5000 INJECTION INTRAVENOUS; SUBCUTANEOUS at 14:52

## 2024-03-18 NOTE — PROGRESS NOTE ADULT - ASSESSMENT
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     Neuro: Pain well controlled. Continue current pain regimen.  CV: Blood pressure well controlled.  Pulm: Saturating well  GI: Tolerating PO diet  : Voiding spontaneously.  Heme: Hgb 8 this AM, recommend transfusion of pRBC for goal Hgb >8.0 in pt with active malignancy.   ID: Afebrile  Endo: No active disease.   FEN: Electrolytes WNL  Skin: No active disease.   Psych: No active disease.   DVT ppx: Ambulation encouraged, SCDs when in bed  Dispo: plan for cytoreductive surgery this week, pending cardiopulmonary stability and clearance

## 2024-03-18 NOTE — CONSULT NOTE ADULT - ASSESSMENT
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     # Metastatic high grade neoplasm likely uterine in setting of uterine mass- initial w/u started at [facility]     -below records per portal notes from [facility], shared by family  -p/w significant uterine bleed in November, US was unremarkable except for leiomyoma, Endometrial Bx-11/16/24- was benign. She was scheduled for hysterectomy.  -As part of preoperative w/u she underwent MRI-A/P11/15/23- which showed an incidental finding of a 2.1 cm RLL nodule. She followed up with pulmonologist who recommended CT-chest- which showed multiple pulmonary nodules and was referred to thoracic surgery- who sent her for PET/CT  -PET/CT-2/7/24--. FDG avid enlarging uterus masses with multiple lobulated masses, concerning for malignancy. Several areas of FDG avidity outside of the uterus suggest extrauterine extension or possible lymphadenopathy. Multiple enlarging FDG avid pulmonary nodules, concerning for malignancy.   -2/26/24: she underwent right lung mass biopsy at [facility] -path c/w malignant high grade mesenchymal neoplasm; differential includes metastatic high grade endometrial stromal sarcoma and GLI1- rearranged sarcoma. FUNES RNA sequencing pending.    -Seen by Dr. Black ([facility] associate) this admission-->now transitioned care to [facility]  -Gyn/Onc following--plan for cytoreductive surgery this week, pending cardiopulmonary stability and clearance  -Dr. Mart & Dr. Abel spoke in length: plan to obtain pathology slides from [facility] for [facility] review. Pending path review, pending hysterectomy and surgical pathology results, case will be presented on TB. Patient and family informed in details of stepwise approach in planning/ diagnosis/ treatment.       # Chest pain/SOB in setting of PTX    -CTA-Chest-3/11-New small left pneumothorax. Increased size and number of pulmonary metastases, some cavitary.  -s/p Left pigtail placement -3/11/24  -s/p Right pigtail placed 3/13/24  -s/p L wedge resection/pleurodesis-3/15/24 for complex PTX secondary to pulmonary metastases, path pending.  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     # Metastatic high grade neoplasm likely uterine in setting of uterine mass- initial [place] [facility]     [place] records per portal [place] [facility] shared by family  -p/w significant uterine bleed in November, US was unremarkable except for leiomyoma, Endometrial Bx-11/16/24- was benign. She was scheduled for hysterectomy.  -As part of preoperative w/u she underwent MRI-A/P11/15/23- which showed an incidental finding of a 2.1 cm RLL nodule. She followed up with pulmonologist who recommended CT-chest- which showed multiple pulmonary nodules and was referred to thoracic surgery- who sent her for PET/CT  -PET/CT-2/7/24--. FDG avid enlarging uterus masses with multiple lobulated masses, concerning for malignancy. Several areas of FDG avidity outside of the uterus suggest extrauterine extension or possible lymphadenopathy. Multiple enlarging FDG avid pulmonary nodules, concerning for malignancy.   -2/26/24: she underwent right lung [place] biopsy [place] [facility] [place]path c/w malignant high grade mesenchymal neoplasm; differential includes metastatic high grade endometrial stromal sarcoma and GLI1- rearranged sarcoma. FUNES RNA sequencing pending.    -Seen by Dr. Black ([facility] associate) this admission-->now transitioned care to [facility]  -Gyn/Onc following--plan for cytoreductive surgery this week, pending cardiopulmonary stability and clearance  -Dr. Mart & Dr. Abel spoke in length: plan to obtain pathology slides from [facility] for [facility] review. Pending path review, pending hysterectomy and surgical pathology results, case will be presented on TB. Patient and family informed in details of stepwise approach in planning/ diagnosis/ treatment.       # Chest pain/SOB in setting of PTX    -CTA-Chest-3/11-New small left pneumothorax. Increased size and number of pulmonary metastases, some cavitary.  -s/p Left pigtail placement -3/11/24  -s/p Right pigtail placed 3/13/24  -s/p L wedge resection/pleurodesis-3/15/24 for complex PTX secondary to pulmonary metastases, path pending.  -d/w CTSx LIANE Elmore, NP-C   Hematology/Oncology 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     # Metastatic high grade neoplasm likely uterine in setting of uterine mass- initial w/u started at [facility]     -below records per portal notes from [facility], shared by family  -p/w significant uterine bleed in November, US was unremarkable except for leiomyoma, Endometrial Bx-11/16/24- was benign. She was scheduled for hysterectomy.  -As part of preoperative w/u she underwent MRI-A/P11/15/23- which showed an incidental finding of a 2.1 cm RLL nodule. She followed up with pulmonologist who recommended CT-chest- which showed multiple pulmonary nodules and was referred to thoracic surgery- who sent her for PET/CT  -PET/CT-2/7/24--. FDG avid enlarging uterus masses with multiple lobulated masses, concerning for malignancy. Several areas of FDG avidity outside of the uterus suggest extrauterine extension or possible lymphadenopathy. Multiple enlarging FDG avid pulmonary nodules, concerning for malignancy.   -2/26/24: she underwent right lung mass biopsy at [facility] -path c/w malignant high grade mesenchymal neoplasm; differential includes metastatic high grade endometrial stromal sarcoma and GLI1- rearranged sarcoma. FUNES RNA sequencing pending.    -Seen by Dr. Black ([facility] associate) this admission-->now transitioned care to [facility]  -Gyn/Onc following--plan for cytoreductive surgery this week, pending cardiopulmonary stability and clearance  -Dr. Mart & Dr. Abel spoke in length: plan to obtain pathology slides from [facility] for [facility] review. Pending path review, pending hysterectomy and surgical pathology results, case will be presented on TB. Patient and family informed in details of stepwise approach in planning/ diagnosis/ treatment.       # Chest pain/SOB in setting of PTX    -CTA-Chest-3/11-New small left pneumothorax. Increased size and number of pulmonary metastases, some cavitary.  -s/p Left pigtail placement -3/11/24  -s/p Right pigtail placed 3/13/24  -s/p L wedge resection/pleurodesis-3/15/24 for complex PTX secondary to pulmonary metastases, path pending.  -d/w CTSx LIANE Ramos    # Normocytic anemia    -hgb- 7.5 g/dl<--8.7 g/dl  -multifactorial: ingoing vaginal bleeding, underlying malignancy, nutritional deficiency/TING  -labs c/w TING, may benefit from Venofer  -Monitor CBC  -Transfuse PRBC if Hbg continues to drop and as clinically indicated, if becomes symptomatic or hemodynamically unstable.       52 yo F w/ MHx of uterine fibroid, most recently [place]'d with metastatic high grade mesenchymal [place] (likely [place] [facility]/w chest pain/SOB, noted to have pneumothorax.     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 US was unremarkable except for leiomyoma. Endometrial Bx11/16/24 was benign. She was scheduled for hysterectomy.  -As part of preoperative w/u she underwent MRI A/P 11/15/23, which showed an incidental finding of a 2.1 cm RLL nodule. Pulmonology recommended CT-chest, which showed multiple pulmonary nodules.  She was referred to thoracic surgery, who sent her for PET/CT.  -PET/CT 2/7/24:  FDG avid enlarging uterus masses with multiple lobulated masses, concerning for malignancy. Several areas of FDG avidity outside of the uterus suggest extrauterine extension or possible lymphadenopathy. Multiple enlarging FDG avid pulmonary nodules, concerning for malignancy.   -2/26/24: she underwent [place] [facility] [place]  Path c/w malignant high grade mesenchymal neoplasm; differential includes metastatic high grade endometrial stromal sarcoma and GLI1- rearranged sarcoma. FUNES RNA sequencing was pending, however as of 3/18/24 I was notified by Dr. Calixto that the Funes fusion panel at [facility] failed, d/t insufficient % of quality RNA in submitted tissue.  Dr. Calixto will run it again on different sample, but will likely not have the information for another week.     -Seen by Dr. Black ([facility] [place]) this admission through 3/15/24-->now transitioned care to [facility] as of 3/18/24.  -Gyn/Onc Dr. Hubbard now following  -Dr. Mart, Dr. Abel, Dr. Hubbard and Dr. Calixto spoke at length regarding the diagnostic workup and treatment plan.    -Will need to to obtain pathology slides [place] [facility] for [facility] pathology review.   -recommend obtaining further tissue given vague path report of malignant high grade mesenchymal neoplasm with broad differential and failed Funes fusion panel.  Consideration for obtaining tissue is to get an EMB and possible embolizaion given chronic vaginal bleeding with subsequent decline in Hb.    -her case will be presented at Gyn TB 3/19/24.   -Patient and family informed in detail of stepwise approach to planning/ diagnosis/ treatment and agreeable for Dr. Mart to reach out to Dr. Calixto for colaboration.   -further plan/ next steps for obtaining more tissue for diagnostic precision agreed upon by team and family and will be determined by gyn onc team.      # Chest pain/SOB in setting of PTX    -CTA Chest 3/11/24:  New small left pneumothorax. Increased size and number of pulmonary metastases, some cavitary.  -s/p Left pigtail placement 3/11/24  -s/p Right pigtail placed 3/13/24  -s/p L wedge resection/pleurodesis 3/15/24 for complex PTX, secondary to pulmonary metastases; path pending.  -d/w CTSx LIANE Ramos    # Normocytic anemia    -hgb- 7.5 g/dl<--8.7 g/dl  -multifactorial: ongoing vaginal bleeding, underlying malignancy, nutritional deficiency/TING  -labs c/w TING, may benefit from Venofer  -Monitor CBC  -Transfuse PRBC if Hbg continues to drop and as clinically indicated, if becomes symptomatic or hemodynamically unstable.  -gyn onc to consider embolizaion vs surgical procedure.

## 2024-03-18 NOTE — PROGRESS NOTE ADULT - ASSESSMENT
ASSESSMENT  50 yo female with a PMHx of fibroids, who is under evaluation  for recently diagnosed endometrial cancer (with planned biopsy on 3/14/24) and now presents to [place] ED c/o left sided chest pain.   Pt reports having recent  abdominal pain related to her uterine mass in her left lower abdomen and a diffuse crampy discomfort.      Admitted for spontaneous L PTX  Had L pigtail placed by CTS 3/11  Developed R PTX with subsequent R pigtail placement on 3/14    Take to OR on 3/15 for robot assisted L VATS parietal pleurectomy, pleurodesis, SERINA wedge resection   EBL 50cc    PLAN    Neuro: AAOx 3. pain control prn  tylenol, diluadid prn.  No nsaids.   CV: BP low normal. cont to monitor. sinus tachycardia on monitor 120s suspect 2/2 pain vs dehydration  Pulm: on room air. CXR this am, no PTX seen on L or R side. R pigtail and L chest tube still in place  GI: PO diet. PPI. bowel regimen prn  Gyn: plan for hysterectomy, cytoreductive surgery this week as per gyn onc  Nephro: monitor I & Os. Trend renal fxn. will give 500cc NS bolus.   ID: monitor off abx for now. trend WBC. monitor temps..   Heme: Hgb trending down, 9.1 -> 8.0. repeat CBC 8.7. pt with mild vaginal bleeding still. hold chemical DVT ppx for now. Tranfuse for Hgb < 8 as per gyn onc. SCDs  PT eval    Dispo: SICU. monitor CT output. pain control. IV fluid bolus. f/u surgical pathology    Will discuss with Dr. Escobedo

## 2024-03-18 NOTE — PROGRESS NOTE ADULT - SUBJECTIVE AND OBJECTIVE BOX
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     Seen at bedside this AM.  Patient has no complaints other than chest discomfort 2/2 VATS procedure, thinks the block is wearing off.  States her vaginal bleeding remains minimal. Denies lightheadedness/dizziness.  Reports mild abdominal pain, feels like gas.  Tolerating PO w/o n/v.   Was OOBTC yesterday.    O:   Vital Signs Last 24 Hrs  T(C): 37 (17 Mar 2024 20:43), Max: 37 (17 Mar 2024 20:43)  T(F): 98.6 (17 Mar 2024 20:43), Max: 98.6 (17 Mar 2024 20:43)  HR: 102 (18 Mar 2024 02:00) (101 - 126)  BP: 104/60 (18 Mar 2024 02:00) (103/66 - 143/98)  BP(mean): 74 (18 Mar 2024 02:00) (74 - 109)  RR: 14 (18 Mar 2024 02:00) (13 - 26)  SpO2: 95% (18 Mar 2024 02:00) (93% - 98%)    Parameters below as of 18 Mar 2024 02:00  Patient On (Oxygen Delivery Method): room air    I&O's Detail    17 Mar 2024 07:01  -  18 Mar 2024 07:00  --------------------------------------------------------  IN:    Lactated Ringers: 286 mL  Total IN: 286 mL    OUT:    Chest Tube (mL): 2 mL    Chest Tube (mL): 15 mL    Voided (mL): 950 mL  Total OUT: 967 mL    Total NET: -681 mL    Gen: NAD, AAOx3  Resp: breathing comfortably on RA  Abdomen: Soft, nontender, palpable mass ~2 finger breadths above umbilicus  Pelvic: no active vaginal bleeding     Labs:                         8.0    10.77 )-----------( 646      ( 18 Mar 2024 05:48 )             26.6     03-18    134<L>  |  98  |  10  ----------------------------<  93  4.0   |  28  |  0.40<L>    Ca    9.5      18 Mar 2024 05:48  Phos  5.3     03-18  Mg     2.0     03-18    TPro  7.1  /  Alb  1.9<L>  /  TBili  0.4  /  DBili  x   /  AST  16  /  ALT  15  /  AlkPhos  82  03-18

## 2024-03-18 NOTE — PROGRESS NOTE ADULT - ASSESSMENT
52 y/o female with a PMHx of fibroids, [place]  [place] [facility] [place]  PT currently undergoing workup for recently diagnosed endometrial cancer presents to the ED c/o left sided CP. Pt reports she had a pain in her left chest that felt like a gas bubble that was progressively worsening. Denies rash. No other complaints at this time.  Pt noted to have a moderate PTX on left side . Now s/p Left pigtail placement  3/11 , Rt Pigtail placed 3/13  s/p 3/15 Left Robot assist Pleurectomy/TALC pleurodesis wedge resection     Plan   Maintain pigtail and CT to -20 LWCS today  Pt may ambulate off suction   plan to place to WS tomorrow  Awaiting ultimate plan from GYN Onc of plan for possible hysterectomy   Heme/onc consult appreciated Dr Corrales will speak to Dr Dennis regarding plan  If the plan if for OR within the next few days will keep Chest tubes in UNTIL after procedure   heparin SQ   Pt with Anemia - trend CBC   Tachycardia- may be hypovolemia , may need transfusion in future dt anemia   CXR in AM  pain control  Discussed with Cardiothoracic Team at AM rounds.

## 2024-03-18 NOTE — PROGRESS NOTE ADULT - SUBJECTIVE AND OBJECTIVE BOX
Patient is a 53y old  Female who presents with a chief complaint of Left side chest pain  L Pneumothorax (15 Mar 2024 13:48)      BRIEF HOSPITAL COURSE: 50 yo female with a PMHx of fibroids, who is under evaluation  for recently diagnosed endometrial cancer (with planned biopsy on 3/14/24) and now presents to [place] ED c/o left sided chest pain.   Pt was at work today at 11:30 am when she experienced a pain in her left chest that felt like a gas bubble that was progressively worsening and associated with a tingling in her left arm.   Pt reports having recent  abdominal pain related to her uterine mass in her left lower abdomen and a diffuse crampy discomfort.      Admitted for spontaneous L PTX  Had L pigtail placed by CTS 3/11  Developed R PTX with subsequent R pigtail placement on 3/14    Take to OR on 3/15 for robot assisted L VATS parietal pleurectomy, pleurodesis, SERINA wedge resection   EBL 50cc    3/15 pt seen in PACU, having pain at chest tube sites. breathing feels ok. no air leak on L side.   3/18 no air leak seen, has some mild discomfort at area of CT site. sinus tachycardia 120s. on room air sating 97%, normotensive.     PAST MEDICAL & SURGICAL HISTORY:  Fibroid  H/O oral surgery  under general anesthesia during childhoo  S/P D&C (status post dilation and curettage)  x2  S/P laparoscopy  as part of infertility work up  S/P tonsillectomy        MEDICATIONS  (STANDING):  heparin   Injectable 5000 Unit(s) SubCutaneous every 8 hours  lidocaine   4% Patch 2 Patch Transdermal daily  lidocaine   4% Patch 1 Patch Transdermal daily  medroxyPROGESTERone 10 milliGRAM(s) Oral daily  senna 2 Tablet(s) Oral at bedtime      Vital Signs Last 24 Hrs  T(C): 37 (17 Mar 2024 20:43), Max: 37 (17 Mar 2024 20:43)  T(F): 98.6 (17 Mar 2024 20:43), Max: 98.6 (17 Mar 2024 20:43)  HR: 125 (18 Mar 2024 12:00) (102 - 130)  BP: 130/86 (18 Mar 2024 12:00) (91/69 - 143/98)  BP(mean): 98 (18 Mar 2024 12:00) (74 - 109)  RR: 23 (18 Mar 2024 12:00) (13 - 24)  SpO2: 96% (18 Mar 2024 12:00) (93% - 100%)    Parameters below as of 18 Mar 2024 08:00  Patient On (Oxygen Delivery Method): room air      Vital Signs Last 24 Hrs  T(C): 37 (17 Mar 2024 20:43), Max: 37 (17 Mar 2024 20:43)  T(F): 98.6 (17 Mar 2024 20:43), Max: 98.6 (17 Mar 2024 20:43)  HR: 125 (18 Mar 2024 12:00) (102 - 130)  BP: 130/86 (18 Mar 2024 12:00) (91/69 - 143/98)  BP(mean): 98 (18 Mar 2024 12:00) (74 - 109)  RR: 23 (18 Mar 2024 12:00) (13 - 24)  SpO2: 96% (18 Mar 2024 12:00) (93% - 100%)    Parameters below as of 18 Mar 2024 08:00  Patient On (Oxygen Delivery Method): room air        LABS:                               8.7    10.71 )-----------( 710      ( 18 Mar 2024 12:08 )             29.4     03-18    134<L>  |  98  |  10  ----------------------------<  93  4.0   |  28  |  0.40<L>    Ca    9.5      18 Mar 2024 05:48  Phos  5.3     03-18  Mg     2.0     03-18    TPro  7.1  /  Alb  1.9<L>  /  TBili  0.4  /  DBili  x   /  AST  16  /  ALT  15  /  AlkPhos  82  03-18        LIVER FUNCTIONS - ( 18 Mar 2024 05:48 )  Alb: 1.9 g/dL / Pro: 7.1 gm/dL / ALK PHOS: 82 U/L / ALT: 15 U/L / AST: 16 U/L / GGT: x             Urinalysis Basic - ( 18 Mar 2024 05:48 )    Color: x / Appearance: x / SG: x / pH: x  Gluc: 93 mg/dL / Ketone: x  / Bili: x / Urobili: x   Blood: x / Protein: x / Nitrite: x   Leuk Esterase: x / RBC: x / WBC x   Sq Epi: x / Non Sq Epi: x / Bacteria: x        CULTURES:    Physical Examination:    General: No acute distress.    HEENT: Pupils equal, reactive to light.  Symmetric.  PULM: dec breath sounds at bases b/l. L CT serous,  R pigtail - serosanguinous  CVS: Regular rate and rhythm, no murmurs  ABD: Soft, nondistended, nontender  EXT: No edema, nontender  SKIN: Warm and well perfused, no rashes noted.  NEURO: Alert, oriented, interactive,    DEVICES:   R pigtail  L CT

## 2024-03-18 NOTE — PROGRESS NOTE ADULT - SUBJECTIVE AND OBJECTIVE BOX
Subjective:  Pt in chair NAD      T(C): 36.7 (03-18-24 @ 12:49), Max: 37 (03-17-24 @ 20:43)  HR: 118 (03-18-24 @ 16:00) (102 - 130)  BP: 112/71 (03-18-24 @ 16:00) (91/69 - 130/86)  ABP: --  ABP(mean): --  RR: 19 (03-18-24 @ 16:00) (13 - 24)  SpO2: 95% (03-18-24 @ 16:00) (93% - 100%) RA  Wt(kg): --  CVP(mm Hg): --  CO: --  CI: --  PA: --                                              Tele: SR     CHEST TUBE:   RT 0cc   Lt  15cc                              OUTPUT:     per 24 hours    AIR LEAKS:  [ ] YES [x ] NO          03-18    134<L>  |  98  |  10  ----------------------------<  93  4.0   |  28  |  0.40<L>    Ca    9.5      18 Mar 2024 05:48  Phos  5.3     03-18  Mg     2.0     03-18    TPro  7.1  /  Alb  1.9<L>  /  TBili  0.4  /  DBili  x   /  AST  16  /  ALT  15  /  AlkPhos  82  03-18                               8.7    10.71 )-----------( 710      ( 18 Mar 2024 12:08 )             29.4                 CAPILLARY BLOOD GLUCOSE               CXR:  < from: Xray Chest 1 View- PORTABLE-Routine (Xray Chest 1 View- PORTABLE-Routine in AM.) (03.16.24 @ 09:26) >  Single frontal view of the chest demonstrates small bilateral   pneumothoraces. Bilateral chest tubes. Left thoracic wall subcutaneous   emphysema. 2 cm left lower lobe pulmonary nodule. 1.5 cc left upper lobe   pulmonary nodule. The cardiomediastinal silhouette is normal. No acute   osseous abnormalities. Overlying EKG leads and wires are noted    IMPRESSION: Small bilateral pneumothoraces. Left-sided pulmonary nodules.    < end of copied text >          Exam   Neuro:  Alert Awake NAD   Pulm: decreased b/l + left CT, + Rt Pigtail   CV: Tachy S1 S2   Abd:  Soft mild tenderness   Extremities: warm   Inc : Left Thorax C/D/I           Assessment:  53yFemale    with PAST MEDICAL & SURGICAL HISTORY:  Fibroid      H/O oral surgery  under general anesthesia during childhood      S/P D&C (status post dilation and curettage)  x2      S/P laparoscopy  as part of infertility work up      S/P tonsillectomy            Plan:

## 2024-03-18 NOTE — PROGRESS NOTE ADULT - NS ATTEND AMEND GEN_ALL_CORE FT
Patient seen discussed with staff on rounds, d/w oncology, thoracic    Paitent cxr shows lung expanded  sinus tachycardia, likely physiologic , hgb 8.7 on room air, will monitor  some vaginal bleeding but hgb stable, plan for eventual Hysterectomy possible later this week  oob to chair  diet

## 2024-03-18 NOTE — CONSULT NOTE ADULT - CONSULT REASON
[place] [facility] [place]

## 2024-03-19 LAB
ALBUMIN SERPL ELPH-MCNC: 1.8 G/DL — LOW (ref 3.3–5)
ALP SERPL-CCNC: 79 U/L — SIGNIFICANT CHANGE UP (ref 40–120)
ALT FLD-CCNC: 11 U/L — LOW (ref 12–78)
ANION GAP SERPL CALC-SCNC: 7 MMOL/L — SIGNIFICANT CHANGE UP (ref 5–17)
AST SERPL-CCNC: 16 U/L — SIGNIFICANT CHANGE UP (ref 15–37)
BASOPHILS # BLD AUTO: 0.08 K/UL — SIGNIFICANT CHANGE UP (ref 0–0.2)
BASOPHILS NFR BLD AUTO: 0.9 % — SIGNIFICANT CHANGE UP (ref 0–2)
BILIRUB SERPL-MCNC: 0.3 MG/DL — SIGNIFICANT CHANGE UP (ref 0.2–1.2)
BUN SERPL-MCNC: 13 MG/DL — SIGNIFICANT CHANGE UP (ref 7–23)
CALCIUM SERPL-MCNC: 9.1 MG/DL — SIGNIFICANT CHANGE UP (ref 8.5–10.1)
CHLORIDE SERPL-SCNC: 102 MMOL/L — SIGNIFICANT CHANGE UP (ref 96–108)
CO2 SERPL-SCNC: 26 MMOL/L — SIGNIFICANT CHANGE UP (ref 22–31)
CREAT SERPL-MCNC: 0.36 MG/DL — LOW (ref 0.5–1.3)
EGFR: 120 ML/MIN/1.73M2 — SIGNIFICANT CHANGE UP
EOSINOPHIL # BLD AUTO: 0.59 K/UL — HIGH (ref 0–0.5)
EOSINOPHIL NFR BLD AUTO: 6.5 % — HIGH (ref 0–6)
GLUCOSE SERPL-MCNC: 97 MG/DL — SIGNIFICANT CHANGE UP (ref 70–99)
HCT VFR BLD CALC: 24.7 % — LOW (ref 34.5–45)
HGB BLD-MCNC: 7.5 G/DL — LOW (ref 11.5–15.5)
IMM GRANULOCYTES NFR BLD AUTO: 0.8 % — SIGNIFICANT CHANGE UP (ref 0–0.9)
LYMPHOCYTES # BLD AUTO: 1.45 K/UL — SIGNIFICANT CHANGE UP (ref 1–3.3)
LYMPHOCYTES # BLD AUTO: 16 % — SIGNIFICANT CHANGE UP (ref 13–44)
MAGNESIUM SERPL-MCNC: 2 MG/DL — SIGNIFICANT CHANGE UP (ref 1.6–2.6)
MCHC RBC-ENTMCNC: 24.8 PG — LOW (ref 27–34)
MCHC RBC-ENTMCNC: 30.4 GM/DL — LOW (ref 32–36)
MCV RBC AUTO: 81.5 FL — SIGNIFICANT CHANGE UP (ref 80–100)
MONOCYTES # BLD AUTO: 0.9 K/UL — SIGNIFICANT CHANGE UP (ref 0–0.9)
MONOCYTES NFR BLD AUTO: 9.9 % — SIGNIFICANT CHANGE UP (ref 2–14)
NEUTROPHILS # BLD AUTO: 6 K/UL — SIGNIFICANT CHANGE UP (ref 1.8–7.4)
NEUTROPHILS NFR BLD AUTO: 65.9 % — SIGNIFICANT CHANGE UP (ref 43–77)
PHOSPHATE SERPL-MCNC: 3.8 MG/DL — SIGNIFICANT CHANGE UP (ref 2.5–4.5)
PLATELET # BLD AUTO: 572 K/UL — HIGH (ref 150–400)
POTASSIUM SERPL-MCNC: 3.8 MMOL/L — SIGNIFICANT CHANGE UP (ref 3.5–5.3)
POTASSIUM SERPL-SCNC: 3.8 MMOL/L — SIGNIFICANT CHANGE UP (ref 3.5–5.3)
PROT SERPL-MCNC: 6.7 GM/DL — SIGNIFICANT CHANGE UP (ref 6–8.3)
RBC # BLD: 3.03 M/UL — LOW (ref 3.8–5.2)
RBC # FLD: 20.1 % — HIGH (ref 10.3–14.5)
SODIUM SERPL-SCNC: 135 MMOL/L — SIGNIFICANT CHANGE UP (ref 135–145)
WBC # BLD: 9.09 K/UL — SIGNIFICANT CHANGE UP (ref 3.8–10.5)
WBC # FLD AUTO: 9.09 K/UL — SIGNIFICANT CHANGE UP (ref 3.8–10.5)

## 2024-03-19 PROCEDURE — 93306 TTE W/DOPPLER COMPLETE: CPT | Mod: 26

## 2024-03-19 PROCEDURE — 99233 SBSQ HOSP IP/OBS HIGH 50: CPT

## 2024-03-19 PROCEDURE — 71045 X-RAY EXAM CHEST 1 VIEW: CPT | Mod: 26,77

## 2024-03-19 PROCEDURE — 99232 SBSQ HOSP IP/OBS MODERATE 35: CPT

## 2024-03-19 PROCEDURE — 71045 X-RAY EXAM CHEST 1 VIEW: CPT | Mod: 26

## 2024-03-19 PROCEDURE — 99231 SBSQ HOSP IP/OBS SF/LOW 25: CPT

## 2024-03-19 RX ADMIN — OXYCODONE HYDROCHLORIDE 5 MILLIGRAM(S): 5 TABLET ORAL at 23:24

## 2024-03-19 RX ADMIN — HEPARIN SODIUM 5000 UNIT(S): 5000 INJECTION INTRAVENOUS; SUBCUTANEOUS at 22:24

## 2024-03-19 RX ADMIN — MEDROXYPROGESTERONE ACETATE 10 MILLIGRAM(S): 150 INJECTION, SUSPENSION, EXTENDED RELEASE INTRAMUSCULAR at 11:01

## 2024-03-19 RX ADMIN — OXYCODONE HYDROCHLORIDE 10 MILLIGRAM(S): 5 TABLET ORAL at 03:15

## 2024-03-19 RX ADMIN — LIDOCAINE 2 PATCH: 4 CREAM TOPICAL at 03:24

## 2024-03-19 RX ADMIN — Medication 400 MILLIGRAM(S): at 11:41

## 2024-03-19 RX ADMIN — OXYCODONE HYDROCHLORIDE 10 MILLIGRAM(S): 5 TABLET ORAL at 19:28

## 2024-03-19 RX ADMIN — LIDOCAINE 1 PATCH: 4 CREAM TOPICAL at 03:23

## 2024-03-19 RX ADMIN — HEPARIN SODIUM 5000 UNIT(S): 5000 INJECTION INTRAVENOUS; SUBCUTANEOUS at 06:55

## 2024-03-19 RX ADMIN — SENNA PLUS 2 TABLET(S): 8.6 TABLET ORAL at 22:24

## 2024-03-19 RX ADMIN — OXYCODONE HYDROCHLORIDE 5 MILLIGRAM(S): 5 TABLET ORAL at 07:55

## 2024-03-19 RX ADMIN — OXYCODONE HYDROCHLORIDE 5 MILLIGRAM(S): 5 TABLET ORAL at 22:24

## 2024-03-19 RX ADMIN — OXYCODONE HYDROCHLORIDE 5 MILLIGRAM(S): 5 TABLET ORAL at 06:55

## 2024-03-19 RX ADMIN — Medication 1000 MILLIGRAM(S): at 12:00

## 2024-03-19 RX ADMIN — LIDOCAINE 1 PATCH: 4 CREAM TOPICAL at 03:24

## 2024-03-19 RX ADMIN — HEPARIN SODIUM 5000 UNIT(S): 5000 INJECTION INTRAVENOUS; SUBCUTANEOUS at 14:46

## 2024-03-19 RX ADMIN — LIDOCAINE 2 PATCH: 4 CREAM TOPICAL at 11:01

## 2024-03-19 RX ADMIN — LIDOCAINE 1 PATCH: 4 CREAM TOPICAL at 11:02

## 2024-03-19 RX ADMIN — POLYETHYLENE GLYCOL 3350 17 GRAM(S): 17 POWDER, FOR SOLUTION ORAL at 06:55

## 2024-03-19 NOTE — PROGRESS NOTE ADULT - SUBJECTIVE AND OBJECTIVE BOX
53F  of uterine fibroid, most recently dx'd with metastatic high grade mesenchymal neoplasm     Admit 3/11 with  chest pain/SOB, noted to have  L pneumothorax     Had tube pigtail chest tube decompression with resolution.      On 3/13 she developed a R PTX and had a pigtail cath placed.      On 3/15 she underwent a  Left Robot assist Pleurectomy/TALC pleurodesis wedge resection.    Both PTX's thought to be due to what appears to be metastatic disease to the lung with tumors abutting the pleura     Both tubes clamped now.    No air leak    Patient is fine     CXR tonight both lungs up.    Repeat CXR in AM

## 2024-03-19 NOTE — PROGRESS NOTE ADULT - ASSESSMENT
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 concern that surgical intervention may result in increased  surgical morbidity and delay to definitive treatment with chemotherapy for widespread metastatic disease. Recommend IR biopsy of abdominal mass.   Neuro: Pain well controlled. Continue current pain regimen.  CV: Blood pressure well controlled.  Pulm: Saturating well, bilateral chest tubes  GI: was nauseous this morning, but states it resolved. will try to eat.  : Voiding spontaneously.  Heme: Hgb 7.5 this AM, recommend transfusion of pRBC for goal Hgb >8.0 in pt with active malignancy. Consider uterine artery embolization if bleeding increased, Hbg drops and precludes pt from proceeding with chemotherapy.  ID: Afebrile  Endo: No active disease.   FEN: Electrolytes WNL  Skin: No active disease.   Psych: No active disease.   DVT ppx: Ambulation encouraged, SCDs when in bed  Dispo: inpatient management, gyn onc following

## 2024-03-19 NOTE — PROGRESS NOTE ADULT - SUBJECTIVE AND OBJECTIVE BOX
Patient is a 53y old  Female who presents with a chief complaint of Left side chest pain  L Pneumothorax (15 Mar 2024 13:48)      BRIEF HOSPITAL COURSE: 50 yo female with a PMHx of fibroids, who is under evaluation  for recently diagnosed endometrial cancer (with planned biopsy on 3/14/24) and now presents to [place] ED c/o left sided chest pain.   Pt was at work today at 11:30 am when she experienced a pain in her left chest that felt like a gas bubble that was progressively worsening and associated with a tingling in her left arm.   Pt reports having recent  abdominal pain related to her uterine mass in her left lower abdomen and a diffuse crampy discomfort.      Admitted for spontaneous L PTX  Had L pigtail placed by CTS 3/11  Developed R PTX with subsequent R pigtail placement on 3/14    Take to OR on 3/15 for robot assisted L VATS parietal pleurectomy, pleurodesis, SERINA wedge resection   EBL 50cc    3/15 pt seen in PACU, having pain at chest tube sites. breathing feels ok. no air leak on L side.   3/18 no air leak seen, has some mild discomfort at area of CT site. sinus tachycardia 120s. on room air sating 97%, normotensive.   3/19     PAST MEDICAL & SURGICAL HISTORY:  Fibroid  H/O oral surgery  under general anesthesia during childhoo  S/P D&C (status post dilation and curettage)  x2  S/P laparoscopy  as part of infertility work up  S/P tonsillectomy        MEDICATIONS  (STANDING):  heparin   Injectable 5000 Unit(s) SubCutaneous every 8 hours  lidocaine   4% Patch 2 Patch Transdermal daily  lidocaine   4% Patch 1 Patch Transdermal daily  medroxyPROGESTERone 10 milliGRAM(s) Oral daily  senna 2 Tablet(s) Oral at bedtime      Vital Signs Last 24 Hrs  T(C): 37 (17 Mar 2024 20:43), Max: 37 (17 Mar 2024 20:43)  T(F): 98.6 (17 Mar 2024 20:43), Max: 98.6 (17 Mar 2024 20:43)  HR: 125 (18 Mar 2024 12:00) (102 - 130)  BP: 130/86 (18 Mar 2024 12:00) (91/69 - 143/98)  BP(mean): 98 (18 Mar 2024 12:00) (74 - 109)  RR: 23 (18 Mar 2024 12:00) (13 - 24)  SpO2: 96% (18 Mar 2024 12:00) (93% - 100%)    Parameters below as of 18 Mar 2024 08:00  Patient On (Oxygen Delivery Method): room air      Vital Signs Last 24 Hrs  T(C): 37 (17 Mar 2024 20:43), Max: 37 (17 Mar 2024 20:43)  T(F): 98.6 (17 Mar 2024 20:43), Max: 98.6 (17 Mar 2024 20:43)  HR: 125 (18 Mar 2024 12:00) (102 - 130)  BP: 130/86 (18 Mar 2024 12:00) (91/69 - 143/98)  BP(mean): 98 (18 Mar 2024 12:00) (74 - 109)  RR: 23 (18 Mar 2024 12:00) (13 - 24)  SpO2: 96% (18 Mar 2024 12:00) (93% - 100%)    Parameters below as of 18 Mar 2024 08:00  Patient On (Oxygen Delivery Method): room air        LABS:                               8.7    10.71 )-----------( 710      ( 18 Mar 2024 12:08 )             29.4     03-18    134<L>  |  98  |  10  ----------------------------<  93  4.0   |  28  |  0.40<L>    Ca    9.5      18 Mar 2024 05:48  Phos  5.3     03-18  Mg     2.0     03-18    TPro  7.1  /  Alb  1.9<L>  /  TBili  0.4  /  DBili  x   /  AST  16  /  ALT  15  /  AlkPhos  82  03-18        LIVER FUNCTIONS - ( 18 Mar 2024 05:48 )  Alb: 1.9 g/dL / Pro: 7.1 gm/dL / ALK PHOS: 82 U/L / ALT: 15 U/L / AST: 16 U/L / GGT: x             Urinalysis Basic - ( 18 Mar 2024 05:48 )    Color: x / Appearance: x / SG: x / pH: x  Gluc: 93 mg/dL / Ketone: x  / Bili: x / Urobili: x   Blood: x / Protein: x / Nitrite: x   Leuk Esterase: x / RBC: x / WBC x   Sq Epi: x / Non Sq Epi: x / Bacteria: x        CULTURES:    Physical Examination:    General: No acute distress.    HEENT: Pupils equal, reactive to light.  Symmetric.  PULM: dec breath sounds at bases b/l. L CT serous,  R pigtail - serosanguinous  CVS: Regular rate and rhythm, no murmurs  ABD: Soft, nondistended, nontender  EXT: No edema, nontender  SKIN: Warm and well perfused, no rashes noted.  NEURO: Alert, oriented, interactive,    DEVICES:   R pigtail  L CT       Patient is a 53y old  Female who presents with a chief complaint of Left side chest pain  L Pneumothorax (15 Mar 2024 13:48)      BRIEF HOSPITAL COURSE: 50 yo female with a PMHx of fibroids, who is under evaluation  for recently diagnosed endometrial cancer (with planned biopsy on 3/14/24) and now presents to [place] ED c/o left sided chest pain.   Pt was at work today at 11:30 am when she experienced a pain in her left chest that felt like a gas bubble that was progressively worsening and associated with a tingling in her left arm.   Pt reports having recent  abdominal pain related to her uterine mass in her left lower abdomen and a diffuse crampy discomfort.      Admitted for spontaneous L PTX  Had L pigtail placed by CTS 3/11  Developed R PTX with subsequent R pigtail placement on 3/14    Take to OR on 3/15 for robot assisted L VATS parietal pleurectomy, pleurodesis, SERINA wedge resection   EBL 50cc    3/15 pt seen in PACU, having pain at chest tube sites. breathing feels ok. no air leak on L side.   3/18 no air leak seen, has some mild discomfort at area of CT site. sinus tachycardia 120s. on room air sating 97%, normotensive.   3/19     PAST MEDICAL & SURGICAL HISTORY:  Fibroid  H/O oral surgery  under general anesthesia during childhoo  S/P D&C (status post dilation and curettage)  x2  S/P laparoscopy  as part of infertility work up  S/P tonsillectomy      MEDICATIONS  (STANDING):  heparin   Injectable 5000 Unit(s) SubCutaneous every 8 hours  lidocaine   4% Patch 1 Patch Transdermal daily  lidocaine   4% Patch 2 Patch Transdermal daily  medroxyPROGESTERone 10 milliGRAM(s) Oral daily  senna 2 Tablet(s) Oral at bedtime  sodium chloride 0.9%. 1000 milliLiter(s) (75 mL/Hr) IV Continuous <Continuous>      Vital Signs Last 24 Hrs  T(C): 36.8 (19 Mar 2024 06:13), Max: 36.9 (18 Mar 2024 20:30)  T(F): 98.2 (19 Mar 2024 06:13), Max: 98.4 (18 Mar 2024 20:30)  HR: 103 (19 Mar 2024 10:00) (103 - 129)  BP: 105/60 (19 Mar 2024 10:00) (105/60 - 139/94)  BP(mean): 74 (19 Mar 2024 10:00) (74 - 105)  RR: 16 (19 Mar 2024 10:00) (16 - 27)  SpO2: 96% (19 Mar 2024 10:00) (94% - 99%)    Parameters below as of 19 Mar 2024 06:00  Patient On (Oxygen Delivery Method): room air        LABS:                               7.5    9.09  )-----------( 572      ( 19 Mar 2024 05:27 )             24.7     03-19    135  |  102  |  13  ----------------------------<  97  3.8   |  26  |  0.36<L>    Ca    9.1      19 Mar 2024 05:27  Phos  3.8     03-19  Mg     2.0     03-19    TPro  6.7  /  Alb  1.8<L>  /  TBili  0.3  /  DBili  x   /  AST  16  /  ALT  11<L>  /  AlkPhos  79  03-19        LIVER FUNCTIONS - ( 19 Mar 2024 05:27 )  Alb: 1.8 g/dL / Pro: 6.7 gm/dL / ALK PHOS: 79 U/L / ALT: 11 U/L / AST: 16 U/L / GGT: x             Urinalysis Basic - ( 19 Mar 2024 05:27 )    Color: x / Appearance: x / SG: x / pH: x  Gluc: 97 mg/dL / Ketone: x  / Bili: x / Urobili: x   Blood: x / Protein: x / Nitrite: x   Leuk Esterase: x / RBC: x / WBC x   Sq Epi: x / Non Sq Epi: x / Bacteria: x      CULTURES:    Physical Examination:    General: No acute distress.    HEENT: Pupils equal, reactive to light.  Symmetric.  PULM: dec breath sounds at bases b/l. L CT serous,  R pigtail - serosanguinous  CVS: Regular rate and rhythm, no murmurs  ABD: Soft, nondistended, nontender  EXT: No edema, nontender  SKIN: Warm and well perfused, no rashes noted.  NEURO: Alert, oriented, interactive,    DEVICES:   R pigtail  L CT

## 2024-03-19 NOTE — PROGRESS NOTE ADULT - SUBJECTIVE AND OBJECTIVE BOX
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     Seen at bedside this AM.  Patient reports she felt nauseous earlier and couldn't eat, but she feels better now.   States her vaginal bleeding remains minimal. Denies lightheadedness/dizziness.  Denies any current pain.   Was OOBTC yesterday.    O:   Vital Signs Last 24 Hrs  T(C): 36.8 (19 Mar 2024 06:13), Max: 36.9 (18 Mar 2024 20:30)  T(F): 98.2 (19 Mar 2024 06:13), Max: 98.4 (18 Mar 2024 20:30)  HR: 104 (19 Mar 2024 08:00) (104 - 129)  BP: 120/74 (19 Mar 2024 08:00) (109/68 - 139/94)  BP(mean): 88 (19 Mar 2024 08:00) (80 - 105)  RR: 17 (19 Mar 2024 08:00) (16 - 27)  SpO2: 97% (19 Mar 2024 08:00) (94% - 99%)    Parameters below as of 19 Mar 2024 06:00  Patient On (Oxygen Delivery Method): room air    I&O's Detail    18 Mar 2024 07:01  -  19 Mar 2024 07:00  --------------------------------------------------------  IN:    sodium chloride 0.9%: 600 mL    Sodium Chloride 0.9% Bolus: 1000 mL  Total IN: 1600 mL    OUT:    Chest Tube (mL): 0 mL    Chest Tube (mL): 5 mL    Voided (mL): 1150 mL  Total OUT: 1155 mL    Total NET: 445 mL      Gen: NAD, AAOx3  Resp: breathing comfortably on RA  Abdomen: Soft, nontender, palpable mass ~2 finger breadths above umbilicus  Pelvic: no active vaginal bleeding     Labs:                          7.5    9.09  )-----------( 572      ( 19 Mar 2024 05:27 )             24.7     03-19    135  |  102  |  13  ----------------------------<  97  3.8   |  26  |  0.36<L>    Ca    9.1      19 Mar 2024 05:27  Phos  3.8     03-19  Mg     2.0     03-19    TPro  6.7  /  Alb  1.8<L>  /  TBili  0.3  /  DBili  x   /  AST  16  /  ALT  11<L>  /  AlkPhos  79  03-19

## 2024-03-19 NOTE — CONSULT NOTE ADULT - ASSESSMENT
52 yo F w/ MHx of uterine fibroid, most recently dx'd with metastatic high grade mesenchymal neoplasm (likely [place]ESS) [place] [facility]/w chest pain/SOB, noted to have pneumothorax.     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 US was unremarkable except for leiomyoma. Endometrial Bx11/16/24 was benign. She was scheduled for hysterectomy.  -As part of preoperative w/u she underwent MRI A/P 11/15/23, which showed an incidental finding of a 2.1 cm RLL nodule. Pulmonology recommended CT-chest, which showed multiple pulmonary nodules.  She was referred to thoracic surgery, who sent her for PET/CT.  -PET/CT 2/7/24:  FDG avid enlarging uterus masses with multiple lobulated masses, concerning for malignancy. Several areas of FDG avidity outside of the uterus suggest extrauterine extension or possible lymphadenopathy. Multiple enlarging FDG avid pulmonary nodules, concerning for malignancy.   -2/26/24: she underwent right lung mass biopsy [place] [facility].  Path c/w malignant high grade mesenchymal neoplasm; differential includes metastatic high grade endometrial stromal sarcoma and GLI1- rearranged sarcoma. FUNES RNA sequencing was pending, however as of 3/18/24 I was notified by Dr. Calixto that the Funes fusion panel at [facility] [place] failed, d/t insufficient % of quality RNA in submitted tissue.  Dr. Calixto will run it again on different sample, but will likely not have the information for another week.     -Seen by Dr. Black ([facility] associate) this admission through 3/15/24-->now transitioned care to [facility] as of 3/18/24.  -Gyn/Onc Dr. Hubbard now following  -Dr. Mart, Dr. Abel, Dr. Hubbard and Dr. Calixto spoke at length regarding the diagnostic workup and treatment plan.    -Will need to to obtain pathology slides from [facility] for [facility] pathology review.   -recommend obtaining further tissue given vague path report of malignant high grade mesenchymal neoplasm with broad differential and failed Funes fusion panel.  Consideration for obtaining tissue is to get an EMB and possible embolizaion given chronic vaginal bleeding with subsequent decline in Hb.    -her case was presented at [facility] 3/19/24.  Consensus to proceed with IR  biopsy of uterine mass and embolization for continued drop in hemoglobin secondary to bleeding.  -will need echo prior to start of chemotherapy.  -I discussed the plan for treatment with palliative chemotherapy with both Dr. Calixto and Dr. Primo Anderson who has expertise in treatment of sarcoma. Dr. Calixto will resend Funes fusion panel that failed.  We will send [facility] on the uterine biopsy for NGS. IMPACT genetic testing was also sent and is still pending at [facility] [place]. While path and molecular testing pending, we all agreed that once she is medically optimized, can initiate doxorubicin 75 mg/m² q3 weeks x 6 cycles, potentially followed by monthly Doxil if continued response.  Will offer Zinecard for cardioprotection with each cycle as well as Onpro.  Patient and family aware from both Dr. Calixto and myself that at this time there is not a clear diagnosis from the lung biopsy resulting in a high-grade mesenchymal neoplasm, and this is the most likely diagnosis based on consensus opinion.  We agree with initiation of doxorubicin based on the likelihood of this being a high-grade endometrial stromal sarcoma GYN primary tumor, but recommend additional tissue for added clarification.   We reiterated that this is unfortunately widely metastatic and high-grade with a poor overall prognosis.  Patient and family aware and wish to proceed with treatment as soon as medically optimized.  Dr Calixto ([phone])    # Chest pain/SOB in setting of PTX    -CTA Chest 3/11/24:  New small left pneumothorax. Increased size and number of pulmonary metastases, some cavitary.  -s/p Left pigtail placement 3/11/24  -s/p Right pigtail placed 3/13/24  -s/p L wedge resection/pleurodesis 3/15/24 for complex PTX, secondary to pulmonary metastases; path pending.  -d/w CTSx LIANE Ramos    # Normocytic anemia    -hgb- 7.5 g/dl<--8.7 g/dl  -multifactorial: ongoing vaginal bleeding, underlying malignancy, nutritional deficiency/TING  -labs c/w TING, may benefit from Venofer  -Monitor CBC  -Transfuse PRBC if Hbg continues to drop and as clinically indicated, if becomes symptomatic or hemodynamically unstable.  -gyn onc to consider embolizaion vs surgical procedure.       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 US was unremarkable except for leiomyoma. Endometrial Bx11/16/24 was benign. She was scheduled for hysterectomy.  -As part of preoperative w/u she underwent MRI A/P 11/15/23, which showed an incidental finding of a 2.1 cm RLL nodule. Pulmonology recommended CT-chest, which showed multiple pulmonary nodules.  She was referred to thoracic surgery, who sent her for PET/CT.  -PET/CT 2/7/24:  FDG avid enlarging uterus masses with multiple lobulated masses, concerning for malignancy. Several areas of FDG avidity outside of the uterus suggest extrauterine extension or possible lymphadenopathy. Multiple enlarging FDG avid pulmonary nodules, concerning for malignancy.   -2/26/24: she underwent right lung mass biopsy at [facility] [place].  Path c/w malignant high grade mesenchymal neoplasm; differential includes metastatic high grade endometrial stromal sarcoma and GLI1- rearranged sarcoma. FUNES RNA sequencing was pending, however as of 3/18/24 I was notified by Dr. Calixto that the Funes fusion panel at [facility] failed, d/t insufficient % of quality RNA in submitted tissue.  Dr. Calixto will run it again on different sample, but will likely not have the information for another week.     -Seen by Dr. Black ([facility] associate) this admission through 3/15/24-->now transitioned care to [facility] as of 3/18/24.  -Gyn/Onc Dr. Hubbard now following  -Dr. Mart, Dr. Abel, Dr. Hubbard and Dr. Calixto spoke at length regarding the diagnostic workup and treatment plan.    -Will need to to obtain pathology slides from [facility] for [facility] pathology review.   -recommend obtaining further tissue given vague path report of malignant high grade mesenchymal neoplasm with broad differential and failed Funes fusion panel.  Consideration for obtaining tissue is to get an EMB and possible embolizaion given chronic vaginal bleeding with subsequent decline in Hb.    -her case was presented at [facility] 3/19/24.  Consensus to proceed with IR  biopsy of uterine mass and embolization for continued drop in hemoglobin secondary to bleeding.  -will need echo prior to start of chemotherapy.  -I discussed the plan for treatment with palliative chemotherapy with both Dr. Calixto and Dr. Primo Anderson. Dr. Calixto will resend Funes fusion panel that failed.  We will send [facility] on the uterine biopsy for NGS. IMPACT genetic testing was also sent and is still pending at [facility]. While path and molecular testing pending, we all agreed that once she is medically optimized, can initiate doxorubicin 75 mg/m² q3 weeks x 6 cycles, potentially followed by monthly Doxil if continued response.  Will offer Zinecard for cardioprotection with each cycle as well as Onpro.  Patient and family aware from both Dr. Calixto and myself that at this time there is not a clear diagnosis from the lung biopsy resulting in a high-grade mesenchymal neoplasm, and this is the most likely diagnosis based on consensus opinion.  We agree with initiation of doxorubicin based on the likelihood of this being a high-grade endometrial stromal sarcoma GYN primary tumor, but recommend additional tissue for added clarification.   We reiterated that this is unfortunately widely metastatic and high-grade with a poor overall prognosis.  Patient and family aware and wish to proceed with treatment as soon as medically optimized.  Dr Calixto ([phone])    # Chest pain/SOB in setting of PTX    -CTA Chest 3/11/24:  New small left pneumothorax. Increased size and number of pulmonary metastases, some cavitary.  -s/p Left pigtail placement 3/11/24  -s/p Right pigtail placed 3/13/24  -s/p L wedge resection/pleurodesis 3/15/24 for complex PTX, secondary to pulmonary metastases; path pending.  -d/w CTSx LIANE Ramos    # Normocytic anemia    -hgb- 7.5 g/dl<--8.7 g/dl  -multifactorial: ongoing vaginal bleeding, underlying malignancy, nutritional deficiency/TING  -labs c/w TING, may benefit from Venofer  -Monitor CBC  -Transfuse PRBC if Hbg continues to drop and as clinically indicated, if becomes symptomatic or hemodynamically unstable.  -gyn onc to consider embolizaion vs surgical procedure.

## 2024-03-19 NOTE — PROGRESS NOTE ADULT - ASSESSMENT
ASSESSMENT  52 yo female with a PMHx of fibroids, who is under evaluation  for recently diagnosed endometrial cancer (with planned biopsy on 3/14/24) and now presents to [place] ED c/o left sided chest pain.   Pt reports having recent  abdominal pain related to her uterine mass in her left lower abdomen and a diffuse crampy discomfort.      Admitted for spontaneous L PTX  Had L pigtail placed by CTS 3/11  Developed R PTX with subsequent R pigtail placement on 3/14    Take to OR on 3/15 for robot assisted L VATS parietal pleurectomy, pleurodesis, SERINA wedge resection   EBL 50cc    PLAN    Neuro: AAOx 3. pain control prn  tylenol, diluadid prn.  No nsaids.   CV: BP low normal. cont to monitor. sinus tachycardia on monitor 120s suspect 2/2 pain vs dehydration  Pulm: on room air. CXR this am, no PTX seen on L or R side. R pigtail and L chest tube still in place  GI: PO diet. PPI. bowel regimen prn  Gyn: plan for hysterectomy, cytoreductive surgery this week as per gyn onc  Nephro: monitor I & Os. Trend renal fxn. will give 500cc NS bolus.   ID: monitor off abx for now. trend WBC. monitor temps..   Heme: Hgb trending down, 9.1 -> 8.0. repeat CBC 8.7. pt with mild vaginal bleeding still. hold chemical DVT ppx for now. Tranfuse for Hgb < 8 as per gyn onc. SCDs  PT eval    Dispo: SICU. monitor CT output. pain control. IV fluid bolus. f/u surgical pathology    Will discuss with Dr. Escobedo ASSESSMENT  50 yo female with a PMHx of fibroids, who is under evaluation  for recently diagnosed endometrial cancer (with planned biopsy on 3/14/24) and now presents to [place] ED c/o left sided chest pain.   Pt reports having recent  abdominal pain related to her uterine mass in her left lower abdomen and a diffuse crampy discomfort.      Admitted for spontaneous L PTX  Had L pigtail placed by CTS 3/11  Developed R PTX with subsequent R pigtail placement on 3/14    Taken to OR on 3/15 for robot assisted L VATS parietal pleurectomy, pleurodesis, SERINA wedge resection   EBL 50cc    PLAN    Neuro: AAOx 3. pain control prn  tylenol, diluadid prn.  No nsaids.   CV: BP low normal. cont to monitor. sinus tachycardia better today 110-115 suspect 2/2 pain vs dehydration vs anemia  Pulm: on room air. CXR this am, no PTX seen on L or R side. R pigtail and L chest tube to waterseal. mgmt as per CTS  GI: PO diet. PPI. bowel regimen prn  Gyn: no plan for hysterectomy anymore as per gyn onc, re-consult IR for abd mass biopsy/possible UAE. threshold for transfuse Hgb <7. d/w gyn onc resident  Nephro: monitor I & Os. Trend renal fxn. cont NS @75  ID: monitor off abx for now. trend WBC. monitor temps..   Heme: Hgb trending down 8.5 -> 7.5. repeat CBC 12:00. Transfusion threshold Hgb <7 discussed with gyn onc. iron def anemia on labs. will d/w heme onc about IV iron vs blood transfusion. SCDs  PT eval    Dispo: SICU.  IV fluids. IR reconsulted for abd biopsy, possible UAE. Trend H/H.     Will discuss with Dr. Vazquez

## 2024-03-20 ENCOUNTER — NON-APPOINTMENT (OUTPATIENT)
Age: 54
End: 2024-03-20

## 2024-03-20 LAB
ALBUMIN SERPL ELPH-MCNC: 1.8 G/DL — LOW (ref 3.3–5)
ALP SERPL-CCNC: 78 U/L — SIGNIFICANT CHANGE UP (ref 40–120)
ALT FLD-CCNC: 11 U/L — LOW (ref 12–78)
ANION GAP SERPL CALC-SCNC: 11 MMOL/L — SIGNIFICANT CHANGE UP (ref 5–17)
AST SERPL-CCNC: 12 U/L — LOW (ref 15–37)
BASOPHILS # BLD AUTO: 0.07 K/UL — SIGNIFICANT CHANGE UP (ref 0–0.2)
BASOPHILS NFR BLD AUTO: 0.7 % — SIGNIFICANT CHANGE UP (ref 0–2)
BILIRUB SERPL-MCNC: 0.5 MG/DL — SIGNIFICANT CHANGE UP (ref 0.2–1.2)
BUN SERPL-MCNC: 10 MG/DL — SIGNIFICANT CHANGE UP (ref 7–23)
CALCIUM SERPL-MCNC: 9.2 MG/DL — SIGNIFICANT CHANGE UP (ref 8.5–10.1)
CHLORIDE SERPL-SCNC: 102 MMOL/L — SIGNIFICANT CHANGE UP (ref 96–108)
CO2 SERPL-SCNC: 23 MMOL/L — SIGNIFICANT CHANGE UP (ref 22–31)
CREAT SERPL-MCNC: 0.35 MG/DL — LOW (ref 0.5–1.3)
EGFR: 121 ML/MIN/1.73M2 — SIGNIFICANT CHANGE UP
EOSINOPHIL # BLD AUTO: 0.63 K/UL — HIGH (ref 0–0.5)
EOSINOPHIL NFR BLD AUTO: 6.3 % — HIGH (ref 0–6)
GLUCOSE SERPL-MCNC: 79 MG/DL — SIGNIFICANT CHANGE UP (ref 70–99)
HCT VFR BLD CALC: 28.7 % — LOW (ref 34.5–45)
HGB BLD-MCNC: 8.8 G/DL — LOW (ref 11.5–15.5)
IMM GRANULOCYTES NFR BLD AUTO: 0.9 % — SIGNIFICANT CHANGE UP (ref 0–0.9)
LYMPHOCYTES # BLD AUTO: 1.6 K/UL — SIGNIFICANT CHANGE UP (ref 1–3.3)
LYMPHOCYTES # BLD AUTO: 15.9 % — SIGNIFICANT CHANGE UP (ref 13–44)
MAGNESIUM SERPL-MCNC: 2 MG/DL — SIGNIFICANT CHANGE UP (ref 1.6–2.6)
MCHC RBC-ENTMCNC: 25.1 PG — LOW (ref 27–34)
MCHC RBC-ENTMCNC: 30.7 GM/DL — LOW (ref 32–36)
MCV RBC AUTO: 81.8 FL — SIGNIFICANT CHANGE UP (ref 80–100)
MONOCYTES # BLD AUTO: 1.14 K/UL — HIGH (ref 0–0.9)
MONOCYTES NFR BLD AUTO: 11.4 % — SIGNIFICANT CHANGE UP (ref 2–14)
NEUTROPHILS # BLD AUTO: 6.51 K/UL — SIGNIFICANT CHANGE UP (ref 1.8–7.4)
NEUTROPHILS NFR BLD AUTO: 64.8 % — SIGNIFICANT CHANGE UP (ref 43–77)
PHOSPHATE SERPL-MCNC: 4 MG/DL — SIGNIFICANT CHANGE UP (ref 2.5–4.5)
PLATELET # BLD AUTO: 655 K/UL — HIGH (ref 150–400)
POTASSIUM SERPL-MCNC: 3.8 MMOL/L — SIGNIFICANT CHANGE UP (ref 3.5–5.3)
POTASSIUM SERPL-SCNC: 3.8 MMOL/L — SIGNIFICANT CHANGE UP (ref 3.5–5.3)
PROT SERPL-MCNC: 7 GM/DL — SIGNIFICANT CHANGE UP (ref 6–8.3)
RBC # BLD: 3.51 M/UL — LOW (ref 3.8–5.2)
RBC # FLD: 18.7 % — HIGH (ref 10.3–14.5)
SODIUM SERPL-SCNC: 136 MMOL/L — SIGNIFICANT CHANGE UP (ref 135–145)
WBC # BLD: 10.04 K/UL — SIGNIFICANT CHANGE UP (ref 3.8–10.5)
WBC # FLD AUTO: 10.04 K/UL — SIGNIFICANT CHANGE UP (ref 3.8–10.5)

## 2024-03-20 PROCEDURE — 93970 EXTREMITY STUDY: CPT | Mod: 26

## 2024-03-20 PROCEDURE — 99232 SBSQ HOSP IP/OBS MODERATE 35: CPT | Mod: GC

## 2024-03-20 PROCEDURE — 99232 SBSQ HOSP IP/OBS MODERATE 35: CPT

## 2024-03-20 PROCEDURE — 71045 X-RAY EXAM CHEST 1 VIEW: CPT | Mod: 26,77

## 2024-03-20 PROCEDURE — 99231 SBSQ HOSP IP/OBS SF/LOW 25: CPT

## 2024-03-20 PROCEDURE — 99233 SBSQ HOSP IP/OBS HIGH 50: CPT

## 2024-03-20 PROCEDURE — 71045 X-RAY EXAM CHEST 1 VIEW: CPT | Mod: 26,76

## 2024-03-20 RX ORDER — HYDROMORPHONE HYDROCHLORIDE 2 MG/ML
4 INJECTION INTRAMUSCULAR; INTRAVENOUS; SUBCUTANEOUS EVERY 4 HOURS
Refills: 0 | Status: DISCONTINUED | OUTPATIENT
Start: 2024-03-20 | End: 2024-03-22

## 2024-03-20 RX ORDER — HYDROMORPHONE HYDROCHLORIDE 2 MG/ML
2 INJECTION INTRAMUSCULAR; INTRAVENOUS; SUBCUTANEOUS EVERY 4 HOURS
Refills: 0 | Status: DISCONTINUED | OUTPATIENT
Start: 2024-03-20 | End: 2024-03-22

## 2024-03-20 RX ORDER — OXYCODONE HYDROCHLORIDE 5 MG/1
10 TABLET ORAL EVERY 4 HOURS
Refills: 0 | Status: DISCONTINUED | OUTPATIENT
Start: 2024-03-20 | End: 2024-03-20

## 2024-03-20 RX ORDER — HYDROMORPHONE HYDROCHLORIDE 2 MG/ML
0.5 INJECTION INTRAMUSCULAR; INTRAVENOUS; SUBCUTANEOUS ONCE
Refills: 0 | Status: DISCONTINUED | OUTPATIENT
Start: 2024-03-20 | End: 2024-03-20

## 2024-03-20 RX ORDER — HYDROMORPHONE HYDROCHLORIDE 2 MG/ML
0.5 INJECTION INTRAMUSCULAR; INTRAVENOUS; SUBCUTANEOUS
Refills: 0 | Status: DISCONTINUED | OUTPATIENT
Start: 2024-03-20 | End: 2024-03-22

## 2024-03-20 RX ADMIN — OXYCODONE HYDROCHLORIDE 10 MILLIGRAM(S): 5 TABLET ORAL at 07:54

## 2024-03-20 RX ADMIN — HYDROMORPHONE HYDROCHLORIDE 0.5 MILLIGRAM(S): 2 INJECTION INTRAMUSCULAR; INTRAVENOUS; SUBCUTANEOUS at 23:43

## 2024-03-20 RX ADMIN — LIDOCAINE 2 PATCH: 4 CREAM TOPICAL at 00:53

## 2024-03-20 RX ADMIN — LIDOCAINE 1 PATCH: 4 CREAM TOPICAL at 00:53

## 2024-03-20 RX ADMIN — HYDROMORPHONE HYDROCHLORIDE 0.5 MILLIGRAM(S): 2 INJECTION INTRAMUSCULAR; INTRAVENOUS; SUBCUTANEOUS at 10:55

## 2024-03-20 RX ADMIN — OXYCODONE HYDROCHLORIDE 10 MILLIGRAM(S): 5 TABLET ORAL at 02:11

## 2024-03-20 RX ADMIN — HYDROMORPHONE HYDROCHLORIDE 4 MILLIGRAM(S): 2 INJECTION INTRAMUSCULAR; INTRAVENOUS; SUBCUTANEOUS at 21:03

## 2024-03-20 RX ADMIN — HYDROMORPHONE HYDROCHLORIDE 0.5 MILLIGRAM(S): 2 INJECTION INTRAMUSCULAR; INTRAVENOUS; SUBCUTANEOUS at 23:13

## 2024-03-20 RX ADMIN — OXYCODONE HYDROCHLORIDE 10 MILLIGRAM(S): 5 TABLET ORAL at 17:39

## 2024-03-20 RX ADMIN — LIDOCAINE 1 PATCH: 4 CREAM TOPICAL at 22:48

## 2024-03-20 RX ADMIN — LIDOCAINE 1 PATCH: 4 CREAM TOPICAL at 10:56

## 2024-03-20 RX ADMIN — LIDOCAINE 2 PATCH: 4 CREAM TOPICAL at 22:48

## 2024-03-20 RX ADMIN — OXYCODONE HYDROCHLORIDE 10 MILLIGRAM(S): 5 TABLET ORAL at 08:50

## 2024-03-20 RX ADMIN — SENNA PLUS 2 TABLET(S): 8.6 TABLET ORAL at 21:03

## 2024-03-20 RX ADMIN — OXYCODONE HYDROCHLORIDE 10 MILLIGRAM(S): 5 TABLET ORAL at 18:34

## 2024-03-20 RX ADMIN — HYDROMORPHONE HYDROCHLORIDE 4 MILLIGRAM(S): 2 INJECTION INTRAMUSCULAR; INTRAVENOUS; SUBCUTANEOUS at 22:33

## 2024-03-20 RX ADMIN — Medication 0.25 MILLIGRAM(S): at 17:39

## 2024-03-20 RX ADMIN — OXYCODONE HYDROCHLORIDE 10 MILLIGRAM(S): 5 TABLET ORAL at 13:55

## 2024-03-20 RX ADMIN — HYDROMORPHONE HYDROCHLORIDE 0.5 MILLIGRAM(S): 2 INJECTION INTRAMUSCULAR; INTRAVENOUS; SUBCUTANEOUS at 11:15

## 2024-03-20 RX ADMIN — OXYCODONE HYDROCHLORIDE 10 MILLIGRAM(S): 5 TABLET ORAL at 12:59

## 2024-03-20 RX ADMIN — OXYCODONE HYDROCHLORIDE 10 MILLIGRAM(S): 5 TABLET ORAL at 01:11

## 2024-03-20 RX ADMIN — MEDROXYPROGESTERONE ACETATE 10 MILLIGRAM(S): 150 INJECTION, SUSPENSION, EXTENDED RELEASE INTRAMUSCULAR at 13:01

## 2024-03-20 RX ADMIN — LIDOCAINE 2 PATCH: 4 CREAM TOPICAL at 10:56

## 2024-03-20 RX ADMIN — SODIUM CHLORIDE 75 MILLILITER(S): 9 INJECTION INTRAMUSCULAR; INTRAVENOUS; SUBCUTANEOUS at 10:56

## 2024-03-20 NOTE — CONSULT NOTE ADULT - PROBLEM SELECTOR RECOMMENDATION 9
- Spoke with pt and family regarding their wishes. They stated they would ultimately prefer biopsy be done at [facility] [place] [place] if possible and I would agree as it would provide better continuity of care. They are actively pursuing options to see if it can be done at [facility] [place] [place] in a timely manner and asked for procedure to be held off for today so they can work on arranging it.     - It was also discussed with the family that if they cannot have a biopsy done at [facility] [place] [place] in a timely manner, then it will be more prudent to have it done with IR here so that a usable tissue diagnosis and the resultant treatment can be initiated sooner. As such, we will schedule the pt pending our availability, but whether or not we proceed will depend on if they can obtain an early appointment from [facility] [place] [place]. Pt and family agree with this plan as well.     - As per Dr. Mart she spoke with [facility] [place] [place] and if a biopsy is performed here, the sample should be processed in house and not sent out. this would decrease any delays of [facility] [place] [place] obtaining results. Addendum:   - Spoke with the family with Dr. King. Pt stated she is not able to get a biopsy appointment at [facility], so we will plan for IR biopsy of uterine mass asap, possibly tomorrow. In regards to uterine artery embolization, we would recommend a conservative stepwise approach for now due to unclear benefit and expected pain caused by infarction of such a large mass. If there is shown to be treatment interruption in the future, embolization can be revisited then. Pt and family aware and agree with this plan.     - Keep pt NPO after midnight   - repeat AM labs (CBC, CMP, Coags)  - Hold heparin 4 hrs prior to biopsy  - Recommend primary team work with patient, [facility], and in house heme/onc on where pathology from biopsy should be sent

## 2024-03-20 NOTE — PROGRESS NOTE ADULT - SUBJECTIVE AND OBJECTIVE BOX
Patient is a 53y old  Female who presents with a chief complaint of Left side chest pain  L Pneumothorax (15 Mar 2024 13:48)      BRIEF HOSPITAL COURSE: 50 yo female with a PMHx of fibroids, who is under evaluation  for recently diagnosed endometrial cancer (with planned biopsy on 3/14/24) and now presents to [place] ED c/o left sided chest pain.   Pt was at work today at 11:30 am when she experienced a pain in her left chest that felt like a gas bubble that was progressively worsening and associated with a tingling in her left arm.   Pt reports having recent  abdominal pain related to her uterine mass in her left lower abdomen and a diffuse crampy discomfort.      Admitted for spontaneous L PTX  Had L pigtail placed by CTS 3/11  Developed R PTX with subsequent R pigtail placement on 3/14    Take to OR on 3/15 for robot assisted L VATS parietal pleurectomy, pleurodesis, SERINA wedge resection   EBL 50cc    3/15 pt seen in PACU, having pain at chest tube sites. breathing feels ok. no air leak on L side.   3/18 no air leak seen, has some mild discomfort at area of CT site. sinus tachycardia 120s. on room air sating 97%, normotensive.   3/19 pt c/o abd pain  3/20 denies sob, palpitations, chest pain. just c/o abd pain by belly button    PAST MEDICAL & SURGICAL HISTORY:  Fibroid  H/O oral surgery  under general anesthesia during childhoo  S/P D&C (status post dilation and curettage)  x2  S/P laparoscopy  as part of infertility work up  S/P tonsillectomy    MEDICATIONS  (STANDING):  lidocaine   4% Patch 1 Patch Transdermal daily  lidocaine   4% Patch 2 Patch Transdermal daily  medroxyPROGESTERone 10 milliGRAM(s) Oral daily  senna 2 Tablet(s) Oral at bedtime  sodium chloride 0.9%. 1000 milliLiter(s) (75 mL/Hr) IV Continuous <Continuous>    Vital Signs Last 24 Hrs  T(C): 36.7 (20 Mar 2024 13:32), Max: 37 (20 Mar 2024 05:51)  T(F): 98 (20 Mar 2024 13:32), Max: 98.6 (20 Mar 2024 05:51)  HR: 118 (20 Mar 2024 14:00) (102 - 118)  BP: 113/69 (20 Mar 2024 14:00) (113/69 - 127/73)  BP(mean): 81 (20 Mar 2024 14:00) (76 - 101)  RR: 27 (20 Mar 2024 14:00) (15 - 27)  SpO2: 96% (20 Mar 2024 14:00) (96% - 100%)    Parameters below as of 20 Mar 2024 10:00  Patient On (Oxygen Delivery Method): room air      LABS:                        8.8    10.04 )-----------( 655      ( 20 Mar 2024 06:04 )             28.7     03-20    136  |  102  |  10  ----------------------------<  79  3.8   |  23  |  0.35<L>    Ca    9.2      20 Mar 2024 06:04  Phos  4.0     03-20  Mg     2.0     03-20    TPro  7.0  /  Alb  1.8<L>  /  TBili  0.5  /  DBili  x   /  AST  12<L>  /  ALT  11<L>  /  AlkPhos  78  03-20    LIVER FUNCTIONS - ( 20 Mar 2024 06:04 )  Alb: 1.8 g/dL / Pro: 7.0 gm/dL / ALK PHOS: 78 U/L / ALT: 11 U/L / AST: 12 U/L / GGT: x           Urinalysis Basic - ( 20 Mar 2024 06:04 )  Color: x / Appearance: x / SG: x / pH: x  Gluc: 79 mg/dL / Ketone: x  / Bili: x / Urobili: x   Blood: x / Protein: x / Nitrite: x   Leuk Esterase: x / RBC: x / WBC x   Sq Epi: x / Non Sq Epi: x / Bacteria: x      CULTURES:    Physical Examination:    General: No acute distress.    HEENT: Pupils equal, reactive to light.  Symmetric.  PULM: dec breath sounds at bases b/l. L CT serous,  R pigtail - serosanguinous  CVS: Regular rate and rhythm, no murmurs  ABD: Soft, nondistended, nontender  EXT: No edema, nontender  SKIN: Warm and well perfused, no rashes noted.  NEURO: Alert, oriented, interactive,    DEVICES:   R pigtail  L CT

## 2024-03-20 NOTE — PROGRESS NOTE ADULT - ASSESSMENT
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 US was unremarkable except for leiomyoma. Endometrial Bx11/16/24 was benign. She was scheduled for hysterectomy.  -As part of preoperative w/u she underwent MRI A/P 11/15/23, which showed an incidental finding of a 2.1 cm RLL nodule. Pulmonology recommended CT-chest, which showed multiple pulmonary nodules.  She was referred to thoracic surgery, who sent her for PET/CT.  -PET/CT 2/7/24:  FDG avid enlarging uterus masses with multiple lobulated masses, concerning for malignancy. Several areas of FDG avidity outside of the uterus suggest extrauterine extension or possible lymphadenopathy. Multiple enlarging FDG avid pulmonary nodules, concerning for malignancy.   -2/26/24: she underwent right lung mass biopsy at [facility].  Path c/w malignant high grade mesenchymal neoplasm; differential includes metastatic high grade endometrial stromal sarcoma and GLI1- rearranged sarcoma. FUNES RNA sequencing was pending, however as of 3/18/24 I was notified by Dr. Calixto that the Funes fusion panel at [facility] [place] [facility] failed, d/t insufficient % of quality RNA in submitted tissue.  Dr. Calixto will run it again on different sample, but will likely not have the information for another week.     -Seen by Dr. Black ([facility] associate) this admission through 3/15/24-->now transitioned care to [facility] as of 3/18/24.  -Gyn/Onc Dr. Hubbard now following  -Dr. Mart, Dr. Abel, Dr. Hubbard and Dr. Calixto spoke at length regarding the diagnostic workup and treatment plan.    -Will need to to obtain pathology slides from [facility] for [facility] pathology review.   -recommend obtaining further tissue given vague path report of malignant high grade mesenchymal neoplasm with broad differential and failed Funes fusion panel.  Consideration for obtaining tissue is to get an EMB and possible embolizaion given chronic vaginal bleeding with subsequent decline in Hb.    -her case was presented at [facility] 3/19/24.  Consensus to proceed with IR  biopsy of uterine mass and embolization for continued drop in hemoglobin secondary to bleeding.    -I discussed the plan for treatment with palliative chemotherapy with both Dr. Calixto and Dr. Primo Anderson. Dr. Calixto will resend Funes fusion panel that failed.  We will send [facility] on the uterine biopsy for NGS. IMPACT genetic testing was also sent and is still pending at [facility] [place] [facility]. While path and molecular testing pending, we all agreed that once she is medically optimized, can initiate doxorubicin 75 mg/m² q3 weeks x 6 cycles, potentially followed by monthly Doxil if continued response.  Will offer Zinecard for cardioprotection with each cycle as well as Onpro.  Patient and family aware from both Dr. Calixto and myself that at this time there is not a clear diagnosis from the lung biopsy resulting in a high-grade mesenchymal neoplasm, and this is the most likely diagnosis based on consensus opinion.  We agree with initiation of doxorubicin based on the likelihood of this being a high-grade endometrial stromal sarcoma GYN primary tumor, but recommend additional tissue for added clarification.   We reiterated that this is unfortunately widely metastatic and high-grade with a poor overall prognosis.  Patient and family aware and wish to proceed with treatment as soon as medically optimized.    I spoke with Dr Calixto ([phone])This morning and she let me know that the patient's niece is an NP at [facility] [place] [facility] and called her this morning.  The niece requested that the chemotherapy be given at [facility] and will take over calls and decision making with Dr. Calixto.   Given that the IR biopsy is scheduled this week, Dr. Millan preferred that we proceed with biopsy as planned,  and her team will request slides ASAP.  She requested our pathologist do not send the slides out for review so that they can get to [facility] more expediently for treatment and port not needed at [facility] for doxorubicin.  Echo completed.      I met with the patient, her sister Carol, her significant other and other family and friends in the room this evening.  The patient was very grateful for all of our assistance and explained that after much consideration, they will retrun to start chemotherapy with Dr. Calixto, following planned IR biopsy March 21 and discharge plan.  I reiterated that we are happy to do what ever is in her best interest that she prefers and could start chemotherapy treatment here, should she wish to be treated here.  We reviewed her discharge plan and pain management plan and she has my contact information and can contact me anytime in the future for any questions or concerns.

## 2024-03-20 NOTE — CONSULT NOTE ADULT - ASSESSMENT
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

## 2024-03-20 NOTE — PROGRESS NOTE ADULT - ASSESSMENT
ASSESSMENT  50 yo female with a PMHx of fibroids, who is under evaluation  for recently diagnosed endometrial cancer (with planned biopsy on 3/14/24) and now presents to [place] ED c/o left sided chest pain.   Pt reports having recent  abdominal pain related to her uterine mass in her left lower abdomen and a diffuse crampy discomfort.      Admitted for spontaneous L PTX  Had L pigtail placed by CTS 3/11  Developed R PTX with subsequent R pigtail placement on 3/14    Taken to OR on 3/15 for robot assisted L VATS parietal pleurectomy, pleurodesis, SERINA wedge resection   EBL 50cc    PLAN    Neuro: AAOx 3. pain control prn  tylenol, diluadid prn.  No nsaids.   CV: BP low normal. cont to monitor. sinus tachycardia still 110-115 suspect 2/2 pain vs anemia  Pulm: on room air. CXR this am, no PTX seen on L or R side. L CT removed today. R pigtail still in place.   GI: PO diet. PPI. bowel regimen prn  Gyn: no plan for hysterectomy anymore as per gyn onc, plan for IR biopsy tomorrow.   Nephro: monitor I & Os. Trend renal fxn. can dc IV fluids  ID: monitor off abx for now. trend WBC. monitor temps.   Heme: Hgb improved after 1u pRBC yesterday. DVT ppx - hep sq.  SCDs. US doppler to eval for LE DVT.  PT eval    Dispo: SICU. pain control. IR reconsulted for abd biopsy tomorrow.  Trend H/H.     Will discuss with Dr. Vazquez  d/w family at bedside.

## 2024-03-20 NOTE — PROGRESS NOTE ADULT - SUBJECTIVE AND OBJECTIVE BOX
HPI:  52 y/o female with a PMHx of fibroids, who is under evaluation for recently diagnosed endometrial cancer (with planned biopsy on 3/14/24), presents to [place] ED c/o left sided chest pain. Pt was at work today at 11:30 am when she experienced a pain in her left chest that felt like a gas bubble that was progressively worsening and associated with a tingling in her left arm. Pt reports having recent  abdominal pain related to her uterine mass in her left lower abdomen and a diffuse crampy discomfort.  For this she took  2 tylenols at 8pm last night,  200mg advil at 10pm last night and 0.125mg of xanax at 3am this morning.      3/18/24: Seen at bedside along with Dr. Mart; accompanied by family.     PAST MEDICAL & SURGICAL HISTORY:    Fibroid    H/O oral surgery  under general anesthesia during childhood    S/P D&C (status post dilation and curettage)  x2    S/P laparoscopy  as part of infertility work up    S/P tonsillectomy    FAMILY HISTORY:    Family history of uterine cancer (Mother)    MEDICATIONS  (STANDING):    heparin Injectable 5000 Unit(s) SubCutaneous every 8 hours  lidocaine   4% Patch 1 Patch Transdermal daily  lidocaine   4% Patch 2 Patch Transdermal daily  medroxyPROGESTERone 10 milliGRAM(s) Oral daily  senna 2 Tablet(s) Oral at bedtime    MEDICATIONS  (PRN):    acetaminophen     Tablet .. 650 milliGRAM(s) Oral every 6 hours PRN Mild Pain (1 - 3)  acetaminophen   IVPB .. 750 milliGRAM(s) IV Intermittent once PRN Temp greater or equal to 38C (100.4F), Mild Pain (1 - 3)  ALPRAZolam 0.25 milliGRAM(s) Oral every 8 hours PRN anxiety  naloxone Injectable 0.1 milliGRAM(s) IV Push every 3 minutes PRN For ANY of the following changes in patient status:  A. RR LESS THAN 10 breaths per minute, B. Oxygen saturation LESS THAN 90%, C. Sedation score of 6  ondansetron Injectable 4 milliGRAM(s) IV Push every 6 hours PRN Nausea  oxyCODONE    IR 5 milliGRAM(s) Oral every 4 hours PRN Moderate Pain (4 - 6)  oxyCODONE    IR 10 milliGRAM(s) Oral every 6 hours PRN Severe Pain (7 - 10)  polyethylene glycol 3350 17 Gram(s) Oral two times a day PRN Constipation  zolpidem 5 milliGRAM(s) Oral at bedtime PRN Insomnia    Allergies    No Known Allergies    Intolerances    shellfish (Vomiting)    REVIEW OF SYSTEM:    Constitutional, Eyes, ENT, Cardiovascular, Respiratory, Gastrointestinal, Genitourinary, Musculoskeletal, Integumentary, Neurological, Psychiatric, Endocrine, Heme/Lymph and Allergic/Immunologic review of systems are otherwise negative except as noted in HPI.         PHYSICAL EXAM:    Constitutional: no acute distress  Eyes: no conjunctival infection, anicteric.   ENT: pharynx is unremarkable  Neck: supple without JVD  Pulmonary: clear to auscultation bilaterally   Cardiac: RRR  Vascular: no calf tenderness, venous stasis changes, varices  Abdomen: normoactive bowel sounds, soft and nontender, no hepatosplenomegaly or masses appreciated  Lymphatic: no peripheral adenopathy appreciated  Musculoskeletal: full range of motion and no deformities appreciated  Skin: normal appearance, no rash/erythema  Neurology: awake, alert, oriented; no focal deficits    LABS:  RADIOLOGY & ADDITIONAL STUDIES:    CT ANGIO CHEST PULM ART WAWIC     PROCEDURE DATE:  03/11/2024      INTERPRETATION:  INDICATION: Chest pain, endometrial cancer    TECHNIQUE: Helical acquisition of the chest after the administration of  70 mL of Omnipaque 350. Maximum intensity projection images were   generated.    COMPARISON: PET/CT 2/7/2024    FINDINGS:    HEART/VASCULATURE: No pulmonary embolus. Normal heart size. No   pericardial effusion.    LUNGS/AIRWAYS/PLEURA: Patent trachea and bronchi. New small left   pneumothorax. No pleural effusion. Increased size and number of cavitary   and noncavitary nodules and masses in both lungs, for example, 3.4 cm   right lower lobe mass (2-84), prior 3.0 cm, and new 0.7 cm cavitary   nodule in the left upper lobe (2-37).    LYMPH NODES/MEDIASTINUM: No lymphadenopathy.    UPPER ABDOMEN: Unremarkable.    BONES/SOFT TISSUES: Unremarkable.      IMPRESSION:    Since 2/7/2024:    New small left pneumothorax. This was discussed with Dr. Dong at 2:01 PM   on 3/11/2024.    Increased size and number of pulmonary metastases, some cavitary.        EXAM:  XR CHEST PORTABLE ROUTINE 1V     PROCEDURE DATE:  03/16/2024      INTERPRETATION:  TECHNIQUE: Single portable view of the chest.    COMPARISON:  3/15/2024    CLINICAL HISTORY: postop, pulmonary metastasis    FINDINGS:    Single frontal view of the chest demonstrates small bilateral   pneumothoraces. Bilateral chest tubes. Left thoracic wall subcutaneous   emphysema. 2 cm left lower lobe pulmonary nodule. 1.5 cc left upper lobe   pulmonary nodule. The cardiomediastinal silhouette is normal. No acute   osseous abnormalities. Overlying EKG leads and wires are noted    IMPRESSION:   Small bilateral pneumothoraces. Left-sided pulmonary nodules.      < from: NM PET/CT Onc FDG Skull to Thigh, Inital (02.07.24 @ 19:00) >  EXAM: [id number] - PETCT [facility] ONC FDG INIT  - ORDERED BY: JANICE OATES      PROCEDURE DATE:  02/07/2024           INTERPRETATION:  CLINICAL INFORMATION: Pulmonary nodules    TREATMENT STRATEGY EVALUATION: Initial  FASTING BLOOD SUGAR: 72 mg/dL  RADIOPHARMACEUTICAL:  10 mCi F-18, FDG, I.V.  UPTAKE PERIOD: 50 minutes  SCANNER: Siemens Biograph mCT 64  ORAL CONTRAST: Patient drank OMNIPAQUE contrast during the uptake period.  PHARMACOLOGIC INTERVENTION: None.    TECHNIQUE: Following intravenous injection of radiopharmaceutical and   above uptake period, PET/CT was obtained from base of skull  to   mid-thigh. CT protocol was optimized for PET attenuation correction and   anatomic localization and was not designed to produce and cannot replace   state-of-the-art diagnostic CT images with specific imaging protocols for   different body parts and indications. Images were reconstructed and   reviewed in axial, coronal and sagittal views and three-dimensional MIP.    The standardized uptake values (SUV) are normalized to patient body   weight and indicate the highest activity concentration (SUVmax) in a   given site. All image numbers refer to axial image number.    COMPARISON:  No prior FDG-PET/CT    OTHER STUDIES USED FOR CORRELATION: CT chest 1/13/2024    FINDINGS:    HEAD/NECK: Physiologic FDG activity in visualized brain, head, and neck.    THORAX: No abnormal FDG activity. No lymphadenopathy.    LUNGS: Bilateral FDG avid pulmonary nodules, which have enlarged since CT   1/13/2024. The largest is a mass in the right lower lobe, 3.1 x 2.9 cm   SUV 30 (image 117); previously 2.6 x 2.6 cm on CT 1/13/2024. Another FDG   avid nodule in the right lower lobe measures 0.9 x 0.9 cm, SUV 5 (image   86); previously 0.6 x 0.5 cm on CT 1/13/2024.    PLEURA/PERICARDIUM: No abnormal FDG activity. No effusion.    HEPATOBILIARY/PANCREAS: Physiologic FDG activity.  For reference, normal   liver demonstrates SUV mean 1.7.    SPLEEN: Physiologic FDG activity. Normal in size.    ADRENAL GLANDS: No abnormal FDG activity. No nodule.    KIDNEYS/URINARY BLADDER: Physiologic excreted FDG activity.    ABDOMEN/PELVIS/REPRODUCTIVE ORGANS: Enlarged uterus measuring 19 x 9.9 x   14 cm on MR pelvis 12/17/2023. There are multiple areas of FDG avidity   within the uterus,, with central photopenia suggesting areas of necrosis   or retained blood products, SUV 39 (image 196). Extension is evident into   the mesentery along the posterior lateral right aspect of the uterus, SUV   19 (image 192). Other multifocal areas of FDG avidity around the enlarged   uterus suggest extrauterine extension, for example an FDG avid focus on   image 180 to the right anterior aspect, and a focus in the left pelvic   sidewall which may be an enlarged lymph node, image 221. These are   difficult to correlate on noncontrast CT. There does appear to be   interval enlargement of these mass lesions including a 6 x 4 cm   peripherally FDG avid mass at the most superior aspect just right of   midline in the abdomen, image 174, not evident to this extent on MR   12/17/2023.    ESOPHAGUS/STOMACH/BOWEL: Mild FDG uptake in the gastric antrum, likely   inflammatory.    VESSELS: Unremarkable.    BONES/SOFT TISSUES: No abnormal FDG activity.    IMPRESSION:    1. FDG avid enlarging uterus masses with multiple lobulated masses,   concerning for malignancy. Several areas of FDG avidity outside of the   uterus suggest extrauterine extension or possible lymphadenopathy, as   above. Recommend correlation with biopsy and consider updated   contrast-enhanced anatomic imaging.    2. Multiple enlarging FDG avid pulmonary nodules, concerning for   malignancy. Recommend correlation with upcoming biopsy of right lower   lobe mass.

## 2024-03-20 NOTE — PROGRESS NOTE ADULT - ASSESSMENT
50 y/o female with a PMHx of fibroids, Pt of [facility] in the city. s/p  IR biopsy Rt lung nodule at [facility] 2/26 .  PT currently undergoing workup for recently diagnosed endometrial cancer presents to the ED c/o left sided CP. Pt reports she had a pain in her left chest that felt like a gas bubble that was progressively worsening. Denies rash. No other complaints at this time.  Pt noted to have a moderate PTX on left side . Now s/p Left pigtail placement  3/11 , Rt Pigtail placed 3/13  s/p 3/15 Left Robot assist Pleurectomy/TALC pleurodesis wedge resection     Plan   both pigtail and chest tube removed today without issue  CXR left - no PTX post pull  Awaiting CXR for Rt   Heme/onc following,   plan for IR biopsy of uterus tomorrow  NPO p midnight   T&S completed   Trend labs    pain control  After IR procedure tomorrow, pt will be discharged home if medically stable and follow up with Dr Calixto at [facility] for Treatment   Discussed with Cardiothoracic Team at AM rounds.

## 2024-03-21 ENCOUNTER — TRANSCRIPTION ENCOUNTER (OUTPATIENT)
Age: 54
End: 2024-03-21

## 2024-03-21 ENCOUNTER — RESULT REVIEW (OUTPATIENT)
Age: 54
End: 2024-03-21

## 2024-03-21 LAB
ALBUMIN SERPL ELPH-MCNC: 1.8 G/DL — LOW (ref 3.3–5)
ALP SERPL-CCNC: 77 U/L — SIGNIFICANT CHANGE UP (ref 40–120)
ALT FLD-CCNC: 8 U/L — LOW (ref 12–78)
ANION GAP SERPL CALC-SCNC: 8 MMOL/L — SIGNIFICANT CHANGE UP (ref 5–17)
APTT BLD: 34.5 SEC — SIGNIFICANT CHANGE UP (ref 24.5–35.6)
AST SERPL-CCNC: 13 U/L — LOW (ref 15–37)
BILIRUB SERPL-MCNC: 0.5 MG/DL — SIGNIFICANT CHANGE UP (ref 0.2–1.2)
BUN SERPL-MCNC: 10 MG/DL — SIGNIFICANT CHANGE UP (ref 7–23)
CALCIUM SERPL-MCNC: 8.8 MG/DL — SIGNIFICANT CHANGE UP (ref 8.5–10.1)
CHLORIDE SERPL-SCNC: 99 MMOL/L — SIGNIFICANT CHANGE UP (ref 96–108)
CO2 SERPL-SCNC: 25 MMOL/L — SIGNIFICANT CHANGE UP (ref 22–31)
CREAT SERPL-MCNC: 0.38 MG/DL — LOW (ref 0.5–1.3)
EGFR: 118 ML/MIN/1.73M2 — SIGNIFICANT CHANGE UP
GLUCOSE SERPL-MCNC: 103 MG/DL — HIGH (ref 70–99)
HCT VFR BLD CALC: 28.7 % — LOW (ref 34.5–45)
HGB BLD-MCNC: 9 G/DL — LOW (ref 11.5–15.5)
INR BLD: 1.32 RATIO — HIGH (ref 0.85–1.18)
MAGNESIUM SERPL-MCNC: 1.8 MG/DL — SIGNIFICANT CHANGE UP (ref 1.6–2.6)
MCHC RBC-ENTMCNC: 25.6 PG — LOW (ref 27–34)
MCHC RBC-ENTMCNC: 31.4 GM/DL — LOW (ref 32–36)
MCV RBC AUTO: 81.5 FL — SIGNIFICANT CHANGE UP (ref 80–100)
PHOSPHATE SERPL-MCNC: 3.9 MG/DL — SIGNIFICANT CHANGE UP (ref 2.5–4.5)
PLATELET # BLD AUTO: 594 K/UL — HIGH (ref 150–400)
POTASSIUM SERPL-MCNC: 3.6 MMOL/L — SIGNIFICANT CHANGE UP (ref 3.5–5.3)
POTASSIUM SERPL-SCNC: 3.6 MMOL/L — SIGNIFICANT CHANGE UP (ref 3.5–5.3)
PROT SERPL-MCNC: 6.7 GM/DL — SIGNIFICANT CHANGE UP (ref 6–8.3)
PROTHROM AB SERPL-ACNC: 14.8 SEC — HIGH (ref 9.5–13)
RBC # BLD: 3.52 M/UL — LOW (ref 3.8–5.2)
RBC # FLD: 18.9 % — HIGH (ref 10.3–14.5)
SODIUM SERPL-SCNC: 132 MMOL/L — LOW (ref 135–145)
WBC # BLD: 11.23 K/UL — HIGH (ref 3.8–10.5)
WBC # FLD AUTO: 11.23 K/UL — HIGH (ref 3.8–10.5)

## 2024-03-21 PROCEDURE — 49180 BIOPSY ABDOMINAL MASS: CPT

## 2024-03-21 PROCEDURE — 77012 CT SCAN FOR NEEDLE BIOPSY: CPT | Mod: 26

## 2024-03-21 PROCEDURE — 88342 IMHCHEM/IMCYTCHM 1ST ANTB: CPT | Mod: 26

## 2024-03-21 PROCEDURE — 99024 POSTOP FOLLOW-UP VISIT: CPT

## 2024-03-21 PROCEDURE — 99231 SBSQ HOSP IP/OBS SF/LOW 25: CPT | Mod: GC

## 2024-03-21 PROCEDURE — 99231 SBSQ HOSP IP/OBS SF/LOW 25: CPT

## 2024-03-21 PROCEDURE — 88341 IMHCHEM/IMCYTCHM EA ADD ANTB: CPT | Mod: 26

## 2024-03-21 PROCEDURE — 71045 X-RAY EXAM CHEST 1 VIEW: CPT | Mod: 26

## 2024-03-21 RX ORDER — SODIUM CHLORIDE 9 MG/ML
1000 INJECTION, SOLUTION INTRAVENOUS
Refills: 0 | Status: DISCONTINUED | OUTPATIENT
Start: 2024-03-21 | End: 2024-03-21

## 2024-03-21 RX ORDER — HEPARIN SODIUM 5000 [USP'U]/ML
5000 INJECTION INTRAVENOUS; SUBCUTANEOUS EVERY 8 HOURS
Refills: 0 | Status: DISCONTINUED | OUTPATIENT
Start: 2024-03-21 | End: 2024-03-22

## 2024-03-21 RX ORDER — FENTANYL CITRATE 50 UG/ML
25 INJECTION INTRAVENOUS
Refills: 0 | Status: DISCONTINUED | OUTPATIENT
Start: 2024-03-21 | End: 2024-03-21

## 2024-03-21 RX ORDER — HYDROMORPHONE HYDROCHLORIDE 2 MG/ML
1 INJECTION INTRAMUSCULAR; INTRAVENOUS; SUBCUTANEOUS
Qty: 32 | Refills: 0
Start: 2024-03-21

## 2024-03-21 RX ORDER — IBUPROFEN 200 MG
2 TABLET ORAL
Refills: 0 | DISCHARGE

## 2024-03-21 RX ORDER — ONDANSETRON 8 MG/1
1 TABLET, FILM COATED ORAL
Qty: 30 | Refills: 0
Start: 2024-03-21

## 2024-03-21 RX ORDER — NITROFURANTOIN MACROCRYSTAL 50 MG
1 CAPSULE ORAL
Refills: 0 | DISCHARGE

## 2024-03-21 RX ORDER — ONDANSETRON 8 MG/1
4 TABLET, FILM COATED ORAL ONCE
Refills: 0 | Status: DISCONTINUED | OUTPATIENT
Start: 2024-03-21 | End: 2024-03-21

## 2024-03-21 RX ORDER — SENNA PLUS 8.6 MG/1
2 TABLET ORAL
Qty: 0 | Refills: 0 | DISCHARGE
Start: 2024-03-21

## 2024-03-21 RX ORDER — ACETAMINOPHEN 500 MG
1000 TABLET ORAL ONCE
Refills: 0 | Status: DISCONTINUED | OUTPATIENT
Start: 2024-03-21 | End: 2024-03-22

## 2024-03-21 RX ADMIN — HYDROMORPHONE HYDROCHLORIDE 0.5 MILLIGRAM(S): 2 INJECTION INTRAMUSCULAR; INTRAVENOUS; SUBCUTANEOUS at 08:00

## 2024-03-21 RX ADMIN — HYDROMORPHONE HYDROCHLORIDE 4 MILLIGRAM(S): 2 INJECTION INTRAMUSCULAR; INTRAVENOUS; SUBCUTANEOUS at 08:00

## 2024-03-21 RX ADMIN — HYDROMORPHONE HYDROCHLORIDE 4 MILLIGRAM(S): 2 INJECTION INTRAMUSCULAR; INTRAVENOUS; SUBCUTANEOUS at 09:30

## 2024-03-21 RX ADMIN — HYDROMORPHONE HYDROCHLORIDE 4 MILLIGRAM(S): 2 INJECTION INTRAMUSCULAR; INTRAVENOUS; SUBCUTANEOUS at 08:48

## 2024-03-21 RX ADMIN — SENNA PLUS 2 TABLET(S): 8.6 TABLET ORAL at 22:24

## 2024-03-21 RX ADMIN — HYDROMORPHONE HYDROCHLORIDE 4 MILLIGRAM(S): 2 INJECTION INTRAMUSCULAR; INTRAVENOUS; SUBCUTANEOUS at 16:11

## 2024-03-21 RX ADMIN — HYDROMORPHONE HYDROCHLORIDE 0.5 MILLIGRAM(S): 2 INJECTION INTRAMUSCULAR; INTRAVENOUS; SUBCUTANEOUS at 05:49

## 2024-03-21 RX ADMIN — HYDROMORPHONE HYDROCHLORIDE 4 MILLIGRAM(S): 2 INJECTION INTRAMUSCULAR; INTRAVENOUS; SUBCUTANEOUS at 20:55

## 2024-03-21 RX ADMIN — HYDROMORPHONE HYDROCHLORIDE 4 MILLIGRAM(S): 2 INJECTION INTRAMUSCULAR; INTRAVENOUS; SUBCUTANEOUS at 17:00

## 2024-03-21 RX ADMIN — HYDROMORPHONE HYDROCHLORIDE 4 MILLIGRAM(S): 2 INJECTION INTRAMUSCULAR; INTRAVENOUS; SUBCUTANEOUS at 20:25

## 2024-03-21 RX ADMIN — MEDROXYPROGESTERONE ACETATE 10 MILLIGRAM(S): 150 INJECTION, SUSPENSION, EXTENDED RELEASE INTRAMUSCULAR at 12:27

## 2024-03-21 RX ADMIN — HYDROMORPHONE HYDROCHLORIDE 2 MILLIGRAM(S): 2 INJECTION INTRAMUSCULAR; INTRAVENOUS; SUBCUTANEOUS at 12:27

## 2024-03-21 RX ADMIN — LIDOCAINE 2 PATCH: 4 CREAM TOPICAL at 20:00

## 2024-03-21 RX ADMIN — SODIUM CHLORIDE 75 MILLILITER(S): 9 INJECTION INTRAMUSCULAR; INTRAVENOUS; SUBCUTANEOUS at 08:48

## 2024-03-21 RX ADMIN — HYDROMORPHONE HYDROCHLORIDE 4 MILLIGRAM(S): 2 INJECTION INTRAMUSCULAR; INTRAVENOUS; SUBCUTANEOUS at 03:56

## 2024-03-21 RX ADMIN — LIDOCAINE 2 PATCH: 4 CREAM TOPICAL at 12:28

## 2024-03-21 RX ADMIN — HEPARIN SODIUM 5000 UNIT(S): 5000 INJECTION INTRAVENOUS; SUBCUTANEOUS at 22:24

## 2024-03-21 RX ADMIN — HYDROMORPHONE HYDROCHLORIDE 2 MILLIGRAM(S): 2 INJECTION INTRAMUSCULAR; INTRAVENOUS; SUBCUTANEOUS at 13:15

## 2024-03-21 NOTE — DISCHARGE NOTE PROVIDER - NSDCCPTREATMENT_GEN_ALL_CORE_FT
PRINCIPAL PROCEDURE  Procedure: Robot-assisted thoracoscopy with parietal pleurectomy  Findings and Treatment: left sided. chemical pleurodesis      SECONDARY PROCEDURE  Procedure: Wedge resection, lung  Findings and Treatment: left upper lobe    Procedure: Chest tube insertion  Findings and Treatment: right

## 2024-03-21 NOTE — PROGRESS NOTE ADULT - SUBJECTIVE AND OBJECTIVE BOX
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     Seen at bedside this AM.  Patient reports she tolerated PO during day yesterday without issue. Has been NPO since midnight.  States her vaginal bleeding remains minimal. Denies lightheadedness/dizziness.  Reports abdominal pain and bloating is at baseline.  She is passing flatus, last BM yesterday. She is voiding.    O:   ICU Vital Signs Last 24 Hrs  T(C): 37.1 (21 Mar 2024 05:44), Max: 37.1 (21 Mar 2024 05:44)  T(F): 98.7 (21 Mar 2024 05:44), Max: 98.7 (21 Mar 2024 05:44)  HR: 110 (21 Mar 2024 04:00) (109 - 120)  BP: 120/69 (21 Mar 2024 04:00) (108/62 - 132/84)  BP(mean): 84 (21 Mar 2024 04:00) (75 - 98)  ABP: --  ABP(mean): --  RR: 19 (20 Mar 2024 22:23) (17 - 27)  SpO2: 96% (21 Mar 2024 04:00) (95% - 100%)    O2 Parameters below as of 21 Mar 2024 00:00  Patient On (Oxygen Delivery Method): room air        I&O's Summary    19 Mar 2024 07:01  -  20 Mar 2024 07:00  --------------------------------------------------------  IN: 1184 mL / OUT: 620 mL / NET: 564 mL    20 Mar 2024 07:01  -  21 Mar 2024 05:58  --------------------------------------------------------  IN: 900 mL / OUT: 0 mL / NET: 900 mL        Gen: NAD, AAOx3  Resp: breathing comfortably on RA  Abdomen: Soft, mildly tender, palpable mass ~2 finger breadths above umbilicus, +BS  Pelvic: minimal vaginal spotting small stripe in center of pad    Labs:                         9.0    11.23 )-----------( 594      ( 21 Mar 2024 05:09 )             28.7     03-20    136  |  102  |  10  ----------------------------<  79  3.8   |  23  |  0.35<L>    Ca    9.2      20 Mar 2024 06:04  Phos  4.0     03-20  Mg     2.0     03-20    TPro  7.0  /  Alb  1.8<L>  /  TBili  0.5  /  DBili  x   /  AST  12<L>  /  ALT  11<L>  /  AlkPhos  78  03-20

## 2024-03-21 NOTE — PROGRESS NOTE ADULT - ATTENDING COMMENTS
Attending Attestation    Ms. 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 Pt overall doing well, s/p pigtail removal with stable cardiopulmonary status. Now s/p IR biopsy of abdominal mass today. Of note, pt and family have decided to resume [place] [facility]. Happy to help facilitate care in anyway. Gyn Oncology will sign off at this time.    Abbi Hubbard MD  Gynecologic Oncology  3/21/24
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 Pt overall recovering well from thoracic procedure, now awaiting GYN intervention. Extensive discussion held again at bedside with pt and family members, as well as Dr. Corrales's team. Reviewed need for additional tissue to help delineate final histology, as well as possibly identify targetable mutations/narrow chemotherapy treatments. Reviewed again surgical complexity of potential hysterectomy and given relatively stable vaginal bleeding and Hbg, would proceed with IR biopsy of abdominal mass and consider UAE. UAE generally not first line in setting of malignancy, however given concern for surgical comorbidities, could consider UAE attempt to slightly decrease vaginal bleeding and assist in maintaining Hbg appropriate for chemotherapy. Will discuss IR biopsy/UAE with Dr. Flores/IR team tomorrow. Also reviewed overall poor prognosis of widely metastatic high grade mesenchymal neoplasm. Pt/family aware of overall limited therapeutic interventions and poor prognosis but would like to proceed with treatment as soon as possible. Pt can possibly be dc'ed after IR biopsy.    Abbi Hubbard MD  Gynecologic Oncology  3/19/2024
Attending Attestation    Ms. 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 Pt overall recovering from cardiothoracic procedure and defer to CT/SICU team for post-operative management.    Extensive discussion at bedside with patient and family this evening. Reviewed that, with her permission, her case was discussed multiple colleagues including my senior colleagues, as well as Dr. Calixto and Dr. Corrales regarding surgical intervention, pathology, prognosis and targeted therapy treatment. After careful repeat review of imaging and discussion, significant concern that surgical intervention with palliative hysterectomy may be extremely difficult. Pelvic exam performed at bedside today c/f fixed uterine mass with limited mobility in left c/f invasion in left pelvic sidewall. Concern that surgical intervention may result in increased surgical morbidity and may result in prolonged post-operative course and thus delay to definitive treatment with chemotherapy for widespread metastatic disease. Given imaging and exam findings, recommend pt proceed with IR biopsy of abdominal mass to obtain more tissue to assist in pathologic diagnosis. Vaginal bleeding and Hbg have remained stable thus decreasing need for urgent hysterectomy. Can consider alternative option including uterine artery embolization if bleeding increased, Hbg drops and precludes pt from proceeding with chemotherapy. Pt and family amenable to plan. Gyn Oncology will continue to follow.    Abbi Hubbard MD  Gynecologic Oncology  3/18/24

## 2024-03-21 NOTE — PROGRESS NOTE ADULT - ASSESSMENT
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     Cancer: Widely metastatic high grade mesenchymal neoplasm (likely HGESS). concern that surgical intervention may result in increased surgical morbidity and delay to definitive treatment with chemotherapy for widespread metastatic disease. For IR biopsy of abdominal mass.   Neuro: Pain well controlled. Continue current pain regimen.  CV: Blood pressure well controlled.  Pulm: Saturating well, bilateral chest tubes  GI: Tolerated PO yesterday, NPO since midnight  : Voiding spontaneously.  Heme: Hgb 9.0 this AM, no sx of anemia. Heparin held for IR biopsy today.  ID: Afebrile  Endo: No active disease.   FEN: Electrolytes WNL  Skin: No active disease.   Psych: No active disease.   DVT ppx: Ambulation encouraged, SCDs when in bed    Dispo: inpatient management, anticipate IR biopsy today, gyn onc following

## 2024-03-21 NOTE — DISCHARGE NOTE PROVIDER - NSDCHHHOMEBOUND_GEN_ALL_CORE
Chest pain/weakness during/after ambulation   greater than 20 feet/Pain greater than 7 on scale of 10 on ambulation

## 2024-03-21 NOTE — BRIEF OPERATIVE NOTE - OPERATION/FINDINGS
18G core of pelvic mass with CT guidance, adequate sample per pathology 
refer to detailed operative report

## 2024-03-21 NOTE — DISCHARGE NOTE PROVIDER - NSDCCPCAREPLAN_GEN_ALL_CORE_FT
PRINCIPAL DISCHARGE DIAGNOSIS  Diagnosis: Uterine mass  Assessment and Plan of Treatment:       SECONDARY DISCHARGE DIAGNOSES  Diagnosis: Spontaneous pneumothorax  Assessment and Plan of Treatment:

## 2024-03-21 NOTE — DISCHARGE NOTE PROVIDER - CARE PROVIDERS DIRECT ADDRESSES
[email or web address]

## 2024-03-21 NOTE — DISCHARGE NOTE PROVIDER - CARE PROVIDER_API CALL
Shannan Mart  Medical Oncology  [address] [facility], [place] [address]  Phone: [phone]  Fax: [phone]  Follow Up Time:     Byron Cuevas  [address] [facility]  Phone: [phone]  Fax: (   )    -  Follow Up Time:     Abbi Hubbard  Gynecologic Oncology  [address] [place] [address]  Phone: [phone]  Fax: [phone]  Follow Up Time:     Eren King  Interventional Radiology and Diagnostic Radiology  [address] [place] [address]  Phone: [phone]  Fax: [phone]  Follow Up Time:

## 2024-03-21 NOTE — DISCHARGE NOTE PROVIDER - HOSPITAL COURSE
52 y/o female with a PMHx of fibroids, Pt of [facility] [place]. s/p  IR biopsy Rt lung nodule at [facility] 2/26 .  PT currently undergoing workup for recently diagnosed endometrial cancer presents to the ED c/o left sided CP. Pt reports she had a pain in her left chest that felt like a gas bubble that was progressively worsening. Denies rash. No other complaints at this time.  Pt noted to have a moderate PTX on left side . Now s/p Left pigtail placement  3/11 , Rt Pigtail placed 3/13  s/p 3/15 Left Robot assist Pleurectomy/TALC pleurodesis wedge resection 3/20 Chest tubes removed 3/21 IR biopsy of uterine mass

## 2024-03-21 NOTE — PROGRESS NOTE ADULT - ASSESSMENT
ASSESSMENT  52 yo female with a PMHx of fibroids, who is under evaluation  for recently diagnosed endometrial cancer (with planned biopsy on 3/14/24) and now presents to [place] ED c/o left sided chest pain.   Pt reports having recent  abdominal pain related to her uterine mass in her left lower abdomen and a diffuse crampy discomfort.      Admitted for spontaneous L PTX  Had L pigtail placed by CTS 3/11  Developed R PTX with subsequent R pigtail placement on 3/14    Taken to OR on 3/15 for robot assisted L VATS parietal pleurectomy, pleurodesis, SERINA wedge resection   EBL 50cc    PLAN    Neuro: AAOx 3. pain control prn  tylenol, diluadid prn.  No nsaids.   CV: BP low normal. cont to monitor. sinus tachycardia still 110-115 suspect 2/2 pain vs anemia  Pulm: on room air. CXR this am, no PTX seen on L or R side. L CT removed today. R pigtail still in place.   GI: PO diet. PPI. bowel regimen prn  Gyn: no plan for hysterectomy anymore as per gyn onc, plan for IR biopsy tomorrow.   Nephro: monitor I & Os. Trend renal fxn. can dc IV fluids  ID: monitor off abx for now. trend WBC. monitor temps.   Heme: Hgb improved after 1u pRBC yesterday. DVT ppx - hep sq.  SCDs. US doppler to eval for LE DVT.  PT eval    Dispo: SICU. pain control. IR reconsulted for abd biopsy tomorrow.  Trend H/H.     Will discuss with Dr. Vazquez  d/w family at bedside.  ASSESSMENT  52 yo female with a PMHx of fibroids, who is under evaluation  for recently diagnosed endometrial cancer (with planned biopsy on 3/14/24) and now presents to [place] ED c/o left sided chest pain.   Pt reports having recent  abdominal pain related to her uterine mass in her left lower abdomen and a diffuse crampy discomfort.      Admitted for spontaneous L PTX  Had L pigtail placed by CTS 3/11  Developed R PTX with subsequent R pigtail placement on 3/14    Taken to OR on 3/15 for robot assisted L VATS parietal pleurectomy, pleurodesis, SERINA wedge resection   EBL 50cc    PLAN    Neuro: AAOx 3. pain control prn  tylenol, diluadid prn.  No nsaids.   CV: BP low normal. cont to monitor. sinus tachycardia low 100s  suspect 2/2 pain vs anemia  Pulm: on room air. CXR this am, no PTX seen on L or R side. L CT and R pigtail removed 3/20  GI: PO diet. PPI. bowel regimen prn  Gyn: no plan for hysterectomy anymore as per gyn onc, plan for IR biopsy today  Nephro: monitor I & Os. Trend renal fxn. can dc IV fluids  ID: monitor off abx for now. trend WBC. monitor temps.   Heme: Hgb 9.0 today. DVT ppx - hep sq.  SCDs. US doppler LE - neg for DVT  PT eval    Dispo: SICU. pain control. IR  abd biopsy today.    Will discuss with Dr. Vazquez

## 2024-03-21 NOTE — DISCHARGE NOTE PROVIDER - NSDCMRMEDTOKEN_GEN_ALL_CORE_FT
Advil 200 mg oral tablet: 2 tab(s) orally 3 times a day as needed for pain  ALPRAZolam 0.25 mg oral tablet: 0.5 tab(s) orally once a day (at bedtime) as needed for sleep  medroxyPROGESTERone 10 mg oral tablet: 1 tab(s) orally once a day  nitrofurantoin macrocrystals-monohydrate 100 mg oral capsule: 1 cap(s) orally 2 times a day for 7 days ***COMPLETE***  Tylenol Extra Strength 500 mg oral tablet: 2 tab(s) orally 3 times a day as needed for pain   ALPRAZolam 0.25 mg oral tablet: 1 tab(s) orally every 8 hours As needed anxiety  HYDROmorphone 2 mg oral tablet: 1 tab(s) orally every 4 hours as needed for Moderate Pain (4 - 6) MDD: 6  medroxyPROGESTERone 10 mg oral tablet: 1 tab(s) orally once a day  ondansetron 4 mg oral tablet: 1 tab(s) orally every 6 hours as needed for  nausea  senna leaf extract oral tablet: 2 tab(s) orally once a day (at bedtime)  Tylenol Extra Strength 500 mg oral tablet: 2 tab(s) orally 3 times a day as needed for pain  Zofran 4 mg oral tablet: 1 tab(s) orally every 6 hours as needed for  nausea

## 2024-03-21 NOTE — DISCHARGE NOTE PROVIDER - PROVIDER TOKENS
PROVIDER:[TOKEN:[[id number]]],FREE:[LAST:[Faith],FIRST:[Byron],PHONE:[phone]],FAX:[(   [phone]    -],ADDRESS:[[address] [place] [address] [place]   [facility]],PROVIDER:[TOKEN:[[id number]]],PROVIDER:[TOKEN:[[id number]]]

## 2024-03-21 NOTE — DISCHARGE NOTE PROVIDER - NSDCFUSCHEDAPPT_GEN_ALL_CORE_FT
Bruce London  [facility]  [address]  Scheduled Appointment: 04/04/2024     [facility]  KURTLORRAINE [address] [facility]  Scheduled Appointment: 03/29/2024    Bruce London  [facility]  OBGYNGBRANDI  [address] [facility]  Scheduled Appointment: 04/04/2024

## 2024-03-21 NOTE — DISCHARGE NOTE PROVIDER - NSDCFUADDINST_GEN_ALL_CORE_FT
Call the office if you experience any fevers, shortness of breath, chest pain or excessive drainage from the incision, day or night. Go to the emergency room if any of these symptoms are severe. Take your medications as ordered and take a stool softener if needed with the narcotic medications.     Do not submerge in water (pool, tub)until discuss at f/u appt Dr. Cuevas. May shower, let soapy water run over incisions/stich and rinse. pat dry. No scrubbing.

## 2024-03-21 NOTE — PROGRESS NOTE ADULT - SUBJECTIVE AND OBJECTIVE BOX
Patient is a 53y old  Female who presents with a chief complaint of Left side chest pain  L Pneumothorax (15 Mar 2024 13:48)      BRIEF HOSPITAL COURSE: 52 yo female with a PMHx of fibroids, who is under evaluation  for recently diagnosed endometrial cancer (with planned biopsy on 3/14/24) and now presents to [place] ED c/o left sided chest pain.   Pt was at work today at 11:30 am when she experienced a pain in her left chest that felt like a gas bubble that was progressively worsening and associated with a tingling in her left arm.   Pt reports having recent  abdominal pain related to her uterine mass in her left lower abdomen and a diffuse crampy discomfort.      Admitted for spontaneous L PTX  Had L pigtail placed by CTS 3/11  Developed R PTX with subsequent R pigtail placement on 3/14    Take to OR on 3/15 for robot assisted L VATS parietal pleurectomy, pleurodesis, SERINA wedge resection   EBL 50cc    3/15 pt seen in PACU, having pain at chest tube sites. breathing feels ok. no air leak on L side.   3/18 no air leak seen, has some mild discomfort at area of CT site. sinus tachycardia 120s. on room air sating 97%, normotensive.   3/19 pt c/o abd pain  3/20 denies sob, palpitations, chest pain. just c/o abd pain by belly button  3/21 pt for     PAST MEDICAL & SURGICAL HISTORY:  Fibroid  H/O oral surgery  under general anesthesia during childhoo  S/P D&C (status post dilation and curettage)  x2  S/P laparoscopy  as part of infertility work up  S/P tonsillectomy    MEDICATIONS  (STANDING):  lidocaine   4% Patch 1 Patch Transdermal daily  lidocaine   4% Patch 2 Patch Transdermal daily  medroxyPROGESTERone 10 milliGRAM(s) Oral daily  senna 2 Tablet(s) Oral at bedtime  sodium chloride 0.9%. 1000 milliLiter(s) (75 mL/Hr) IV Continuous <Continuous>    Vital Signs Last 24 Hrs  T(C): 36.7 (20 Mar 2024 13:32), Max: 37 (20 Mar 2024 05:51)  T(F): 98 (20 Mar 2024 13:32), Max: 98.6 (20 Mar 2024 05:51)  HR: 118 (20 Mar 2024 14:00) (102 - 118)  BP: 113/69 (20 Mar 2024 14:00) (113/69 - 127/73)  BP(mean): 81 (20 Mar 2024 14:00) (76 - 101)  RR: 27 (20 Mar 2024 14:00) (15 - 27)  SpO2: 96% (20 Mar 2024 14:00) (96% - 100%)    Parameters below as of 20 Mar 2024 10:00  Patient On (Oxygen Delivery Method): room air      LABS:                        8.8    10.04 )-----------( 655      ( 20 Mar 2024 06:04 )             28.7     03-20    136  |  102  |  10  ----------------------------<  79  3.8   |  23  |  0.35<L>    Ca    9.2      20 Mar 2024 06:04  Phos  4.0     03-20  Mg     2.0     03-20    TPro  7.0  /  Alb  1.8<L>  /  TBili  0.5  /  DBili  x   /  AST  12<L>  /  ALT  11<L>  /  AlkPhos  78  03-20    LIVER FUNCTIONS - ( 20 Mar 2024 06:04 )  Alb: 1.8 g/dL / Pro: 7.0 gm/dL / ALK PHOS: 78 U/L / ALT: 11 U/L / AST: 12 U/L / GGT: x           Urinalysis Basic - ( 20 Mar 2024 06:04 )  Color: x / Appearance: x / SG: x / pH: x  Gluc: 79 mg/dL / Ketone: x  / Bili: x / Urobili: x   Blood: x / Protein: x / Nitrite: x   Leuk Esterase: x / RBC: x / WBC x   Sq Epi: x / Non Sq Epi: x / Bacteria: x      CULTURES:    Physical Examination:    General: No acute distress.    HEENT: Pupils equal, reactive to light.  Symmetric.  PULM: dec breath sounds at bases b/l. L CT serous,  R pigtail - serosanguinous  CVS: Regular rate and rhythm, no murmurs  ABD: Soft, nondistended, nontender  EXT: No edema, nontender  SKIN: Warm and well perfused, no rashes noted.  NEURO: Alert, oriented, interactive,    DEVICES:   R pigtail  L CT       Patient is a 53y old  Female who presents with a chief complaint of Left side chest pain  L Pneumothorax (15 Mar 2024 13:48)      BRIEF HOSPITAL COURSE: 50 yo female with a PMHx of fibroids, who is under evaluation  for recently diagnosed endometrial cancer (with planned biopsy on 3/14/24) and now presents to [place] ED c/o left sided chest pain.   Pt was at work today at 11:30 am when she experienced a pain in her left chest that felt like a gas bubble that was progressively worsening and associated with a tingling in her left arm.   Pt reports having recent  abdominal pain related to her uterine mass in her left lower abdomen and a diffuse crampy discomfort.      Admitted for spontaneous L PTX  Had L pigtail placed by CTS 3/11  Developed R PTX with subsequent R pigtail placement on 3/14    Take to OR on 3/15 for robot assisted L VATS parietal pleurectomy, pleurodesis, SERINA wedge resection   EBL 50cc    3/15 pt seen in PACU, having pain at chest tube sites. breathing feels ok. no air leak on L side.   3/18 no air leak seen, has some mild discomfort at area of CT site. sinus tachycardia 120s. on room air sating 97%, normotensive.   3/19 pt c/o abd pain  3/20 denies sob, palpitations, chest pain. just c/o abd pain by belly button  3/21 pt for abd biopsy today.    PAST MEDICAL & SURGICAL HISTORY:  Fibroid  H/O oral surgery  under general anesthesia during childhoo  S/P D&C (status post dilation and curettage)  x2  S/P laparoscopy  as part of infertility work up  S/P tonsillectomy    MEDICATIONS  (STANDING):  lidocaine   4% Patch 1 Patch Transdermal daily  lidocaine   4% Patch 2 Patch Transdermal daily  medroxyPROGESTERone 10 milliGRAM(s) Oral daily  senna 2 Tablet(s) Oral at bedtime  sodium chloride 0.9%. 1000 milliLiter(s) (75 mL/Hr) IV Continuous <Continuous>      Vital Signs Last 24 Hrs  T(C): 36.4 (21 Mar 2024 13:44), Max: 37.4 (21 Mar 2024 11:30)  T(F): 97.6 (21 Mar 2024 13:44), Max: 99.4 (21 Mar 2024 11:30)  HR: 94 (21 Mar 2024 14:00) (94 - 120)  BP: 93/58 (21 Mar 2024 14:00) (93/58 - 132/84)  BP(mean): 69 (21 Mar 2024 14:00) (69 - 98)  RR: 16 (21 Mar 2024 14:00) (16 - 25)  SpO2: 97% (21 Mar 2024 14:00) (95% - 100%)    Parameters below as of 21 Mar 2024 14:00  Patient On (Oxygen Delivery Method): room air      LABS:                          9.0    11.23 )-----------( 594      ( 21 Mar 2024 05:09 )             28.7     03-21    132<L>  |  99  |  10  ----------------------------<  103<H>  3.6   |  25  |  0.38<L>    Ca    8.8      21 Mar 2024 05:09  Phos  3.9     03-21  Mg     1.8     03-21    TPro  6.7  /  Alb  1.8<L>  /  TBili  0.5  /  DBili  x   /  AST  13<L>  /  ALT  8<L>  /  AlkPhos  77  03-21    LIVER FUNCTIONS - ( 21 Mar 2024 05:09 )  Alb: 1.8 g/dL / Pro: 6.7 gm/dL / ALK PHOS: 77 U/L / ALT: 8 U/L / AST: 13 U/L / GGT: x           PT/INR - ( 21 Mar 2024 05:09 )   PT: 14.8 sec;   INR: 1.32 ratio    PTT - ( 21 Mar 2024 05:09 )  PTT:34.5 sec  Urinalysis Basic - ( 21 Mar 2024 05:09 )    Color: x / Appearance: x / SG: x / pH: x  Gluc: 103 mg/dL / Ketone: x  / Bili: x / Urobili: x   Blood: x / Protein: x / Nitrite: x   Leuk Esterase: x / RBC: x / WBC x   Sq Epi: x / Non Sq Epi: x / Bacteria: x      CULTURES:    Physical Examination:    General: No acute distress.    HEENT: Pupils equal, reactive to light.  Symmetric.  PULM: dec breath sounds at bases b/l.   CVS: Regular rate and rhythm, no murmurs  ABD: Soft, distended, mild tenderness in epigastric area. midline small dressing at biopsy  EXT: No edema, nontender  SKIN: Warm and well perfused,  NEURO: Alert, oriented, interactive,    DEVICES:

## 2024-03-21 NOTE — BRIEF OPERATIVE NOTE - PRIMARY SURGEON
Eren King (Attending) Home Suture Removal Text: Patient was provided instructions on removing sutures and will remove their sutures at home.  If they have any questions or difficulties they will call the office. Add Me

## 2024-03-21 NOTE — PROGRESS NOTE ADULT - SUBJECTIVE AND OBJECTIVE BOX
Subjective:  Pt in bed NAD no issues overnight . Plan for IR biopsy today of uterine mass     T(C): 37 (03-21-24 @ 09:11), Max: 37.1 (03-21-24 @ 05:44)  HR: 113 (03-21-24 @ 08:00) (109 - 120)  BP: 118/70 (03-21-24 @ 08:00) (108/62 - 132/84)  ABP: --  ABP(mean): --  RR: 19 (03-20-24 @ 22:23) (17 - 27)  SpO2: 97% (03-21-24 @ 08:00) (95% - 100%) RA   Wt(kg): --  CVP(mm Hg): --  CO: --  CI: --  PA: --                                              Tele: ST            03-21    132<L>  |  99  |  10  ----------------------------<  103<H>  3.6   |  25  |  0.38<L>    Ca    8.8      21 Mar 2024 05:09  Phos  3.9     03-21  Mg     1.8     03-21    TPro  6.7  /  Alb  1.8<L>  /  TBili  0.5  /  DBili  x   /  AST  13<L>  /  ALT  8<L>  /  AlkPhos  77  03-21                               9.0    11.23 )-----------( 594      ( 21 Mar 2024 05:09 )             28.7        PT/INR - ( 21 Mar 2024 05:09 )   PT: 14.8 sec;   INR: 1.32 ratio         PTT - ( 21 Mar 2024 05:09 )  PTT:34.5 sec         CAPILLARY BLOOD GLUCOSE               CXR: < from: Xray Chest 1 View-PORTABLE IMMEDIATE (Xray Chest 1 View-PORTABLE IMMEDIATE .) (03.20.24 @ 10:07) >    Frontal expiratory view of the chest shows the heart to be normal in   size. Left chest tube and right pigtail catheter are again noted as is   dense nodule over the left lung base.    The lungs show mild basilar congestion and there is no evidence of   pneumothorax nor pleural effusion.    Chest one view 3/20/2024 7:25 AM  Compared to the prior study, there is no interval change.    Chest one view 3/20/2024 9:13 AM  Compared to the prior study, left chest tube has been removed. There is   no evidence of pneumothorax.    IMPRESSION:  No pneumothorax. Dense left base nodule.        Thank you for the courtesy of this referral.    < end of copied text >          Exam   Neuro:  Alert Awake NAD   Pulm: decreased at bases b/l +  left CT suture   CV: Tachy S1 S2   Abd:   distended tender   Extremities:  warm         Assessment:  53yFemale    with PAST MEDICAL & SURGICAL HISTORY:  Fibroid      H/O oral surgery  under general anesthesia during childhood      S/P D&C (status post dilation and curettage)  x2      S/P laparoscopy  as part of infertility work up      S/P tonsillectomy            Plan:

## 2024-03-21 NOTE — PROGRESS NOTE ADULT - ASSESSMENT
50 y/o female with a PMHx of fibroids, Pt of [facility] in the city. s/p  IR biopsy Rt lung nodule at [facility] 2/26 .  PT currently undergoing workup for recently diagnosed endometrial cancer presents to the ED c/o left sided CP. Pt reports she had a pain in her left chest that felt like a gas bubble that was progressively worsening. Denies rash. No other complaints at this time.  Pt noted to have a moderate PTX on left side . Now s/p Left pigtail placement  3/11 , Rt Pigtail placed 3/13  s/p 3/15 Left Robot assist Pleurectomy/TALC pleurodesis wedge resection 3/20 both CT d/c      Plan   NPO for IR biopsy of uterus today   Heme/onc following,  pain control   pt will be discharged home if medically stable and follow up with Dr Calixto at [facility] for Treatment   Discussed with Cardiothoracic Team at AM rounds. 50 y/o female with a PMHx of fibroids, Pt of [facility] in the city. s/p  IR biopsy Rt lung nodule at [facility] 2/26 .  PT currently undergoing workup for recently diagnosed endometrial cancer presents to the ED c/o left sided CP. Pt reports she had a pain in her left chest that felt like a gas bubble that was progressively worsening. Denies rash. No other complaints at this time.  Pt noted to have a moderate PTX on left side . Now s/p Left pigtail placement  3/11 , Rt Pigtail placed 3/13  s/p 3/15 Left Robot assist Pleurectomy/TALC pleurodesis wedge resection 3/20 both CT d/c      Plan   NPO for IR biopsy of uterus today   Heme/onc following _ Dr Barron Mart has been in contact with Dr Calixto at [facility] and Dr Calixto agrees with the plan of IR biopsy here  All oncology care will cont after discharge at [facility]   the pathology will be shared with [facility]   After Discharge the plan is to follow up with Dr Calixto next week   pain control  pt will be discharged home when medically stable and follow up with Dr Calixto at [facility] for Treatment   CXR in am   Labs in am   no plans for [facility] as of now here at  as per Dr Hubbard - GYN/Onc   Discussed with Cardiothoracic Team at AM rounds.

## 2024-03-21 NOTE — DISCHARGE NOTE PROVIDER - NSDCFUADDAPPT_GEN_ALL_CORE_FT
Call the office if you experience any fevers, shortness of breath, chest pain or excessive drainage from the incision, day or night. Go to the emergency room if any of these symptoms are severe. Take your medications as ordered and take a stool softener if needed with the narcotic medications.       Please follow up with Dr Cuevas on 3/29 at 1:45pm  at [facility]     Please follow up with Dr Calixto at [facility] for pathology results of Uterine Biopsy        Please follow up with Dr Cuevas on 3/29 at 1:45pm  at [facility]     Please follow up with [place] Cal[place]xto [place] [facility] [place] of Uterine Biopsy

## 2024-03-22 ENCOUNTER — TRANSCRIPTION ENCOUNTER (OUTPATIENT)
Age: 54
End: 2024-03-22

## 2024-03-22 VITALS
HEART RATE: 116 BPM | SYSTOLIC BLOOD PRESSURE: 114 MMHG | DIASTOLIC BLOOD PRESSURE: 63 MMHG | RESPIRATION RATE: 18 BRPM | OXYGEN SATURATION: 96 %

## 2024-03-22 LAB
ANION GAP SERPL CALC-SCNC: 10 MMOL/L — SIGNIFICANT CHANGE UP (ref 5–17)
BUN SERPL-MCNC: 6 MG/DL — LOW (ref 7–23)
CALCIUM SERPL-MCNC: 9 MG/DL — SIGNIFICANT CHANGE UP (ref 8.5–10.1)
CHLORIDE SERPL-SCNC: 101 MMOL/L — SIGNIFICANT CHANGE UP (ref 96–108)
CO2 SERPL-SCNC: 24 MMOL/L — SIGNIFICANT CHANGE UP (ref 22–31)
CREAT SERPL-MCNC: 0.31 MG/DL — LOW (ref 0.5–1.3)
EGFR: 124 ML/MIN/1.73M2 — SIGNIFICANT CHANGE UP
GLUCOSE SERPL-MCNC: 92 MG/DL — SIGNIFICANT CHANGE UP (ref 70–99)
HCT VFR BLD CALC: 28.2 % — LOW (ref 34.5–45)
HGB BLD-MCNC: 8.6 G/DL — LOW (ref 11.5–15.5)
MAGNESIUM SERPL-MCNC: 1.9 MG/DL — SIGNIFICANT CHANGE UP (ref 1.6–2.6)
MCHC RBC-ENTMCNC: 25.5 PG — LOW (ref 27–34)
MCHC RBC-ENTMCNC: 30.5 GM/DL — LOW (ref 32–36)
MCV RBC AUTO: 83.7 FL — SIGNIFICANT CHANGE UP (ref 80–100)
PLATELET # BLD AUTO: 587 K/UL — HIGH (ref 150–400)
POTASSIUM SERPL-MCNC: 3.9 MMOL/L — SIGNIFICANT CHANGE UP (ref 3.5–5.3)
POTASSIUM SERPL-SCNC: 3.9 MMOL/L — SIGNIFICANT CHANGE UP (ref 3.5–5.3)
RBC # BLD: 3.37 M/UL — LOW (ref 3.8–5.2)
RBC # FLD: 19 % — HIGH (ref 10.3–14.5)
SODIUM SERPL-SCNC: 135 MMOL/L — SIGNIFICANT CHANGE UP (ref 135–145)
WBC # BLD: 10.28 K/UL — SIGNIFICANT CHANGE UP (ref 3.8–10.5)
WBC # FLD AUTO: 10.28 K/UL — SIGNIFICANT CHANGE UP (ref 3.8–10.5)

## 2024-03-22 PROCEDURE — 99231 SBSQ HOSP IP/OBS SF/LOW 25: CPT

## 2024-03-22 PROCEDURE — 99223 1ST HOSP IP/OBS HIGH 75: CPT

## 2024-03-22 RX ORDER — ALPRAZOLAM 0.25 MG
0.5 TABLET ORAL
Refills: 0 | DISCHARGE

## 2024-03-22 RX ORDER — ALPRAZOLAM 0.25 MG
1 TABLET ORAL
Qty: 0 | Refills: 0 | DISCHARGE
Start: 2024-03-22

## 2024-03-22 RX ORDER — ALPRAZOLAM 0.25 MG
0.25 TABLET ORAL EVERY 8 HOURS
Refills: 0 | Status: DISCONTINUED | OUTPATIENT
Start: 2024-03-22 | End: 2024-03-22

## 2024-03-22 RX ORDER — LIDOCAINE 4 G/100G
1 CREAM TOPICAL DAILY
Refills: 0 | Status: DISCONTINUED | OUTPATIENT
Start: 2024-03-22 | End: 2024-03-22

## 2024-03-22 RX ADMIN — HYDROMORPHONE HYDROCHLORIDE 4 MILLIGRAM(S): 2 INJECTION INTRAMUSCULAR; INTRAVENOUS; SUBCUTANEOUS at 15:38

## 2024-03-22 RX ADMIN — SODIUM CHLORIDE 75 MILLILITER(S): 9 INJECTION INTRAMUSCULAR; INTRAVENOUS; SUBCUTANEOUS at 00:06

## 2024-03-22 RX ADMIN — LIDOCAINE 2 PATCH: 4 CREAM TOPICAL at 10:40

## 2024-03-22 RX ADMIN — HYDROMORPHONE HYDROCHLORIDE 4 MILLIGRAM(S): 2 INJECTION INTRAMUSCULAR; INTRAVENOUS; SUBCUTANEOUS at 06:46

## 2024-03-22 RX ADMIN — LIDOCAINE 1 PATCH: 4 CREAM TOPICAL at 11:00

## 2024-03-22 RX ADMIN — HYDROMORPHONE HYDROCHLORIDE 4 MILLIGRAM(S): 2 INJECTION INTRAMUSCULAR; INTRAVENOUS; SUBCUTANEOUS at 10:42

## 2024-03-22 RX ADMIN — HYDROMORPHONE HYDROCHLORIDE 0.5 MILLIGRAM(S): 2 INJECTION INTRAMUSCULAR; INTRAVENOUS; SUBCUTANEOUS at 05:00

## 2024-03-22 RX ADMIN — HYDROMORPHONE HYDROCHLORIDE 4 MILLIGRAM(S): 2 INJECTION INTRAMUSCULAR; INTRAVENOUS; SUBCUTANEOUS at 01:00

## 2024-03-22 RX ADMIN — HYDROMORPHONE HYDROCHLORIDE 4 MILLIGRAM(S): 2 INJECTION INTRAMUSCULAR; INTRAVENOUS; SUBCUTANEOUS at 11:41

## 2024-03-22 RX ADMIN — MEDROXYPROGESTERONE ACETATE 10 MILLIGRAM(S): 150 INJECTION, SUSPENSION, EXTENDED RELEASE INTRAMUSCULAR at 10:43

## 2024-03-22 RX ADMIN — POLYETHYLENE GLYCOL 3350 17 GRAM(S): 17 POWDER, FOR SOLUTION ORAL at 05:00

## 2024-03-22 RX ADMIN — HYDROMORPHONE HYDROCHLORIDE 0.5 MILLIGRAM(S): 2 INJECTION INTRAMUSCULAR; INTRAVENOUS; SUBCUTANEOUS at 04:46

## 2024-03-22 RX ADMIN — HYDROMORPHONE HYDROCHLORIDE 4 MILLIGRAM(S): 2 INJECTION INTRAMUSCULAR; INTRAVENOUS; SUBCUTANEOUS at 07:15

## 2024-03-22 RX ADMIN — HEPARIN SODIUM 5000 UNIT(S): 5000 INJECTION INTRAVENOUS; SUBCUTANEOUS at 06:36

## 2024-03-22 RX ADMIN — HYDROMORPHONE HYDROCHLORIDE 4 MILLIGRAM(S): 2 INJECTION INTRAMUSCULAR; INTRAVENOUS; SUBCUTANEOUS at 00:29

## 2024-03-22 RX ADMIN — LIDOCAINE 2 PATCH: 4 CREAM TOPICAL at 00:20

## 2024-03-22 RX ADMIN — HYDROMORPHONE HYDROCHLORIDE 2 MILLIGRAM(S): 2 INJECTION INTRAMUSCULAR; INTRAVENOUS; SUBCUTANEOUS at 17:57

## 2024-03-22 RX ADMIN — SODIUM CHLORIDE 75 MILLILITER(S): 9 INJECTION INTRAMUSCULAR; INTRAVENOUS; SUBCUTANEOUS at 12:07

## 2024-03-22 RX ADMIN — HEPARIN SODIUM 5000 UNIT(S): 5000 INJECTION INTRAVENOUS; SUBCUTANEOUS at 14:49

## 2024-03-22 RX ADMIN — HYDROMORPHONE HYDROCHLORIDE 2 MILLIGRAM(S): 2 INJECTION INTRAMUSCULAR; INTRAVENOUS; SUBCUTANEOUS at 17:09

## 2024-03-22 RX ADMIN — HYDROMORPHONE HYDROCHLORIDE 4 MILLIGRAM(S): 2 INJECTION INTRAMUSCULAR; INTRAVENOUS; SUBCUTANEOUS at 14:48

## 2024-03-22 NOTE — CONSULT NOTE ADULT - REASON FOR ADMISSION
Left side chest pain  L Pneumothorax

## 2024-03-22 NOTE — CONSULT NOTE ADULT - CONSULT REQUESTED DATE/TIME
11-Mar-2024 17:09
20-Mar-2024 08:28
18-Mar-2024 09:30
18-Mar-2024 09:30
22-Mar-2024 14:23
13-Mar-2024 17:51
15-Mar-2024 13:34

## 2024-03-22 NOTE — DISCHARGE NOTE NURSING/CASE MANAGEMENT/SOCIAL WORK - PATIENT PORTAL LINK FT
You can access the FollowMyHealth Patient Portal offered by [facility] by registering at the following website: [email or web address]. By joining [facility]’s FollowMyHealth portal, you will also be able to view your health information using other applications (apps) compatible with our system.

## 2024-03-22 NOTE — CONSULT NOTE ADULT - ASSESSMENT
fibroids, who is under evaluation  for recently diagnosed endometrial cancer (with planned biopsy on 3/14/24) and now presents to [facility] ED c/o left sided chest pain.   Pt was at work today at 11:30 am when she experienced a pain in her left chest that felt like a gas bubble that was progressively worsening and associated with a tingling in her left arm.   Pt reports having recent  abdominal pain related to her uterine mass in her left lower abdomen and a diffuse crampy discomfort.      Process of Care  --Reviewed dx/treatment problems and alignment with Goals of Care    Physical Aspects of Care  --Pain  c/w current managment  - if patient was not being d/c today would consider adding a Fentanyl patch - as patient has been taking every 4 hours for breakthrough- patient will follow up with primary onc on Monday and address with them     --Bowel Regimen  denies constipation  risk for constipation d/t immobility    --Dyspnea  No SOB at this time  comfortable and in NAD    --Nausea Vomiting  denies    --Weakness  PT as tolerated     Psychological and Psychiatric Aspects of Care:   --Greif/Bereavment: emotional support provided  -Pastoral Care Available PRN     Social Aspects of Care  -SW involved     Cultural Aspects  -Primary Language: English    Goals of Care:     We discussed Palliative Care team being a supportive team when a patient has ongoing illnesses.  We also discussed that it is not an end of life care service, but can help navigate symptoms and emotional support through out their hospital stay here.    Ethical and Legal Aspects: NA    Capacity- patient has capacity     HCP/Surrogate: HCP filled out naming her sisters Sana and Carol     Code Status- full code   MOLST-  Dispo Plan- home with significant other     Discussed With: Petra Rico coordinated with attending and SW and RN     Time Spent: 60 minutes including the care, coordination and counseling of this patient, 50% of which was spent coordinating and counseling.

## 2024-03-22 NOTE — PROGRESS NOTE ADULT - SUBJECTIVE AND OBJECTIVE BOX
Subjective:  Pt seen, c/o abd pain, wants to go home. Denies SOB.    Vital Signs:  Vital Signs Last 24 Hrs  T(C): 36.8 (03-22-24 @ 06:00), Max: 36.8 (03-22-24 @ 06:00)  T(F): 98.2 (03-22-24 @ 06:00), Max: 98.2 (03-22-24 @ 06:00)  HR: 116 (03-22-24 @ 17:09) (103 - 117)  BP: 114/63 (03-22-24 @ 17:09) (104/62 - 116/76)  RR: 18 (03-22-24 @ 17:09) (15 - 22)  SpO2: 96% (03-22-24 @ 17:09) (95% - 97%) on (O2)    Telemetry/Alarms:    Relevant labs, radiology and Medications reviewed                        8.6    10.28 )-----------( 587      ( 22 Mar 2024 05:42 )             28.2     03-22    135  |  101  |  6<L>  ----------------------------<  92  3.9   |  24  |  0.31<L>    Ca    9.0      22 Mar 2024 05:42  Phos  3.9     03-21  Mg     1.9     03-22    TPro  6.7  /  Alb  1.8<L>  /  TBili  0.5  /  DBili  x   /  AST  13<L>  /  ALT  8<L>  /  AlkPhos  77  03-21    PT/INR - ( 21 Mar 2024 05:09 )   PT: 14.8 sec;   INR: 1.32 ratio         PTT - ( 21 Mar 2024 05:09 )  PTT:34.5 sec  MEDICATIONS  (STANDING):  heparin   Injectable 5000 Unit(s) SubCutaneous every 8 hours  lidocaine   4% Patch 1 Patch Transdermal daily  lidocaine   4% Patch 2 Patch Transdermal daily  medroxyPROGESTERone 10 milliGRAM(s) Oral daily  senna 2 Tablet(s) Oral at bedtime    MEDICATIONS  (PRN):  acetaminophen     Tablet .. 650 milliGRAM(s) Oral every 6 hours PRN Mild Pain (1 - 3)  acetaminophen   IVPB .. 1000 milliGRAM(s) IV Intermittent once PRN Moderate Pain (4 - 6)  ALPRAZolam 0.25 milliGRAM(s) Oral every 8 hours PRN anxiety  HYDROmorphone   Tablet 2 milliGRAM(s) Oral every 4 hours PRN Moderate Pain (4 - 6)  HYDROmorphone   Tablet 4 milliGRAM(s) Oral every 4 hours PRN Severe Pain (7 - 10)  naloxone Injectable 0.1 milliGRAM(s) IV Push every 3 minutes PRN For ANY of the following changes in patient status:  A. RR LESS THAN 10 breaths per minute, B. Oxygen saturation LESS THAN 90%, C. Sedation score of 6  ondansetron Injectable 4 milliGRAM(s) IV Push every 6 hours PRN Nausea  polyethylene glycol 3350 17 Gram(s) Oral two times a day PRN Constipation      Physical exam  Gen  NAD  Neuro AAOx3  Card tachycardic  Pulm equal  Abd soft, distended w firmness lower abd  Ext warm    Tubes:    I&O's Summary    21 Mar 2024 07:01  -  22 Mar 2024 07:00  --------------------------------------------------------  IN: 949 mL / OUT: 0 mL / NET: 949 mL        Assessment  53y Female  w/ PAST MEDICAL & SURGICAL HISTORY:  Fibroid      H/O oral surgery  under general anesthesia during childhood      S/P D&C (status post dilation and curettage)  x2      S/P laparoscopy  as part of infertility work up      S/P tonsillectomy      admitted with complaints of Patient is a 53y old  Female who presents with a chief complaint of Left side chest pain  L Pneumothorax (22 Mar 2024 14:23)  .  52 y/o female with a PMHx of fibroids, Pt of [facility] in the city. s/p  IR biopsy Rt lung nodule at [facility] 2/26 .  PT currently undergoing workup for recently diagnosed endometrial cancer presents to the ED c/o left sided CP. Pt reports she had a pain in her left chest that felt like a gas bubble that was progressively worsening. Denies rash. No other complaints at this time.  Pt noted to have a moderate PTX on left side . Now s/p Left pigtail placement  3/11 , Rt Pigtail placed 3/13  s/p 3/15 Left Robot assist Pleurectomy/TALC pleurodesis wedge resection 3/20 both CT d/c  s/p biopsy of uterus 3/21/24.    All oncology care will cont after discharge at [facility]   the pathology will be shared with [facility]   After Discharge the plan is to follow up with Dr Calixto next week   pain control  f/u Dr. Cuevas next Friday  discharge complete, pain medicine sent    Discussed with Cardiothoracic Team at AM rounds.

## 2024-03-22 NOTE — CHART NOTE - NSCHARTNOTEFT_GEN_A_CORE
HPI:  Pt is a pleasant  50 y/o female with a PMHx of fibroids, who is under evaluation  for recently diagnosed endometrial cancer (with planned biopsy on 3/14/24) and now presents to [place] ED c/o left sided chest pain.   Pt was at work today at 11:30 am when she experienced a pain in her left chest that felt like a gas bubble that was progressively worsening and associated with a tingling in her left arm.   Pt reports having recent  abdominal pain related to her uterine mass in her left lower abdomen and a diffuse crampy discomfort.  For this she took  2 tylenols at 8pm last night,  200mg advil at 10pm last night and 0.125mg of xanax at 3am this morning.     (11 Mar 2024 22:49)      PERTINENT PMH REVIEWED:  [ x ] YES [ ] NO           Primary Contact:     Carol, , phone # [phone]    HCP [ x ] Surrogate [   ] Guardian [   ]    Mental Status: [ x ] Alert  [ x ] Oriented [  ] Confused [  ] Lethargic  Concerns of Depression [  ] -denies  Anxiety [   ] -reports feelings related  Baseline ADLs (prior to admission):  Independent [ ] moderately [ x ] fully   Dependent   [ ] moderately [ ]fully    Anticipated Grief: Patient[ x ] Family [ x ]    Caregiver [place] Assessed: Yes [ x ] No [  ]    Jainism: Spiritism    Spiritual Concerns: Not identified,  available for support as needed.    Goals of Care: Comfort [  ] Rehabilitation [  ] Curative [ x ] Life Prolonging [  ]    Previous Services: None.    ADVANCE DIRECTIVES:    -Pt has capacity  -HCP names Sana as primary & Carol as alternate agent   -Full Code    Anticipated D/C Plan: return home                      Summary:  Palliative NP, Marlen Butler and this SW met with Pt at the bedside to introduce our team and offer support.  Palliative role explained.  Emotional support provided.  Pts significant other and sister at the bedside, Pt in agreement with our team meeting with family present.  Pt is alert and oriented, able to make needs known.  Pt. denies feelings related to depression and reports feelings related to anxiety.  Pts feelings explored and support provided.  Prior to hospitalization, Pt. resides at home with her significant other.  Pt was independent with ADLs.  Pt. shared her journey with her illness thus far.  Pts pain medication discussed in details, Palliative NP addressed all questions and concerns.  Pt and family aware of palliative team availability.  Emotional support provided.  Pt. to be discharged today.  Our team remains available.

## 2024-03-22 NOTE — DISCHARGE NOTE NURSING/CASE MANAGEMENT/SOCIAL WORK - NSSCCARECORD_GEN_ALL_CORE
Routed to Dr. Owen Gar. Please place order for lab.     Home Care Agency/[facility]

## 2024-03-22 NOTE — PROGRESS NOTE ADULT - PROVIDER SPECIALTY LIST ADULT
Heme/Onc
SICU
Thoracic Surgery
Gyn Onc
Gyn Onc
Heme/Onc
Hospitalist
Hospitalist
Thoracic Surgery
Critical Care
Gyn Onc
Gyn Onc
Heme/Onc
Hospitalist
Thoracic Surgery
Heme/Onc
Heme/Onc
Hospitalist
SICU
SICU
Thoracic Surgery

## 2024-03-22 NOTE — CONSULT NOTE ADULT - SUBJECTIVE AND OBJECTIVE BOX
HPI:  50 y/o female with a PMHx of fibroids, who is under evaluation for recently diagnosed endometrial cancer (with planned biopsy on 3/14/24), presents to [place] ED c/o left sided chest pain. Pt was at work today at 11:30 am when she experienced a pain in her left chest that felt like a gas bubble that was progressively worsening and associated with a tingling in her left arm. Pt reports having recent  abdominal pain related to her uterine mass in her left lower abdomen and a diffuse crampy discomfort.  For this she took  2 tylenols at 8pm last night,  200mg advil at 10pm last night and 0.125mg of xanax at 3am this morning.      3/18/24: Seen at bedside along with Dr. Mart; accompanied by family.     PAST MEDICAL & SURGICAL HISTORY:    Fibroid    H/O oral surgery  under general anesthesia during childhood    S/P D&C (status post dilation and curettage)  x2    S/P laparoscopy  as part of infertility work up    S/P tonsillectomy    FAMILY HISTORY:    Family history of uterine cancer (Mother)    MEDICATIONS  (STANDING):    heparin Injectable 5000 Unit(s) SubCutaneous every 8 hours  lidocaine   4% Patch 1 Patch Transdermal daily  lidocaine   4% Patch 2 Patch Transdermal daily  medroxyPROGESTERone 10 milliGRAM(s) Oral daily  senna 2 Tablet(s) Oral at bedtime    MEDICATIONS  (PRN):    acetaminophen     Tablet .. 650 milliGRAM(s) Oral every 6 hours PRN Mild Pain (1 - 3)  acetaminophen   IVPB .. 750 milliGRAM(s) IV Intermittent once PRN Temp greater or equal to 38C (100.4F), Mild Pain (1 - 3)  ALPRAZolam 0.25 milliGRAM(s) Oral every 8 hours PRN anxiety  naloxone Injectable 0.1 milliGRAM(s) IV Push every 3 minutes PRN For ANY of the following changes in patient status:  A. RR LESS THAN 10 breaths per minute, B. Oxygen saturation LESS THAN 90%, C. Sedation score of 6  ondansetron Injectable 4 milliGRAM(s) IV Push every 6 hours PRN Nausea  oxyCODONE    IR 5 milliGRAM(s) Oral every 4 hours PRN Moderate Pain (4 - 6)  oxyCODONE    IR 10 milliGRAM(s) Oral every 6 hours PRN Severe Pain (7 - 10)  polyethylene glycol 3350 17 Gram(s) Oral two times a day PRN Constipation  zolpidem 5 milliGRAM(s) Oral at bedtime PRN Insomnia    Allergies    No Known Allergies    Intolerances    shellfish (Vomiting)    REVIEW OF SYSTEM:    Constitutional, Eyes, ENT, Cardiovascular, Respiratory, Gastrointestinal, Genitourinary, Musculoskeletal, Integumentary, Neurological, Psychiatric, Endocrine, Heme/Lymph and Allergic/Immunologic review of systems are otherwise negative except as noted in HPI.     Vital Signs Last 24 Hrs    T(C): 37 (17 Mar 2024 20:43), Max: 37 (17 Mar 2024 20:43)  T(F): 98.6 (17 Mar 2024 20:43), Max: 98.6 (17 Mar 2024 20:43)  HR: 125 (18 Mar 2024 12:00) (102 - 130)  BP: 130/86 (18 Mar 2024 12:00) (91/69 - 143/98)  BP(mean): 98 (18 Mar 2024 12:00) (74 - 109)  RR: 23 (18 Mar 2024 12:00) (13 - 24)  SpO2: 96% (18 Mar 2024 12:00) (93% - 100%)    Parameters below as of 18 Mar 2024 08:00  Patient On (Oxygen Delivery Method): room air    PHYSICAL EXAM:    Constitutional: no acute distress  Eyes: no conjunctival infection, anicteric.   ENT: pharynx is unremarkable  Neck: supple without JVD  Pulmonary: clear to auscultation bilaterally   Cardiac: RRR  Vascular: no calf tenderness, venous stasis changes, varices  Abdomen: normoactive bowel sounds, soft and nontender, no hepatosplenomegaly or masses appreciated  Lymphatic: no peripheral adenopathy appreciated  Musculoskeletal: full range of motion and no deformities appreciated  Skin: normal appearance, no rash/erythema  Neurology: awake, alert, oriented; no focal deficits    LABS:                          7.5    9.09  )-----------( 572      ( 19 Mar 2024 05:27 )             24.7   03-19    135  |  102  |  13  ----------------------------<  97  3.8   |  26  |  0.36<L>    Ca    9.1      19 Mar 2024 05:27  Phos  3.8     03-19  Mg     2.0     03-19    TPro  6.7  /  Alb  1.8<L>  /  TBili  0.3  /  DBili  x   /  AST  16  /  ALT  11<L>  /  AlkPhos  79  03-19      RADIOLOGY & ADDITIONAL STUDIES:    CT ANGIO CHEST PULM ART [facility]     PROCEDURE DATE:  03/11/2024      INTERPRETATION:  INDICATION: Chest pain, endometrial cancer    TECHNIQUE: Helical acquisition of the chest after the administration of  70 mL of Omnipaque 350. Maximum intensity projection images were   generated.    COMPARISON: PET/CT 2/7/2024    FINDINGS:    HEART/VASCULATURE: No pulmonary embolus. Normal heart size. No   pericardial effusion.    LUNGS/AIRWAYS/PLEURA: Patent trachea and bronchi. New small left   pneumothorax. No pleural effusion. Increased size and number of cavitary   and noncavitary nodules and masses in both lungs, for example, 3.4 cm   right lower lobe mass (2-84), prior 3.0 cm, and new 0.7 cm cavitary   nodule in the left upper lobe (2-37).    LYMPH NODES/MEDIASTINUM: No lymphadenopathy.    UPPER ABDOMEN: Unremarkable.    BONES/SOFT TISSUES: Unremarkable.      IMPRESSION:    Since 2/7/2024:    New small left pneumothorax. This was discussed with Dr. Dong at 2:01 PM   on 3/11/2024.    Increased size and number of pulmonary metastases, some cavitary.        EXAM:  XR CHEST PORTABLE ROUTINE 1V     PROCEDURE DATE:  03/16/2024      INTERPRETATION:  TECHNIQUE: Single portable view of the chest.    COMPARISON:  3/15/2024    CLINICAL HISTORY: postop, pulmonary metastasis    FINDINGS:    Single frontal view of the chest demonstrates small bilateral   pneumothoraces. Bilateral chest tubes. Left thoracic wall subcutaneous   emphysema. 2 cm left lower lobe pulmonary nodule. 1.5 cc left upper lobe   pulmonary nodule. The cardiomediastinal silhouette is normal. No acute   osseous abnormalities. Overlying EKG leads and wires are noted    IMPRESSION:   Small bilateral pneumothoraces. Left-sided pulmonary nodules.      < from: NM PET/CT Onc FDG Skull to Thigh, Inital (02.07.24 @ 19:00) >  EXAM: [id number] - PETCT [facility] ONC FDG INIT  - ORDERED BY: JANICE OATES      PROCEDURE DATE:  02/07/2024           INTERPRETATION:  CLINICAL INFORMATION: Pulmonary nodules    TREATMENT STRATEGY EVALUATION: Initial  FASTING BLOOD SUGAR: 72 mg/dL  RADIOPHARMACEUTICAL:  10 mCi F-18, FDG, I.V.  UPTAKE PERIOD: 50 minutes  SCANNER: Siemens Biograph mCT 64  ORAL CONTRAST: Patient drank OMNIPAQUE contrast during the uptake period.  PHARMACOLOGIC INTERVENTION: None.    TECHNIQUE: Following intravenous injection of radiopharmaceutical and   above uptake period, PET/CT was obtained from base of skull  to   mid-thigh. CT protocol was optimized for PET attenuation correction and   anatomic localization and was not designed to produce and cannot replace   state-of-the-art diagnostic CT images with specific imaging protocols for   different body parts and indications. Images were reconstructed and   reviewed in axial, coronal and sagittal views and three-dimensional MIP.    The standardized uptake values (SUV) are normalized to patient body   weight and indicate the highest activity concentration (SUVmax) in a   given site. All image numbers refer to axial image number.    COMPARISON:  No prior FDG-PET/CT    OTHER STUDIES USED FOR CORRELATION: CT chest 1/13/2024    FINDINGS:    HEAD/NECK: Physiologic FDG activity in visualized brain, head, and neck.    THORAX: No abnormal FDG activity. No lymphadenopathy.    LUNGS: Bilateral FDG avid pulmonary nodules, which have enlarged since CT   1/13/2024. The largest is a mass in the right lower lobe, 3.1 x 2.9 cm   SUV 30 (image 117); previously 2.6 x 2.6 cm on CT 1/13/2024. Another FDG   avid nodule in the right lower lobe measures 0.9 x 0.9 cm, SUV 5 (image   86); previously 0.6 x 0.5 cm on CT 1/13/2024.    PLEURA/PERICARDIUM: No abnormal FDG activity. No effusion.    HEPATOBILIARY/PANCREAS: Physiologic FDG activity.  For reference, normal   liver demonstrates SUV mean 1.7.    SPLEEN: Physiologic FDG activity. Normal in size.    ADRENAL GLANDS: No abnormal FDG activity. No nodule.    KIDNEYS/URINARY BLADDER: Physiologic excreted FDG activity.    ABDOMEN/PELVIS/REPRODUCTIVE ORGANS: Enlarged uterus measuring 19 x 9.9 x   14 cm on MR pelvis 12/17/2023. There are multiple areas of FDG avidity   within the uterus,, with central photopenia suggesting areas of necrosis   or retained blood products, SUV 39 (image 196). Extension is evident into   the mesentery along the posterior lateral right aspect of the uterus, SUV   19 (image 192). Other multifocal areas of FDG avidity around the enlarged   uterus suggest extrauterine extension, for example an FDG avid focus on   image 180 to the right anterior aspect, and a focus in the left pelvic   sidewall which may be an enlarged lymph node, image 221. These are   difficult to correlate on noncontrast CT. There does appear to be   interval enlargement of these mass lesions including a 6 x 4 cm   peripherally FDG avid mass at the most superior aspect just right of   midline in the abdomen, image 174, not evident to this extent on MR   12/17/2023.    ESOPHAGUS/STOMACH/BOWEL: Mild FDG uptake in the gastric antrum, likely   inflammatory.    VESSELS: Unremarkable.    BONES/SOFT TISSUES: No abnormal FDG activity.    IMPRESSION:    1. FDG avid enlarging uterus masses with multiple lobulated masses,   concerning for malignancy. Several areas of FDG avidity outside of the   uterus suggest extrauterine extension or possible lymphadenopathy, as   above. Recommend correlation with biopsy and consider updated   contrast-enhanced anatomic imaging.    2. Multiple enlarging FDG avid pulmonary nodules, concerning for   malignancy. Recommend correlation with upcoming biopsy of right lower   lobe mass.      
Chief Complaint:  Patient is a 53y old  Female who presents with a chief complaint of pelvic/uterine mass    HPI:  Pt is a pleasant  52 y/o female with a PMHx of fibroids, who is under evaluation  for recently diagnosed endometrial cancer and now presents to [facility] ED c/o left sided chest pain.   Pt was found to have bilateral pneumothorax, now s/p chest tubes. IR consulted for biopsy of uterine mass.      Allergies  No Known Allergies    Intolerances  shellfish (Vomiting)      MEDICATIONS  (STANDING):  HYDROmorphone PCA (1 mG/mL) 30 milliLiter(s) PCA Continuous PCA Continuous  lidocaine   4% Patch 2 Patch Transdermal daily  lidocaine   4% Patch 1 Patch Transdermal daily  medroxyPROGESTERone 10 milliGRAM(s) Oral daily  sodium chloride 0.9%. 1000 milliLiter(s) (125 mL/Hr) IV Continuous <Continuous>    MEDICATIONS  (PRN):  acetaminophen     Tablet .. 650 milliGRAM(s) Oral every 6 hours PRN Mild Pain (1 - 3)  ALPRAZolam 0.25 milliGRAM(s) Oral every 8 hours PRN anxiety  HYDROmorphone PCA (1 mG/mL) Rescue Clinician Bolus 0.5 milliGRAM(s) IV Push every 15 minutes PRN for Pain Scale GREATER THAN 6  naloxone Injectable 0.1 milliGRAM(s) IV Push every 3 minutes PRN For ANY of the following changes in patient status:  A. RR LESS THAN 10 breaths per minute, B. Oxygen saturation LESS THAN 90%, C. Sedation score of 6  ondansetron Injectable 4 milliGRAM(s) IV Push every 6 hours PRN Nausea  ondansetron Injectable 4 milliGRAM(s) IV Push every 6 hours PRN Nausea  senna 2 Tablet(s) Oral at bedtime PRN Constipation  zolpidem 5 milliGRAM(s) Oral at bedtime PRN Insomnia      PAST MEDICAL & SURGICAL HISTORY:  Fibroid      H/O oral surgery  under general anesthesia during childhood      S/P D&C (status post dilation and curettage)  x2      S/P laparoscopy  as part of infertility work up      S/P tonsillectomy          FAMILY HISTORY:  Family history of uterine cancer (Mother)      Vital Signs Last 24 Hrs  T(C): 36.1 (15 Mar 2024 13:00), Max: 37.3 (15 Mar 2024 00:52)  T(F): 97 (15 Mar 2024 13:00), Max: 99.2 (15 Mar 2024 00:52)  HR: 90 (15 Mar 2024 13:00) (90 - 121)  BP: 91/62 (15 Mar 2024 13:00) (90/72 - 120/69)  BP(mean): --  RR: 10 (15 Mar 2024 13:00) (10 - 29)  SpO2: 100% (15 Mar 2024 13:00) (93% - 100%)    Parameters below as of 15 Mar 2024 13:00  Patient On (Oxygen Delivery Method): nasal cannula  O2 Flow (L/min): 2      CBC                        8.7    8.85  )-----------( 645      ( 15 Mar 2024 07:14 )             28.6       Chemistry  03-15    138  |  105  |  12  ----------------------------<  148<H>  4.3   |  24  |  0.47<L>    Ca    9.3      15 Mar 2024 10:40  Phos  4.0     03-15  Mg     2.0     03-15      Coags  PT/INR - ( 14 Mar 2024 05:52 )   PT: 16.1 sec;   INR: 1.44 ratio    PTT - ( 14 Mar 2024 05:52 )  PTT:40.6 sec      
  HPI:  Pt is a pleasant  52 y/o female with a PMHx of fibroids, who is under evaluation  for recently diagnosed endometrial cancer (with planned biopsy on 3/14/24) and now presents to [place] ED c/o left sided chest pain.   Pt was at work today at 11:30 am when she experienced a pain in her left chest that felt like a gas bubble that was progressively worsening and associated with a tingling in her left arm.   Pt reports having recent  abdominal pain related to her uterine mass in her left lower abdomen and a diffuse crampy discomfort.  For this she took  2 tylenols at 8pm last night,  200mg advil at 10pm last night and 0.125mg of xanax at 3am this morning.      Pt denies any fevers, no cough, no nausea/vomiting/diarrhea,  no urinary or resp complaints, pt denies rash.    (11 Mar 2024 22:49)     53y  w recent diagnosis of endometrial cancer and lung mets Last Menstrual Period December   admitted for management of spontaneous lt pneumothorax, chest tube in place. Gyn onc consulted for complaints of heavy vaginal bleeding. Pt states that she had a known 9cm fibroid and heavy Vb that has been monitored and was stable over the course of these past 2 years. She even felt like it had been decreasing in size recently. She denied any intermenstrual spotting until December, since when she has been continuously bleeding. She was started on provera TID x10d with improvement in her bleeding and has been on maintenance qd since. She trialed lysteda, but she felt it did not help her bleeding. She was started on lovenox ppx when she was admitted on Monday, and today she began having heavier vaginal bleeding, passing tangerine-size clots. She denies any lightheadedness/dizziness. She states she presented to the ED in December for abdominal pain, and imaging showed a nodule in her lungs, as well as uterus, for which her gyn performed an EMB which came back negative. She followed up with pulmonary who sent her for a CT, which showed additional lung masses, so they sent her for a PET scan, and her uterus lit up. They also biopsied a lung mass, which showed malignancy of uterine origin. She was planned for a uterine biopsy tomorrow at [facility].     She endorses recent weight loss despite eating a lot. States her mother also had endometrial cancer but never underwent genetic workup. The patient is in the middle of undergoing genetic workup currently. She     PMHX; anemia s/p blood transfusions  PSHX;  section x1, D&C x2, tonsillectomy  POBHX; CSx1, SAbx3, D&Cx2  PGYNHX: remote history of HPV, self-resolved  SOCIAL: social alcohol use, never smoker/drug use  Allergies    No Known Allergies    Intolerances    shellfish (Vomiting)    MEDS:  acetaminophen     Tablet .. 650 milliGRAM(s) Oral every 6 hours PRN  acetaminophen     Tablet .. 650 milliGRAM(s) Oral every 8 hours  ALPRAZolam 0.125 milliGRAM(s) Oral at bedtime PRN  enoxaparin Injectable 40 milliGRAM(s) SubCutaneous every 24 hours  lidocaine   4% Patch 2 Patch Transdermal daily  medroxyPROGESTERone 10 milliGRAM(s) Oral daily  morphine  - Injectable 4 milliGRAM(s) IV Push every 4 hours PRN  oxyCODONE    IR 2.5 milliGRAM(s) Oral every 4 hours PRN  senna 2 Tablet(s) Oral at bedtime PRN  zolpidem 5 milliGRAM(s) Oral at bedtime PRN      Vital Signs Last 24 Hrs  T(C): 37.1 (13 Mar 2024 16:05), Max: 37.6 (13 Mar 2024 14:06)  T(F): 98.7 (13 Mar 2024 16:05), Max: 99.6 (13 Mar 2024 14:06)  HR: 112 (13 Mar 2024 16:05) (104 - 139)  BP: 117/74 (13 Mar 2024 16:05) (108/57 - 140/72)  BP(mean): 83 (13 Mar 2024 16:05) (83 - 83)  RR: 17 (13 Mar 2024 16:05) (17 - 19)  SpO2: 99% (13 Mar 2024 16:05) (96% - 100%)    Parameters below as of 13 Mar 2024 16:05  Patient On (Oxygen Delivery Method): nasal cannula  O2 Flow (L/min): 3       PHYSICAL EXAM:  Gen: NAD  Neuro: A&Ox3  Resp: CT in place  Abdomen: lower abdominal firm mass, occupying entire lower abdomen, nonmobile, minimally tender  PELVIC:        EXTERNAL GENITALIA: Normal. No rashes or lesions noted.         VAGINA: sm amt blood in vaginal vault, ~25cc         CERVIX: no active bleeding        UTERUS: ~20w size, firm mass extending to umbilicus, nonmobile, minimally tender        ADNEXA: nontender due to occupying mass    LABS:                        8.2    8.84  )-----------( 732      ( 13 Mar 2024 06:31 )             28.5     03-13    136  |  104  |  12  ----------------------------<  99  4.1   |  26  |  0.59    Ca    9.3      13 Mar 2024 06:31  Phos  4.4     03-13  Mg     2.2     03-13    TPro  7.9  /  Alb  2.2<L>  /  TBili  0.3  /  DBili  x   /  AST  13<L>  /  ALT  17  /  AlkPhos  105  03-13    Urinalysis Basic - ( 13 Mar 2024 06:31 )    Color: x / Appearance: x / SG: x / pH: x  Gluc: 99 mg/dL / Ketone: x  / Bili: x / Urobili: x   Blood: x / Protein: x / Nitrite: x   Leuk Esterase: x / RBC: x / WBC x   Sq Epi: x / Non Sq Epi: x / Bacteria: x  
HPI: Pt is a 53y old Female with hx of fibroids, who is under evaluation  for recently diagnosed endometrial cancer (with planned biopsy on 3/14/24) and now presents to [facility] ED c/o left sided chest pain.   Pt was at work today at 11:30 am when she experienced a pain in her left chest that felt like a gas bubble that was progressively worsening and associated with a tingling in her left arm.   Pt reports having recent  abdominal pain related to her uterine mass in her left lower abdomen and a diffuse crampy discomfort.      3/22/2024 pt seen and examined with partner and sister at bedside, patient states that over night she has an intense episode of pain when she needed to have BM but since then has been manageable. Patient is planning [place] [facility] [place] did discuss adding a long acting medication - will follow up outpatient with pallaitive care provider at [facility].       PAIN: (x )Yes   ( )No  Level: moderate to severe   Location: across abdomen   Intensity:  5-10  /10  Quality: throbbing sharp at time   Aggravating Factors: having BM or movement   Alleviating Factors: medications   Radiation: around side/flank   Duration/Timing: constant but much worse at times   Impact on ADLs:     DYSPNEA: ( ) Yes  ( ) No  Level:    PAST MEDICAL & SURGICAL HISTORY:  Fibroid  H/O oral surgery under general anesthesia during childhood  S/P D&C (status post dilation and curettage) x2  S/P laparoscopy as part of infertility work up  S/P tonsillectomy    SOCIAL HX:    Hx opiate tolerance ( )YES  (x )NO    Baseline ADLs  (Prior to Admission)  (x ) Independent   ( )Dependent    FAMILY HISTORY:  Family history of uterine cancer (Mother)    Review of Systems:    Anxiety- at times   Depression- situational   Physical Discomfort- yes  Constipation- denies but difficult to have BM at times   Anorexia- maybe   Weight Loss- yes  Fatigue- denies   Weakness- yes    All other systems reviewed and negative    PHYSICAL EXAM:    Vital Signs Last 24 Hrs  T(C): 36.8 (22 Mar 2024 06:00), Max: 36.8 (22 Mar 2024 06:00)  T(F): 98.2 (22 Mar 2024 06:00), Max: 98.2 (22 Mar 2024 06:00)  HR: 110 (22 Mar 2024 12:00) (103 - 117)  BP: 116/66 (22 Mar 2024 12:00) (104/62 - 116/76)  BP(mean): 80 (22 Mar 2024 12:00) (75 - 88)  RR: 15 (22 Mar 2024 12:00) (15 - 25)  SpO2: 97% (22 Mar 2024 12:00) (94% - 97%)    Parameters below as of 22 Mar 2024 12:00  Patient On (Oxygen Delivery Method): room air    PPSV2: 70 %    General: pleasant female in bed, NAD   Mental Status: alert and oriented   HEENT: mmm   Lungs: cta b/l   Cardiac: s1s2 +   GI: nontender, nondistended +BS   : +voiding   Ext: no edema present   Neuro: intact     LABS:                        8.6    10.28 )-----------( 587      ( 22 Mar 2024 05:42 )             28.2     03-22    135  |  101  |  6<L>  ----------------------------<  92  3.9   |  24  |  0.31<L>    Ca    9.0      22 Mar 2024 05:42  Phos  3.9     03-21  Mg     1.9     03-22    TPro  6.7  /  Alb  1.8<L>  /  TBili  0.5  /  DBili  x   /  AST  13<L>  /  ALT  8<L>  /  AlkPhos  77  03-21    PT/INR - ( 21 Mar 2024 05:09 )   PT: 14.8 sec;   INR: 1.32 ratio    PTT - ( 21 Mar 2024 05:09 )  PTT:34.5 sec    Albumin: Albumin: 1.8 g/dL (03-21 @ 05:09)    Allergies - No Known Allergies    Intolerances - shellfish (Vomiting)    MEDICATIONS  (STANDING):  heparin   Injectable 5000 Unit(s) SubCutaneous every 8 hours  lidocaine   4% Patch 2 Patch Transdermal daily  medroxyPROGESTERone 10 milliGRAM(s) Oral daily  senna 2 Tablet(s) Oral at bedtime    MEDICATIONS  (PRN):  acetaminophen     Tablet .. 650 milliGRAM(s) Oral every 6 hours PRN Mild Pain (1 - 3)  acetaminophen   IVPB .. 1000 milliGRAM(s) IV Intermittent once PRN Moderate Pain (4 - 6)  HYDROmorphone   Tablet 2 milliGRAM(s) Oral every 4 hours PRN Moderate Pain (4 - 6)  HYDROmorphone   Tablet 4 milliGRAM(s) Oral every 4 hours PRN Severe Pain (7 - 10)  naloxone Injectable 0.1 milliGRAM(s) IV Push every 3 minutes PRN For ANY of the following changes in patient status:  A. RR LESS THAN 10 breaths per minute, B. Oxygen saturation LESS THAN 90%, C. Sedation score of 6  ondansetron Injectable 4 milliGRAM(s) IV Push every 6 hours PRN Nausea  polyethylene glycol 3350 17 Gram(s) Oral two times a day PRN Constipation      RADIOLOGY/ADDITIONAL STUDIES:    ACC: [id number] EXAM:  XR CHEST PORTABLE ROUTINE 1V   ORDERED BY: ELAINA BOYLE   PROCEDURE DATE:  03/21/2024    INTERPRETATION:  Exam:XR CHEST  clinical history:Effusion  Stable bilateral pulmonary nodules. No acute infiltrates. No pneumothorax.    IMPRESSION: Stable pulmonary nodules    ACC: [id number] EXAM:  US DPLX LWR EXT VEINS COMPL BI   ORDERED BY: ROWDY DIEGO   PROCEDURE DATE:  03/20/2024    INTERPRETATION:  CLINICAL INFORMATION: Tachycardia. Malignancy. Lower extremity pain.  COMPARISON: None available.  TECHNIQUE: Duplex sonography of the BILATERAL LOWER extremity veins with   color and spectral Doppler, with and without compression.    FINDINGS:  RIGHT:  Normal compressibility of the RIGHT common femoral, femoral and popliteal   veins.  Doppler examination shows normal spontaneous and phasic flow.  No RIGHT calf vein thrombosis is detected.  LEFT:  Normal compressibility of the LEFT common femoral, femoral and popliteal   veins.  Doppler examination shows normal spontaneous and phasic flow.  No LEFT calf vein thrombosis is detected.  IMPRESSION:  No evidence of deep venous thrombosis in either lower extremity.          
Surgeon: Faith    Consult requesting by: ER    HISTORY OF PRESENT ILLNESS:    52 y/o female with a PMHx of fibroids, Pt of [facility] [place]. s/p  IR biopsy Rt lung nodule at [facility] 2/26 .  PT currently undergoing workup for recently diagnosed endometrial cancer presents to the ED c/o left sided CP. Pt reports she had a pain in her left chest that felt like a gas bubble that was progressively worsening. Denies rash. No other complaints at this time.  Pt noted to have a moderate PTX on left side . Now s/p Left pigtail placement   PAST MEDICAL & SURGICAL HISTORY:  Fibroid      H/O oral surgery  under general anesthesia during childhood      S/P D&C (status post dilation and curettage)  x2      S/P laparoscopy  as part of infertility work up      S/P tonsillectomy          MEDICATIONS  (STANDING):  acetaminophen   IVPB .. 750 milliGRAM(s) IV Intermittent once  lidocaine   4% Patch 1 Patch Transdermal daily    MEDICATIONS  (PRN):  acetaminophen     Tablet .. 650 milliGRAM(s) Oral once PRN Temp greater or equal to 38C (100.4F), Mild Pain (1 - 3)  morphine  - Injectable 2 milliGRAM(s) IV Push every 4 hours PRN Moderate Pain (4 - 6)    Antiplatelet therapy:      none                      Last dose/amt:    Allergies    No Known Allergies    Intolerances    shellfish (Vomiting)      SOCIAL HISTORY:  Smoker: [ ] Yes  [x ] No        PACK YEARS:                         WHEN QUIT?  ETOH use: [ ] Yes  [ x] No              FREQUENCY / QUANTITY:  Ilicit Drug use:  [ ] Yes  [ x] No  Occupation:  work for [employer]   Live with: with the life partner   Assisted device use: none     FAMILY HISTORY:      Review of Systems  CONSTITUTIONAL:  Fevers[ ] chills[ ] sweats[ ] fatigue[ ] weight loss[ ] weight gain [ ]                                     NEGATIVE [ x]   NEURO:  parathesias[ ] seizures [ ]  syncope [ ]  confusion [ ]                                                                                NEGATIVE[x ]   EYES: glasses[ ]  blurry vision[ ]  discharge[ ] pain[ ] glaucoma [ ]                                                                          NEGATIVE[ x]   ENMT:  difficulty hearing [ ]  vertigo[ ]  dysphagia[ ] epistaxis[ ] recent dental work [ ]                                    NEGATIVE[ x]   CV:  chest pain[ ] palpitations[ ] ELDER [ ] diaphoresis [ ]                                                                                           NEGATIVE[x ]   RESPIRATORY:  wheezing[ ] SOB[ ] cough [ ] sputum[ ] hemoptysis[ ]                                                                  NEGATIVE[x ]   GI:  nausea[ ]  vommiting [ ]  diarrhea[ ] constipation [ ] melena [ ]                                                                      NEGATIVE[x ]   : hematuria[ ]  dysuria[ ] urgency[ ] incontinence[ ]                                                                                            NEGATIVE[ x]   MUSKULOSKELETAL:  arthritis[ ]  joint swelling [ ] muscle weakness [ ]                                                                NEGATIVE[ x]   SKIN/BREAST:  rash[ ] itching [ ]  hair loss[ ] masses[ ]                                                                                              NEGATIVE[x ]   PSYCH:  dementia [ ] depresion [ ] anxiety[x ]                                                                                                               NEGATIVE[x ]   HEME/LYMPH:  bruises easily[ ] enlarged lymph nodes[ ] tender lymph nodes[ ]                                               NEGATIVE[x ]   ENDOCRINE:  cold intolerance[ ] heat intolerance[ ] polydipsia[ ]                                                                          NEGATIVE[x ]     PHYSICAL EXAM  Vital Signs Last 24 Hrs  T(C): 36.6 (11 Mar 2024 16:06), Max: 36.6 (11 Mar 2024 16:06)  T(F): 97.8 (11 Mar 2024 16:06), Max: 97.8 (11 Mar 2024 16:06)  HR: 120 (11 Mar 2024 16:06) (110 - 120)  BP: 128/77 (11 Mar 2024 16:06) (124/79 - 131/81)  BP(mean): --  RR: 20 (11 Mar 2024 16:06) (17 - 20)  SpO2: 100% (11 Mar 2024 16:06) (100% - 100%)    Parameters below as of 11 Mar 2024 16:06  Patient On (Oxygen Delivery Method): room air        CONSTITUTIONAL:                                                                          WNL[x ]   Neuro: WNL[ x] Normal exam oriented to person/place & time with no focal motor or sensory  deficits. Other                     Eyes: WNL[x ]   Normal exam of conjunctiva & lids, pupils equally reactive. Other     ENT: WNL[x ]    Normal exam of nasal/oral mucosa with absence of cyanosis. Other  Neck: WNL[ x]  Normal exam of jugular veins, trachea & thyroid. Other  Chest: WNL[ ] Normal lung exam with good air movement absence of wheezes, rales, or rhonchi: Other        clear decreased b/l                                                                          CV:  Auscultation: normal [x ] S3[ ] S4[ ] Irregular [ ] Rub[ ] Clicks[ ]    Murmurs none:[ ]systolic [ ]  diastolic [ ] holosystolic [ ]  Carotids: No Bruits[ x] Other                 Abdominal Aorta: normal [ ] nonpalpable[ ]Other                                                                                      GI:           WNL[ x] Normal exam of abdomen, liver & spleen with no noted masses or tenderness. Other                                                                                                        Extremities: WNL[ ] Normal no evidence of cyanosis or deformity Edema: none[x ]trace[ ]1+[ ]2+[ ]3+[ ]4+[ ]                                                            LABS:                        8.7    9.08  )-----------( 799      ( 11 Mar 2024 13:27 )             29.4     03-11    136  |  104  |  17  ----------------------------<  90  5.0   |  25  |  0.61    Ca    10.0      11 Mar 2024 13:27    TPro  8.5<H>  /  Alb  2.4<L>  /  TBili  0.3  /  DBili  x   /  AST  26  /  ALT  19  /  AlkPhos  112  03-11    PT/INR - ( 11 Mar 2024 13:27 )   PT: 16.2 sec;   INR: 1.45 ratio         PTT - ( 11 Mar 2024 13:27 )  PTT:36.2 sec  Urinalysis Basic - ( 11 Mar 2024 13:27 )    Color: x / Appearance: x / SG: x / pH: x  Gluc: 90 mg/dL / Ketone: x  / Bili: x / Urobili: x   Blood: x / Protein: x / Nitrite: x   Leuk Esterase: x / RBC: x / WBC x   Sq Epi: x / Non Sq Epi: x / Bacteria: x      < from: Xray Chest 2 Views PA/Lat (03.11.24 @ 13:57) >  IMPRESSION: Scattered nodular densities are seen in both lungs of varying   sizes. One should consider metastatic disease. This reflects an interval   change since previous chest x-ray.    Left-sided upper lung pneumothorax noted. Finding should be compared with   CT scan performed today the results of which are available under separate   cover. Heart is within normal limits in its transthoracic diameter.   Regional osseous structures appropriate for age    < end of copied text >                                      
Chief complaint:  Patient is a 53y old  Female who presents with a chief complaint of Left side chest pain L Pneumothorax      HPI:  Pt is a pleasant  54 y/o female with a PMHx of fibroids, who is under evaluation  for recently diagnosed endometrial cancer (with planned biopsy on 3/14/24) and now presents to [place] ED c/o left sided chest pain.   Pt was found to have left pneumothorax, now s/p pleurodesis. IR consulted for biopsy of the uterine mass, as per heme/onc, the initial pulmonary bx did not have enough specificity to accurately direct chemotherapy. Pt seen at bedside.     Allergies  No Known Allergies      MEDICATIONS  (STANDING):  lidocaine   4% Patch 1 Patch Transdermal daily  lidocaine   4% Patch 2 Patch Transdermal daily  medroxyPROGESTERone 10 milliGRAM(s) Oral daily  senna 2 Tablet(s) Oral at bedtime  sodium chloride 0.9%. 1000 milliLiter(s) (75 mL/Hr) IV Continuous <Continuous>    MEDICATIONS  (PRN):  acetaminophen     Tablet .. 650 milliGRAM(s) Oral every 6 hours PRN Mild Pain (1 - 3)  acetaminophen   IVPB .. 750 milliGRAM(s) IV Intermittent once PRN Temp greater or equal to 38C (100.4F), Mild Pain (1 - 3)  ALPRAZolam 0.25 milliGRAM(s) Oral every 8 hours PRN anxiety  naloxone Injectable 0.1 milliGRAM(s) IV Push every 3 minutes PRN For ANY of the following changes in patient status:  A. RR LESS THAN 10 breaths per minute, B. Oxygen saturation LESS THAN 90%, C. Sedation score of 6  ondansetron Injectable 4 milliGRAM(s) IV Push every 6 hours PRN Nausea  oxyCODONE    IR 5 milliGRAM(s) Oral every 4 hours PRN Moderate Pain (4 - 6)  oxyCODONE    IR 10 milliGRAM(s) Oral every 6 hours PRN Severe Pain (7 - 10)  polyethylene glycol 3350 17 Gram(s) Oral two times a day PRN Constipation  zolpidem 5 milliGRAM(s) Oral at bedtime PRN Insomnia      PAST MEDICAL & SURGICAL HISTORY:  Fibroid      H/O oral surgery  under general anesthesia during childhood      S/P D&C (status post dilation and curettage)  x2      S/P laparoscopy  as part of infertility work up      S/P tonsillectomy          FAMILY HISTORY:  Family history of uterine cancer (Mother)      Vital Signs Last 24 Hrs  T(C): 37 (20 Mar 2024 08:51), Max: 37 (20 Mar 2024 05:51)  T(F): 98.6 (20 Mar 2024 08:51), Max: 98.6 (20 Mar 2024 05:51)  HR: 110 (20 Mar 2024 08:00) (102 - 118)  BP: 121/71 (20 Mar 2024 08:00) (111/70 - 127/73)  BP(mean): 86 (20 Mar 2024 08:00) (76 - 101)  RR: 19 (20 Mar 2024 08:00) (15 - 26)  SpO2: 96% (20 Mar 2024 08:00) (96% - 100%)    Parameters below as of 20 Mar 2024 04:00  Patient On (Oxygen Delivery Method): room air      GENERAL APPEARANCE: Well developed, in no acute distress.    LUNGS: Auscultation of the lungs revealed normal breath sounds without any other adventitious sounds or rubs.    CARDIOVASCULAR: There was a regular rate and rhythm    ABDOMEN: Soft and nontender with normal bowel sounds.     NEUROLOGIC: Alert and oriented x 3. Normal affect.     CBC                        8.8    10.04 )-----------( 655      ( 20 Mar 2024 06:04 )             28.7       Chemistry  03-20    136  |  102  |  10  ----------------------------<  79  3.8   |  23  |  0.35<L>    Ca    9.2      20 Mar 2024 06:04  Phos  4.0     03-20  Mg     2.0     03-20    TPro  7.0  /  Alb  1.8<L>  /  TBili  0.5  /  DBili  x   /  AST  12<L>  /  ALT  11<L>  /  AlkPhos  78  03-20      < from: MR Pelvis w/wo IV Cont (12.17.23 @ 15:15) >  IMPRESSION:  1.  Enlarged uterus with multiple uterine fibroids as described above.  2.  No evidence of endometriosis.  3.  A new 2.1 cm right lower lobe enhancing nodule. Recommend further   evaluation with CT chest.    < end of copied text >      
HPI:  52 y/o female with a PMHx of fibroids, who is under evaluation for recently diagnosed endometrial cancer (with planned biopsy on 3/14/24), presents to [place] ED c/o left sided chest pain. Pt was at work today at 11:30 am when she experienced a pain in her left chest that felt like a gas bubble that was progressively worsening and associated with a tingling in her left arm. Pt reports having recent  abdominal pain related to her uterine mass in her left lower abdomen and a diffuse crampy discomfort.  For this she took  2 tylenols at 8pm last night,  200mg advil at 10pm last night and 0.125mg of xanax at 3am this morning.      3/18/24: Seen at bedside along with Dr. Mart; accompanied by family.     PAST MEDICAL & SURGICAL HISTORY:    Fibroid    H/O oral surgery  under general anesthesia during childhood    S/P D&C (status post dilation and curettage)  x2    S/P laparoscopy  as part of infertility work up    S/P tonsillectomy    FAMILY HISTORY:    Family history of uterine cancer (Mother)    MEDICATIONS  (STANDING):    heparin Injectable 5000 Unit(s) SubCutaneous every 8 hours  lidocaine   4% Patch 1 Patch Transdermal daily  lidocaine   4% Patch 2 Patch Transdermal daily  medroxyPROGESTERone 10 milliGRAM(s) Oral daily  senna 2 Tablet(s) Oral at bedtime    MEDICATIONS  (PRN):    acetaminophen     Tablet .. 650 milliGRAM(s) Oral every 6 hours PRN Mild Pain (1 - 3)  acetaminophen   IVPB .. 750 milliGRAM(s) IV Intermittent once PRN Temp greater or equal to 38C (100.4F), Mild Pain (1 - 3)  ALPRAZolam 0.25 milliGRAM(s) Oral every 8 hours PRN anxiety  naloxone Injectable 0.1 milliGRAM(s) IV Push every 3 minutes PRN For ANY of the following changes in patient status:  A. RR LESS THAN 10 breaths per minute, B. Oxygen saturation LESS THAN 90%, C. Sedation score of 6  ondansetron Injectable 4 milliGRAM(s) IV Push every 6 hours PRN Nausea  oxyCODONE    IR 5 milliGRAM(s) Oral every 4 hours PRN Moderate Pain (4 - 6)  oxyCODONE    IR 10 milliGRAM(s) Oral every 6 hours PRN Severe Pain (7 - 10)  polyethylene glycol 3350 17 Gram(s) Oral two times a day PRN Constipation  zolpidem 5 milliGRAM(s) Oral at bedtime PRN Insomnia    Allergies    No Known Allergies    Intolerances    shellfish (Vomiting)    REVIEW OF SYSTEM:    Constitutional, Eyes, ENT, Cardiovascular, Respiratory, Gastrointestinal, Genitourinary, Musculoskeletal, Integumentary, Neurological, Psychiatric, Endocrine, Heme/Lymph and Allergic/Immunologic review of systems are otherwise negative except as noted in HPI.     Vital Signs Last 24 Hrs    T(C): 37 (17 Mar 2024 20:43), Max: 37 (17 Mar 2024 20:43)  T(F): 98.6 (17 Mar 2024 20:43), Max: 98.6 (17 Mar 2024 20:43)  HR: 125 (18 Mar 2024 12:00) (102 - 130)  BP: 130/86 (18 Mar 2024 12:00) (91/69 - 143/98)  BP(mean): 98 (18 Mar 2024 12:00) (74 - 109)  RR: 23 (18 Mar 2024 12:00) (13 - 24)  SpO2: 96% (18 Mar 2024 12:00) (93% - 100%)    Parameters below as of 18 Mar 2024 08:00  Patient On (Oxygen Delivery Method): room air    PHYSICAL EXAM:    Constitutional: no acute distress  Eyes: no conjunctival infection, anicteric.   ENT: pharynx is unremarkable  Neck: supple without JVD  Pulmonary: clear to auscultation bilaterally   Cardiac: RRR  Vascular: no calf tenderness, venous stasis changes, varices  Abdomen: normoactive bowel sounds, soft and nontender, no hepatosplenomegaly or masses appreciated  Lymphatic: no peripheral adenopathy appreciated  Musculoskeletal: full range of motion and no deformities appreciated  Skin: normal appearance, no rash/erythema  Neurology: awake, alert, oriented; no focal deficits    LABS:    CBC Full  -  ( 18 Mar 2024 12:08 )  WBC Count : 10.71 K/uL  RBC Count : 3.58 M/uL  Hemoglobin : 8.7 g/dL  Hematocrit : 29.4 %  Platelet Count - Automated : 710 K/uL  Mean Cell Volume : 82.1 fl  Mean Cell Hemoglobin : 24.3 pg  Mean Cell Hemoglobin Concentration : 29.6 gm/dL  Auto Neutrophil # : x  Auto Lymphocyte # : x  Auto Monocyte # : x  Auto Eosinophil # : x  Auto Basophil # : x  Auto Neutrophil % : x  Auto Lymphocyte % : x  Auto Monocyte % : x  Auto Eosinophil % : x  Auto Basophil % : x    03-18    134<L>  |  98  |  10  ----------------------------<  93  4.0   |  28  |  0.40<L>    Ca    9.5      18 Mar 2024 05:48  Phos  5.3     03-18  Mg     2.0     03-18    TPro  7.1  /  Alb  1.9<L>  /  TBili  0.4  /  DBili  x   /  AST  16  /  ALT  15  /  AlkPhos  82  03-18      Urinalysis Basic - ( 18 Mar 2024 05:48 )    Color: x / Appearance: x / SG: x / pH: x  Gluc: 93 mg/dL / Ketone: x  / Bili: x / Urobili: x   Blood: x / Protein: x / Nitrite: x   Leuk Esterase: x / RBC: x / WBC x   Sq Epi: x / Non Sq Epi: x / Bacteria: x    RADIOLOGY & ADDITIONAL STUDIES:    CT ANGIO CHEST PULM ART WAWIC     PROCEDURE DATE:  03/11/2024      INTERPRETATION:  INDICATION: Chest pain, endometrial cancer    TECHNIQUE: Helical acquisition of the chest after the administration of  70 mL of Omnipaque 350. Maximum intensity projection images were   generated.    COMPARISON: PET/CT 2/7/2024    FINDINGS:    HEART/VASCULATURE: No pulmonary embolus. Normal heart size. No   pericardial effusion.    LUNGS/AIRWAYS/PLEURA: Patent trachea and bronchi. New small left   pneumothorax. No pleural effusion. Increased size and number of cavitary   and noncavitary nodules and masses in both lungs, for example, 3.4 cm   right lower lobe mass (2-84), prior 3.0 cm, and new 0.7 cm cavitary   nodule in the left upper lobe (2-37).    LYMPH NODES/MEDIASTINUM: No lymphadenopathy.    UPPER ABDOMEN: Unremarkable.    BONES/SOFT TISSUES: Unremarkable.      IMPRESSION:    Since 2/7/2024:    New small left pneumothorax. This was discussed with Dr. Dong at 2:01 PM   on 3/11/2024.    Increased size and number of pulmonary metastases, some cavitary.        EXAM:  XR CHEST PORTABLE ROUTINE 1V     PROCEDURE DATE:  03/16/2024      INTERPRETATION:  TECHNIQUE: Single portable view of the chest.    COMPARISON:  3/15/2024    CLINICAL HISTORY: postop, pulmonary metastasis    FINDINGS:    Single frontal view of the chest demonstrates small bilateral   pneumothoraces. Bilateral chest tubes. Left thoracic wall subcutaneous   emphysema. 2 cm left lower lobe pulmonary nodule. 1.5 cc left upper lobe   pulmonary nodule. The cardiomediastinal silhouette is normal. No acute   osseous abnormalities. Overlying EKG leads and wires are noted    IMPRESSION:   Small bilateral pneumothoraces. Left-sided pulmonary nodules.      < from: NM PET/CT Onc FDG Skull to Thigh, Inital (02.07.24 @ 19:00) >  EXAM: [id number] - PETCT [facility] ONC FDG INIT  - ORDERED BY: JANICE OATES      PROCEDURE DATE:  02/07/2024           INTERPRETATION:  CLINICAL INFORMATION: Pulmonary nodules    TREATMENT STRATEGY EVALUATION: Initial  FASTING BLOOD SUGAR: 72 mg/dL  RADIOPHARMACEUTICAL:  10 mCi F-18, FDG, I.V.  UPTAKE PERIOD: 50 minutes  SCANNER: Siemens Biograph mCT 64  ORAL CONTRAST: Patient drank OMNIPAQUE contrast during the uptake period.  PHARMACOLOGIC INTERVENTION: None.    TECHNIQUE: Following intravenous injection of radiopharmaceutical and   above uptake period, PET/CT was obtained from base of skull  to   mid-thigh. CT protocol was optimized for PET attenuation correction and   anatomic localization and was not designed to produce and cannot replace   state-of-the-art diagnostic CT images with specific imaging protocols for   different body parts and indications. Images were reconstructed and   reviewed in axial, coronal and sagittal views and three-dimensional MIP.    The standardized uptake values (SUV) are normalized to patient body   weight and indicate the highest activity concentration (SUVmax) in a   given site. All image numbers refer to axial image number.    COMPARISON:  No prior FDG-PET/CT    OTHER STUDIES USED FOR CORRELATION: CT chest 1/13/2024    FINDINGS:    HEAD/NECK: Physiologic FDG activity in visualized brain, head, and neck.    THORAX: No abnormal FDG activity. No lymphadenopathy.    LUNGS: Bilateral FDG avid pulmonary nodules, which have enlarged since CT   1/13/2024. The largest is a mass in the right lower lobe, 3.1 x 2.9 cm   SUV 30 (image 117); previously 2.6 x 2.6 cm on CT 1/13/2024. Another FDG   avid nodule in the right lower lobe measures 0.9 x 0.9 cm, SUV 5 (image   86); previously 0.6 x 0.5 cm on CT 1/13/2024.    PLEURA/PERICARDIUM: No abnormal FDG activity. No effusion.    HEPATOBILIARY/PANCREAS: Physiologic FDG activity.  For reference, normal   liver demonstrates SUV mean 1.7.    SPLEEN: Physiologic FDG activity. Normal in size.    ADRENAL GLANDS: No abnormal FDG activity. No nodule.    KIDNEYS/URINARY BLADDER: Physiologic excreted FDG activity.    ABDOMEN/PELVIS/REPRODUCTIVE ORGANS: Enlarged uterus measuring 19 x 9.9 x   14 cm on MR pelvis 12/17/2023. There are multiple areas of FDG avidity   within the uterus,, with central photopenia suggesting areas of necrosis   or retained blood products, SUV 39 (image 196). Extension is evident into   the mesentery along the posterior lateral right aspect of the uterus, SUV   19 (image 192). Other multifocal areas of FDG avidity around the enlarged   uterus suggest extrauterine extension, for example an FDG avid focus on   image 180 to the right anterior aspect, and a focus in the left pelvic   sidewall which may be an enlarged lymph node, image 221. These are   difficult to correlate on noncontrast CT. There does appear to be   interval enlargement of these mass lesions including a 6 x 4 cm   peripherally FDG avid mass at the most superior aspect just right of   midline in the abdomen, image 174, not evident to this extent on MR   12/17/2023.    ESOPHAGUS/STOMACH/BOWEL: Mild FDG uptake in the gastric antrum, likely   inflammatory.    VESSELS: Unremarkable.    BONES/SOFT TISSUES: No abnormal FDG activity.    IMPRESSION:    1. FDG avid enlarging uterus masses with multiple lobulated masses,   concerning for malignancy. Several areas of FDG avidity outside of the   uterus suggest extrauterine extension or possible lymphadenopathy, as   above. Recommend correlation with biopsy and consider updated   contrast-enhanced anatomic imaging.    2. Multiple enlarging FDG avid pulmonary nodules, concerning for   malignancy. Recommend correlation with upcoming biopsy of right lower   lobe mass.

## 2024-03-22 NOTE — DISCHARGE NOTE NURSING/CASE MANAGEMENT/SOCIAL WORK - NSDCFUADDAPPT_GEN_ALL_CORE_FT
Please follow up with Dr Cuevas on 3/29 at 1:45pm  at [facility]     [place] [facility] [place]

## 2024-03-22 NOTE — PROGRESS NOTE ADULT - REASON FOR ADMISSION
Left side chest pain  L Pneumothorax

## 2024-03-22 NOTE — CHART NOTE - NSCHARTNOTESELECT_GEN_ALL_CORE
Event Note
Off Service Note
Thoracic/Event Note
Consult Note/Off Service Note
Event Note
Thoracic/Event Note

## 2024-03-26 LAB
SURGICAL PATHOLOGY STUDY: SIGNIFICANT CHANGE UP
SURGICAL PATHOLOGY STUDY: SIGNIFICANT CHANGE UP

## 2024-03-27 NOTE — ASSESSMENT
[FreeTextEntry1] : Ms. FELICITAS MCCLELLAND, 53 year old female, never smoker, w/ hx of TING, anxiety, uterine fibroid f/u GYN, plan for hysterectomy on 03/05/2024, preop MRI abdomen showed lung nodules incidentally, who referred by Dr. Andino for evaluation.   PFTs on 1/8/24: %, FEV1 93%, DLCO 75%.   CT Chest on 1/13/24: - multiple new solid nodules in both lungs, largest 2.5 cm in the posterior basal segment of the right lower lobe - new punctate branching nodules in the left lower lobe, likely due to bronchiolar impaction  I have reviewed the patient's medical records and diagnostic images at time of this office consultation and have made the following recommendation: 1. PFTs and CT chest reviewed and explained to patient, bilateral lung nodules with unknown etiology. Will get a PET/CT and referred to IR for a CT guided biopsy of RLL nodule. RTC after to discuss findings and plan of care.    I, Dr. OATES, JANICE LINARES, personally performed the evaluation and management (E/M) services for this new patient.  That E/M includes conducting the initial examination, assessing all conditions, and establishing the plan of care.  Today, my ACP, MARLA RussC, was here to observe my evaluation and management services for this patient to be followed going forward. 
Resident

## 2024-03-29 ENCOUNTER — APPOINTMENT (OUTPATIENT)
Dept: THORACIC SURGERY | Facility: CLINIC | Age: 54
End: 2024-03-29

## 2024-04-04 ENCOUNTER — APPOINTMENT (OUTPATIENT)
Dept: OBGYN | Facility: CLINIC | Age: 54
End: 2024-04-04

## 2024-04-05 ENCOUNTER — APPOINTMENT (OUTPATIENT)
Dept: THORACIC SURGERY | Facility: CLINIC | Age: 54
End: 2024-04-05

## 2024-04-14 PROBLEM — R91.8 LUNG NODULES: Status: ACTIVE | Noted: 2024-01-04

## 2024-04-15 ENCOUNTER — APPOINTMENT (OUTPATIENT)
Dept: THORACIC SURGERY | Facility: CLINIC | Age: 54
End: 2024-04-15
Payer: COMMERCIAL

## 2024-04-15 ENCOUNTER — NON-APPOINTMENT (OUTPATIENT)
Age: 54
End: 2024-04-15

## 2024-04-15 DIAGNOSIS — R91.8 OTHER NONSPECIFIC ABNORMAL FINDING OF LUNG FIELD: ICD-10-CM

## 2024-04-15 PROCEDURE — 99024 POSTOP FOLLOW-UP VISIT: CPT

## 2024-04-18 NOTE — REASON FOR VISIT
[Home] : at home, [unfilled] , at the time of the visit. [Medical Office: ([facility])___] : at the medical office located in  [Patient] : the patient [This encounter was initiated by telehealth (audio with video) and converted to telephone (audio only) due to technical difficulties.] : This encounter was initiated by telehealth (audio with video) and converted to telephone (audio only) due to technical difficulties. [de-identified] : Robot-assisted thoracoscopy with parietal pleurectomy left sided. chemical pleurodesis, SERINA Wedge resection [de-identified] : 3/15/2024 [de-identified] : overall doing well, [place] [facility] [place] post d/c she had a recurrent ptx on R [place] and [place] [facility] [place] with tube thoracostomy, she has since felt much better and her breathing is normal, no new pain symptoms

## 2024-04-18 NOTE — ASSESSMENT
[FreeTextEntry1] : Ms. 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  PFTs on 1/8/24: %, FEV1 93%, DLCO 75%.   CT Chest on 1/13/24: - multiple new solid nodules in both lungs, largest 2.5 cm in the posterior basal segment of the right lower lobe - new punctate branching nodules in the left lower lobe, likely due to bronchiolar impaction  Now s/p R.A. thoracoscopy with parietal pleurectomy left sided. chemical pleurodesis, SERINA Wedge resection on 3/15/2024. 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 Path of Lt. parietal pleura revealed acute fibrinous pleuritis with necrosis and mesothelial hyperplasia.      I have reviewed the patient's medical records and diagnostic images at time of this office consultation and have made the following recommendation: 1. in office visit this Friday 4/19 with CXR to evaluate post surgical left side and recurrent ptx on right side  this is a tele health phone call that lasted 12 minutes

## 2024-04-18 NOTE — DISCUSSION/SUMMARY
[FreeTextEntry1] : Accession:                             [id number]  Collected Date/Time:                   3/15/2024 07:41 EDT Received Date/Time:                    3/15/2024 12:12 EDT  Surgical Pathology Report - Auth (Verified)  Specimen(s) Submitted 1  Left upper lobe wedge resection 2  Left parietal pleura  Final Diagnosis 1.  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  Parietal pleura, left: -Acute fibrinous pleuritis with necrosis -Mesothelial hyperplasia Verified by: CHUN MOBLEY M.D. (Electronic Signature) Reported on: 03/26/24 15:15 EDT, [facility], [address] [place] [address] Phone: [phone]   Fax: [phone] _________________________________________________________________   Comment Please see related 31-M-.  This case represents a current or recent malignant diagnosis and as such, a copy is being forwarded to the Tumor Registrar.     Please be reminded that all tumor registry cases must be accurately staged and tracked.  -For inpatients, please complete the tumor staging form on the patient's chart based on the clinical data which you have compiled.  -Please remind your office to respond promptly to the    Tumor Registrar's  request for patient follow-up.  1.-2.  All or portions of this case have undergone intradepartmental review (2,6)  Clinical Information Left spontaneous pnemothorax, metastatic endometrial sarcoma  Gross Description 1.  Received in formalin labeled  "left upper lobe wedge resection"   is a 5.7 x 1.6 x 1.5 cm, 3.8 g wedge of lung with an attached staple line.  The staple end is removed and the underlying tissue is inked green. The pleural surface is purple-tan, focally hemorrhagic and smooth. The cut surface is pale-tan to focally hemorrhagic, spongy to consolidated. No definitive lesions are identified. Representative sections in two cassettes: 1A-1B  2.  Received in formalin labeled  "left parietal pleura"  is a 4.2 x 3.7 x 1.5 cm aggregate of tan shaggy fibromembranous tissue with minimal attached fibrofatty tissue. No definitive lesions are identified. Representative sections in one cassette: 2A  JILL Cifuentes ([facility]) 03/18/2024 10:03 AM

## 2024-04-19 ENCOUNTER — RESULT REVIEW (OUTPATIENT)
Age: 54
End: 2024-04-19

## 2024-04-19 ENCOUNTER — OUTPATIENT (OUTPATIENT)
Dept: OUTPATIENT SERVICES | Facility: HOSPITAL | Age: 54
LOS: 1 days | End: 2024-04-19
Payer: COMMERCIAL

## 2024-04-19 ENCOUNTER — APPOINTMENT (OUTPATIENT)
Dept: THORACIC SURGERY | Facility: CLINIC | Age: 54
End: 2024-04-19
Payer: COMMERCIAL

## 2024-04-19 VITALS
DIASTOLIC BLOOD PRESSURE: 66 MMHG | HEIGHT: 64 IN | WEIGHT: 105 LBS | HEART RATE: 135 BPM | BODY MASS INDEX: 17.93 KG/M2 | SYSTOLIC BLOOD PRESSURE: 106 MMHG | OXYGEN SATURATION: 98 %

## 2024-04-19 DIAGNOSIS — Z90.89 ACQUIRED ABSENCE OF OTHER ORGANS: Chronic | ICD-10-CM

## 2024-04-19 DIAGNOSIS — R91.8 OTHER NONSPECIFIC ABNORMAL FINDING OF LUNG FIELD: ICD-10-CM

## 2024-04-19 DIAGNOSIS — Z98.890 OTHER SPECIFIED POSTPROCEDURAL STATES: Chronic | ICD-10-CM

## 2024-04-19 PROCEDURE — 99024 POSTOP FOLLOW-UP VISIT: CPT

## 2024-04-19 PROCEDURE — 71046 X-RAY EXAM CHEST 2 VIEWS: CPT | Mod: 26

## 2024-04-19 PROCEDURE — 71046 X-RAY EXAM CHEST 2 VIEWS: CPT

## 2024-04-19 RX ORDER — NITROFURANTOIN (MONOHYDRATE/MACROCRYSTALS) 25; 75 MG/1; MG/1
100 CAPSULE ORAL
Qty: 14 | Refills: 0 | Status: DISCONTINUED | COMMUNITY
Start: 2024-02-13 | End: 2024-04-19

## 2024-04-19 RX ORDER — DEXAMETHASONE 4 MG/1
4 TABLET ORAL
Refills: 0 | Status: ACTIVE | COMMUNITY

## 2024-04-19 RX ORDER — FERROUS SULFATE TAB EC 325 MG (65 MG FE EQUIVALENT) 325 (65 FE) MG
325 (65 FE) TABLET DELAYED RESPONSE ORAL
Refills: 0 | Status: ACTIVE | COMMUNITY

## 2024-04-19 RX ORDER — OLANZAPINE 2.5 MG/1
2.5 TABLET, FILM COATED ORAL DAILY
Refills: 0 | Status: ACTIVE | COMMUNITY

## 2024-04-19 RX ORDER — ALPRAZOLAM 0.25 MG/1
0.25 TABLET ORAL
Qty: 30 | Refills: 0 | Status: DISCONTINUED | COMMUNITY
Start: 2023-12-06 | End: 2024-04-19

## 2024-04-19 RX ORDER — MORPHINE SULFATE 30 MG/1
30 TABLET, FILM COATED, EXTENDED RELEASE ORAL
Refills: 0 | Status: ACTIVE | COMMUNITY

## 2024-04-19 RX ORDER — LORAZEPAM 0.5 MG/1
0.5 TABLET ORAL 3 TIMES DAILY
Refills: 0 | Status: ACTIVE | COMMUNITY

## 2024-04-19 RX ORDER — PREGABALIN 75 MG/1
75 CAPSULE ORAL 3 TIMES DAILY
Refills: 0 | Status: ACTIVE | COMMUNITY

## 2024-04-19 RX ORDER — SENNOSIDES 8.6 MG/1
8.6 CAPSULE, GELATIN COATED ORAL
Refills: 0 | Status: ACTIVE | COMMUNITY

## 2024-04-20 DIAGNOSIS — R91.8 OTHER NONSPECIFIC ABNORMAL FINDING OF LUNG FIELD: ICD-10-CM

## 2024-04-22 ENCOUNTER — NON-APPOINTMENT (OUTPATIENT)
Age: 54
End: 2024-04-22

## 2024-04-24 ENCOUNTER — OUTPATIENT (OUTPATIENT)
Dept: OUTPATIENT SERVICES | Facility: HOSPITAL | Age: 54
LOS: 1 days | End: 2024-04-24
Payer: COMMERCIAL

## 2024-04-24 ENCOUNTER — APPOINTMENT (OUTPATIENT)
Dept: RADIOLOGY | Facility: HOSPITAL | Age: 54
End: 2024-04-24
Payer: COMMERCIAL

## 2024-04-24 ENCOUNTER — NON-APPOINTMENT (OUTPATIENT)
Age: 54
End: 2024-04-24

## 2024-04-24 DIAGNOSIS — Z98.890 OTHER SPECIFIED POSTPROCEDURAL STATES: Chronic | ICD-10-CM

## 2024-04-24 DIAGNOSIS — C78.00 SECONDARY MALIGNANT NEOPLASM OF UNSPECIFIED LUNG: ICD-10-CM

## 2024-04-24 DIAGNOSIS — Z90.89 ACQUIRED ABSENCE OF OTHER ORGANS: Chronic | ICD-10-CM

## 2024-04-24 PROCEDURE — 71046 X-RAY EXAM CHEST 2 VIEWS: CPT | Mod: 26

## 2024-04-24 PROCEDURE — 71046 X-RAY EXAM CHEST 2 VIEWS: CPT

## 2024-04-25 NOTE — ASSESSMENT
[FreeTextEntry1] : Ms. 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  PFTs on 1/8/24: %, FEV1 93%, DLCO 75%.   CT Chest on 1/13/24: - multiple new solid nodules in both lungs, largest 2.5 cm in the posterior basal segment of the right lower lobe - new punctate branching nodules in the left lower lobe, likely due to bronchiolar impaction  Now s/p R.A. thoracoscopy with parietal pleurectomy left sided. chemical pleurodesis, SERINA Wedge resection on 3/15/2024. 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 Path of Lt. parietal pleura revealed acute fibrinous pleuritis with necrosis and mesothelial hyperplasia.   S/p Rt. pleurodesis ([facility]) on 3 on 3/28 Pt. started chemotherapy on 4/1/2024 with Dr. Calixto ([facility]) - plan for 3 cycles of chemo and then repeat CT scan.  CXR today-- stable findings  Patient presents today for possible infection in one of the incisions on her back.    I have reviewed the patient's medical records and diagnostic images at time of this office consultation and have made the following recommendation: 1. CXR reviewed and discussed with patient, stable findings. 2. Surgical wound on her back noted to be clean and dry with dry scab, no signs of infection noted. 3. Clinical f/u in one month via TTM  I, ELAINA Ernst, personally performed the evaluation and management (E/M) services for this established patient who presents today with (a) new problem(s)/exacerbation of (an) existing condition(s).  That E/M includes conducting the examination, assessing all new/exacerbated conditions, and establishing a new plan of care.  Today, my ACP, Margarita Muñoz/ VIRGINIA Silvestre, was here to observe my evaluation and management services for this new problem/exacerbated condition to be followed going forward.

## 2024-04-25 NOTE — REASON FOR VISIT
[de-identified] : Robot-assisted thoracoscopy with parietal pleurectomy left sided. chemical pleurodesis, SERINA Wedge resection [de-identified] : 3/15/2024

## 2024-04-25 NOTE — PHYSICAL EXAM
[Bleeding] : no active bleeding [Foul Odor] : no foul smell [Purulent Drainage] : no purulent drainage [Serosanguinous Drainage] : no serosanguinous drainage [Erythema] : not erythematous [Warm] : not warm [Tender] : not tender

## 2024-04-26 ENCOUNTER — OUTPATIENT (OUTPATIENT)
Dept: OUTPATIENT SERVICES | Facility: HOSPITAL | Age: 54
LOS: 1 days | End: 2024-04-26
Payer: COMMERCIAL

## 2024-04-26 ENCOUNTER — APPOINTMENT (OUTPATIENT)
Dept: THORACIC SURGERY | Facility: CLINIC | Age: 54
End: 2024-04-26
Payer: COMMERCIAL

## 2024-04-26 ENCOUNTER — INPATIENT (INPATIENT)
Facility: HOSPITAL | Age: 54
LOS: 0 days | Discharge: HOME | DRG: 204 | End: 2024-04-27
Attending: INTERNAL MEDICINE | Admitting: STUDENT IN AN ORGANIZED HEALTH CARE EDUCATION/TRAINING PROGRAM
Payer: COMMERCIAL

## 2024-04-26 VITALS — HEIGHT: 64 IN | WEIGHT: 100.09 LBS

## 2024-04-26 VITALS
BODY MASS INDEX: 17.93 KG/M2 | OXYGEN SATURATION: 97 % | RESPIRATION RATE: 16 BRPM | DIASTOLIC BLOOD PRESSURE: 71 MMHG | SYSTOLIC BLOOD PRESSURE: 102 MMHG | HEART RATE: 136 BPM | HEIGHT: 64 IN | WEIGHT: 105 LBS

## 2024-04-26 DIAGNOSIS — Z98.890 OTHER SPECIFIED POSTPROCEDURAL STATES: Chronic | ICD-10-CM

## 2024-04-26 DIAGNOSIS — Z90.89 ACQUIRED ABSENCE OF OTHER ORGANS: Chronic | ICD-10-CM

## 2024-04-26 DIAGNOSIS — R91.8 OTHER NONSPECIFIC ABNORMAL FINDING OF LUNG FIELD: ICD-10-CM

## 2024-04-26 DIAGNOSIS — R91.1 SOLITARY PULMONARY NODULE: ICD-10-CM

## 2024-04-26 DIAGNOSIS — R00.0 TACHYCARDIA, UNSPECIFIED: ICD-10-CM

## 2024-04-26 DIAGNOSIS — R06.02 SHORTNESS OF BREATH: ICD-10-CM

## 2024-04-26 LAB
ALBUMIN SERPL ELPH-MCNC: 2.4 G/DL — LOW (ref 3.3–5)
ALP SERPL-CCNC: 173 U/L — HIGH (ref 40–120)
ALT FLD-CCNC: 14 U/L — SIGNIFICANT CHANGE UP (ref 12–78)
ANION GAP SERPL CALC-SCNC: 9 MMOL/L — SIGNIFICANT CHANGE UP (ref 5–17)
ANISOCYTOSIS BLD QL: SLIGHT — SIGNIFICANT CHANGE UP
AST SERPL-CCNC: 26 U/L — SIGNIFICANT CHANGE UP (ref 15–37)
BASOPHILS # BLD AUTO: 0 K/UL — SIGNIFICANT CHANGE UP (ref 0–0.2)
BASOPHILS NFR BLD AUTO: 0 % — SIGNIFICANT CHANGE UP (ref 0–2)
BILIRUB SERPL-MCNC: 0.7 MG/DL — SIGNIFICANT CHANGE UP (ref 0.2–1.2)
BUN SERPL-MCNC: 15 MG/DL — SIGNIFICANT CHANGE UP (ref 7–23)
CALCIUM SERPL-MCNC: 9.4 MG/DL — SIGNIFICANT CHANGE UP (ref 8.5–10.1)
CHLORIDE SERPL-SCNC: 96 MMOL/L — SIGNIFICANT CHANGE UP (ref 96–108)
CO2 SERPL-SCNC: 27 MMOL/L — SIGNIFICANT CHANGE UP (ref 22–31)
CREAT SERPL-MCNC: 0.46 MG/DL — LOW (ref 0.5–1.3)
EGFR: 113 ML/MIN/1.73M2 — SIGNIFICANT CHANGE UP
EOSINOPHIL # BLD AUTO: 0 K/UL — SIGNIFICANT CHANGE UP (ref 0–0.5)
EOSINOPHIL NFR BLD AUTO: 0 % — SIGNIFICANT CHANGE UP (ref 0–6)
GLUCOSE SERPL-MCNC: 91 MG/DL — SIGNIFICANT CHANGE UP (ref 70–99)
HCT VFR BLD CALC: 30.6 % — LOW (ref 34.5–45)
HGB BLD-MCNC: 9.3 G/DL — LOW (ref 11.5–15.5)
HYPOCHROMIA BLD QL: SLIGHT — SIGNIFICANT CHANGE UP
LYMPHOCYTES # BLD AUTO: 1.13 K/UL — SIGNIFICANT CHANGE UP (ref 1–3.3)
LYMPHOCYTES # BLD AUTO: 2 % — LOW (ref 13–44)
MAGNESIUM SERPL-MCNC: 2.2 MG/DL — SIGNIFICANT CHANGE UP (ref 1.6–2.6)
MANUAL SMEAR VERIFICATION: SIGNIFICANT CHANGE UP
MCHC RBC-ENTMCNC: 26.3 PG — LOW (ref 27–34)
MCHC RBC-ENTMCNC: 30.4 GM/DL — LOW (ref 32–36)
MCV RBC AUTO: 86.4 FL — SIGNIFICANT CHANGE UP (ref 80–100)
MONOCYTES # BLD AUTO: 0 K/UL — SIGNIFICANT CHANGE UP (ref 0–0.9)
MONOCYTES NFR BLD AUTO: 0 % — LOW (ref 2–14)
NEUTROPHILS # BLD AUTO: 55.32 K/UL — HIGH (ref 1.8–7.4)
NEUTROPHILS NFR BLD AUTO: 98 % — HIGH (ref 43–77)
NRBC # BLD: 0 /100 WBCS — SIGNIFICANT CHANGE UP (ref 0–0)
NRBC # BLD: SIGNIFICANT CHANGE UP /100 WBCS (ref 0–0)
NT-PROBNP SERPL-SCNC: 186 PG/ML — HIGH (ref 0–125)
PLAT MORPH BLD: NORMAL — SIGNIFICANT CHANGE UP
PLATELET # BLD AUTO: 694 K/UL — HIGH (ref 150–400)
POTASSIUM SERPL-MCNC: 4.5 MMOL/L — SIGNIFICANT CHANGE UP (ref 3.5–5.3)
POTASSIUM SERPL-SCNC: 4.5 MMOL/L — SIGNIFICANT CHANGE UP (ref 3.5–5.3)
PROT SERPL-MCNC: 7.5 GM/DL — SIGNIFICANT CHANGE UP (ref 6–8.3)
RBC # BLD: 3.54 M/UL — LOW (ref 3.8–5.2)
RBC # FLD: 18 % — HIGH (ref 10.3–14.5)
RBC BLD AUTO: ABNORMAL
SODIUM SERPL-SCNC: 132 MMOL/L — LOW (ref 135–145)
TOXIC GRANULES BLD QL SMEAR: PRESENT — SIGNIFICANT CHANGE UP
TROPONIN I, HIGH SENSITIVITY RESULT: <3 NG/L — SIGNIFICANT CHANGE UP
WBC # BLD: 56.45 K/UL — CRITICAL HIGH (ref 3.8–10.5)
WBC # FLD AUTO: 56.45 K/UL — CRITICAL HIGH (ref 3.8–10.5)

## 2024-04-26 PROCEDURE — 99285 EMERGENCY DEPT VISIT HI MDM: CPT

## 2024-04-26 PROCEDURE — 93010 ELECTROCARDIOGRAM REPORT: CPT

## 2024-04-26 PROCEDURE — 71275 CT ANGIOGRAPHY CHEST: CPT | Mod: 26,MC

## 2024-04-26 PROCEDURE — 99223 1ST HOSP IP/OBS HIGH 75: CPT

## 2024-04-26 PROCEDURE — 71046 X-RAY EXAM CHEST 2 VIEWS: CPT | Mod: 26

## 2024-04-26 PROCEDURE — 71045 X-RAY EXAM CHEST 1 VIEW: CPT

## 2024-04-26 PROCEDURE — 99024 POSTOP FOLLOW-UP VISIT: CPT

## 2024-04-26 RX ORDER — LANOLIN ALCOHOL/MO/W.PET/CERES
3 CREAM (GRAM) TOPICAL AT BEDTIME
Refills: 0 | Status: DISCONTINUED | OUTPATIENT
Start: 2024-04-26 | End: 2024-04-27

## 2024-04-26 RX ORDER — FERROUS SULFATE 325(65) MG
325 TABLET ORAL DAILY
Refills: 0 | Status: DISCONTINUED | OUTPATIENT
Start: 2024-04-26 | End: 2024-04-27

## 2024-04-26 RX ORDER — OLANZAPINE 15 MG/1
1 TABLET, FILM COATED ORAL
Refills: 0 | DISCHARGE

## 2024-04-26 RX ORDER — ACETAMINOPHEN 500 MG
2 TABLET ORAL
Refills: 0 | DISCHARGE

## 2024-04-26 RX ORDER — SENNA PLUS 8.6 MG/1
2 TABLET ORAL AT BEDTIME
Refills: 0 | Status: DISCONTINUED | OUTPATIENT
Start: 2024-04-26 | End: 2024-04-27

## 2024-04-26 RX ORDER — ONDANSETRON 8 MG/1
4 TABLET, FILM COATED ORAL EVERY 8 HOURS
Refills: 0 | Status: DISCONTINUED | OUTPATIENT
Start: 2024-04-26 | End: 2024-04-27

## 2024-04-26 RX ORDER — OLANZAPINE 15 MG/1
2.5 TABLET, FILM COATED ORAL AT BEDTIME
Refills: 0 | Status: DISCONTINUED | OUTPATIENT
Start: 2024-04-26 | End: 2024-04-27

## 2024-04-26 RX ORDER — ONDANSETRON 8 MG/1
1 TABLET, FILM COATED ORAL
Refills: 0 | DISCHARGE

## 2024-04-26 RX ORDER — SIMETHICONE 80 MG/1
1 TABLET, CHEWABLE ORAL
Refills: 0 | DISCHARGE

## 2024-04-26 RX ORDER — FERROUS SULFATE 325(65) MG
1 TABLET ORAL
Refills: 0 | DISCHARGE

## 2024-04-26 RX ORDER — DOCUSATE SODIUM 100 MG
1 CAPSULE ORAL
Refills: 0 | DISCHARGE

## 2024-04-26 RX ORDER — ACETAMINOPHEN 500 MG
650 TABLET ORAL EVERY 6 HOURS
Refills: 0 | Status: DISCONTINUED | OUTPATIENT
Start: 2024-04-26 | End: 2024-04-27

## 2024-04-26 RX ORDER — MEDROXYPROGESTERONE ACETATE 150 MG/ML
1 INJECTION, SUSPENSION, EXTENDED RELEASE INTRAMUSCULAR
Refills: 0 | DISCHARGE

## 2024-04-26 RX ORDER — MORPHINE SULFATE 50 MG/1
1 CAPSULE, EXTENDED RELEASE ORAL
Refills: 0 | DISCHARGE

## 2024-04-26 RX ORDER — MORPHINE SULFATE 50 MG/1
30 CAPSULE, EXTENDED RELEASE ORAL
Refills: 0 | Status: DISCONTINUED | OUTPATIENT
Start: 2024-04-26 | End: 2024-04-27

## 2024-04-26 RX ORDER — SIMETHICONE 80 MG/1
80 TABLET, CHEWABLE ORAL EVERY 6 HOURS
Refills: 0 | Status: DISCONTINUED | OUTPATIENT
Start: 2024-04-26 | End: 2024-04-27

## 2024-04-26 RX ORDER — MEDROXYPROGESTERONE ACETATE 150 MG/ML
10 INJECTION, SUSPENSION, EXTENDED RELEASE INTRAMUSCULAR AT BEDTIME
Refills: 0 | Status: DISCONTINUED | OUTPATIENT
Start: 2024-04-26 | End: 2024-04-27

## 2024-04-26 RX ORDER — POLYETHYLENE GLYCOL 3350 17 G/17G
17 POWDER, FOR SOLUTION ORAL AT BEDTIME
Refills: 0 | Status: DISCONTINUED | OUTPATIENT
Start: 2024-04-26 | End: 2024-04-27

## 2024-04-26 RX ADMIN — ONDANSETRON 4 MILLIGRAM(S): 8 TABLET, FILM COATED ORAL at 23:08

## 2024-04-26 RX ADMIN — Medication 75 MILLIGRAM(S): at 22:54

## 2024-04-26 RX ADMIN — Medication 325 MILLIGRAM(S): at 22:55

## 2024-04-26 RX ADMIN — MEDROXYPROGESTERONE ACETATE 10 MILLIGRAM(S): 150 INJECTION, SUSPENSION, EXTENDED RELEASE INTRAMUSCULAR at 22:53

## 2024-04-26 RX ADMIN — POLYETHYLENE GLYCOL 3350 17 GRAM(S): 17 POWDER, FOR SOLUTION ORAL at 22:56

## 2024-04-26 RX ADMIN — Medication 0.5 MILLIGRAM(S): at 23:08

## 2024-04-26 RX ADMIN — MORPHINE SULFATE 30 MILLIGRAM(S): 50 CAPSULE, EXTENDED RELEASE ORAL at 22:54

## 2024-04-26 RX ADMIN — SENNA PLUS 2 TABLET(S): 8.6 TABLET ORAL at 22:55

## 2024-04-26 RX ADMIN — OLANZAPINE 2.5 MILLIGRAM(S): 15 TABLET, FILM COATED ORAL at 22:54

## 2024-04-26 NOTE — PATIENT PROFILE ADULT - NSPROPOAURINARYCATHETER_GEN_A_NUR
Patient is a 76y old  Female who presents with a chief complaint of Anemia (12 Dec 2021 11:02)      INTERVAL HPI/OVERNIGHT EVENTS: Patient seen and examined at bedside. No overnight events. Patient had multiple seizure like episodes which resolved with PRN Ativan. HGB stable       MEDICATIONS  (STANDING):  ALBUTerol    90 MICROgram(s) HFA Inhaler 2 Puff(s) Inhalation every 6 hours  chlorhexidine 0.12% Liquid 15 milliLiter(s) Oral Mucosa every 12 hours  dextrose 40% Gel 15 Gram(s) Oral once  dextrose 5%. 1000 milliLiter(s) (50 mL/Hr) IV Continuous <Continuous>  dextrose 5%. 1000 milliLiter(s) (100 mL/Hr) IV Continuous <Continuous>  dextrose 50% Injectable 25 Gram(s) IV Push once  dextrose 50% Injectable 12.5 Gram(s) IV Push once  dextrose 50% Injectable 25 Gram(s) IV Push once  glucagon  Injectable 1 milliGRAM(s) IntraMuscular once  heparin   Injectable 5000 Unit(s) SubCutaneous every 12 hours  insulin lispro (ADMELOG) corrective regimen sliding scale   SubCutaneous every 6 hours  methocarbamol 250 milliGRAM(s) Oral two times a day  midodrine 5 milliGRAM(s) Oral every 8 hours  pantoprazole  Injectable 40 milliGRAM(s) IV Push every 12 hours  piperacillin/tazobactam IVPB.. 3.375 Gram(s) IV Intermittent every 8 hours  polyethylene glycol 3350 17 Gram(s) Oral every 12 hours  senna 2 Tablet(s) Oral at bedtime  simethicone 80 milliGRAM(s) Chew every 6 hours  sodium chloride 0.9%. 1000 milliLiter(s) (65 mL/Hr) IV Continuous <Continuous>  sucralfate suspension 1 Gram(s) Enteral Tube every 6 hours    MEDICATIONS  (PRN):  acetaminophen     Tablet .. 650 milliGRAM(s) Oral every 6 hours PRN Temp greater or equal to 38C (100.4F), Mild Pain (1 - 3)  LORazepam   Injectable 1 milliGRAM(s) IV Push every 4 hours PRN Agitation      Allergies    codeine (Hives)    Intolerances        REVIEW OF SYSTEMS:  Unable to obtain meaningful ROS due to mental status      Vital Signs Last 24 Hrs  T(C): 36.8 (12 Dec 2021 08:00), Max: 37.4 (11 Dec 2021 20:08)  T(F): 98.3 (12 Dec 2021 08:00), Max: 99.3 (11 Dec 2021 20:08)  HR: 88 (12 Dec 2021 09:00) (58 - 96)  BP: 127/53 (12 Dec 2021 08:00) (103/64 - 160/63)  BP(mean): 76 (12 Dec 2021 08:00) (64 - 88)  RR: 20 (12 Dec 2021 09:00) (15 - 36)  SpO2: 97% (12 Dec 2021 09:00) (93% - 100%)    PHYSICAL EXAM:  GENERAL: chronically ill appearing, emaciated, NAD, obtunded  HEAD:  atraumatic  EYES: conjunctiva clear  NECK: +trach in place, clean  RESPIRATORY:  coarse mechanical breath sounds, no gross crackles or rales (+)trach, vent  CARDIOVASCULAR:  regular rate and rhythm, no murmurs or rubs or gallops, 2+ peripheral pulses  GASTROINTESTINAL:  soft, +PEG in place, clean and dry as well, mildly distended  EXTREMITIES: no clubbing or cyanosis or edema  MUSCULOSKELETAL:  +diffuse contractures in all joints  NERVOUS SYSTEM: does not respond to pain  SKIN: necrotic tips of bilateral 1st toes    LABS:                        9.1    5.82  )-----------( 235      ( 12 Dec 2021 06:29 )             31.5     CBC Full  -  ( 12 Dec 2021 06:29 )  WBC Count : 5.82 K/uL  Hemoglobin : 9.1 g/dL  Hematocrit : 31.5 %  Platelet Count - Automated : 235 K/uL  Mean Cell Volume : 86.1 fl  Mean Cell Hemoglobin : 24.9 pg  Mean Cell Hemoglobin Concentration : 28.9 gm/dL  Auto Neutrophil # : x  Auto Lymphocyte # : x  Auto Monocyte # : x  Auto Eosinophil # : x  Auto Basophil # : x  Auto Neutrophil % : x  Auto Lymphocyte % : x  Auto Monocyte % : x  Auto Eosinophil % : x  Auto Basophil % : x    12 Dec 2021 06:29    141    |  105    |  35     ----------------------------<  155    4.1     |  28     |  1.13     Ca    8.0        12 Dec 2021 06:29  Phos  2.2       12 Dec 2021 06:29  Mg     2.1       12 Dec 2021 06:29          CAPILLARY BLOOD GLUCOSE      POCT Blood Glucose.: 157 mg/dL (12 Dec 2021 06:38)  POCT Blood Glucose.: 183 mg/dL (11 Dec 2021 23:57)  POCT Blood Glucose.: 204 mg/dL (11 Dec 2021 17:13)  POCT Blood Glucose.: 159 mg/dL (11 Dec 2021 11:32)        Culture - Sputum (collected 12-11-21 @ 00:41)  Source: .Sputum Sputum  Gram Stain (12-11-21 @ 04:56):    Moderate polymorphonuclear leukocytes per low power field    Rare Squamous epithelial cells per low power field    Numerous Gram Negative Rods seen per oil power field  Preliminary Report (12-11-21 @ 22:41):    Few Pseudomonas aeruginosa    Moderate Serratia marcescens    Moderate Stenotrophomonas maltophilia    Normal Respiratory Elvira absent    Culture - Urine (collected 12-08-21 @ 17:55)  Source: Clean Catch Clean Catch (Midstream)  Final Report (12-09-21 @ 13:58):    <10,000 CFU/mL Normal Urogenital Elvira    Culture - Blood (collected 12-08-21 @ 15:24)  Source: .Blood Blood-Peripheral  Preliminary Report (12-09-21 @ 16:01):    No growth to date.    Culture - Blood (collected 12-08-21 @ 15:24)  Source: .Blood Blood-Peripheral  Preliminary Report (12-09-21 @ 16:01):    No growth to date.        RADIOLOGY & ADDITIONAL TESTS:     Consultant(s) Notes Reviewed:  [x] YES  [ ] NO    Care Discussed with [x] Consultants  [x] Patient  [ ] Family  [ ]      [ x] Other; RN  DVT ppx   no

## 2024-04-26 NOTE — PATIENT PROFILE ADULT - FUNCTIONAL ASSESSMENT - BASIC MOBILITY 6.
4-calculated by average/Not able to assess (calculate score using [facility] averaging method)

## 2024-04-26 NOTE — CONSULT NOTE ADULT - ASSESSMENT
53y Females/p  IR biopsy Rt lung nodule at [facility] 2/26, h/o moderate PTX on left side s/p Left pigtail placement 3/11 , Rt Pigtail placed 3/13 s/p 3/15 Left Robot assist Pleurectomy/TALC pleurodesis wedge resection 3/20, s/p biopsy of uterus 3/21/24 dx w cancer on chemotherapy went to see Dr. Cuevas today and found to be tachycardic to 140s, sent to ER. On CT chest found to have moderate to large loculated right PTX.     CT also w progression of disease  no acute thoracic intervention on right side as per Dr. Cuevas  O2 therapy, pain control  Dr. Cuevas will see her this weekend  admission, will need home O2   53y Females/p  IR biopsy Rt lung nodule at [facility] 2/26, h/o moderate PTX on left side s/p Left pigtail placement 3/11 , Rt Pigtail placed 3/13 s/p 3/15 Left Robot assist Pleurectomy/TALC pleurodesis wedge resection 3/20, s/p biopsy of uterus 3/21/24 dx w cancer on chemotherapy went to see Dr. Cuevas today and found to be tachycardic to 140s, sent to ER. On CT chest found to have moderate to large loculated right PTX.     CT also w progression of disease  no acute thoracic intervention on right side as per Dr. Cuevas  O2 therapy, pain control  Dr. Cuevas on call over weekend  admission, will need home O2

## 2024-04-26 NOTE — ED ADULT NURSE NOTE - OBJECTIVE STATEMENT
Pt arrives to ED sent in by MD for tachycardia. Pt complains of intermittent sob on exertion. Pt tachycardic upon arrival to ED. Denies chest pain. R/o PE protocol. Alert and oriented x 4. ambulatory. Hx lung cancer on chemo.

## 2024-04-26 NOTE — H&P ADULT - NSHPPHYSICALEXAM_GEN_ALL_CORE
T(C): 36.7 (04-26-24 @ 23:57), Max: 36.7 (04-26-24 @ 23:57)  HR: 108 (04-26-24 @ 23:57) (108 - 134)  BP: 112/57 (04-26-24 @ 23:57) (96/57 - 112/57)  RR: 16 (04-26-24 @ 23:57) (16 - 22)  SpO2: 98% (04-26-24 @ 23:57) (89% - 98%)    CONSTITUTIONAL: Well groomed, no apparent distress  EYES: PERRLA and symmetric, EOMI, No conjunctival or scleral injection, non-icteric  ENMT: Oral mucosa with moist membranes. no pharyngeal injection or exudates             NECK: Supple, symmetric and without tracheal deviation   RESP: No respiratory distress, no use of accessory muscles; on NC oxygen, Decreased breath sound bilaterally in different lobes.   CV: RRR, +S1S2, no MRG; no JVD; no peripheral edema  GI: Soft, NT, ND, no rebound, no guarding; no palpable masses;   LYMPH: No cervical LAD or tenderness;  MSK: Normal ROM without pain, normal muscle strength/tone  SKIN: No rashes or ulcers noted;   NEURO: CN II-XII intact; normal reflexes in upper and lower extremities, sensation intact in upper and lower extremities b/l to light touch   PSYCH: Appropriate insight/judgment; A+O x 3, mood and affect appropriate, recent/remote memory intact

## 2024-04-26 NOTE — REASON FOR VISIT
[de-identified] : Robot-assisted thoracoscopy with parietal pleurectomy left sided. chemical pleurodesis, SERINA Wedge resection [de-identified] : 3/15/2024

## 2024-04-26 NOTE — ED ADULT TRIAGE NOTE - CHIEF COMPLAINT QUOTE
patient sent by Dr. Cuevas for r/o PE.  hx lung ca, on chemo last dose monday.  sent to r/o PE due to tachycardia, has been feeling weak after chemo and not moving much.

## 2024-04-26 NOTE — PATIENT PROFILE ADULT - FALL HARM RISK - RISK INTERVENTIONS
Assistance with ambulation/Communicate Fall Risk and Risk Factors to all staff, patient, and family/Reinforce activity limits and safety measures with patient and family/Visual Cue: Yellow wristband/Bed in lowest position, wheels locked, appropriate side rails in place/Call bell, personal items and telephone in reach/Instruct patient to call for assistance before getting out of bed or chair/Non-slip footwear when patient is out of bed/[place] to call system/Physically safe environment - no spills, clutter or unnecessary equipment/Purposeful Proactive Rounding/Room/bathroom lighting operational, light cord in reach

## 2024-04-26 NOTE — ASSESSMENT
[FreeTextEntry1] : Ms. 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  PFTs on 1/8/24: %, FEV1 93%, DLCO 75%.   CT Chest on 1/13/24: - multiple new solid nodules in both lungs, largest 2.5 cm in the posterior basal segment of the right lower lobe - new punctate branching nodules in the left lower lobe, likely due to bronchiolar impaction  Now s/p R.A. thoracoscopy with parietal pleurectomy left sided. chemical pleurodesis, SERINA Wedge resection on 3/15/2024. 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 Path of Lt. parietal pleura revealed acute fibrinous pleuritis with necrosis and mesothelial hyperplasia.   S/p Rt. pleurodesis ([facility]) on 3 on 3/28 Pt. started chemotherapy on 4/1/2024 with Dr. Calixto ([facility]) - plan for 3 cycles of chemo and then repeat CT scan.  CXR today- reviewed.  Patient presents today for follow up. Last chemo on Monday 4/22, she reports of tiredness ans SOB on exertion after the chemo therapy.  I have reviewed the patient's medical records and diagnostic images at time of this office consultation and have made the following recommendation: 1. CXR independently reviewed. Suspect there is a right sided pneumo. However, final radiology read reporting no pneumo.  Patient respiratory status stable; heart rate in office noted to be in 140's. She was sent to ED for further evaluation and workup to rule out PE.    I, ELAINA Ernst, personally performed the evaluation and management (E/M) services for this established patient who presents today with (a) new problem(s)/exacerbation of (an) existing condition(s).  That E/M includes conducting the examination, assessing all new/exacerbated conditions, and establishing a new plan of care.  Today, my ACP, Courtney Mccall/VIRGINIA Silvestre, was here to observe my evaluation and management services for this new problem/exacerbated condition to be followed going forward.

## 2024-04-26 NOTE — H&P ADULT - ASSESSMENT
A/P:    1.  Pneumothorax  Acute Hypoxic Respiratory failure  -as per the Thoracic surgery service no intervention for the pneumothorax now(no chest tube)  -will give supplemental oxygen as needed for Hypoxia  -follow clinically    2.  Uterine Sarcoma  -follow with Oncology service  -follow palliative are consult    3.  Chronic pain syndrome  -on Pain meds as needed    4.  SCD for DVT ppx    5.  Code status  -Full code. MOLST form is in the chart.

## 2024-04-26 NOTE — H&P ADULT - HISTORY OF PRESENT ILLNESS
53 year old Female with PMHx of uterine sarcoma on active chemo, pneumothorax, pleurodesis; sent in by thoracic surgeon for CT chest due to complaints of SOB, chest congestion, tachycardia and hypoxia 91%. She Denies chest pain, LOC, diaphoresis, vomiting, diarrhea, urinary symptoms, abdominal pain, chest trauma, fever. Last dose chemo on last Monday. No other complain.

## 2024-04-26 NOTE — PHARMACOTHERAPY INTERVENTION NOTE - COMMENTS
Met with patient to go over discharge plans for today. He is ready to go home with [facility] home care. He stated that he will have a ride home from his wife today around 1:00pm.    He was given his IMM letter.      CASE MANAGEMENT DISCHARGE SUMMARY      Discharge to: Home with [facility] home care     New Durable Medical Equipment ordered/agency: None    Transportation:    Family/car: he stated that his wife will be here to take him home    Notified: Patient    Family: he notified his family    Facility/Agency: [facility] faxed Mone Bond with [facility] will fax orders    RN: Sue Mora MSW, LSW Medication history complete, reviewed medications with patient and confirmed with doctor first med hx.

## 2024-04-26 NOTE — ED PROVIDER NOTE - CLINICAL SUMMARY MEDICAL DECISION MAKING FREE TEXT BOX
Adult female with history of uterine sarcoma active chemo, will eval with CT chest, labs, cardiac enzyme, thoracic consult and reassess.

## 2024-04-26 NOTE — ED PROVIDER NOTE - PROGRESS NOTE DETAILS
thoracis surgery team that said they are unable to address the pneumothorax on her chest.  Chest tube would not work.    Pneumothorax would just reaccumulate due to the mass in her chest.  Patient already had a pleurodesis.  Thoracics is Recommending admission to medicine for oxygen therapy at home. will consult palliative care.

## 2024-04-26 NOTE — ED PROVIDER NOTE - OBJECTIVE STATEMENT
53 year old with PMHx of uterine sarcoma active chemo, pneumothorax, pleurodesis; ent in by thoracic surgeon for CT chest due to complaints of SOB, chest congestion, tachycardia and hypoxia 91%. Denies chest pain, LOC, diaphoresis, vomiting, diarrhea, urinary symptoms, abdominal pain, chest trauma, fever. Last dose chemo on Monday.

## 2024-04-26 NOTE — ED ADULT NURSE REASSESSMENT NOTE - NS ED NURSE REASSESS COMMENT FT1
received pt from day RN. pt resting comfortably at this time. no signs of distress noted. VSS as charted. no further complaints or discomforts reported at this time. safety and comfort maintained.

## 2024-04-26 NOTE — ED ADULT NURSE NOTE - NSFALLUNIVINTERV_ED_ALL_ED
Bed/Stretcher in lowest position, wheels locked, appropriate side rails in place/Call bell, personal items and telephone in reach/Instruct patient to call for assistance before getting out of bed/chair/stretcher/Non-slip footwear applied when patient is off stretcher/[place] to call system/Physically safe environment - no spills, clutter or unnecessary equipment/Purposeful proactive rounding/Room/bathroom lighting operational, light cord in reach

## 2024-04-27 ENCOUNTER — TRANSCRIPTION ENCOUNTER (OUTPATIENT)
Age: 54
End: 2024-04-27

## 2024-04-27 VITALS
DIASTOLIC BLOOD PRESSURE: 56 MMHG | HEART RATE: 104 BPM | TEMPERATURE: 98 F | RESPIRATION RATE: 16 BRPM | SYSTOLIC BLOOD PRESSURE: 103 MMHG | OXYGEN SATURATION: 96 %

## 2024-04-27 DIAGNOSIS — R91.8 OTHER NONSPECIFIC ABNORMAL FINDING OF LUNG FIELD: ICD-10-CM

## 2024-04-27 PROCEDURE — 71045 X-RAY EXAM CHEST 1 VIEW: CPT | Mod: 26

## 2024-04-27 PROCEDURE — 99239 HOSP IP/OBS DSCHRG MGMT >30: CPT

## 2024-04-27 RX ADMIN — Medication 75 MILLIGRAM(S): at 06:04

## 2024-04-27 RX ADMIN — Medication 75 MILLIGRAM(S): at 13:59

## 2024-04-27 RX ADMIN — MORPHINE SULFATE 30 MILLIGRAM(S): 50 CAPSULE, EXTENDED RELEASE ORAL at 06:05

## 2024-04-27 RX ADMIN — MORPHINE SULFATE 30 MILLIGRAM(S): 50 CAPSULE, EXTENDED RELEASE ORAL at 13:59

## 2024-04-27 NOTE — DISCHARGE NOTE PROVIDER - HOSPITAL COURSE
History of Present Illness:   53 year old Female with PMHx of uterine sarcoma on active chemo, pneumothorax, pleurodesis; sent in by thoracic surgeon for CT chest due to complaints of SOB, chest congestion, tachycardia and hypoxia 91%. She Denies chest pain, LOC, diaphoresis, vomiting, diarrhea, urinary symptoms, abdominal pain, chest trauma, fever. Last dose chemo on last Monday. No other complain.      4.27: no dyspnea , no cp, no sob  OSat on ambulation 99%        REVIEW OF SYSTEMS:    CONSTITUTIONAL: No weakness, No fevers or chills  ENT: No ear ache, No sorethroat  NECK: No pain, No stiffness  RESPIRATORY: No cough, No wheezing, No hemoptysis; No dyspnea  CARDIOVASCULAR: No chest pain, No palpitations  GASTROINTESTINAL: No abd pain, No nausea, No vomiting, No hematemesis, No diarrhea or constipation. No melena, No hematochezia.  GENITOURINARY: No dysuria, No  hematuria  NEUROLOGICAL: No diplopia, No paresthesia, No motor dysfunction  MUSCULOSKELETAL: No arthralgia, No myalgia  SKIN: No rashes, or lesions   PSYCH: no anxiety, no suicidal ideation    All other review of systems is negative unless indicated above    Vital Signs Last 24 Hrs  T(C): 36.6 (27 Apr 2024 08:07), Max: 36.7 (26 Apr 2024 23:57)  T(F): 97.8 (27 Apr 2024 08:07), Max: 98.1 (26 Apr 2024 23:57)  HR: 104 (27 Apr 2024 08:07) (104 - 134)  BP: 103/56 (27 Apr 2024 08:07) (96/57 - 112/57)  BP(mean): 68 (26 Apr 2024 19:26) (68 - 87)  RR: 16 (27 Apr 2024 08:07) (16 - 22)  SpO2: 96% (27 Apr 2024 08:07) (89% - 98%)    Parameters below as of 27 Apr 2024 08:07  Patient On (Oxygen Delivery Method): room air        PHYSICAL EXAM:    GENERAL: NAD  HEENT:  NC/AT, EOMI, PERRLA, No scleral icterus, Moist mucous membranes  NECK: Supple, No JVD  CNS:  Alert & Oriented X3, Motor Strength 5/5 B/L upper and lower extremities; DTRs 2+ intact   LUNG: Normal Breath sounds, Clear to auscultation bilaterally, No rales, No rhonchi, No wheezing  HEART: RRR; No murmurs, No rubs  ABDOMEN: +BS, ST/ND/NT  GENITOURINARY: Voiding, Bladder not distended  EXTREMITIES:  2+ Peripheral Pulses, No clubbing, No cyanosis, No tibial edema  MUSCULOSKELTAL: Joints normal ROM, No TTP, No effusion  VAGINAL: deferred  SKIN: no rashes  RECTAL: deferred, not indicated  BREAST: deferred                          9.3    56.45 )-----------( 694      ( 26 Apr 2024 16:06 )             30.6     04-26    132<L>  |  96  |  15  ----------------------------<  91  4.5   |  27  |  0.46<L>    Ca    9.4      26 Apr 2024 16:06  Mg     2.2     04-26    TPro  7.5  /  Alb  2.4<L>  /  TBili  0.7  /  DBili  x   /  AST  26  /  ALT  14  /  AlkPhos  173<H>  04-26    Vancomycin levels:   Cultures:     1.  Pneumothorax  CT Chest noted  Thoracic evaluation noted: cleared for discharge with outpatient f/u    2.  Uterine Sarcoma  -follow with Oncology service  Leukocytosis due to Neulasta     3.  Chronic pain syndrome  -on Pain meds as needed

## 2024-04-27 NOTE — DISCHARGE NOTE NURSING/CASE MANAGEMENT/SOCIAL WORK - PATIENT PORTAL LINK FT
You can access the FollowMyHealth Patient Portal offered by [facility] by registering at the following website: [email or web address]. By joining [employer]’s FollowMyHealth portal, you will also be able to view your health information using other applications (apps) compatible with our system.

## 2024-04-27 NOTE — DISCHARGE NOTE PROVIDER - NSDCFUSCHEDAPPT_GEN_ALL_CORE_FT
[facility] Physician [facility] [address]  Scheduled Appointment: 05/17/2024

## 2024-04-27 NOTE — CONSULT NOTE ADULT - ASSESSMENT
53-year-old female history of uterine sarcoma on active therapy sent in by Dr ascencio for tachycardia as she was 140s in the office. She was sent into the hospital on CT chest she was found to have a moderate to large loculated right pneumothorax. Cardiothoracic holding off on placement of a chest tube and continuing supplemental oxygen. CT also demonstrates a progression and pleural and pulmonary mets. - repeat CXR stable.   Possible DC home today. Per Cardiothoracic. WBC 50K from [facility].

## 2024-04-27 NOTE — CONSULT NOTE ADULT - SUBJECTIVE AND OBJECTIVE BOX
History of Present Illness:  53y Females/p  IR biopsy Rt lung nodule at [facility] 2/26, h/o moderate PTX on left side s/p Left pigtail placement 3/11 , Rt Pigtail placed 3/13 s/p 3/15 Left Robot assist Pleurectomy/TALC pleurodesis wedge resection 3/20, s/p biopsy of uterus 3/21/24 dx w cancer on chemotherapy went to see Dr. Cuevas today and found to be tachycardic to 140s, sent to ER. On CT chest found to have moderate to large loculated right PTX.   Pt on 2L, feeling better, HR 120s.     PMH/PSH:  Fibroid      H/O oral surgery  under general anesthesia during childhood    S/P D&C (status post dilation and curettage)  x2    S/P laparoscopy  as part of infertility work up    S/P tonsillectomy        Relevant Family History  FAMILY HISTORY:  Family history of uterine cancer (Mother)        SOCIAL HISTORY:  Smoker: [ ] Yes  [x ] No        PACK YEARS:                         WHEN QUIT?  ETOH use: [ ] Yes  [x ] No              FREQUENCY / QUANTITY:  Ilicit Drug use:  [ ] Yes  [x ] No      MEDICATIONS  (STANDING):    MEDICATIONS  (PRN):      Allergies: shellfish (Vomiting)  No Known Allergies                                                            LABS:                        9.3    56.45 )-----------( 694      ( 26 Apr 2024 16:06 )             30.6     04-26    132<L>  |  96  |  15  ----------------------------<  91  4.5   |  27  |  0.46<L>    Ca    9.4      26 Apr 2024 16:06  Mg     2.2     04-26    TPro  7.5  /  Alb  2.4<L>  /  TBili  0.7  /  DBili  x   /  AST  26  /  ALT  14  /  AlkPhos  173<H>  04-26      Urinalysis Basic - ( 26 Apr 2024 16:06 )    Color: x / Appearance: x / SG: x / pH: x  Gluc: 91 mg/dL / Ketone: x  / Bili: x / Urobili: x   Blood: x / Protein: x / Nitrite: x   Leuk Esterase: x / RBC: x / WBC x   Sq Epi: x / Non Sq Epi: x / Bacteria: x    < from: CT Angio Chest PE Protocol w/ IV Cont (04.26.24 @ 17:57) >    FINDINGS:    LUNGS AND AIRWAYS: Patent central airways.  Progressive multiple   pulmonary metastases. Largest right lower lobe 4.2 x 3.5 cm.  PLEURA: Small partially loculated left pneumothorax decreased since   03/11/2024. New moderate to large loculated right pneumothorax with   pleural thickening. New pleural metastases.  MEDIASTINUM AND PEDRO LUIS: No lymphadenopathy.  VESSELS: No pulmonary embolus.  HEART: Heart size is normal. No pericardial effusion.  CHEST WALL AND LOWER NECK: Within normal limits.  VISUALIZED UPPER ABDOMEN: Within normal limits.  BONES: Within normal limits.    IMPRESSION:  Progressive pleural and pulmonary metastases.  Small partially loculated left pneumothorax decreased since 03/11/2024.  Moderate to large loculated right pneumothorax new since 03/11/2024.    < end of copied text >              Review of Systems         as per HPI    T(C): 36.6 (04-26-24 @ 15:46), Max: 36.6 (04-26-24 @ 15:46)  HR: 112 (04-26-24 @ 18:51) (112 - 134)  BP: 107/77 (04-26-24 @ 15:46) (107/77 - 107/77)    RR: 22 (04-26-24 @ 18:51) (18 - 22)  SpO2: 96% (04-26-24 @ 18:51) (89% - 96%)    Physical Exam  General: anxious NAD                                                         Neuro: A+O x 3,                      Eyes: PERRL, EOMI   ENT: Normal exam of nasal/oral mucosa with absence of cyanosis.   Neck: supple, no JVD, trachea midline   Chest: decreased right accessory muscle use note  CV: tachycardic  GI: distended  Extremities: no edema  SKIN: warm, dry, intact   
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 She Denies chest pain, LOC, diaphoresis, vomiting, diarrhea, urinary symptoms, abdominal pain, chest trauma, fever. Last dose chemo on last Monday. No other complain. (26 Apr 2024 21:34)      PAST MEDICAL & SURGICAL HISTORY:  Fibroid      H/O oral surgery  under general anesthesia during childhood      S/P D&C (status post dilation and curettage)  x2      S/P laparoscopy  as part of infertility work up      S/P tonsillectomy          Allergies    No Known Allergies    Intolerances    shellfish (Vomiting)      MEDICATIONS  (STANDING):  ferrous    sulfate 325 milliGRAM(s) Oral daily  medroxyPROGESTERone 10 milliGRAM(s) Oral at bedtime  morphine ER Tablet 30 milliGRAM(s) Oral <User Schedule>  OLANZapine 2.5 milliGRAM(s) Oral at bedtime  polyethylene glycol 3350 17 Gram(s) Oral at bedtime  pregabalin 75 milliGRAM(s) Oral three times a day  senna 2 Tablet(s) Oral at bedtime    MEDICATIONS  (PRN):  acetaminophen     Tablet .. 650 milliGRAM(s) Oral every 6 hours PRN Temp greater or equal to 38C (100.4F), Mild Pain (1 - 3)  aluminum hydroxide/magnesium hydroxide/simethicone Suspension 30 milliLiter(s) Oral every 4 hours PRN Dyspepsia  LORazepam     Tablet 0.5 milliGRAM(s) Oral every 8 hours PRN Anxiety  melatonin 3 milliGRAM(s) Oral at bedtime PRN Insomnia  ondansetron Injectable 4 milliGRAM(s) IV Push every 8 hours PRN Nausea and/or Vomiting  simethicone 80 milliGRAM(s) Chew every 6 hours PRN Gas      FAMILY HISTORY:  Family history of uterine cancer (Mother)        SOCIAL HISTORY: No EtOH, no tobacco    REVIEW OF SYSTEMS:    CONSTITUTIONAL: No weakness, fevers or chills  EYES/ENT: No visual changes;  No vertigo or throat pain   NECK: No pain or stiffness  RESPIRATORY: No cough, wheezing, hemoptysis; No shortness of breath  CARDIOVASCULAR: No chest pain or palpitations  GASTROINTESTINAL: No abdominal or epigastric pain. No nausea, vomiting, or hematemesis; No diarrhea or constipation. No melena or hematochezia.  GENITOURINARY: No dysuria, frequency or hematuria  NEUROLOGICAL: No numbness or weakness  SKIN: No itching, burning, rashes, or lesions   All other review of systems is negative unless indicated above.    Height (cm): 162.6 (04-26 @ 15:27)  Weight (kg): 46 (04-26 @ 23:57)  BMI (kg/m2): 17.4 (04-26 @ 23:57)  BSA (m2): 1.47 (04-26 @ 23:57)    T(F): 97.8 (04-27-24 @ 08:07), Max: 98.1 (04-26-24 @ 23:57)  HR: 104 (04-27-24 @ 08:07)  BP: 103/56 (04-27-24 @ 08:07)  RR: 16 (04-27-24 @ 08:07)  SpO2: 96% (04-27-24 @ 08:07)  Wt(kg): --    GENERAL: NAD, well-developed  HEAD:  Atraumatic, Normocephalic  EYES: EOMI, PERRLA, conjunctiva and sclera clear  NECK: Supple, No JVD  CHEST/LUNG: Clear to auscultation bilaterally; No wheeze  HEART: Regular rate and rhythm; No murmurs, rubs, or gallops  ABDOMEN: Soft, Nontender, Nondistended; Bowel sounds present  EXTREMITIES:  2+ Peripheral Pulses, No clubbing, cyanosis, or edema  NEUROLOGY: non-focal  SKIN: No rashes or lesions                          9.3    56.45 )-----------( 694      ( 26 Apr 2024 16:06 )             30.6       04-26    132<L>  |  96  |  15  ----------------------------<  91  4.5   |  27  |  0.46<L>    Ca    9.4      26 Apr 2024 16:06  Mg     2.2     04-26    TPro  7.5  /  Alb  2.4<L>  /  TBili  0.7  /  DBili  x   /  AST  26  /  ALT  14  /  AlkPhos  173<H>  04-26      Magnesium: 2.2 mg/dL (04-26 @ 16:06)

## 2024-04-27 NOTE — DISCHARGE NOTE NURSING/CASE MANAGEMENT/SOCIAL WORK - NSDCPEFALRISK_GEN_ALL_CORE
For information on Fall & Injury Prevention, visit: [email or web address] OR  [email or web address] OR  https://www.cdc.gov/steadi/patient.html

## 2024-04-27 NOTE — DISCHARGE NOTE PROVIDER - NSDCMRMEDTOKEN_GEN_ALL_CORE_FT
Colace 100 mg oral capsule: 1 cap(s) orally 2 times a day  ferrous sulfate 324 mg (65 mg elemental iron) oral tablet: 1 tab(s) orally once a day  LORazepam 0.5 mg oral tablet: 1 tab(s) orally every 8 hours as needed for  medroxyPROGESTERone 10 mg oral tablet: 1 tab(s) orally once a day (at bedtime)  morphine 30 mg/8 to 12 hr oral tablet, extended release: 1 tab(s) orally every 8 hours *** 6 AM, 2PM, 10PM***  OLANZapine 2.5 mg oral tablet: 1 tab(s) orally once a day (at bedtime)  pregabalin 75 mg oral capsule: 1 cap(s) orally 3 times a day  simethicone 80 mg oral tablet, chewable: 1 tab(s) chewed every 6 hours as needed for  Zofran 4 mg oral tablet: 1 tab(s) orally every 6 hours as needed for  nausea *** 6 AM, 2PM, 10PM***

## 2024-04-29 ENCOUNTER — TRANSCRIPTION ENCOUNTER (OUTPATIENT)
Age: 54
End: 2024-04-29

## 2024-04-29 DIAGNOSIS — R11.0 NAUSEA: ICD-10-CM

## 2024-04-29 RX ORDER — MEDROXYPROGESTERONE ACETATE 10 MG/1
10 TABLET ORAL AT BEDTIME
Qty: 10 | Refills: 3 | Status: ACTIVE | COMMUNITY
Start: 2023-12-06

## 2024-04-29 RX ORDER — DOCUSATE SODIUM 100 MG/1
100 CAPSULE ORAL TWICE DAILY
Refills: 0 | Status: ACTIVE | COMMUNITY
Start: 2024-04-29

## 2024-04-29 RX ORDER — ONDANSETRON 4 MG/1
4 TABLET ORAL
Refills: 0 | Status: ACTIVE | COMMUNITY
Start: 2024-04-29

## 2024-05-02 DIAGNOSIS — Z79.890 HORMONE REPLACEMENT THERAPY: ICD-10-CM

## 2024-05-02 DIAGNOSIS — G89.4 CHRONIC PAIN SYNDROME: ICD-10-CM

## 2024-05-02 DIAGNOSIS — J93.9 PNEUMOTHORAX, UNSPECIFIED: ICD-10-CM

## 2024-05-02 DIAGNOSIS — J96.01 ACUTE RESPIRATORY FAILURE WITH HYPOXIA: ICD-10-CM

## 2024-05-02 DIAGNOSIS — Z91.013 ALLERGY TO SEAFOOD: ICD-10-CM

## 2024-05-02 DIAGNOSIS — Z79.899 OTHER LONG TERM (CURRENT) DRUG THERAPY: ICD-10-CM

## 2024-05-02 DIAGNOSIS — C55 MALIGNANT NEOPLASM OF UTERUS, PART UNSPECIFIED: ICD-10-CM

## 2024-05-02 DIAGNOSIS — Z92.21 PERSONAL HISTORY OF ANTINEOPLASTIC CHEMOTHERAPY: ICD-10-CM

## 2024-05-14 PROBLEM — C78.00 MALIGNANT NEOPLASM METASTATIC TO LUNG, UNSPECIFIED LATERALITY: Status: ACTIVE | Noted: 2024-04-04

## 2024-05-17 ENCOUNTER — APPOINTMENT (OUTPATIENT)
Dept: THORACIC SURGERY | Facility: CLINIC | Age: 54
End: 2024-05-17
Payer: COMMERCIAL

## 2024-05-17 DIAGNOSIS — C78.00 SECONDARY MALIGNANT NEOPLASM OF UNSPECIFIED LUNG: ICD-10-CM

## 2024-05-17 PROCEDURE — 99442: CPT | Mod: 24

## 2024-05-17 NOTE — REASON FOR VISIT
[Home] : at home, [unfilled] , at the time of the visit. [Medical Office: ([facility])___] : at the medical office located in  [Verbal consent obtained from patient] : the patient, [unfilled] [de-identified] : Robot-assisted thoracoscopy with parietal pleurectomy left sided. chemical pleurodesis, SERINA Wedge resection [de-identified] : 3/15/2024

## 2024-05-17 NOTE — ASSESSMENT
[FreeTextEntry1] : Ms. 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  PFTs on 1/8/24: %, FEV1 93%, DLCO 75%.   CT Chest on 1/13/24: - multiple new solid nodules in both lungs, largest 2.5 cm in the posterior basal segment of the right lower lobe - new punctate branching nodules in the left lower lobe, likely due to bronchiolar impaction  Now s/p R.A. thoracoscopy with parietal pleurectomy left sided. chemical pleurodesis, SERINA Wedge resection on 3/15/2024. 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 Path of Lt. parietal pleura revealed acute fibrinous pleuritis with necrosis and mesothelial hyperplasia.   S/p Rt. pleurodesis ([facility]) on 3 on 3/28  Pt. started chemotherapy on 4/1/2024 with Dr. Calixto ([facility]) - plan for 3 cycles of chemo and then repeat CT scan.  Patient presents today for follow up via TTM. She reports that she is on prednisone now and she feels much better. She reports SOB on exertion, denies any CP or cough.  I have reviewed the patient's medical records and diagnostic images at time of this office consultation and have made the following recommendation: 1. Patient's respiratory status is stable, return to clinic on as needed basis.  2. Patient is travelling out of state to [facility], she is stable to fly.      I, ELAINA Ernst, personally performed the evaluation and management (E/M) services for this established patient who presents today with (a) new problem(s)/exacerbation of (an) existing condition(s).  That E/M includes conducting the examination, assessing all new/exacerbated conditions, and establishing a new plan of care.  Today, my ACP, VIRGINIA Silvestre, was here to observe my evaluation and management services for this new problem/exacerbated condition to be followed going forward.

## 2024-05-27 NOTE — ASU PREOP CHECKLIST - ALLERGY BAND ON
Called to schedule appt with dr Johnston.  No answer, LVM and sent message via portal.     no known allergies

## 2024-07-04 ENCOUNTER — INPATIENT (INPATIENT)
Facility: HOSPITAL | Age: 54
LOS: 7 days | Discharge: HOME | DRG: 167 | End: 2024-07-12
Attending: SURGERY | Admitting: SURGERY
Payer: COMMERCIAL

## 2024-07-04 VITALS
OXYGEN SATURATION: 100 % | DIASTOLIC BLOOD PRESSURE: 66 MMHG | SYSTOLIC BLOOD PRESSURE: 112 MMHG | TEMPERATURE: 98 F | RESPIRATION RATE: 18 BRPM | HEART RATE: 70 BPM

## 2024-07-04 DIAGNOSIS — J93.9 PNEUMOTHORAX, UNSPECIFIED: ICD-10-CM

## 2024-07-04 DIAGNOSIS — Z98.890 OTHER SPECIFIED POSTPROCEDURAL STATES: Chronic | ICD-10-CM

## 2024-07-04 DIAGNOSIS — Z90.89 ACQUIRED ABSENCE OF OTHER ORGANS: Chronic | ICD-10-CM

## 2024-07-04 PROCEDURE — 80053 COMPREHEN METABOLIC PANEL: CPT

## 2024-07-04 PROCEDURE — 86901 BLOOD TYPING SEROLOGIC RH(D): CPT

## 2024-07-04 PROCEDURE — 85610 PROTHROMBIN TIME: CPT

## 2024-07-04 PROCEDURE — 85025 COMPLETE CBC W/AUTO DIFF WBC: CPT

## 2024-07-04 PROCEDURE — 84100 ASSAY OF PHOSPHORUS: CPT

## 2024-07-04 PROCEDURE — 85730 THROMBOPLASTIN TIME PARTIAL: CPT

## 2024-07-04 PROCEDURE — 85027 COMPLETE CBC AUTOMATED: CPT

## 2024-07-04 PROCEDURE — 71045 X-RAY EXAM CHEST 1 VIEW: CPT | Mod: 26

## 2024-07-04 PROCEDURE — 71045 X-RAY EXAM CHEST 1 VIEW: CPT

## 2024-07-04 PROCEDURE — 86900 BLOOD TYPING SEROLOGIC ABO: CPT

## 2024-07-04 PROCEDURE — 71250 CT THORAX DX C-: CPT | Mod: MC

## 2024-07-04 PROCEDURE — 36415 COLL VENOUS BLD VENIPUNCTURE: CPT

## 2024-07-04 PROCEDURE — 83735 ASSAY OF MAGNESIUM: CPT

## 2024-07-04 PROCEDURE — 86850 RBC ANTIBODY SCREEN: CPT

## 2024-07-04 PROCEDURE — 80048 BASIC METABOLIC PNL TOTAL CA: CPT

## 2024-07-04 PROCEDURE — C9399: CPT

## 2024-07-04 RX ORDER — SENNOSIDES 8.6 MG
2 TABLET ORAL AT BEDTIME
Refills: 0 | Status: DISCONTINUED | OUTPATIENT
Start: 2024-07-04 | End: 2024-07-12

## 2024-07-04 RX ORDER — PREGABALIN 50 MG/1
75 CAPSULE ORAL DAILY
Refills: 0 | Status: DISCONTINUED | OUTPATIENT
Start: 2024-07-04 | End: 2024-07-05

## 2024-07-04 RX ORDER — OXYCODONE HYDROCHLORIDE 100 MG/5ML
5 SOLUTION ORAL EVERY 4 HOURS
Refills: 0 | Status: DISCONTINUED | OUTPATIENT
Start: 2024-07-04 | End: 2024-07-10

## 2024-07-04 RX ORDER — PANTOPRAZOLE SODIUM 40 MG/10ML
40 INJECTION, POWDER, FOR SOLUTION INTRAVENOUS
Refills: 0 | Status: DISCONTINUED | OUTPATIENT
Start: 2024-07-04 | End: 2024-07-12

## 2024-07-04 RX ORDER — SERTRALINE HYDROCHLORIDE 100 MG/1
50 TABLET, FILM COATED ORAL DAILY
Refills: 0 | Status: DISCONTINUED | OUTPATIENT
Start: 2024-07-04 | End: 2024-07-12

## 2024-07-04 RX ORDER — FERROUS SULFATE 325(65) MG
325 TABLET ORAL
Refills: 0 | Status: DISCONTINUED | OUTPATIENT
Start: 2024-07-04 | End: 2024-07-12

## 2024-07-04 RX ORDER — MORPHINE SULFATE 100 MG/1
30 TABLET, EXTENDED RELEASE ORAL EVERY 8 HOURS
Refills: 0 | Status: DISCONTINUED | OUTPATIENT
Start: 2024-07-04 | End: 2024-07-05

## 2024-07-04 RX ORDER — LORAZEPAM 0.5 MG
0.5 TABLET ORAL EVERY 8 HOURS
Refills: 0 | Status: DISCONTINUED | OUTPATIENT
Start: 2024-07-04 | End: 2024-07-10

## 2024-07-04 RX ORDER — SIMETHICONE 40MG/0.6ML
80 SUSPENSION, DROPS(FINAL DOSAGE FORM)(ML) ORAL EVERY 6 HOURS
Refills: 0 | Status: DISCONTINUED | OUTPATIENT
Start: 2024-07-04 | End: 2024-07-12

## 2024-07-04 RX ORDER — ONDANSETRON HYDROCHLORIDE 2 MG/ML
4 INJECTION INTRAMUSCULAR; INTRAVENOUS EVERY 8 HOURS
Refills: 0 | Status: DISCONTINUED | OUTPATIENT
Start: 2024-07-04 | End: 2024-07-12

## 2024-07-04 RX ORDER — MEDROXYPROGESTERONE ACETATE 2.5 MG/1
10 TABLET ORAL DAILY
Refills: 0 | Status: DISCONTINUED | OUTPATIENT
Start: 2024-07-04 | End: 2024-07-12

## 2024-07-04 RX ORDER — ACETAMINOPHEN 325 MG
650 TABLET ORAL EVERY 6 HOURS
Refills: 0 | Status: DISCONTINUED | OUTPATIENT
Start: 2024-07-04 | End: 2024-07-04

## 2024-07-04 RX ORDER — OXYCODONE HYDROCHLORIDE 100 MG/5ML
10 SOLUTION ORAL EVERY 4 HOURS
Refills: 0 | Status: DISCONTINUED | OUTPATIENT
Start: 2024-07-04 | End: 2024-07-10

## 2024-07-04 RX ORDER — LIDOCAINE HCL 28 MG/G
1 GEL TOPICAL EVERY 24 HOURS
Refills: 0 | Status: DISCONTINUED | OUTPATIENT
Start: 2024-07-04 | End: 2024-07-05

## 2024-07-04 RX ORDER — MAGNESIUM, ALUMINUM HYDROXIDE 400-400
30 TABLET,CHEWABLE ORAL EVERY 4 HOURS
Refills: 0 | Status: DISCONTINUED | OUTPATIENT
Start: 2024-07-04 | End: 2024-07-12

## 2024-07-04 RX ORDER — LORATADINE 10 MG/1
10 TABLET ORAL DAILY
Refills: 0 | Status: DISCONTINUED | OUTPATIENT
Start: 2024-07-04 | End: 2024-07-05

## 2024-07-04 RX ORDER — POLYETHYLENE GLYCOL 3350 1 G/G
17 POWDER ORAL
Refills: 0 | Status: DISCONTINUED | OUTPATIENT
Start: 2024-07-04 | End: 2024-07-12

## 2024-07-04 RX ORDER — OLANZAPINE 2.5 MG/1
2.5 TABLET, FILM COATED ORAL AT BEDTIME
Refills: 0 | Status: DISCONTINUED | OUTPATIENT
Start: 2024-07-04 | End: 2024-07-12

## 2024-07-04 RX ADMIN — PREGABALIN 75 MILLIGRAM(S): 50 CAPSULE ORAL at 21:14

## 2024-07-04 RX ADMIN — MORPHINE SULFATE 30 MILLIGRAM(S): 100 TABLET, EXTENDED RELEASE ORAL at 21:08

## 2024-07-04 RX ADMIN — OLANZAPINE 2.5 MILLIGRAM(S): 2.5 TABLET, FILM COATED ORAL at 21:08

## 2024-07-04 RX ADMIN — MEDROXYPROGESTERONE ACETATE 10 MILLIGRAM(S): 2.5 TABLET ORAL at 22:54

## 2024-07-04 RX ADMIN — Medication 2 TABLET(S): at 21:08

## 2024-07-04 NOTE — H&P ADULT - ASSESSMENT
A:    54F    Here for:    1. PTX, recurrent, as a consequence of  2. Metastatic uterine CA    This patient requires a moderate level of MDM due to the complexity and severity of their condition which increases the amount and complexity of data to be reviewed and analyzed in addition to the risk of complications, morbidity and mortality of patient management    P:    Recurrent PTX 2/2 metastatic uterine CA  On chemo for this, last dose 3 weeks ago    Maintain ant pigtail chest tube placed at [facility] to 20cc sxn  CXR done, lung up  Med rec; MS contin, oxycodone IR for breakthrough  Diet, bowel regimen  IS, OOB as able  SCD  Oncology consult; known pulmonary and pleural metastatic disease  Medicine consult for co mgmt  VTE ppx mechanical only; had bleeding from pigtail on chemical ppx, has been on SCD only since  AM labs, replete lytes PRN  No hernandez, ambulate as able    Dispo: Admit to med/surg bed 1N under Dr Cuevas    Time spent on this patient encounter: 55+ minutes    Date of entry of this note is equal to the date of services rendered    This includes assessment of the presenting problems with associated risks, reviewing the medical record to prepare for the encounter and meeting face to face with the patient to obtain additional history. I have also performed an appropriate physical exam, made interventions listed and ordered and interpreted appropriate diagnostic studies as documented. This also included communication and coordination of care with the multidisciplinary team including the bedside nurse, appropriate attending of record and consultants as needed.

## 2024-07-04 NOTE — H&P ADULT - HISTORY OF PRESENT ILLNESS
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     ROS: + SOB, improving  Remainder of systems reviewed, negative

## 2024-07-05 LAB
ALBUMIN SERPL ELPH-MCNC: 3.1 G/DL — LOW (ref 3.3–5)
ALP SERPL-CCNC: 73 U/L — SIGNIFICANT CHANGE UP (ref 40–120)
ALT FLD-CCNC: 16 U/L — SIGNIFICANT CHANGE UP (ref 12–78)
ANION GAP SERPL CALC-SCNC: 5 MMOL/L — SIGNIFICANT CHANGE UP (ref 5–17)
ANISOCYTOSIS BLD QL: SLIGHT — SIGNIFICANT CHANGE UP
APTT BLD: 34.4 SEC — SIGNIFICANT CHANGE UP (ref 24.5–35.6)
AST SERPL-CCNC: 24 U/L — SIGNIFICANT CHANGE UP (ref 15–37)
BASOPHILS # BLD AUTO: 0.09 K/UL — SIGNIFICANT CHANGE UP (ref 0–0.2)
BASOPHILS NFR BLD AUTO: 1 % — SIGNIFICANT CHANGE UP (ref 0–2)
BILIRUB SERPL-MCNC: 0.4 MG/DL — SIGNIFICANT CHANGE UP (ref 0.2–1.2)
BUN SERPL-MCNC: 13 MG/DL — SIGNIFICANT CHANGE UP (ref 7–23)
CALCIUM SERPL-MCNC: 9.9 MG/DL — SIGNIFICANT CHANGE UP (ref 8.5–10.1)
CHLORIDE SERPL-SCNC: 107 MMOL/L — SIGNIFICANT CHANGE UP (ref 96–108)
CO2 SERPL-SCNC: 26 MMOL/L — SIGNIFICANT CHANGE UP (ref 22–31)
CREAT SERPL-MCNC: 0.54 MG/DL — SIGNIFICANT CHANGE UP (ref 0.5–1.3)
DACRYOCYTES BLD QL SMEAR: SLIGHT — SIGNIFICANT CHANGE UP
EGFR: 108 ML/MIN/1.73M2 — SIGNIFICANT CHANGE UP
EOSINOPHIL # BLD AUTO: 1.8 K/UL — HIGH (ref 0–0.5)
EOSINOPHIL NFR BLD AUTO: 21 % — HIGH (ref 0–6)
GLUCOSE SERPL-MCNC: 78 MG/DL — SIGNIFICANT CHANGE UP (ref 70–99)
HCT VFR BLD CALC: 32.8 % — LOW (ref 34.5–45)
HGB BLD-MCNC: 10.3 G/DL — LOW (ref 11.5–15.5)
INR BLD: 1.05 RATIO — SIGNIFICANT CHANGE UP (ref 0.85–1.18)
LYMPHOCYTES # BLD AUTO: 1.37 K/UL — SIGNIFICANT CHANGE UP (ref 1–3.3)
LYMPHOCYTES # BLD AUTO: 16 % — SIGNIFICANT CHANGE UP (ref 13–44)
MACROCYTES BLD QL: SLIGHT — SIGNIFICANT CHANGE UP
MANUAL SMEAR VERIFICATION: SIGNIFICANT CHANGE UP
MCHC RBC-ENTMCNC: 29.3 PG — SIGNIFICANT CHANGE UP (ref 27–34)
MCHC RBC-ENTMCNC: 31.4 GM/DL — LOW (ref 32–36)
MCV RBC AUTO: 93.4 FL — SIGNIFICANT CHANGE UP (ref 80–100)
MONOCYTES # BLD AUTO: 0.77 K/UL — SIGNIFICANT CHANGE UP (ref 0–0.9)
MONOCYTES NFR BLD AUTO: 9 % — SIGNIFICANT CHANGE UP (ref 2–14)
NEUTROPHILS # BLD AUTO: 4.53 K/UL — SIGNIFICANT CHANGE UP (ref 1.8–7.4)
NEUTROPHILS NFR BLD AUTO: 53 % — SIGNIFICANT CHANGE UP (ref 43–77)
NRBC # BLD: 0 /100 WBCS — SIGNIFICANT CHANGE UP (ref 0–0)
NRBC # BLD: SIGNIFICANT CHANGE UP /100 WBCS (ref 0–0)
OVALOCYTES BLD QL SMEAR: SLIGHT — SIGNIFICANT CHANGE UP
PLAT MORPH BLD: NORMAL — SIGNIFICANT CHANGE UP
PLATELET # BLD AUTO: 387 K/UL — SIGNIFICANT CHANGE UP (ref 150–400)
POIKILOCYTOSIS BLD QL AUTO: SLIGHT — SIGNIFICANT CHANGE UP
POTASSIUM SERPL-MCNC: 3.8 MMOL/L — SIGNIFICANT CHANGE UP (ref 3.5–5.3)
POTASSIUM SERPL-SCNC: 3.8 MMOL/L — SIGNIFICANT CHANGE UP (ref 3.5–5.3)
PROT SERPL-MCNC: 7.2 GM/DL — SIGNIFICANT CHANGE UP (ref 6–8.3)
PROTHROM AB SERPL-ACNC: 11.9 SEC — SIGNIFICANT CHANGE UP (ref 9.5–13)
RBC # BLD: 3.51 M/UL — LOW (ref 3.8–5.2)
RBC # FLD: 20 % — HIGH (ref 10.3–14.5)
RBC BLD AUTO: ABNORMAL
SODIUM SERPL-SCNC: 138 MMOL/L — SIGNIFICANT CHANGE UP (ref 135–145)
WBC # BLD: 8.55 K/UL — SIGNIFICANT CHANGE UP (ref 3.8–10.5)
WBC # FLD AUTO: 8.55 K/UL — SIGNIFICANT CHANGE UP (ref 3.8–10.5)

## 2024-07-05 PROCEDURE — 71045 X-RAY EXAM CHEST 1 VIEW: CPT | Mod: 26

## 2024-07-05 PROCEDURE — 71045 X-RAY EXAM CHEST 1 VIEW: CPT | Mod: 26,77

## 2024-07-05 PROCEDURE — 99232 SBSQ HOSP IP/OBS MODERATE 35: CPT

## 2024-07-05 RX ORDER — MORPHINE SULFATE 100 MG/1
30 TABLET, EXTENDED RELEASE ORAL EVERY 12 HOURS
Refills: 0 | Status: DISCONTINUED | OUTPATIENT
Start: 2024-07-05 | End: 2024-07-10

## 2024-07-05 RX ORDER — PREGABALIN 50 MG/1
75 CAPSULE ORAL EVERY 12 HOURS
Refills: 0 | Status: DISCONTINUED | OUTPATIENT
Start: 2024-07-05 | End: 2024-07-12

## 2024-07-05 RX ORDER — SERTRALINE HYDROCHLORIDE 100 MG/1
1 TABLET, FILM COATED ORAL
Refills: 0 | DISCHARGE

## 2024-07-05 RX ORDER — MAGNESIUM OXIDE 400 MG/1
1 TABLET ORAL
Refills: 0 | DISCHARGE

## 2024-07-05 RX ADMIN — SERTRALINE HYDROCHLORIDE 50 MILLIGRAM(S): 100 TABLET, FILM COATED ORAL at 10:56

## 2024-07-05 RX ADMIN — LORATADINE 10 MILLIGRAM(S): 10 TABLET ORAL at 10:55

## 2024-07-05 RX ADMIN — OLANZAPINE 2.5 MILLIGRAM(S): 2.5 TABLET, FILM COATED ORAL at 21:49

## 2024-07-05 RX ADMIN — PREGABALIN 75 MILLIGRAM(S): 50 CAPSULE ORAL at 21:49

## 2024-07-05 RX ADMIN — PREGABALIN 75 MILLIGRAM(S): 50 CAPSULE ORAL at 10:55

## 2024-07-05 RX ADMIN — MORPHINE SULFATE 30 MILLIGRAM(S): 100 TABLET, EXTENDED RELEASE ORAL at 10:55

## 2024-07-05 RX ADMIN — Medication 2 TABLET(S): at 21:48

## 2024-07-05 RX ADMIN — Medication 325 MILLIGRAM(S): at 10:56

## 2024-07-05 RX ADMIN — PANTOPRAZOLE SODIUM 40 MILLIGRAM(S): 40 INJECTION, POWDER, FOR SOLUTION INTRAVENOUS at 06:24

## 2024-07-05 RX ADMIN — MORPHINE SULFATE 30 MILLIGRAM(S): 100 TABLET, EXTENDED RELEASE ORAL at 21:49

## 2024-07-05 RX ADMIN — MEDROXYPROGESTERONE ACETATE 10 MILLIGRAM(S): 2.5 TABLET ORAL at 21:49

## 2024-07-05 NOTE — PATIENT PROFILE ADULT - SURGICAL SITE DRAINAGE DESCRIPTION
Right sided chest tube 
no back pain, no gout, no musculoskeletal pain, no neck pain, and no weakness.

## 2024-07-05 NOTE — PROGRESS NOTE ADULT - SUBJECTIVE AND OBJECTIVE BOX
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     ROS: + SOB, improving  Remainder of systems reviewed, negative (04 Jul 2024 18:41)    OBJECTIVE  PAST MEDICAL & SURGICAL HISTORY:  Fibroid      H/O oral surgery  under general anesthesia during childhood      S/P D&C (status post dilation and curettage)  x2      S/P laparoscopy  as part of infertility work up      S/P tonsillectomy        shellfish (Vomiting)  No Known Allergies    Home Medications:  ferrous sulfate 324 mg (65 mg elemental iron) oral tablet: 1 tab(s) orally once a day (05 Jul 2024 09:14)  lidocaine 4% topical film: Apply topically to affected area every 12 hours (05 Jul 2024 09:14)  magnesium oxide 400 mg oral tablet: 1 tab(s) orally once a day (05 Jul 2024 09:14)  medroxyPROGESTERone 10 mg oral tablet: 1 tab(s) orally once a day (at bedtime) (05 Jul 2024 09:14)  morphine 30 mg/8 to 12 hr oral tablet, extended release: 1 tab(s) orally every 8 hours *** 6 AM, 2PM, 10PM*** (05 Jul 2024 09:14)  OLANZapine 2.5 mg oral tablet: 1 tab(s) orally once a day (at bedtime) (05 Jul 2024 09:14)  pregabalin 75 mg oral capsule: 1 cap(s) orally 3 times a day (05 Jul 2024 09:14)  sertraline 50 mg oral tablet: 1 tab(s) orally once a day (05 Jul 2024 09:14)  simethicone 80 mg oral tablet, chewable: 1 tab(s) chewed every 6 hours as needed for (05 Jul 2024 09:14)    VITALS  ICU Vital Signs Last 24 Hrs  T(C): 36.3 (05 Jul 2024 09:00), Max: 36.6 (04 Jul 2024 17:58)  T(F): 97.4 (05 Jul 2024 09:00), Max: 97.9 (04 Jul 2024 17:58)  HR: 66 (05 Jul 2024 09:00) (66 - 73)  BP: 99/56 (05 Jul 2024 09:00) (99/56 - 112/66)  RR: 18 (05 Jul 2024 09:00) (18 - 18)  SpO2: 98% (05 Jul 2024 09:00) (98% - 100%)  O2 Parameters below as of 05 Jul 2024 09:00  Patient On (Oxygen Delivery Method): room air      LABS                        10.3   8.55  )-----------( 387      ( 05 Jul 2024 06:34 )             32.8   PT/INR - ( 05 Jul 2024 06:34 )   PT: 11.9 sec;   INR: 1.05 ratio         PTT - ( 05 Jul 2024 06:34 )  PTT:34.4 pas97-36    138  |  107  |  13  ----------------------------<  78  3.8   |  26  |  0.54    Ca    9.9      05 Jul 2024 06:34    TPro  7.2  /  Alb  3.1<L>  /  TBili  0.4  /  DBili  x   /  AST  24  /  ALT  16  /  AlkPhos  73  07-05  CAPILLARY BLOOD GLUCOSE              IN/OUT    07-05-24 @ 07:01  -  07-05-24 @ 10:03  --------------------------------------------------------  IN: 0 mL / OUT: 10 mL / NET: -10 mL      IMAGING  personally reviewed imaging   Xray Chest 1 View-PORTABLE IMMEDIATE:   ACC: [id number] EXAM:  XR CHEST PORTABLE IMMED 1V   ORDERED BY: LAURIE PARKER     PROCEDURE DATE:  07/04/2024          INTERPRETATION:  EXAM:XR CHEST IMMEDIATE.     CLINICAL INDICATION: PTX .     TECHNIQUE: Portable AP view.     PRIOR EXAM: 04/27/2024.     FINDINGS:     No evidence of pneumothorax. Evidence of a Pleurx catheter on the right   side with tip projected over the region of right hilum. Interval   significant improvement of pulmonary metastases. As a reference a left   upper lobe nodule measures 1.9 cm, previously 2.9 cm. Prior left upper   lung surgery. Possible small right lung base pleural effusion/infiltrate.   Magnified cardiac silhouette is stable. No significant bony abnormality.      IMPRESSION:    Significant improvement of pulmonary metastases.  Right-sided Pleurx tube. No evidence of pneumothorax.    --- End of Report ---            KANIKA MAYS MD; Attending Radiologist  This document has been electronically signed. Jul 5 2024  8:59AM (07-04-24 @ 19:01)    CURRENT MEDICATIONS  MEDICATIONS  (STANDING):  ferrous    sulfate 325 milliGRAM(s) Oral <User Schedule>  lidocaine   4% Patch 1 Patch Transdermal every 24 hours  loratadine 10 milliGRAM(s) Oral daily  medroxyPROGESTERone 10 milliGRAM(s) Oral daily  morphine ER Tablet 30 milliGRAM(s) Oral every 8 hours  OLANZapine 2.5 milliGRAM(s) Oral at bedtime  pantoprazole    Tablet 40 milliGRAM(s) Oral before breakfast  pregabalin 75 milliGRAM(s) Oral daily  senna 2 Tablet(s) Oral at bedtime  sertraline 50 milliGRAM(s) Oral daily    MEDICATIONS  (PRN):  aluminum hydroxide/magnesium hydroxide/simethicone Suspension 30 milliLiter(s) Oral every 4 hours PRN Dyspepsia  LORazepam     Tablet 0.5 milliGRAM(s) Oral every 8 hours PRN Anxiety  melatonin 3 milliGRAM(s) Oral at bedtime PRN Insomnia  ondansetron Injectable 4 milliGRAM(s) IV Push every 8 hours PRN Nausea and/or Vomiting  oxyCODONE    IR 5 milliGRAM(s) Oral every 4 hours PRN Mild Pain (1 - 3)  oxyCODONE    IR 10 milliGRAM(s) Oral every 4 hours PRN Moderate to severe pain (4-10)  polyethylene glycol 3350 17 Gram(s) Oral two times a day PRN Constipation  simethicone 80 milliGRAM(s) Chew every 6 hours PRN Gas

## 2024-07-05 NOTE — PATIENT PROFILE ADULT - NSPROPTRIGHTCAREGIVER_GEN_A_NUR
[FreeTextEntry1] : 49 year old M w/ h/o IDDM c/b neuropathy (A1c 5.7 12/18) , HTN, CAD (s/p stent in [place] in 2016, unknown coronary anatomy), HFrEF (EF 25%, LVEDD 6.6 cm), severe MR (functional), ESRD on HD via permacath (AV fistula awaiting patency), recent GIB 2/2 ulcer (H. pylori positive) who is here for follow up. Appears slightly volume overloaded but compensated with class II symptoms. Interested in kidney transplant but discussed with patient and his sister that he'll likely need heart and kidney transplant. \par \par 1. HFrEF - unclear etiology; prior known CAD\par - continue bumex 4 mg twice/day\par - increase coreg to 12.5 mg twice/day\par - continue hydral 100 mg three times/day and isordil to 40 mg three times/day \par - labs from dialysis center reviewed; K was normal\par - repeat TTE in 6 weeks\par \par 2. ESRD on HD; awaiting fistula maturation\par - instructed to follow low potassium diet\par \par 3. GI bleed - found to have ulcer which was H. pylori positive\par - will send message to Dr. Trevin Haines (GI) regarding triple-therapy and f/u\par \par 4. CAD - prior PCI\par - on ASA 81 mg daily\par - on lipitor 80 mg qhs\par \par RTC 8 weeks
no

## 2024-07-05 NOTE — PATIENT PROFILE ADULT - FALL HARM RISK - ATTEMPT OOB
Pt called twice at # [phone], no answer. Message left to call back.    Reason for Disposition  • No answer.  First attempt to contact caller.  Follow-up call scheduled within 15 minutes.    Protocols used: NO CONTACT OR DUPLICATE CONTACT CALL-A-AH       No

## 2024-07-05 NOTE — PROGRESS NOTE ADULT - ASSESSMENT
52 y/o female with a PMHx of fibroids, Pt of [facility] [place]. s/p  IR biopsy Rt lung nodule at [facility] 2/26 .  PT currently undergoing workup for recently diagnosed endometrial cancer presents to the ED c/o left sided CP. Pt reports she had a pain in her left chest that felt like a gas bubble that was progressively worsening. Denies rash. No other complaints at this time.  Pt noted to have a moderate PTX on left side . Now s/p Left pigtail placement  3/11 , Rt Pigtail placed 3/13  s/p 3/15 Left Robot assist Pleurectomy/TALC pleurodesis wedge resection 3/20 both CT d/c  s/p biopsy of uterus 3/21/24.    readmitted to [facility] with right PTX s/p anterior tube x 2 (one placed in IR as per patient) with pleurodesis   now transferred to  for further care and management   with anterior chest tube to suction   d.w dr ascencio will place on waterseal and repeat imaging   plan to follow thereafter

## 2024-07-05 NOTE — CONSULT NOTE ADULT - SUBJECTIVE AND OBJECTIVE BOX
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     2 weeks prior went to [facility] for chemo Tx; SOB at time, imaging revealed PTX, admitted to [facility]  Hospital course: Rec'd Rt lateral pigtail, then ant pigtail placed by IR, pleurodesis x 1  Pt request for XF to HH; Faith is her thoracic surgeon    Anterior pigtail in place, to sxn, CXR obtained lung up, no SOB,  at bedside.   WBC 8.55, Hb 10.3, plt 387     PAST MEDICAL & SURGICAL HISTORY:  Fibroid      H/O oral surgery  under general anesthesia during childhood      S/P D&C (status post dilation and curettage)  x2      S/P laparoscopy  as part of infertility work up      S/P tonsillectomy          Allergies    No Known Allergies    Intolerances    shellfish (Vomiting)      MEDICATIONS  (STANDING):  ferrous    sulfate 325 milliGRAM(s) Oral <User Schedule>  lidocaine   4% Patch 1 Patch Transdermal every 24 hours  loratadine 10 milliGRAM(s) Oral daily  medroxyPROGESTERone 10 milliGRAM(s) Oral daily  morphine ER Tablet 30 milliGRAM(s) Oral every 8 hours  OLANZapine 2.5 milliGRAM(s) Oral at bedtime  pantoprazole    Tablet 40 milliGRAM(s) Oral before breakfast  pregabalin 75 milliGRAM(s) Oral daily  senna 2 Tablet(s) Oral at bedtime  sertraline 50 milliGRAM(s) Oral daily    MEDICATIONS  (PRN):  aluminum hydroxide/magnesium hydroxide/simethicone Suspension 30 milliLiter(s) Oral every 4 hours PRN Dyspepsia  LORazepam     Tablet 0.5 milliGRAM(s) Oral every 8 hours PRN Anxiety  melatonin 3 milliGRAM(s) Oral at bedtime PRN Insomnia  ondansetron Injectable 4 milliGRAM(s) IV Push every 8 hours PRN Nausea and/or Vomiting  oxyCODONE    IR 10 milliGRAM(s) Oral every 4 hours PRN Moderate to severe pain (4-10)  oxyCODONE    IR 5 milliGRAM(s) Oral every 4 hours PRN Mild Pain (1 - 3)  polyethylene glycol 3350 17 Gram(s) Oral two times a day PRN Constipation  simethicone 80 milliGRAM(s) Chew every 6 hours PRN Gas      FAMILY HISTORY:  Family history of uterine cancer (Mother)        SOCIAL HISTORY: No EtOH, no tobacco    REVIEW OF SYSTEMS:    CONSTITUTIONAL: No weakness, fevers or chills  EYES/ENT: No visual changes;  No vertigo or throat pain   NECK: No pain or stiffness  RESPIRATORY: No cough, wheezing, hemoptysis; +shortness of breath  CARDIOVASCULAR: No chest pain or palpitations  GASTROINTESTINAL: No abdominal or epigastric pain. No nausea, vomiting, or hematemesis; No diarrhea or constipation. No melena or hematochezia.  GENITOURINARY: No dysuria, frequency or hematuria  NEUROLOGICAL: No numbness or weakness  SKIN: +skin blanching   All other review of systems is negative unless indicated above.      Weight (kg): 47.2 (07-04 @ 18:56)    T(F): 97.8 (07-04-24 @ 21:12), Max: 97.9 (07-04-24 @ 17:58)  HR: 73 (07-04-24 @ 21:12)  BP: 100/58 (07-04-24 @ 21:12)  RR: 18 (07-04-24 @ 21:12)  SpO2: 98% (07-04-24 @ 21:12)  Wt(kg): --    GENERAL: NAD, well-developed  HEAD:  Atraumatic, Normocephalic  EYES: EOMI  CHEST/LUNG: pigtail chest tube to wall suction  HEART: Regular rate and rhythm  ABDOMEN: Soft, Nontender  EXTREMITIES: no edema  NEUROLOGY: non-focal                          10.3   8.55  )-----------( 387      ( 05 Jul 2024 06:34 )             32.8                   PT/INR - ( 05 Jul 2024 06:34 )   PT: 11.9 sec;   INR: 1.05 ratio         PTT - ( 05 Jul 2024 06:34 )  PTT:34.4 sec

## 2024-07-05 NOTE — PATIENT PROFILE ADULT - FALL HARM RISK - RISK INTERVENTIONS
Assistance with ambulation/Communicate Fall Risk and Risk Factors to all staff, patient, and family/Reinforce activity limits and safety measures with patient and family/Visual Cue: Yellow wristband/Bed in lowest position, wheels locked, appropriate side rails in place/Call bell, personal items and telephone in reach/Instruct patient to call for assistance before getting out of bed or chair/Non-slip footwear when patient is out of bed/[place] to call system/Physically safe environment - no spills, clutter or unnecessary equipment/Purposeful Proactive Rounding/Room/bathroom lighting operational, light cord in reach

## 2024-07-05 NOTE — CONSULT NOTE ADULT - ASSESSMENT
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    # h/o high grade vulvar ca   - currently on gemcitabine, docetaxel   - LD 3 weeks ago  - will f/u with [facility] on dc for continued tx   - WBC 8.55, Hb 10.3, plt 387  - requested records from [facility]     # Pneumothorax  - recurrent PTX seen   - Rt lateral pigtail, then ant pigtail placed by IR, pleurodesis x 1- Dr. 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    # h/o high grade vulvar ca   - currently on gemcitabine, docetaxel - LD 3 weeks ago- scheduled for next week if able to be discharged from hospital   - [place] [facility] [place]   - WBC 8.55, Hb 10.3, plt 387  - requested [place] [facility]     # Pneumothorax  - recurrent PTX seen   - Rt lateral pigtail, then ant pigtail placed by IR, pleurodesis x 1- Dr. Cuevas to see today  - [place] was [place] [facility] [place]- anytime tried to take off suction, ptx would worsen on scan  - pt comfortable on ra   - maintain chest tube to wall suction     will continue to follow and communicate with [facility]

## 2024-07-06 LAB
ANION GAP SERPL CALC-SCNC: 6 MMOL/L — SIGNIFICANT CHANGE UP (ref 5–17)
BUN SERPL-MCNC: 17 MG/DL — SIGNIFICANT CHANGE UP (ref 7–23)
CALCIUM SERPL-MCNC: 10 MG/DL — SIGNIFICANT CHANGE UP (ref 8.5–10.1)
CHLORIDE SERPL-SCNC: 107 MMOL/L — SIGNIFICANT CHANGE UP (ref 96–108)
CO2 SERPL-SCNC: 28 MMOL/L — SIGNIFICANT CHANGE UP (ref 22–31)
CREAT SERPL-MCNC: 0.61 MG/DL — SIGNIFICANT CHANGE UP (ref 0.5–1.3)
EGFR: 105 ML/MIN/1.73M2 — SIGNIFICANT CHANGE UP
GLUCOSE SERPL-MCNC: 89 MG/DL — SIGNIFICANT CHANGE UP (ref 70–99)
HCT VFR BLD CALC: 32.3 % — LOW (ref 34.5–45)
HGB BLD-MCNC: 10.4 G/DL — LOW (ref 11.5–15.5)
MAGNESIUM SERPL-MCNC: 2 MG/DL — SIGNIFICANT CHANGE UP (ref 1.6–2.6)
MCHC RBC-ENTMCNC: 30.1 PG — SIGNIFICANT CHANGE UP (ref 27–34)
MCHC RBC-ENTMCNC: 32.2 GM/DL — SIGNIFICANT CHANGE UP (ref 32–36)
MCV RBC AUTO: 93.4 FL — SIGNIFICANT CHANGE UP (ref 80–100)
PLATELET # BLD AUTO: 375 K/UL — SIGNIFICANT CHANGE UP (ref 150–400)
POTASSIUM SERPL-MCNC: 4 MMOL/L — SIGNIFICANT CHANGE UP (ref 3.5–5.3)
POTASSIUM SERPL-SCNC: 4 MMOL/L — SIGNIFICANT CHANGE UP (ref 3.5–5.3)
RBC # BLD: 3.46 M/UL — LOW (ref 3.8–5.2)
RBC # FLD: 19.8 % — HIGH (ref 10.3–14.5)
SODIUM SERPL-SCNC: 141 MMOL/L — SIGNIFICANT CHANGE UP (ref 135–145)
WBC # BLD: 8.33 K/UL — SIGNIFICANT CHANGE UP (ref 3.8–10.5)
WBC # FLD AUTO: 8.33 K/UL — SIGNIFICANT CHANGE UP (ref 3.8–10.5)

## 2024-07-06 PROCEDURE — 71045 X-RAY EXAM CHEST 1 VIEW: CPT | Mod: 26

## 2024-07-06 RX ADMIN — SERTRALINE HYDROCHLORIDE 50 MILLIGRAM(S): 100 TABLET, FILM COATED ORAL at 10:01

## 2024-07-06 RX ADMIN — MORPHINE SULFATE 30 MILLIGRAM(S): 100 TABLET, EXTENDED RELEASE ORAL at 21:31

## 2024-07-06 RX ADMIN — MORPHINE SULFATE 30 MILLIGRAM(S): 100 TABLET, EXTENDED RELEASE ORAL at 22:01

## 2024-07-06 RX ADMIN — PREGABALIN 75 MILLIGRAM(S): 50 CAPSULE ORAL at 21:30

## 2024-07-06 RX ADMIN — OLANZAPINE 2.5 MILLIGRAM(S): 2.5 TABLET, FILM COATED ORAL at 21:30

## 2024-07-06 RX ADMIN — PREGABALIN 75 MILLIGRAM(S): 50 CAPSULE ORAL at 10:01

## 2024-07-06 RX ADMIN — PANTOPRAZOLE SODIUM 40 MILLIGRAM(S): 40 INJECTION, POWDER, FOR SOLUTION INTRAVENOUS at 06:22

## 2024-07-06 RX ADMIN — Medication 2 TABLET(S): at 21:30

## 2024-07-06 RX ADMIN — MORPHINE SULFATE 30 MILLIGRAM(S): 100 TABLET, EXTENDED RELEASE ORAL at 10:01

## 2024-07-06 RX ADMIN — MEDROXYPROGESTERONE ACETATE 10 MILLIGRAM(S): 2.5 TABLET ORAL at 21:31

## 2024-07-06 NOTE — PROGRESS NOTE ADULT - SUBJECTIVE AND OBJECTIVE BOX
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 for recurrent PTX.     2 weeks prior went to [facility] for chemo Tx; SOB at time, imaging revealed PTX, [facility]  Hospital course: Rec'd Rt lateral pigtail, then ant pigtail placed by IR, pleurodesis x 1  Pt request for XF to HH; Faith is her thoracic surgeon    Anterior pigtail in place, to sxn, CXR obtained lung up, no SOB,  at bedside.   WBC 8.55, Hb 10.3, plt 387     PAST MEDICAL & SURGICAL HISTORY:  Fibroid      H/O oral surgery  under general anesthesia during childhood      S/P D&C (status post dilation and curettage)  x2      S/P laparoscopy  as part of infertility work up      S/P tonsillectomy          Allergies    No Known Allergies    Intolerances    shellfish (Vomiting)      MEDICATIONS  (STANDING):  ferrous    sulfate 325 milliGRAM(s) Oral <User Schedule>  medroxyPROGESTERone 10 milliGRAM(s) Oral daily  morphine ER Tablet 30 milliGRAM(s) Oral every 12 hours  OLANZapine 2.5 milliGRAM(s) Oral at bedtime  pantoprazole    Tablet 40 milliGRAM(s) Oral before breakfast  pregabalin 75 milliGRAM(s) Oral every 12 hours  senna 2 Tablet(s) Oral at bedtime  sertraline 50 milliGRAM(s) Oral daily    MEDICATIONS  (PRN):  aluminum hydroxide/magnesium hydroxide/simethicone Suspension 30 milliLiter(s) Oral every 4 hours PRN Dyspepsia  LORazepam     Tablet 0.5 milliGRAM(s) Oral every 8 hours PRN Anxiety  melatonin 3 milliGRAM(s) Oral at bedtime PRN Insomnia  ondansetron Injectable 4 milliGRAM(s) IV Push every 8 hours PRN Nausea and/or Vomiting  oxyCODONE    IR 10 milliGRAM(s) Oral every 4 hours PRN Moderate to severe pain (4-10)  oxyCODONE    IR 5 milliGRAM(s) Oral every 4 hours PRN Mild Pain (1 - 3)  polyethylene glycol 3350 17 Gram(s) Oral two times a day PRN Constipation  simethicone 80 milliGRAM(s) Chew every 6 hours PRN Gas    FAMILY HISTORY:  Family history of uterine cancer (Mother)        SOCIAL HISTORY: No EtOH, no tobacco    REVIEW OF SYSTEMS:    CONSTITUTIONAL: No weakness, fevers or chills  EYES/ENT: No visual changes;  No vertigo or throat pain   NECK: No pain or stiffness  RESPIRATORY: No cough, wheezing, hemoptysis; +shortness of breath  CARDIOVASCULAR: No chest pain or palpitations  GASTROINTESTINAL: No abdominal or epigastric pain. No nausea, vomiting, or hematemesis; No diarrhea or constipation. No melena or hematochezia.  GENITOURINARY: No dysuria, frequency or hematuria  NEUROLOGICAL: No numbness or weakness  SKIN: +skin blanching   All other review of systems is negative unless indicated above.      Weight (kg): 47.2 (07-04 @ 18:56)    Vital Signs Last 24 Hrs  T(C): 36.6 (07-06-24 @ 08:14), Max: 36.7 (07-06-24 @ 05:00)  T(F): 97.8 (07-06-24 @ 08:14), Max: 98.1 (07-06-24 @ 05:00)  HR: 82 (07-06-24 @ 08:14) (69 - 82)  BP: 118/56 (07-06-24 @ 08:14) (93/51 - 118/56)  BP(mean): --  RR: 16 (07-06-24 @ 08:14) (16 - 18)  SpO2: 94% (07-06-24 @ 08:14) (94% - 97%)      GENERAL: NAD, well-developed  HEAD:  Atraumatic, Normocephalic  EYES: EOMI  CHEST/LUNG: pigtail chest tube to wall suction  HEART: Regular rate and rhythm  ABDOMEN: Soft, Nontender  EXTREMITIES: no edema  NEUROLOGY: non-focal      LABS:                          10.4   8.33  )-----------( 375      ( 06 Jul 2024 06:17 )             32.3                           10.3   8.55  )-----------( 387      ( 05 Jul 2024 06:34 )             32.8                   PT/INR - ( 05 Jul 2024 06:34 )   PT: 11.9 sec;   INR: 1.05 ratio         PTT - ( 05 Jul 2024 06:34 )  PTT:34.4 sec     HPI:  Admission H&P    55 y/o F who follows at 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     2 weeks prior went to [facility] for chemo Tx; SOB at time, imaging revealed PTX, admitted to [facility] in [facility]  Hospital course: Rec'd Rt lateral pigtail, then ant pigtail placed by IR, pleurodesis x 1  Pt request for XF to HH; Faith is her thoracic surgeon    Anterior pigtail in place, to sxn, CXR today - no pneumo, stable from prior; no SOB,  at bedside.       PAST MEDICAL & SURGICAL HISTORY:  Fibroid      H/O oral surgery  under general anesthesia during childhood      S/P D&C (status post dilation and curettage)  x2      S/P laparoscopy  as part of infertility work up      S/P tonsillectomy          Allergies    No Known Allergies    Intolerances    shellfish (Vomiting)      MEDICATIONS  (STANDING):  ferrous    sulfate 325 milliGRAM(s) Oral <User Schedule>  medroxyPROGESTERone 10 milliGRAM(s) Oral daily  morphine ER Tablet 30 milliGRAM(s) Oral every 12 hours  OLANZapine 2.5 milliGRAM(s) Oral at bedtime  pantoprazole    Tablet 40 milliGRAM(s) Oral before breakfast  pregabalin 75 milliGRAM(s) Oral every 12 hours  senna 2 Tablet(s) Oral at bedtime  sertraline 50 milliGRAM(s) Oral daily    MEDICATIONS  (PRN):  aluminum hydroxide/magnesium hydroxide/simethicone Suspension 30 milliLiter(s) Oral every 4 hours PRN Dyspepsia  LORazepam     Tablet 0.5 milliGRAM(s) Oral every 8 hours PRN Anxiety  melatonin 3 milliGRAM(s) Oral at bedtime PRN Insomnia  ondansetron Injectable 4 milliGRAM(s) IV Push every 8 hours PRN Nausea and/or Vomiting  oxyCODONE    IR 10 milliGRAM(s) Oral every 4 hours PRN Moderate to severe pain (4-10)  oxyCODONE    IR 5 milliGRAM(s) Oral every 4 hours PRN Mild Pain (1 - 3)  polyethylene glycol 3350 17 Gram(s) Oral two times a day PRN Constipation  simethicone 80 milliGRAM(s) Chew every 6 hours PRN Gas    FAMILY HISTORY:  Family history of uterine cancer (Mother)        SOCIAL HISTORY: No EtOH, no tobacco    REVIEW OF SYSTEMS:    CONSTITUTIONAL: No weakness, fevers or chills  EYES/ENT: No visual changes;  No vertigo or throat pain   NECK: No pain or stiffness  RESPIRATORY: No cough, wheezing, hemoptysis; +shortness of breath  CARDIOVASCULAR: No chest pain or palpitations  GASTROINTESTINAL: No abdominal or epigastric pain. No nausea, vomiting, or hematemesis; No diarrhea or constipation. No melena or hematochezia.  GENITOURINARY: No dysuria, frequency or hematuria  NEUROLOGICAL: No numbness or weakness  SKIN: +skin blanching   All other review of systems is negative unless indicated above.      Weight (kg): 47.2 (07-04 @ 18:56)    Vital Signs Last 24 Hrs  T(C): 36.6 (07-06-24 @ 08:14), Max: 36.7 (07-06-24 @ 05:00)  T(F): 97.8 (07-06-24 @ 08:14), Max: 98.1 (07-06-24 @ 05:00)  HR: 82 (07-06-24 @ 08:14) (69 - 82)  BP: 118/56 (07-06-24 @ 08:14) (93/51 - 118/56)  BP(mean): --  RR: 16 (07-06-24 @ 08:14) (16 - 18)  SpO2: 94% (07-06-24 @ 08:14) (94% - 97%)      GENERAL: NAD, well-developed  HEAD:  Atraumatic, Normocephalic  EYES: EOMI  CHEST/LUNG: pigtail chest tube to wall suction  HEART: Regular rate and rhythm  ABDOMEN: Soft, Nontender  EXTREMITIES: no edema  NEUROLOGY: non-focal      LABS:                          10.4   8.33  )-----------( 375      ( 06 Jul 2024 06:17 )             32.3                           10.3   8.55  )-----------( 387      ( 05 Jul 2024 06:34 )             32.8                   PT/INR - ( 05 Jul 2024 06:34 )   PT: 11.9 sec;   INR: 1.05 ratio         PTT - ( 05 Jul 2024 06:34 )  PTT:34.4 sec

## 2024-07-06 NOTE — PROGRESS NOTE ADULT - SUBJECTIVE AND OBJECTIVE BOX
ICU Progress Note    HPI:    S:    Pt seen and examined  HD #  54y  Female  Pt here for       Allergies    No Known Allergies    Intolerances    shellfish (Vomiting)      MEDICATIONS  (STANDING):  ferrous    sulfate 325 milliGRAM(s) Oral <User Schedule>  medroxyPROGESTERone 10 milliGRAM(s) Oral daily  morphine ER Tablet 30 milliGRAM(s) Oral every 12 hours  OLANZapine 2.5 milliGRAM(s) Oral at bedtime  pantoprazole    Tablet 40 milliGRAM(s) Oral before breakfast  pregabalin 75 milliGRAM(s) Oral every 12 hours  senna 2 Tablet(s) Oral at bedtime  sertraline 50 milliGRAM(s) Oral daily    MEDICATIONS  (PRN):  aluminum hydroxide/magnesium hydroxide/simethicone Suspension 30 milliLiter(s) Oral every 4 hours PRN Dyspepsia  LORazepam     Tablet 0.5 milliGRAM(s) Oral every 8 hours PRN Anxiety  melatonin 3 milliGRAM(s) Oral at bedtime PRN Insomnia  ondansetron Injectable 4 milliGRAM(s) IV Push every 8 hours PRN Nausea and/or Vomiting  oxyCODONE    IR 10 milliGRAM(s) Oral every 4 hours PRN Moderate to severe pain (4-10)  oxyCODONE    IR 5 milliGRAM(s) Oral every 4 hours PRN Mild Pain (1 - 3)  polyethylene glycol 3350 17 Gram(s) Oral two times a day PRN Constipation  simethicone 80 milliGRAM(s) Chew every 6 hours PRN Gas      Drug Dosing Weight  Height (cm): 162.6 (26 Apr 2024 15:27)  Weight (kg): 47.2 (04 Jul 2024 18:56)  BMI (kg/m2): 17.9 (04 Jul 2024 18:56)  BSA (m2): 1.48 (04 Jul 2024 18:56)    PAST MEDICAL & SURGICAL HISTORY:  Fibroid      H/O oral surgery  under general anesthesia during childhood      S/P D&C (status post dilation and curettage)  x2      S/P laparoscopy  as part of infertility work up      S/P tonsillectomy          FAMILY HISTORY:  Family history of uterine cancer (Mother)        UNLESS OTHERWISE NOTED IN HPI above:    Constitutional:  No Weight Change, No Fever, No Chills, No Night Sweats, No Fatigue, No Malaise  ENT/Mouth:  No Hearing Changes, No Ear Pain, No Nasal Congestion, No  Sinus Pain, No Hoarseness, No sore throat, No Rhinorrhea, No Swallowing  Difficulty  Eyes:  No Eye Pain, No Swelling, No Redness, No Foreign Body, No Discharge, No Vision Changes  Cardiovascular:  No Chest Pain, No SOB, No PND, No Dyspnea on Exertion,  No Orthopnea, No Claudication, No Edema, No Palpitations  Respiratory:  No Cough, No Sputum, No Wheezing, No Smoke Exposure, No Dyspnea  Gastrointestinal:  No Nausea, No Vomiting, No Diarrhea, No  Constipation, No Pain, No Heartburn, No Anorexia, No Dysphagia, No  Hematochezia, No Melena, No Flatulence, No Jaundice  Genitourinary:  No Dysmenorrhea, No DUB, No Dyspareunia, No Dysuria, No  Urinary Frequency, No Hematuria, No Urinary Incontinence, No Urgency,  No Flank Pain, No Urinary Flow Changes, No Hesitancy  Musculoskeletal:  No Arthralgias, No Myalgias, No Joint Swelling, No  Joint Stiffness, No Back Pain, No Neck Pain, No Injury History  Skin:  No Skin Lesions, No Pruritis, No Hair Changes, No Breast/Skin Changes, No Nipple Discharge  Neuro:  No Weakness, No Numbness, No Paresthesias, No Loss of  Consciousness, No Syncope, No Dizziness, No Headache, No Coordination  Changes, No Recent Falls  Psych:  No Anxiety/Panic, No Depression, No Insomnia, No Personality  Changes, No Delusions, No Rumination, No SI/HI/AH/VH, No Social Issues,  No Memory Changes, No Violence/Abuse Hx., No Eating Concerns  Heme/Lymph:  No Bruising, No Bleeding, No Transfusions History, No Lymphadenopathy  Endocrine:  No Polyuria, No Polydipsia, No Temperature Intolerance    O:    ICU Vital Signs Last 24 Hrs  T(C): 36.6 (06 Jul 2024 08:14), Max: 36.7 (06 Jul 2024 05:00)  T(F): 97.8 (06 Jul 2024 08:14), Max: 98.1 (06 Jul 2024 05:00)  HR: 82 (06 Jul 2024 08:14) (69 - 82)  BP: 118/56 (06 Jul 2024 08:14) (93/51 - 118/56)  BP(mean): --  ABP: --  ABP(mean): --  RR: 16 (06 Jul 2024 08:14) (16 - 18)  SpO2: 94% (06 Jul 2024 08:14) (94% - 97%)    O2 Parameters below as of 06 Jul 2024 08:14  Patient On (Oxygen Delivery Method): room air                I&O's Detail    05 Jul 2024 07:01  -  06 Jul 2024 07:00  --------------------------------------------------------  IN:  Total IN: 0 mL    OUT:    Drain (mL): 14 mL  Total OUT: 14 mL    Total NET: -14 mL              PE:    Adult lying in bed  No JVD trachea midline  Normocephalic, atraumatic  S1S2+  CTA B/L  Abd soft NTND  No leg swelling/edema noted  Awake and alert  Skin pink, warm    LABS:    CBC Full  -  ( 06 Jul 2024 06:17 )  WBC Count : 8.33 K/uL  RBC Count : 3.46 M/uL  Hemoglobin : 10.4 g/dL  Hematocrit : 32.3 %  Platelet Count - Automated : 375 K/uL  Mean Cell Volume : 93.4 fl  Mean Cell Hemoglobin : 30.1 pg  Mean Cell Hemoglobin Concentration : 32.2 gm/dL  Auto Neutrophil # : x  Auto Lymphocyte # : x  Auto Monocyte # : x  Auto Eosinophil # : x  Auto Basophil # : x  Auto Neutrophil % : x  Auto Lymphocyte % : x  Auto Monocyte % : x  Auto Eosinophil % : x  Auto Basophil % : x    07-06    141  |  107  |  17  ----------------------------<  89  4.0   |  28  |  0.61    Ca    10.0      06 Jul 2024 06:17  Mg     2.0     07-06    TPro  7.2  /  Alb  3.1<L>  /  TBili  0.4  /  DBili  x   /  AST  24  /  ALT  16  /  AlkPhos  73  07-05    PT/INR - ( 05 Jul 2024 06:34 )   PT: 11.9 sec;   INR: 1.05 ratio         PTT - ( 05 Jul 2024 06:34 )  PTT:34.4 sec  Urinalysis Basic - ( 06 Jul 2024 06:17 )    Color: x / Appearance: x / SG: x / pH: x  Gluc: 89 mg/dL / Ketone: x  / Bili: x / Urobili: x   Blood: x / Protein: x / Nitrite: x   Leuk Esterase: x / RBC: x / WBC x   Sq Epi: x / Non Sq Epi: x / Bacteria: x      CAPILLARY BLOOD GLUCOSE            LIVER FUNCTIONS - ( 05 Jul 2024 06:34 )  Alb: 3.1 g/dL / Pro: 7.2 gm/dL / ALK PHOS: 73 U/L / ALT: 16 U/L / AST: 24 U/L / GGT: x                  CTS Progress Note    HPI:    S:    Pt seen and examined  S:    Pt seen and examined  HD # 1  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 on gem/doce (Last dose 3 weeks prior) complicated by B/L PTX s/p pleurodesis  presents [place]F from [facility] for [place]X    2 weeks prior went to [facility] for [facility] Tx; SOB at time, imaging revealed PTX, admitted to [facility]  Hospital course: Rec'd Rt lateral pigtail, then ant pigtail placed by IR, pleurodesis x 1  Pt request for XF to HH; Faith is her thoracic surgeon    7/4: Anterior pigtail in place, to sxn, CXR obtained lung up, no SOB,  at bedside.   7/6: On WS, + AL. CXR lung up. Pain well controlled.     ROS: + SOB, improving  Remainder of systems reviewed, negative      Allergies    No Known Allergies    Intolerances    shellfish (Vomiting)      MEDICATIONS  (STANDING):  ferrous    sulfate 325 milliGRAM(s) Oral <User Schedule>  medroxyPROGESTERone 10 milliGRAM(s) Oral daily  morphine ER Tablet 30 milliGRAM(s) Oral every 12 hours  OLANZapine 2.5 milliGRAM(s) Oral at bedtime  pantoprazole    Tablet 40 milliGRAM(s) Oral before breakfast  pregabalin 75 milliGRAM(s) Oral every 12 hours  senna 2 Tablet(s) Oral at bedtime  sertraline 50 milliGRAM(s) Oral daily    MEDICATIONS  (PRN):  aluminum hydroxide/magnesium hydroxide/simethicone Suspension 30 milliLiter(s) Oral every 4 hours PRN Dyspepsia  LORazepam     Tablet 0.5 milliGRAM(s) Oral every 8 hours PRN Anxiety  melatonin 3 milliGRAM(s) Oral at bedtime PRN Insomnia  ondansetron Injectable 4 milliGRAM(s) IV Push every 8 hours PRN Nausea and/or Vomiting  oxyCODONE    IR 10 milliGRAM(s) Oral every 4 hours PRN Moderate to severe pain (4-10)  oxyCODONE    IR 5 milliGRAM(s) Oral every 4 hours PRN Mild Pain (1 - 3)  polyethylene glycol 3350 17 Gram(s) Oral two times a day PRN Constipation  simethicone 80 milliGRAM(s) Chew every 6 hours PRN Gas      Drug Dosing Weight  Height (cm): 162.6 (26 Apr 2024 15:27)  Weight (kg): 47.2 (04 Jul 2024 18:56)  BMI (kg/m2): 17.9 (04 Jul 2024 18:56)  BSA (m2): 1.48 (04 Jul 2024 18:56)    PAST MEDICAL & SURGICAL HISTORY:  Fibroid      H/O oral surgery  under general anesthesia during childhood      S/P D&C (status post dilation and curettage)  x2      S/P laparoscopy  as part of infertility work up      S/P tonsillectomy          FAMILY HISTORY:  Family history of uterine cancer (Mother)        UNLESS OTHERWISE NOTED IN HPI above:    Constitutional:  No Weight Change, No Fever, No Chills, No Night Sweats, No Fatigue, No Malaise  ENT/Mouth:  No Hearing Changes, No Ear Pain, No Nasal Congestion, No  Sinus Pain, No Hoarseness, No sore throat, No Rhinorrhea, No Swallowing  Difficulty  Eyes:  No Eye Pain, No Swelling, No Redness, No Foreign Body, No Discharge, No Vision Changes  Cardiovascular:  No Chest Pain, No SOB, No PND, No Dyspnea on Exertion,  No Orthopnea, No Claudication, No Edema, No Palpitations  Respiratory:  No Cough, No Sputum, No Wheezing, No Smoke Exposure, No Dyspnea  Gastrointestinal:  No Nausea, No Vomiting, No Diarrhea, No  Constipation, No Pain, No Heartburn, No Anorexia, No Dysphagia, No  Hematochezia, No Melena, No Flatulence, No Jaundice  Genitourinary:  No Dysmenorrhea, No DUB, No Dyspareunia, No Dysuria, No  Urinary Frequency, No Hematuria, No Urinary Incontinence, No Urgency,  No Flank Pain, No Urinary Flow Changes, No Hesitancy  Musculoskeletal:  No Arthralgias, No Myalgias, No Joint Swelling, No  Joint Stiffness, No Back Pain, No Neck Pain, No Injury History  Skin:  No Skin Lesions, No Pruritis, No Hair Changes, No Breast/Skin Changes, No Nipple Discharge  Neuro:  No Weakness, No Numbness, No Paresthesias, No Loss of  Consciousness, No Syncope, No Dizziness, No Headache, No Coordination  Changes, No Recent Falls  Psych:  No Anxiety/Panic, No Depression, No Insomnia, No Personality  Changes, No Delusions, No Rumination, No SI/HI/AH/VH, No Social Issues,  No Memory Changes, No Violence/Abuse Hx., No Eating Concerns  Heme/Lymph:  No Bruising, No Bleeding, No Transfusions History, No Lymphadenopathy  Endocrine:  No Polyuria, No Polydipsia, No Temperature Intolerance    O:    ICU Vital Signs Last 24 Hrs  T(C): 36.6 (06 Jul 2024 08:14), Max: 36.7 (06 Jul 2024 05:00)  T(F): 97.8 (06 Jul 2024 08:14), Max: 98.1 (06 Jul 2024 05:00)  HR: 82 (06 Jul 2024 08:14) (69 - 82)  BP: 118/56 (06 Jul 2024 08:14) (93/51 - 118/56)  BP(mean): --  ABP: --  ABP(mean): --  RR: 16 (06 Jul 2024 08:14) (16 - 18)  SpO2: 94% (06 Jul 2024 08:14) (94% - 97%)    O2 Parameters below as of 06 Jul 2024 08:14  Patient On (Oxygen Delivery Method): room air                I&O's Detail    05 Jul 2024 07:01  -  06 Jul 2024 07:00  --------------------------------------------------------  IN:  Total IN: 0 mL    OUT:    Drain (mL): 14 mL  Total OUT: 14 mL    Total NET: -14 mL             PE:    Adult lying in bed  No JVD trachea midline  Normocephalic, atraumatic  + R sided ant pigtail in palce to WS, + AL with cough  S1S2+  CTA B/L  Abd soft NTND  No leg swelling/edema noted  Awake and alert  Skin pink, warm    LABS:    CBC Full  -  ( 06 Jul 2024 06:17 )  WBC Count : 8.33 K/uL  RBC Count : 3.46 M/uL  Hemoglobin : 10.4 g/dL  Hematocrit : 32.3 %  Platelet Count - Automated : 375 K/uL  Mean Cell Volume : 93.4 fl  Mean Cell Hemoglobin : 30.1 pg  Mean Cell Hemoglobin Concentration : 32.2 gm/dL  Auto Neutrophil # : x  Auto Lymphocyte # : x  Auto Monocyte # : x  Auto Eosinophil # : x  Auto Basophil # : x  Auto Neutrophil % : x  Auto Lymphocyte % : x  Auto Monocyte % : x  Auto Eosinophil % : x  Auto Basophil % : x    07-06    141  |  107  |  17  ----------------------------<  89  4.0   |  28  |  0.61    Ca    10.0      06 Jul 2024 06:17  Mg     2.0     07-06    TPro  7.2  /  Alb  3.1<L>  /  TBili  0.4  /  DBili  x   /  AST  24  /  ALT  16  /  AlkPhos  73  07-05    PT/INR - ( 05 Jul 2024 06:34 )   PT: 11.9 sec;   INR: 1.05 ratio         PTT - ( 05 Jul 2024 06:34 )  PTT:34.4 sec  Urinalysis Basic - ( 06 Jul 2024 06:17 )    Color: x / Appearance: x / SG: x / pH: x  Gluc: 89 mg/dL / Ketone: x  / Bili: x / Urobili: x   Blood: x / Protein: x / Nitrite: x   Leuk Esterase: x / RBC: x / WBC x   Sq Epi: x / Non Sq Epi: x / Bacteria: x      CAPILLARY BLOOD GLUCOSE            LIVER FUNCTIONS - ( 05 Jul 2024 06:34 )  Alb: 3.1 g/dL / Pro: 7.2 gm/dL / ALK PHOS: 73 U/L / ALT: 16 U/L / AST: 24 U/L / GGT: x

## 2024-07-06 NOTE — PROGRESS NOTE ADULT - ASSESSMENT
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    # h/o high grade vulvar ca   - currently on gemcitabine, docetaxel - LD 3 weeks ago- scheduled for next week if able to be discharged from hospital   - will f/u with [facility] on dc for continued tx   - WBC 8.55, Hb 10.3, plt 387  - requested records from [facility]     # Pneumothorax  - recurrent PTX seen   - Rt lateral pigtail, then ant pigtail placed by IR, pleurodesis x 1- Dr. 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    # h/o high grade vulvar ca   - currently on gemcitabine, docetaxel - LD 3 weeks ago- scheduled for next week if able to be discharged from hospital   - will f/u with [facility] on dc for continued tx   - WBC 8.55, Hb 10.3, plt 387  - requested records from [facility]     # Pneumothorax  - recurrent PTX seen   - Rt lateral pigtail, then ant pigtail placed by IR, pleurodesis x 1  - pt was at [facility] for 2 weeks- anytime tried to take off suction, ptx would worsen on scan  - pt comfortable on ra   - maintain chest tube to wall suction - CT surg f/u for definitive plan   - repeat CXR today - no Pneumo     will continue to follow and communicate with [facility]     Magne Garcia MD  [facility]  [phone]

## 2024-07-06 NOTE — PROGRESS NOTE ADULT - ASSESSMENT
A:    54yFemale  HD #    Here for:    1.    This patient requires a higher level of care due to the complexity and severity of their condition which increases the amount and complexity of data to be reviewed and analyzed in addition to the risk of complications, morbidity and mortality of patient management    P:    Neuro: GCS 15. Monitor for delirium.  Continue to optimize pain control. Serial Neurologic assessments.    HEENT: No issues.    CV: Continue hemodynamic monitoring    Pulm: Pulmonary toilet.  Continue incentive spirometer.  Chest PT.  Encourage OOB to chair and ambulation. Nebs. f/u ABG, CXR.    GI/Nutrition: Cont diet, bowel regimen.    /Renal: Monitor UOP. Monitor BMP.  Replete Lytes as needed.    HEME- Chemical and mechanical DVT ppx. f/u CBC. f/u coags as needed.    ID:  Cont abx. f/u Cx's.    Lines/Tubes:     Endo: Maintain euglycemia.    Skin:  Cont skin care, pressure ulcer prevention.    Dispo: Cont care.    Time spent on this patient encounter: 55+ minutes    Date of entry of this note is equal to the date of services rendered.    This includes assessment of the presenting problems with associated risks, reviewing the medical record to prepare for the encounter and meeting face to face with the patient to obtain additional history and conduct a focused exmaination. I have also performed an appropiate physical exam, made interventions listed and ordered and interpreted appropiate diagnostic studies as documented. This also included communication and coordination of care with the multidisciplinary team including the bedside nurse, appropriate attending of record and consultants as needed.         A:    54F    Here for:    1. PTX, recurrent, as a consequence of  2. Metastatic uterine CA    This patient requires a moderate level of MDM due to the complexity and severity of their condition which increases the amount and complexity of data to be reviewed and analyzed in addition to the risk of complications, morbidity and mortality of patient management    P:    Recurrent PTX 2/2 metastatic uterine CA  On chemo for this, last dose 3 weeks ago    Maintain ant pigtail chest tube placed at [facility] to 20cc sxn  CXR done, lung up  Med rec; MS contin, oxycodone IR for breakthrough  Diet, bowel regimen  IS, OOB as able  SCD  Oncology consult; known pulmonary and pleural metastatic disease  Medicine consult for co mgmt  VTE ppx mechanical only; had bleeding from pigtail on chemical ppx, has been on SCD only since  AM labs, replete lytes PRN  No hernandez, ambulate as able    Dispo: Continue care, CT to WS, pending definitive plan    Time spent on this patient encounter: 35+ minutes    Date of entry of this note is equal to the date of services rendered    This includes assessment of the presenting problems with associated risks, reviewing the medical record to prepare for the encounter and meeting face to face with the patient to obtain additional history. I have also performed an appropriate physical exam, made interventions listed and ordered and interpreted appropriate diagnostic studies as documented. This also included communication and coordination of care with the multidisciplinary team including the bedside nurse, appropriate attending of record and consultants as needed.

## 2024-07-07 PROCEDURE — 71045 X-RAY EXAM CHEST 1 VIEW: CPT | Mod: 26

## 2024-07-07 RX ADMIN — PREGABALIN 75 MILLIGRAM(S): 50 CAPSULE ORAL at 21:11

## 2024-07-07 RX ADMIN — OLANZAPINE 2.5 MILLIGRAM(S): 2.5 TABLET, FILM COATED ORAL at 21:11

## 2024-07-07 RX ADMIN — PANTOPRAZOLE SODIUM 40 MILLIGRAM(S): 40 INJECTION, POWDER, FOR SOLUTION INTRAVENOUS at 05:42

## 2024-07-07 RX ADMIN — MORPHINE SULFATE 30 MILLIGRAM(S): 100 TABLET, EXTENDED RELEASE ORAL at 21:11

## 2024-07-07 RX ADMIN — MEDROXYPROGESTERONE ACETATE 10 MILLIGRAM(S): 2.5 TABLET ORAL at 21:11

## 2024-07-07 RX ADMIN — PREGABALIN 75 MILLIGRAM(S): 50 CAPSULE ORAL at 09:38

## 2024-07-07 RX ADMIN — SERTRALINE HYDROCHLORIDE 50 MILLIGRAM(S): 100 TABLET, FILM COATED ORAL at 09:38

## 2024-07-07 RX ADMIN — MORPHINE SULFATE 30 MILLIGRAM(S): 100 TABLET, EXTENDED RELEASE ORAL at 09:38

## 2024-07-07 RX ADMIN — Medication 2 TABLET(S): at 21:11

## 2024-07-07 NOTE — PROGRESS NOTE ADULT - SUBJECTIVE AND OBJECTIVE BOX
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     2 weeks prior went to [facility] for chemo Tx; SOB at time, imaging revealed PTX, [place] [facility] [place] [facility]  Hospital course: Rec'd Rt lateral pigtail, then ant pigtail placed by IR, pleurodesis x 1  Pt request for XF to HH; Faith is her thoracic surgeon    Anterior pigtail in place, to sxn, CXR today - no pneumo, stable from prior; no SOB,  at bedside.       PAST MEDICAL & SURGICAL HISTORY:  Fibroid      H/O oral surgery  under general anesthesia during childhood      S/P D&C (status post dilation and curettage)  x2      S/P laparoscopy  as part of infertility work up      S/P tonsillectomy          Allergies    No Known Allergies    Intolerances    shellfish (Vomiting)      MEDICATIONS  (STANDING):  ferrous    sulfate 325 milliGRAM(s) Oral <User Schedule>  medroxyPROGESTERone 10 milliGRAM(s) Oral daily  morphine ER Tablet 30 milliGRAM(s) Oral every 12 hours  OLANZapine 2.5 milliGRAM(s) Oral at bedtime  pantoprazole    Tablet 40 milliGRAM(s) Oral before breakfast  pregabalin 75 milliGRAM(s) Oral every 12 hours  senna 2 Tablet(s) Oral at bedtime  sertraline 50 milliGRAM(s) Oral daily    MEDICATIONS  (PRN):  aluminum hydroxide/magnesium hydroxide/simethicone Suspension 30 milliLiter(s) Oral every 4 hours PRN Dyspepsia  LORazepam     Tablet 0.5 milliGRAM(s) Oral every 8 hours PRN Anxiety  melatonin 3 milliGRAM(s) Oral at bedtime PRN Insomnia  ondansetron Injectable 4 milliGRAM(s) IV Push every 8 hours PRN Nausea and/or Vomiting  oxyCODONE    IR 10 milliGRAM(s) Oral every 4 hours PRN Moderate to severe pain (4-10)  oxyCODONE    IR 5 milliGRAM(s) Oral every 4 hours PRN Mild Pain (1 - 3)  polyethylene glycol 3350 17 Gram(s) Oral two times a day PRN Constipation  simethicone 80 milliGRAM(s) Chew every 6 hours PRN Gas    FAMILY HISTORY:  Family history of uterine cancer (Mother)        SOCIAL HISTORY: No EtOH, no tobacco    REVIEW OF SYSTEMS:    CONSTITUTIONAL: No weakness, fevers or chills  EYES/ENT: No visual changes;  No vertigo or throat pain   NECK: No pain or stiffness  RESPIRATORY: No cough, wheezing, hemoptysis; +shortness of breath  CARDIOVASCULAR: No chest pain or palpitations  GASTROINTESTINAL: No abdominal or epigastric pain. No nausea, vomiting, or hematemesis; No diarrhea or constipation. No melena or hematochezia.  GENITOURINARY: No dysuria, frequency or hematuria  NEUROLOGICAL: No numbness or weakness  SKIN: +skin blanching   All other review of systems is negative unless indicated above.      Weight (kg): 47.2 (07-04 @ 18:56)    Vital Signs Last 24 Hrs  T(C): 36.7 (07-07-24 @ 08:16), Max: 36.7 (07-07-24 @ 08:16)  T(F): 98.1 (07-07-24 @ 08:16), Max: 98.1 (07-07-24 @ 08:16)  HR: 72 (07-07-24 @ 08:16) (72 - 77)  BP: 92/50 (07-07-24 @ 08:16) (92/50 - 99/58)  BP(mean): --  RR: 17 (07-07-24 @ 08:16) (17 - 18)  SpO2: 95% (07-07-24 @ 08:16) (95% - 97%)        GENERAL: NAD, well-developed  HEAD:  Atraumatic, Normocephalic  EYES: EOMI  CHEST/LUNG: pigtail chest tube to wall suction  HEART: Regular rate and rhythm  ABDOMEN: Soft, Nontender  EXTREMITIES: no edema  NEUROLOGY: non-focal      LABS:                          10.4   8.33  )-----------( 375      ( 06 Jul 2024 06:17 )             32.3                             10.4   8.33  )-----------( 375      ( 06 Jul 2024 06:17 )             32.3                           10.3   8.55  )-----------( 387      ( 05 Jul 2024 06:34 )             32.8                   PT/INR - ( 05 Jul 2024 06:34 )   PT: 11.9 sec;   INR: 1.05 ratio         PTT - ( 05 Jul 2024 06:34 )  PTT:34.4 sec     HPI:  Admission H&P    53 y/o F who [place] [facility] [place] 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     2 weeks prior went to [facility] for chemo Tx; SOB at time, imaging revealed PTX, admitted to [facility] [place] [facility]  Hospital course: Rec'd Rt lateral pigtail, then ant pigtail placed by IR, pleurodesis x 1  Pt request for XF to HH; Faith is her thoracic surgeon    Anterior pigtail in place, to sxn, CXR 7/6 - no pneumo, stable from prior; no SOB,  at bedside.   pt denies pain.  no cough  ambulating well       PAST MEDICAL & SURGICAL HISTORY:  Fibroid      H/O oral surgery  under general anesthesia during childhood      S/P D&C (status post dilation and curettage)  x2      S/P laparoscopy  as part of infertility work up      S/P tonsillectomy          Allergies    No Known Allergies    Intolerances    shellfish (Vomiting)      MEDICATIONS  (STANDING):  ferrous    sulfate 325 milliGRAM(s) Oral <User Schedule>  medroxyPROGESTERone 10 milliGRAM(s) Oral daily  morphine ER Tablet 30 milliGRAM(s) Oral every 12 hours  OLANZapine 2.5 milliGRAM(s) Oral at bedtime  pantoprazole    Tablet 40 milliGRAM(s) Oral before breakfast  pregabalin 75 milliGRAM(s) Oral every 12 hours  senna 2 Tablet(s) Oral at bedtime  sertraline 50 milliGRAM(s) Oral daily    MEDICATIONS  (PRN):  aluminum hydroxide/magnesium hydroxide/simethicone Suspension 30 milliLiter(s) Oral every 4 hours PRN Dyspepsia  LORazepam     Tablet 0.5 milliGRAM(s) Oral every 8 hours PRN Anxiety  melatonin 3 milliGRAM(s) Oral at bedtime PRN Insomnia  ondansetron Injectable 4 milliGRAM(s) IV Push every 8 hours PRN Nausea and/or Vomiting  oxyCODONE    IR 10 milliGRAM(s) Oral every 4 hours PRN Moderate to severe pain (4-10)  oxyCODONE    IR 5 milliGRAM(s) Oral every 4 hours PRN Mild Pain (1 - 3)  polyethylene glycol 3350 17 Gram(s) Oral two times a day PRN Constipation  simethicone 80 milliGRAM(s) Chew every 6 hours PRN Gas    FAMILY HISTORY:  Family history of uterine cancer (Mother)        SOCIAL HISTORY: No EtOH, no tobacco    REVIEW OF SYSTEMS:    CONSTITUTIONAL: No weakness, fevers or chills  EYES/ENT: No visual changes;  No vertigo or throat pain   NECK: No pain or stiffness  RESPIRATORY: No cough, wheezing, hemoptysis; +shortness of breath  CARDIOVASCULAR: No chest pain or palpitations  GASTROINTESTINAL: No abdominal or epigastric pain. No nausea, vomiting, or hematemesis; No diarrhea or constipation. No melena or hematochezia.  GENITOURINARY: No dysuria, frequency or hematuria  NEUROLOGICAL: No numbness or weakness  SKIN: +skin blanching   All other review of systems is negative unless indicated above.      Weight (kg): 47.2 (07-04 @ 18:56)    Vital Signs Last 24 Hrs  T(C): 36.7 (07-07-24 @ 08:16), Max: 36.7 (07-07-24 @ 08:16)  T(F): 98.1 (07-07-24 @ 08:16), Max: 98.1 (07-07-24 @ 08:16)  HR: 72 (07-07-24 @ 08:16) (72 - 77)  BP: 92/50 (07-07-24 @ 08:16) (92/50 - 99/58)  BP(mean): --  RR: 17 (07-07-24 @ 08:16) (17 - 18)  SpO2: 95% (07-07-24 @ 08:16) (95% - 97%)        GENERAL: NAD, well-developed  HEAD:  Atraumatic, Normocephalic  EYES: EOMI  CHEST/LUNG: pigtail chest tube to wall suction  HEART: Regular rate and rhythm  ABDOMEN: Soft, Nontender  EXTREMITIES: no edema  NEUROLOGY: non-focal      LABS:                          10.4   8.33  )-----------( 375      ( 06 Jul 2024 06:17 )             32.3                             10.4   8.33  )-----------( 375      ( 06 Jul 2024 06:17 )             32.3                           10.3   8.55  )-----------( 387      ( 05 Jul 2024 06:34 )             32.8                   PT/INR - ( 05 Jul 2024 06:34 )   PT: 11.9 sec;   INR: 1.05 ratio         PTT - ( 05 Jul 2024 06:34 )  PTT:34.4 sec

## 2024-07-07 NOTE — PROGRESS NOTE ADULT - SUBJECTIVE AND OBJECTIVE BOX
Date of Service is equal to the date of entry    HPI:55 y/o F w/ pmh of high grade mesechymal neoplasm  (suspected to be uterine in origin) w/ metastatic to lung on gem/doc (last dose appx 3 weeksago), now w/ b/l PTX s/p Pleurodesis who initally presented to [facility] for recurrent PTX to the R. side now s/p PT placement and transferred to  per pt request under    24 hour events:     Review of Systems:  Constitutional: No fever, chills, fatigue  Neuro: No headache, numbness, weakness  Resp: No cough, wheezing, shortness of breath  CVS: No chest pain, palpitations, leg swelling  GI: No abdominal pain, nausea, vomiting, diarrhea   : No dysuria, frequency, incontinence  Skin: No itching, burning, rashes, or lesions   Msk: No joint pain or swelling  Psych: No depression, anxiety, mood swings    T(F): 97.9 (07-06-24 @ 21:28), Max: 97.9 (07-06-24 @ 21:28)  HR: 77 (07-06-24 @ 21:28) (76 - 82)  BP: 97/51 (07-06-24 @ 21:28) (97/51 - 118/56)  RR: 18 (07-06-24 @ 21:28) (16 - 18)  SpO2: 97% (07-06-24 @ 21:28) (94% - 97%)  Wt(kg): --      07-06-24 @ 07:01  -  07-07-24 @ 07:00  --------------------------------------------------------  IN: 360 mL / OUT: 5 mL / NET: 355 mL        CAPILLARY BLOOD GLUCOSE          I&O's Summary    06 Jul 2024 07:01  -  07 Jul 2024 07:00  --------------------------------------------------------  IN: 360 mL / OUT: 5 mL / NET: 355 mL        Physical Exam:   Gen:  Neuro:  HEENT:  Resp:  CVS:  Abd:  Ext:  Skin:    Meds:      medroxyPROGESTERone Oral      LORazepam     Tablet Oral PRN  melatonin Oral PRN  morphine ER Tablet Oral  OLANZapine Oral  ondansetron Injectable IV Push PRN  oxyCODONE    IR Oral PRN  oxyCODONE    IR Oral PRN  pregabalin Oral  sertraline Oral        aluminum hydroxide/magnesium hydroxide/simethicone Suspension Oral PRN  pantoprazole    Tablet Oral  polyethylene glycol 3350 Oral PRN  senna Oral  simethicone Chew PRN      ferrous    sulfate Oral                                  10.4   8.33  )-----------( 375      ( 06 Jul 2024 06:17 )             32.3       07-06    141  |  107  |  17  ----------------------------<  89  4.0   |  28  |  0.61    Ca    10.0      06 Jul 2024 06:17  Mg     2.0     07-06              Urinalysis Basic - ( 06 Jul 2024 06:17 )    Color: x / Appearance: x / SG: x / pH: x  Gluc: 89 mg/dL / Ketone: x  / Bili: x / Urobili: x   Blood: x / Protein: x / Nitrite: x   Leuk Esterase: x / RBC: x / WBC x   Sq Epi: x / Non Sq Epi: x / Bacteria: x        Radiology: ***  Bedside ultrasound: ***    CENTRAL LINE: N/Y          DATE INSERTED:              REMOVE: Y/N  THOMPSON: N/Y                       DATE INSERTED:              REMOVE: Y/N  A-LINE: N/Y                       DATE INSERTED:              REMOVE: Y/N    GLOBAL ISSUE/BEST PRACTICE:  Analgesia:  Sedation:  CAM-ICU:   HOB elevation: yes  Stress ulcer prophylaxis:  VTE prophylaxis:  Glycemic control:  Nutrition:    CODE STATUS: ***    Time spent on this patient encounter, which includes documenting this note in the electronic medical record, was 42 minutes including assessing the presenting problems with associated risk, reviewing the medical record to prepare for the encounter, and meeting face to face with patient to obtain additional history. I have also performed an appropriate physical exam, made interventions listed and ordered and interpreted appropriate diagnostic studies as documented. To improve communication and patient safety. I have coordinated care with the multidisciplinary team including the bedside nurse, appropriate attending of record and consultant as needed.         Date of Service is equal to the date of entry    HPI: 53 y/o F w/ pmh of high grade mesechymal neoplasm  (suspected to be uterine in origin) w/ metastatic to lung on gem/doc (last dose appx 3 weeksago), now w/ b/l PTX s/p Pleurodesis who initially presented to [facility] for recurrent PTX to the R. side now s/p Pt placement and transferred to  per pt request under dr. ascencio.    24 hour events: Pt seen and examined this morning currently comfortable w/out any complains, AM CXR reviewed    Review of Systems:  Constitutional: No fever, chills, fatigue  Neuro: No headache, numbness, weakness  Resp: No cough, wheezing, shortness of breath  CVS: No chest pain, palpitations, leg swelling  GI: No abdominal pain, nausea, vomiting, diarrhea   : No dysuria, frequency, incontinence  Skin: No itching, burning, rashes, or lesions   Msk: No joint pain or swelling  Psych: No depression, anxiety, mood swings    T(F): 97.9 (07-06-24 @ 21:28), Max: 97.9 (07-06-24 @ 21:28)  HR: 77 (07-06-24 @ 21:28) (76 - 82)  BP: 97/51 (07-06-24 @ 21:28) (97/51 - 118/56)  RR: 18 (07-06-24 @ 21:28) (16 - 18)  SpO2: 97% (07-06-24 @ 21:28) (94% - 97%)  Wt(kg): --      07-06-24 @ 07:01  -  07-07-24 @ 07:00  --------------------------------------------------------  IN: 360 mL / OUT: 5 mL / NET: 355 mL        CAPILLARY BLOOD GLUCOSE          I&O's Summary    06 Jul 2024 07:01  -  07 Jul 2024 07:00  --------------------------------------------------------  IN: 360 mL / OUT: 5 mL / NET: 355 mL        Physical Exam:   Gen: Comfortable in bed in NAD  Neuro: AAOx3  HEENT: NC/AT  Resp: good air entry b/l  CVS: +RRR  Abd: BSx4, soft, nt/nd  Ext: no edema   Skin: warm/dry    Meds:      medroxyPROGESTERone Oral      LORazepam     Tablet Oral PRN  melatonin Oral PRN  morphine ER Tablet Oral  OLANZapine Oral  ondansetron Injectable IV Push PRN  oxyCODONE    IR Oral PRN  oxyCODONE    IR Oral PRN  pregabalin Oral  sertraline Oral        aluminum hydroxide/magnesium hydroxide/simethicone Suspension Oral PRN  pantoprazole    Tablet Oral  polyethylene glycol 3350 Oral PRN  senna Oral  simethicone Chew PRN      ferrous    sulfate Oral                                  10.4   8.33  )-----------( 375      ( 06 Jul 2024 06:17 )             32.3       07-06    141  |  107  |  17  ----------------------------<  89  4.0   |  28  |  0.61    Ca    10.0      06 Jul 2024 06:17  Mg     2.0     07-06              Urinalysis Basic - ( 06 Jul 2024 06:17 )    Color: x / Appearance: x / SG: x / pH: x  Gluc: 89 mg/dL / Ketone: x  / Bili: x / Urobili: x   Blood: x / Protein: x / Nitrite: x   Leuk Esterase: x / RBC: x / WBC x   Sq Epi: x / Non Sq Epi: x / Bacteria: x        Radiology:     < from: Xray Chest 1 View- PORTABLE-Routine (Xray Chest 1 View- PORTABLE-Routine in AM.) (07.06.24 @ 08:02) >    ACC: [id number] EXAM:  XR CHEST PORTABLE ROUTINE 1V   ORDERED BY: HOLLIE CORADO     ACC: [id number] EXAM:  XR CHEST PORTABLE URGENT 1V   ORDERED BY: HOLLIE CORADO     PROCEDURE DATE:  07/05/2024          INTERPRETATION:  HISTORY: ; ; chest tube evaluation;  TECHNIQUE: Portable frontal view of the chest, 1 view.  COMPARISON: 7/5, 1:39 PM.  FINDINGS/  IMPRESSION:  Pigtail catheter is unchanged.  HEART: normal in size.  LUNGS: There is a 1.9 cm opacity in the left upper lobe. Areas of   scarring in the right lung are unchanged.. No definite pneumothorax  BONES: Bones are within normal limits.      Follow-up film is from 7/6/2024 and shows no change    --- End of Report ---      ARMEN CHARLES MD; Attending Interventional Radiologist  This document has been electronically signed. Jul 6 2024 11:26AM    < end of copied text >      7/7/24: AM CXR reviewed, awaiting offical read from radiology    CODE STATUS: FULL CODE    Time spent on this patient encounter, which includes documenting this note in the electronic medical record, was 45 minutes including assessing the presenting problems with associated risk, reviewing the medical record to prepare for the encounter, and meeting face to face with patient to obtain additional history. I have also performed an appropriate physical exam, made interventions listed and ordered and interpreted appropriate diagnostic studies as documented. To improve communication and patient safety. I have coordinated care with the multidisciplinary team including the bedside nurse, appropriate attending of record and consultant as needed.

## 2024-07-07 NOTE — PROGRESS NOTE ADULT - ASSESSMENT
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    # h/o high grade vulvar ca   - currently on gemcitabine, docetaxel - LD 3 weeks ago- scheduled for next week if able to be discharged from hospital   - will f/u with [facility] on dc for continued tx   - WBC 8.55, Hb 10.3, plt 387  - requested records from [facility]     # Pneumothorax  - recurrent PTX seen   - Rt lateral pigtail, then ant pigtail placed by IR, pleurodesis x 1  - pt was at [facility] for 2 weeks- anytime tried to take off suction, ptx would worsen on scan  - pt comfortable on ra   - maintain chest tube to wall suction - CT surg f/u for definitive plan   - repeat CXR today - no Pneumo     will continue to follow and communicate with [facility]     Magen Garcia MD  [facility]  [phone]  55 y/o F who follows at [facility] with 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    # h/o high grade vulvar ca   - currently on gemcitabine, docetaxel - LD 3 weeks ago- scheduled for next week if able to be discharged from hospital   - will f/u with [facility] on dc for continued tx   - WBC 8.55, Hb 10.3, plt 387    # Pneumothorax  - recurrent PTX seen   - Rt lateral pigtail, then ant pigtail placed by IR, pleurodesis x 1  - pt was at [facility] for 2 weeks- anytime tried to take off suction, ptx would worsen on scan  - pt comfortable on ra   - maintain chest tube to wall suction - CT surg f/u for definitive plan   - repeat CXR today 7/6 - no Pneumo     [place] [facility] [place]     will continue to follow and communicate with [facility]     Magen Garcia MD  [facility]  [phone]

## 2024-07-07 NOTE — PROGRESS NOTE ADULT - ASSESSMENT
53 y/o F w/ pmh of high grade mesechymal neoplasm  (suspected to be uterine in origin) w/ metastatic to lung on gem/doc (last dose appx 3 weeksago), now w/ b/l PTX s/p Pleurodesis who initially presented to [facility] for recurrent PTX to the R. side now s/p Pt placement and transferred to  per pt request under dr. ascencio.    Active Problem List  1. Metastatic Uterine Ca   2. Recurrent PTX w/ R. sided PTX now s/p PG placement    A/P: Pt in place on remains on waterseal, AM CXR reviewed, cont pain regimen and bowel regimen, SCD for DVT ppx, oncology following, Thoracic will cont to follow for pigtail management

## 2024-07-08 ENCOUNTER — TRANSCRIPTION ENCOUNTER (OUTPATIENT)
Age: 54
End: 2024-07-08

## 2024-07-08 PROCEDURE — 71045 X-RAY EXAM CHEST 1 VIEW: CPT | Mod: 26

## 2024-07-08 PROCEDURE — 99232 SBSQ HOSP IP/OBS MODERATE 35: CPT

## 2024-07-08 PROCEDURE — 71250 CT THORAX DX C-: CPT | Mod: 26

## 2024-07-08 RX ADMIN — SERTRALINE HYDROCHLORIDE 50 MILLIGRAM(S): 100 TABLET, FILM COATED ORAL at 09:15

## 2024-07-08 RX ADMIN — PANTOPRAZOLE SODIUM 40 MILLIGRAM(S): 40 INJECTION, POWDER, FOR SOLUTION INTRAVENOUS at 06:15

## 2024-07-08 RX ADMIN — MEDROXYPROGESTERONE ACETATE 10 MILLIGRAM(S): 2.5 TABLET ORAL at 21:56

## 2024-07-08 RX ADMIN — Medication 2 TABLET(S): at 21:57

## 2024-07-08 RX ADMIN — OLANZAPINE 2.5 MILLIGRAM(S): 2.5 TABLET, FILM COATED ORAL at 21:56

## 2024-07-08 RX ADMIN — MORPHINE SULFATE 30 MILLIGRAM(S): 100 TABLET, EXTENDED RELEASE ORAL at 21:59

## 2024-07-08 RX ADMIN — MORPHINE SULFATE 30 MILLIGRAM(S): 100 TABLET, EXTENDED RELEASE ORAL at 09:15

## 2024-07-08 RX ADMIN — Medication 325 MILLIGRAM(S): at 09:14

## 2024-07-08 RX ADMIN — MORPHINE SULFATE 30 MILLIGRAM(S): 100 TABLET, EXTENDED RELEASE ORAL at 22:30

## 2024-07-08 RX ADMIN — PREGABALIN 75 MILLIGRAM(S): 50 CAPSULE ORAL at 09:14

## 2024-07-08 RX ADMIN — MORPHINE SULFATE 30 MILLIGRAM(S): 100 TABLET, EXTENDED RELEASE ORAL at 10:15

## 2024-07-08 RX ADMIN — PREGABALIN 75 MILLIGRAM(S): 50 CAPSULE ORAL at 21:57

## 2024-07-08 NOTE — PROGRESS NOTE ADULT - ASSESSMENT
52 y/o female with a PMHx of fibroids, Pt of [facility] [place]. s/p  IR biopsy Rt lung nodule at [facility] 2/26 .  PT currently undergoing workup for recently diagnosed endometrial cancer presents to the ED c/o left sided CP. Pt reports she had a pain in her left chest that felt like a gas bubble that was progressively worsening. Denies rash. No other complaints at this time.  Pt noted to have a moderate PTX on left side . Now s/p Left pigtail placement  3/11 , Rt Pigtail placed 3/13  s/p 3/15 Left Robot assist Pleurectomy/TALC pleurodesis wedge resection 3/20 both CT d/c  s/p biopsy of uterus 3/21/24. readmitted to [facility] [place] with right PTX s/p anterior tube x 2 (one placed in IR as per patient) with pleurodesis  7/5 now transferred to  for further care and management  with anterior chest tube to suction     Plan   Rt Pigtail to WS   notable AL   CT chest reviewed with Dr Barreto   NPO p midnight for Rt VAT pleurodesis , possible wedge resection   T&S P   consent done   Dr Alves aware of plan   pain mgmt   ambulation  DW Dr Barreto

## 2024-07-08 NOTE — PROGRESS NOTE ADULT - SUBJECTIVE AND OBJECTIVE BOX
INTERVAL HPI/OVERNIGHT EVENTS:  Patient S&E at bedside.   pt reports feeling well   CT surgery following  XR and CT chest pending for pt today      PAST MEDICAL & SURGICAL HISTORY:  Fibroid      H/O oral surgery  under general anesthesia during childhood      S/P D&C (status post dilation and curettage)  x2      S/P laparoscopy  as part of infertility work up      S/P tonsillectomy          FAMILY HISTORY:  Family history of uterine cancer (Mother)        VITAL SIGNS:  T(F): 97.8 (07-07-24 @ 21:26)  HR: 72 (07-07-24 @ 21:26)  BP: 99/58 (07-07-24 @ 21:26)  RR: 18 (07-07-24 @ 21:26)  SpO2: 97% (07-07-24 @ 21:26)  Wt(kg): --    PHYSICAL EXAM:    GENERAL: NAD, well-developed  HEAD:  Atraumatic, Normocephalic  EYES: EOMI  CHEST/LUNG: pigtail chest tube on r   HEART: Regular rate and rhythm  ABDOMEN: Soft, Nontender  EXTREMITIES: no edema  NEUROLOGY: non-focal      MEDICATIONS  (STANDING):  ferrous    sulfate 325 milliGRAM(s) Oral <User Schedule>  medroxyPROGESTERone 10 milliGRAM(s) Oral daily  morphine ER Tablet 30 milliGRAM(s) Oral every 12 hours  OLANZapine 2.5 milliGRAM(s) Oral at bedtime  pantoprazole    Tablet 40 milliGRAM(s) Oral before breakfast  pregabalin 75 milliGRAM(s) Oral every 12 hours  senna 2 Tablet(s) Oral at bedtime  sertraline 50 milliGRAM(s) Oral daily    MEDICATIONS  (PRN):  aluminum hydroxide/magnesium hydroxide/simethicone Suspension 30 milliLiter(s) Oral every 4 hours PRN Dyspepsia  LORazepam     Tablet 0.5 milliGRAM(s) Oral every 8 hours PRN Anxiety  melatonin 3 milliGRAM(s) Oral at bedtime PRN Insomnia  ondansetron Injectable 4 milliGRAM(s) IV Push every 8 hours PRN Nausea and/or Vomiting  oxyCODONE    IR 10 milliGRAM(s) Oral every 4 hours PRN Moderate to severe pain (4-10)  oxyCODONE    IR 5 milliGRAM(s) Oral every 4 hours PRN Mild Pain (1 - 3)  polyethylene glycol 3350 17 Gram(s) Oral two times a day PRN Constipation  simethicone 80 milliGRAM(s) Chew every 6 hours PRN Gas      Allergies    No Known Allergies    Intolerances    shellfish (Vomiting)      LABS:                RADIOLOGY & ADDITIONAL TESTS:  Studies reviewed.

## 2024-07-08 NOTE — PROGRESS NOTE ADULT - ASSESSMENT
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    # h/o high grade vulvar ca   - currently on gemcitabine, docetaxel - LD 3 weeks ago- scheduled for next week if able to be discharged from hospital   - will f/u with [facility] [place] on dc for continued tx     # Pneumothorax  - recurrent PTX seen   - Rt lateral pigtail, then ant pigtail placed by IR, pleurodesis x 1  - pt was at [facility] [place] for 2 weeks- anytime tried to take off suction, ptx would worsen on scan  - pt comfortable on ra - CXR stable   - maintain chest tube - CT surg f/u for definitive plan - CT chest ordered for today     [place] 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    # h/o high grade vulvar ca   - currently on gemcitabine, docetaxel - LD 3 weeks ago- scheduled for next week if able to be discharged from hospital   - will f/u [place] [facility] [place] continued [place]     # Pneumothorax  - recurrent PTX seen   - Rt lateral pigtail, then ant pigtail placed by IR, pleurodesis x 1  - pt was at [facility] [place]- anytime tried to take off suction, ptx would worsen on scan  - pt comfortable on ra - CXR stable   - maintain chest tube - CT surg f/u for definitive plan - CT chest ordered for today   - pt will need vats/pleurodesis with plan for wedge resection tomorrow - as per CT surgery cause of inability to close is likely from tumor burden     [place] [facility] [place] and will push to next week     will continue to follow and communicate with [facility] [place]

## 2024-07-08 NOTE — PROGRESS NOTE ADULT - SUBJECTIVE AND OBJECTIVE BOX
Subjective: Pt in bed NAD no issues overnight . Notable air leak from chest tube. All connections looked over and secured     T(C): 36.3 (07-08-24 @ 09:10), Max: 36.6 (07-07-24 @ 21:26)  HR: 78 (07-08-24 @ 09:10) (72 - 79)  BP: 106/68 (07-08-24 @ 09:10) (95/59 - 106/68)  ABP: --  ABP(mean): --  RR: 18 (07-08-24 @ 09:10) (18 - 18)  SpO2: 99% (07-08-24 @ 09:10) (97% - 99%) RA   Wt(kg): --  CVP(mm Hg): --  CO: --  CI: --  PA: --                                              Tele: SR     CHEST TUBE: RTt pigtail          0cc                     OUTPUT:     per 24 hours    AIR LEAKS:  [x ] YES [ ] NO                                   CAPILLARY BLOOD GLUCOSE             < from: CT Chest No Cont (07.08.24 @ 08:49) >  Decreased moderate multiloculated right pleural effusion.    Decreased size of multiple pulmonary metastases.    < end of copied text >            Exam   Neuro:  Alert Awake NAD   Pulm: decreased at bases  + Rt pigtail   CV: RRR  Abd:  soft   Extremities: warm          Assessment:  54yFemale    with PAST MEDICAL & SURGICAL HISTORY:  Fibroid      H/O oral surgery  under general anesthesia during childhood      S/P D&C (status post dilation and curettage)  x2      S/P laparoscopy  as part of infertility work up      S/P tonsillectomy            Plan:

## 2024-07-09 DIAGNOSIS — J93.9 PNEUMOTHORAX, UNSPECIFIED: ICD-10-CM

## 2024-07-09 DIAGNOSIS — C55 MALIGNANT NEOPLASM OF UTERUS, PART UNSPECIFIED: ICD-10-CM

## 2024-07-09 PROCEDURE — 32551 INSERTION OF CHEST TUBE: CPT | Mod: 59,RT

## 2024-07-09 PROCEDURE — 32650 THORACOSCOPY W/PLEURODESIS: CPT | Mod: AS,RT

## 2024-07-09 PROCEDURE — 32650 THORACOSCOPY W/PLEURODESIS: CPT | Mod: RT

## 2024-07-09 PROCEDURE — 32550 INSERT PLEURAL CATH: CPT | Mod: RT

## 2024-07-09 PROCEDURE — 71045 X-RAY EXAM CHEST 1 VIEW: CPT | Mod: 26

## 2024-07-09 RX ORDER — HYDROMORPHONE HCL 0.2 MG/ML
1 INJECTION, SOLUTION INTRAVENOUS
Refills: 0 | Status: DISCONTINUED | OUTPATIENT
Start: 2024-07-09 | End: 2024-07-09

## 2024-07-09 RX ORDER — TALC 4 G/50ML
4 POWDER INTRAPLEURAL ONCE
Refills: 0 | Status: DISCONTINUED | OUTPATIENT
Start: 2024-07-09 | End: 2024-07-10

## 2024-07-09 RX ORDER — DEXTROSE MONOHYDRATE AND SODIUM CHLORIDE 5; .3 G/100ML; G/100ML
1000 INJECTION, SOLUTION INTRAVENOUS
Refills: 0 | Status: DISCONTINUED | OUTPATIENT
Start: 2024-07-09 | End: 2024-07-09

## 2024-07-09 RX ORDER — HYDROMORPHONE HCL 0.2 MG/ML
0.5 INJECTION, SOLUTION INTRAVENOUS
Refills: 0 | Status: DISCONTINUED | OUTPATIENT
Start: 2024-07-09 | End: 2024-07-09

## 2024-07-09 RX ORDER — HEPARIN SODIUM 50 [USP'U]/ML
5000 INJECTION, SOLUTION INTRAVENOUS EVERY 8 HOURS
Refills: 0 | Status: DISCONTINUED | OUTPATIENT
Start: 2024-07-09 | End: 2024-07-12

## 2024-07-09 RX ORDER — HYDROMORPHONE HCL 0.2 MG/ML
0.5 INJECTION, SOLUTION INTRAVENOUS ONCE
Refills: 0 | Status: DISCONTINUED | OUTPATIENT
Start: 2024-07-09 | End: 2024-07-09

## 2024-07-09 RX ORDER — ONDANSETRON HYDROCHLORIDE 2 MG/ML
4 INJECTION INTRAMUSCULAR; INTRAVENOUS ONCE
Refills: 0 | Status: DISCONTINUED | OUTPATIENT
Start: 2024-07-09 | End: 2024-07-09

## 2024-07-09 RX ADMIN — HEPARIN SODIUM 5000 UNIT(S): 50 INJECTION, SOLUTION INTRAVENOUS at 21:53

## 2024-07-09 RX ADMIN — OXYCODONE HYDROCHLORIDE 10 MILLIGRAM(S): 100 SOLUTION ORAL at 21:41

## 2024-07-09 RX ADMIN — HYDROMORPHONE HCL 0.5 MILLIGRAM(S): 0.2 INJECTION, SOLUTION INTRAVENOUS at 17:00

## 2024-07-09 RX ADMIN — HYDROMORPHONE HCL 0.5 MILLIGRAM(S): 0.2 INJECTION, SOLUTION INTRAVENOUS at 17:39

## 2024-07-09 RX ADMIN — Medication 3 MILLIGRAM(S): at 21:42

## 2024-07-09 RX ADMIN — OXYCODONE HYDROCHLORIDE 10 MILLIGRAM(S): 100 SOLUTION ORAL at 22:42

## 2024-07-09 RX ADMIN — MORPHINE SULFATE 30 MILLIGRAM(S): 100 TABLET, EXTENDED RELEASE ORAL at 22:42

## 2024-07-09 RX ADMIN — MORPHINE SULFATE 30 MILLIGRAM(S): 100 TABLET, EXTENDED RELEASE ORAL at 21:42

## 2024-07-09 RX ADMIN — HYDROMORPHONE HCL 0.5 MILLIGRAM(S): 0.2 INJECTION, SOLUTION INTRAVENOUS at 18:00

## 2024-07-09 RX ADMIN — OLANZAPINE 2.5 MILLIGRAM(S): 2.5 TABLET, FILM COATED ORAL at 22:20

## 2024-07-09 RX ADMIN — HYDROMORPHONE HCL 0.5 MILLIGRAM(S): 0.2 INJECTION, SOLUTION INTRAVENOUS at 16:41

## 2024-07-09 RX ADMIN — MEDROXYPROGESTERONE ACETATE 10 MILLIGRAM(S): 2.5 TABLET ORAL at 22:20

## 2024-07-09 RX ADMIN — Medication 2 TABLET(S): at 21:42

## 2024-07-09 RX ADMIN — PREGABALIN 75 MILLIGRAM(S): 50 CAPSULE ORAL at 21:42

## 2024-07-09 NOTE — PROGRESS NOTE ADULT - ASSESSMENT
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    # h/o high grade vulvar ca   - currently on gemcitabine, docetaxel - LD 3 weeks ago- scheduled if able to be discharged from hospital   - [place] [facility] [place]     # Pneumothorax  - recurrent PTX seen   - Rt lateral pigtail, then ant pigtail placed by IR, pleurodesis x 1  - [place] [facility] [place]- anytime tried to take off suction, ptx would worsen on scan  - pt comfortable on ra - CXR stable   - maintain chest tube - CT surg f/u for definitive plan   - repeat CT chest- with decreased moderate multiloculated right pleural effusion, decreased size of multiple pulmonary mets  - pt will need vats/pleurodesis with plan for wedge resection - as per CT surgery     due for [place] at [facility] - and will push to next week   will continue to follow and communicate with [facility]

## 2024-07-09 NOTE — BRIEF OPERATIVE NOTE - NSICDXBRIEFPROCEDURE_GEN_ALL_CORE_FT
PROCEDURES:  Thoracoscopy, video assisted 09-Jul-2024 16:18:23 RIGHT WITH CHEST TUBE PLACEMENT X 2 Della Jennings

## 2024-07-09 NOTE — CONSULT NOTE ADULT - ASSESSMENT
Assessment:      Plan:        Time spent on this patient encounter, which includes documenting this note in the electronic medical record, was 60 minutes including assessing the presenting problems with associated risks, reviewing the medical record to prepare for the encounter, and meeting face to face with the patient to obtain additional history. I have also performed an appropriate physical exam, made interventions listed and ordered and interpreted appropriate diagnostic studies as documented. To improve  communication and patient safety, I have coordinated care with the multidisciplinary team including the bedside nurse, appropriate attending of record and consultants as needed.    Date of entry of this note is equal to the date of services rendered    Plan discussed with Dr Calderon Assessment:  52 y/o female with a PMHx of fibroids, Pt of [facility] in the city. s/p  IR biopsy Rt lung nodule at [facility] 2/26 .  PT currently undergoing workup for recently diagnosed endometrial cancer presents to the ED c/o left sided CP. Pt reports she had a pain in her left chest that felt like a gas bubble that was progressively worsening. Denies rash. No other complaints at this time.  Pt noted to have a moderate PTX on left side . Now s/p Left pigtail placement  3/11 , Rt Pigtail placed 3/13  s/p 3/15 Left Robot assist Pleurectomy/TALC pleurodesis wedge resection 3/20 both CT d/c  s/p biopsy of uterus 3/21/24. readmitted to [facility] with right PTX s/p anterior tube x 2 (one placed in IR as per patient) with pleurodesis  7/5 now transferred to  for further care and management  with anterior chest tube to suction. Had R chest tube x2 placed today with thoracic surgery and placed to , transferred to stepdown for closer monitoring.    Plan:  -CT x2 to WS  -Monitor CT drainage output, thoracic sx following.  -Follow up CXR in AM  -Maintain SO2>92 for adequate tissue oxygenation, supplemental O2 PRN  -Maintain MAP>65 for adequate organ perfusion  -Scr stable, monitor I/Os, replete lytes to maintain K>4, Mg>2, Phos>3  -Pain control with oxycodone PRN, dilaudid 0.5 mg x1 given for breakthrough pain  -Heparin for DVT PPX  -Regular diet, protonix for GI PPX  -BG goal 110-180, fingersticks PRN  -No leukocytosis or fever noted, monitor off ABX, trend WBC and fever curves.    Time spent on this patient encounter, which includes documenting this note in the electronic medical record, was 60 minutes including assessing the presenting problems with associated risks, reviewing the medical record to prepare for the encounter, and meeting face to face with the patient to obtain additional history. I have also performed an appropriate physical exam, made interventions listed and ordered and interpreted appropriate diagnostic studies as documented. To improve  communication and patient safety, I have coordinated care with the multidisciplinary team including the bedside nurse, appropriate attending of record and consultants as needed.    Date of entry of this note is equal to the date of services rendered    Plan discussed with Dr Calderon Assessment:  50 y/o female with a PMHx of fibroids, Pt of [facility] in the city. s/p  IR biopsy Rt lung nodule at [facility] 2/26 .  PT currently undergoing workup for recently diagnosed endometrial cancer presents to the ED c/o left sided CP. Pt reports she had a pain in her left chest that felt like a gas bubble that was progressively worsening. Denies rash. No other complaints at this time.  Pt noted to have a moderate PTX on left side . Now s/p Left pigtail placement  3/11 , Rt Pigtail placed 3/13  s/p 3/15 Left Robot assist Pleurectomy/TALC pleurodesis wedge resection 3/20 both CT d/c  s/p biopsy of uterus 3/21/24. readmitted to [facility] with right PTX s/p anterior tube x 2 (one placed in IR as per patient) with pleurodesis  7/5 now transferred to  for further care and management  with anterior chest tube to suction. Had R chest tube x2 placed today with thoracic surgery and placed to , transferred to stepdown for closer monitoring.    Pneumothorax    Plan:  -CT x2 to WS  -Monitor CT drainage output, thoracic sx following.  -Follow up CXR in AM  -Maintain SO2>92 for adequate tissue oxygenation, supplemental O2 PRN  -Maintain MAP>65 for adequate organ perfusion  -Scr stable, monitor I/Os, replete lytes to maintain K>4, Mg>2, Phos>3  -Pain control with oxycodone PRN, dilaudid 0.5 mg x1 given for breakthrough pain  -Heparin for DVT PPX  -Regular diet, protonix for GI PPX  -BG goal 110-180, fingersticks PRN  -No leukocytosis or fever noted, monitor off ABX, trend WBC and fever curves.    Time spent on this patient encounter, which includes documenting this note in the electronic medical record, was 42 minutes including assessing the presenting problems with associated risks, reviewing the medical record to prepare for the encounter, and meeting face to face with the patient to obtain additional history. I have also performed an appropriate physical exam, made interventions listed and ordered and interpreted appropriate diagnostic studies as documented. To improve  communication and patient safety, I have coordinated care with the multidisciplinary team including the bedside nurse, appropriate attending of record and consultants as needed.    Date of entry of this note is equal to the date of services rendered    Plan discussed with Dr Calderon

## 2024-07-09 NOTE — BRIEF OPERATIVE NOTE - NSICDXBRIEFPREOP_GEN_ALL_CORE_FT
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

## 2024-07-09 NOTE — CONSULT NOTE ADULT - SUBJECTIVE AND OBJECTIVE BOX
Patient is a 54y old  Female who presents with a chief complaint of PTX (09 Jul 2024 07:16)      BRIEF HOSPITAL COURSE: 52 y/o female with a PMHx of fibroids, [place] [facility] [place]/p  IR biopsy Rt lung nodule [place] [facility] [place] .  PT currently undergoing workup for recently diagnosed endometrial cancer presents to the ED c/o left sided CP. Pt reports she had a pain in her left chest that felt like a gas bubble that was progressively worsening. Denies rash. No other complaints at this time.  Pt noted to have a moderate PTX on left side . Now s/p Left pigtail placement  3/11 , Rt Pigtail placed 3/13  s/p 3/15 Left Robot assist Pleurectomy/TALC pleurodesis wedge resection 3/20 both CT d/c  s/p biopsy of uterus 3/21/24. read[place] [facility] [place]X s/p anterior tube x 2 (one placed in IR as per patient) with pleurodesis  7/5 now transferred to  for further care and management  with anterior chest tube to suction     Events last 24 hours: R thoracotomy done with formal chest tube placed x2. Transferred to stepdown for closer monitoring.    PAST MEDICAL & SURGICAL HISTORY:  Fibroid      H/O oral surgery  under general anesthesia during childhood      S/P D&C (status post dilation and curettage)  x2      S/P laparoscopy  as part of infertility work up      S/P tonsillectomy        Allergies    No Known Allergies    Intolerances    shellfish (Vomiting)    FAMILY HISTORY:  Family history of uterine cancer (Mother)      SOCIAL HISTORY:     Review of Systems:  Negative unless stated above.    Medications:        LORazepam     Tablet 0.5 milliGRAM(s) Oral every 8 hours PRN  melatonin 3 milliGRAM(s) Oral at bedtime PRN  morphine ER Tablet 30 milliGRAM(s) Oral every 12 hours  OLANZapine 2.5 milliGRAM(s) Oral at bedtime  ondansetron Injectable 4 milliGRAM(s) IV Push every 8 hours PRN  oxyCODONE    IR 5 milliGRAM(s) Oral every 4 hours PRN  oxyCODONE    IR 10 milliGRAM(s) Oral every 4 hours PRN  pregabalin 75 milliGRAM(s) Oral every 12 hours  sertraline 50 milliGRAM(s) Oral daily      heparin   Injectable 5000 Unit(s) SubCutaneous every 8 hours    aluminum hydroxide/magnesium hydroxide/simethicone Suspension 30 milliLiter(s) Oral every 4 hours PRN  pantoprazole    Tablet 40 milliGRAM(s) Oral before breakfast  polyethylene glycol 3350 17 Gram(s) Oral two times a day PRN  senna 2 Tablet(s) Oral at bedtime  simethicone 80 milliGRAM(s) Chew every 6 hours PRN      medroxyPROGESTERone 10 milliGRAM(s) Oral daily    ferrous    sulfate 325 milliGRAM(s) Oral <User Schedule>        talc (Sterile) 4 Gram(s) IntraPleural. once          ICU Vital Signs Last 24 Hrs  T(C): 36.7 (09 Jul 2024 21:47), Max: 36.7 (09 Jul 2024 21:47)  T(F): 98.1 (09 Jul 2024 21:47), Max: 98.1 (09 Jul 2024 21:47)  HR: 72 (10 Jul 2024 04:00) (68 - 105)  BP: 95/56 (10 Jul 2024 04:00) (94/66 - 122/77)  BP(mean): 67 (10 Jul 2024 04:00) (67 - 91)  ABP: --  ABP(mean): --  RR: 15 (10 Jul 2024 04:00) (12 - 20)  SpO2: 100% (10 Jul 2024 04:00) (92% - 100%)    O2 Parameters below as of 09 Jul 2024 22:00  Patient On (Oxygen Delivery Method): room air          Vital Signs Last 24 Hrs  T(C): 36.7 (09 Jul 2024 21:47), Max: 36.7 (09 Jul 2024 21:47)  T(F): 98.1 (09 Jul 2024 21:47), Max: 98.1 (09 Jul 2024 21:47)  HR: 72 (10 Jul 2024 04:00) (68 - 105)  BP: 95/56 (10 Jul 2024 04:00) (94/66 - 122/77)  BP(mean): 67 (10 Jul 2024 04:00) (67 - 91)  RR: 15 (10 Jul 2024 04:00) (12 - 20)  SpO2: 100% (10 Jul 2024 04:00) (92% - 100%)    Parameters below as of 09 Jul 2024 22:00  Patient On (Oxygen Delivery Method): room air            I&O's Detail    09 Jul 2024 07:01  -  10 Jul 2024 04:59  --------------------------------------------------------  IN:    Oral Fluid: 100 mL    Other (mL): 900 mL  Total IN: 1000 mL    OUT:    Drain (mL): 10 mL    Drain (mL): 15 mL    Voided (mL): 0 mL  Total OUT: 25 mL    Total NET: 975 mL          LABS:                CAPILLARY BLOOD GLUCOSE              CULTURES:      Physical Examination:    General: In moderate pain post op, afebrile, lying in bed.    HEENT: Pupils equal, reactive to light. Symmetric. No scleral icterus or injection.    PULM: Clear to auscultation B/L. No wheezes, rales, or rhonchi appreciated. No significant sputum production or increased respiratory effort.    NECK: Supple, no lymphadenopathy, trachea midline.    CVS: Regular rate and rhythm, no murmurs appreciated, +s1/s2.    ABD: Soft, nondistended, nontender, normoactive bowel sounds.    EXT: No edema, nontender.    SKIN: Warm and well perfused, no rashes noted.    NEURO: Alert, oriented, interactive, nonfocal.   Patient is a 54y old  Female who presents with a chief complaint of PTX (09 Jul 2024 07:16)      BRIEF HOSPITAL COURSE: 50 y/o female with a PMHx of fibroids, [place] [facility] [place]  IR biopsy R[place] [facility] [place] .  PT currently undergoing workup for recently diagnosed endometrial cancer presents to the ED c/o left sided CP. Pt reports she had a pain in her left chest that felt like a gas bubble that was progressively worsening. Denies rash. No other complaints at this time.  Pt noted to have a moderate PTX on left side . Now s/p Left pigtail placement  3/11 , Rt Pigtail placed 3/13  s/p 3/15 Left Robot assist Pleurectomy/TALC pleurodesis wedge resection 3/20 both CT d/c  s/p biopsy of uterus 3/21/24. [place] [facility] [place] s/p anterior tube x 2 (one placed in IR as per patient) with pleurodesis  7/5 now transferred to  for further care and management  with anterior chest tube to suction     Events last 24 hours: R VATS pleurodesis planned, R chest tube x2 placed to . Transferred to stepdown for closer monitoring.    PAST MEDICAL & SURGICAL HISTORY:  Fibroid      H/O oral surgery  under general anesthesia during childhood      S/P D&C (status post dilation and curettage)  x2      S/P laparoscopy  as part of infertility work up      S/P tonsillectomy        Allergies    No Known Allergies    Intolerances    shellfish (Vomiting)    FAMILY HISTORY:  Family history of uterine cancer (Mother)      SOCIAL HISTORY:     Review of Systems:  Negative unless stated above.    Medications:        LORazepam     Tablet 0.5 milliGRAM(s) Oral every 8 hours PRN  melatonin 3 milliGRAM(s) Oral at bedtime PRN  morphine ER Tablet 30 milliGRAM(s) Oral every 12 hours  OLANZapine 2.5 milliGRAM(s) Oral at bedtime  ondansetron Injectable 4 milliGRAM(s) IV Push every 8 hours PRN  oxyCODONE    IR 5 milliGRAM(s) Oral every 4 hours PRN  oxyCODONE    IR 10 milliGRAM(s) Oral every 4 hours PRN  pregabalin 75 milliGRAM(s) Oral every 12 hours  sertraline 50 milliGRAM(s) Oral daily      heparin   Injectable 5000 Unit(s) SubCutaneous every 8 hours    aluminum hydroxide/magnesium hydroxide/simethicone Suspension 30 milliLiter(s) Oral every 4 hours PRN  pantoprazole    Tablet 40 milliGRAM(s) Oral before breakfast  polyethylene glycol 3350 17 Gram(s) Oral two times a day PRN  senna 2 Tablet(s) Oral at bedtime  simethicone 80 milliGRAM(s) Chew every 6 hours PRN      medroxyPROGESTERone 10 milliGRAM(s) Oral daily    ferrous    sulfate 325 milliGRAM(s) Oral <User Schedule>        talc (Sterile) 4 Gram(s) IntraPleural. once          ICU Vital Signs Last 24 Hrs  T(C): 36.7 (09 Jul 2024 21:47), Max: 36.7 (09 Jul 2024 21:47)  T(F): 98.1 (09 Jul 2024 21:47), Max: 98.1 (09 Jul 2024 21:47)  HR: 72 (10 Jul 2024 04:00) (68 - 105)  BP: 95/56 (10 Jul 2024 04:00) (94/66 - 122/77)  BP(mean): 67 (10 Jul 2024 04:00) (67 - 91)  ABP: --  ABP(mean): --  RR: 15 (10 Jul 2024 04:00) (12 - 20)  SpO2: 100% (10 Jul 2024 04:00) (92% - 100%)    O2 Parameters below as of 09 Jul 2024 22:00  Patient On (Oxygen Delivery Method): room air          Vital Signs Last 24 Hrs  T(C): 36.7 (09 Jul 2024 21:47), Max: 36.7 (09 Jul 2024 21:47)  T(F): 98.1 (09 Jul 2024 21:47), Max: 98.1 (09 Jul 2024 21:47)  HR: 72 (10 Jul 2024 04:00) (68 - 105)  BP: 95/56 (10 Jul 2024 04:00) (94/66 - 122/77)  BP(mean): 67 (10 Jul 2024 04:00) (67 - 91)  RR: 15 (10 Jul 2024 04:00) (12 - 20)  SpO2: 100% (10 Jul 2024 04:00) (92% - 100%)    Parameters below as of 09 Jul 2024 22:00  Patient On (Oxygen Delivery Method): room air            I&O's Detail    09 Jul 2024 07:01  -  10 Jul 2024 04:59  --------------------------------------------------------  IN:    Oral Fluid: 100 mL    Other (mL): 900 mL  Total IN: 1000 mL    OUT:    Drain (mL): 10 mL    Drain (mL): 15 mL    Voided (mL): 0 mL  Total OUT: 25 mL    Total NET: 975 mL          LABS:                CAPILLARY BLOOD GLUCOSE              CULTURES:      Physical Examination:    General: In moderate pain post op, afebrile, lying in bed.    HEENT: Pupils equal, reactive to light. Symmetric. No scleral icterus or injection.    PULM: Clear to auscultation B/L. No wheezes, rales, or rhonchi appreciated. No significant sputum production or increased respiratory effort.    NECK: Supple, no lymphadenopathy, trachea midline.    CVS: Regular rate and rhythm, no murmurs appreciated, +s1/s2.    ABD: Soft, nondistended, nontender, normoactive bowel sounds.    EXT: No edema, nontender.    SKIN: Warm and well perfused, no rashes noted.    NEURO: Alert, oriented, interactive, nonfocal.

## 2024-07-09 NOTE — BRIEF OPERATIVE NOTE - NSICDXBRIEFPOSTOP_GEN_ALL_CORE_FT
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

## 2024-07-09 NOTE — PROGRESS NOTE ADULT - SUBJECTIVE AND OBJECTIVE BOX
INTERVAL HPI/OVERNIGHT EVENTS:  Patient S&E at bedside. No o/n events,   plan for VATS/pleurodesis today   CT chest reviewed  discussed with jv matos on RA    PAST MEDICAL & SURGICAL HISTORY:  Fibroid      H/O oral surgery  under general anesthesia during childhood      S/P D&C (status post dilation and curettage)  x2      S/P laparoscopy  as part of infertility work up      S/P tonsillectomy          FAMILY HISTORY:  Family history of uterine cancer (Mother)        VITAL SIGNS:  T(F): 97.8 (07-08-24 @ 21:15)  HR: 68 (07-08-24 @ 21:15)  BP: 108/52 (07-08-24 @ 21:15)  RR: 17 (07-08-24 @ 21:15)  SpO2: 98% (07-08-24 @ 21:15)  Wt(kg): --    PHYSICAL EXAM:    GENERAL: NAD, well-developed  HEAD:  Atraumatic, Normocephalic  EYES: EOMI  CHEST/LUNG: pigtail chest tube on r   HEART: Regular rate and rhythm  ABDOMEN: Soft, Nontender  EXTREMITIES: no edema  NEUROLOGY: non-focal      MEDICATIONS  (STANDING):  ferrous    sulfate 325 milliGRAM(s) Oral <User Schedule>  medroxyPROGESTERone 10 milliGRAM(s) Oral daily  morphine ER Tablet 30 milliGRAM(s) Oral every 12 hours  OLANZapine 2.5 milliGRAM(s) Oral at bedtime  pantoprazole    Tablet 40 milliGRAM(s) Oral before breakfast  pregabalin 75 milliGRAM(s) Oral every 12 hours  senna 2 Tablet(s) Oral at bedtime  sertraline 50 milliGRAM(s) Oral daily    MEDICATIONS  (PRN):  aluminum hydroxide/magnesium hydroxide/simethicone Suspension 30 milliLiter(s) Oral every 4 hours PRN Dyspepsia  LORazepam     Tablet 0.5 milliGRAM(s) Oral every 8 hours PRN Anxiety  melatonin 3 milliGRAM(s) Oral at bedtime PRN Insomnia  ondansetron Injectable 4 milliGRAM(s) IV Push every 8 hours PRN Nausea and/or Vomiting  oxyCODONE    IR 10 milliGRAM(s) Oral every 4 hours PRN Moderate to severe pain (4-10)  oxyCODONE    IR 5 milliGRAM(s) Oral every 4 hours PRN Mild Pain (1 - 3)  polyethylene glycol 3350 17 Gram(s) Oral two times a day PRN Constipation  simethicone 80 milliGRAM(s) Chew every 6 hours PRN Gas      Allergies    No Known Allergies    Intolerances    shellfish (Vomiting)      LABS:                RADIOLOGY & ADDITIONAL TESTS:  Studies reviewed.   INTERVAL HPI/OVERNIGHT EVENTS:  Patient S&E at bedside. No o/n events,   plan for VATS/pleurodesis today   CT chest reviewed  discussed with jv matos on RA  no concerns overnight, feels well today     PAST MEDICAL & SURGICAL HISTORY:  Fibroid      H/O oral surgery  under general anesthesia during childhood      S/P D&C (status post dilation and curettage)  x2      S/P laparoscopy  as part of infertility work up      S/P tonsillectomy          FAMILY HISTORY:  Family history of uterine cancer (Mother)        VITAL SIGNS:  T(F): 97.8 (07-08-24 @ 21:15)  HR: 68 (07-08-24 @ 21:15)  BP: 108/52 (07-08-24 @ 21:15)  RR: 17 (07-08-24 @ 21:15)  SpO2: 98% (07-08-24 @ 21:15)  Wt(kg): --    PHYSICAL EXAM:    GENERAL: NAD, well-developed  HEAD:  Atraumatic, Normocephalic  EYES: EOMI  CHEST/LUNG: pigtail chest tube on r   HEART: Regular rate and rhythm  ABDOMEN: Soft, Nontender  EXTREMITIES: no edema  NEUROLOGY: non-focal      MEDICATIONS  (STANDING):  ferrous    sulfate 325 milliGRAM(s) Oral <User Schedule>  medroxyPROGESTERone 10 milliGRAM(s) Oral daily  morphine ER Tablet 30 milliGRAM(s) Oral every 12 hours  OLANZapine 2.5 milliGRAM(s) Oral at bedtime  pantoprazole    Tablet 40 milliGRAM(s) Oral before breakfast  pregabalin 75 milliGRAM(s) Oral every 12 hours  senna 2 Tablet(s) Oral at bedtime  sertraline 50 milliGRAM(s) Oral daily    MEDICATIONS  (PRN):  aluminum hydroxide/magnesium hydroxide/simethicone Suspension 30 milliLiter(s) Oral every 4 hours PRN Dyspepsia  LORazepam     Tablet 0.5 milliGRAM(s) Oral every 8 hours PRN Anxiety  melatonin 3 milliGRAM(s) Oral at bedtime PRN Insomnia  ondansetron Injectable 4 milliGRAM(s) IV Push every 8 hours PRN Nausea and/or Vomiting  oxyCODONE    IR 10 milliGRAM(s) Oral every 4 hours PRN Moderate to severe pain (4-10)  oxyCODONE    IR 5 milliGRAM(s) Oral every 4 hours PRN Mild Pain (1 - 3)  polyethylene glycol 3350 17 Gram(s) Oral two times a day PRN Constipation  simethicone 80 milliGRAM(s) Chew every 6 hours PRN Gas      Allergies    No Known Allergies    Intolerances    shellfish (Vomiting)      LABS:                RADIOLOGY & ADDITIONAL TESTS:  Studies reviewed.

## 2024-07-10 LAB
ANION GAP SERPL CALC-SCNC: 8 MMOL/L — SIGNIFICANT CHANGE UP (ref 5–17)
BASOPHILS # BLD AUTO: 0.07 K/UL — SIGNIFICANT CHANGE UP (ref 0–0.2)
BASOPHILS NFR BLD AUTO: 0.7 % — SIGNIFICANT CHANGE UP (ref 0–2)
BUN SERPL-MCNC: 14 MG/DL — SIGNIFICANT CHANGE UP (ref 7–23)
CALCIUM SERPL-MCNC: 9.2 MG/DL — SIGNIFICANT CHANGE UP (ref 8.5–10.1)
CHLORIDE SERPL-SCNC: 105 MMOL/L — SIGNIFICANT CHANGE UP (ref 96–108)
CO2 SERPL-SCNC: 27 MMOL/L — SIGNIFICANT CHANGE UP (ref 22–31)
CREAT SERPL-MCNC: 0.63 MG/DL — SIGNIFICANT CHANGE UP (ref 0.5–1.3)
EGFR: 104 ML/MIN/1.73M2 — SIGNIFICANT CHANGE UP
EOSINOPHIL # BLD AUTO: 1.03 K/UL — HIGH (ref 0–0.5)
EOSINOPHIL NFR BLD AUTO: 9.8 % — HIGH (ref 0–6)
GLUCOSE SERPL-MCNC: 105 MG/DL — HIGH (ref 70–99)
HCT VFR BLD CALC: 31.6 % — LOW (ref 34.5–45)
HGB BLD-MCNC: 9.9 G/DL — LOW (ref 11.5–15.5)
IMM GRANULOCYTES NFR BLD AUTO: 0.4 % — SIGNIFICANT CHANGE UP (ref 0–0.9)
LYMPHOCYTES # BLD AUTO: 1.42 K/UL — SIGNIFICANT CHANGE UP (ref 1–3.3)
LYMPHOCYTES # BLD AUTO: 13.5 % — SIGNIFICANT CHANGE UP (ref 13–44)
MCHC RBC-ENTMCNC: 29.6 PG — SIGNIFICANT CHANGE UP (ref 27–34)
MCHC RBC-ENTMCNC: 31.3 GM/DL — LOW (ref 32–36)
MCV RBC AUTO: 94.3 FL — SIGNIFICANT CHANGE UP (ref 80–100)
MONOCYTES # BLD AUTO: 1.06 K/UL — HIGH (ref 0–0.9)
MONOCYTES NFR BLD AUTO: 10.1 % — SIGNIFICANT CHANGE UP (ref 2–14)
NEUTROPHILS # BLD AUTO: 6.92 K/UL — SIGNIFICANT CHANGE UP (ref 1.8–7.4)
NEUTROPHILS NFR BLD AUTO: 65.5 % — SIGNIFICANT CHANGE UP (ref 43–77)
PLATELET # BLD AUTO: 328 K/UL — SIGNIFICANT CHANGE UP (ref 150–400)
POTASSIUM SERPL-MCNC: 4 MMOL/L — SIGNIFICANT CHANGE UP (ref 3.5–5.3)
POTASSIUM SERPL-SCNC: 4 MMOL/L — SIGNIFICANT CHANGE UP (ref 3.5–5.3)
RBC # BLD: 3.35 M/UL — LOW (ref 3.8–5.2)
RBC # FLD: 18.6 % — HIGH (ref 10.3–14.5)
SODIUM SERPL-SCNC: 140 MMOL/L — SIGNIFICANT CHANGE UP (ref 135–145)
WBC # BLD: 10.54 K/UL — HIGH (ref 3.8–10.5)
WBC # FLD AUTO: 10.54 K/UL — HIGH (ref 3.8–10.5)

## 2024-07-10 PROCEDURE — 99222 1ST HOSP IP/OBS MODERATE 55: CPT

## 2024-07-10 PROCEDURE — 99024 POSTOP FOLLOW-UP VISIT: CPT

## 2024-07-10 PROCEDURE — 71045 X-RAY EXAM CHEST 1 VIEW: CPT | Mod: 26

## 2024-07-10 RX ORDER — ACETAMINOPHEN 325 MG
700 TABLET ORAL ONCE
Refills: 0 | Status: COMPLETED | OUTPATIENT
Start: 2024-07-10 | End: 2024-07-10

## 2024-07-10 RX ORDER — MORPHINE SULFATE 100 MG/1
30 TABLET, EXTENDED RELEASE ORAL
Refills: 0 | Status: DISCONTINUED | OUTPATIENT
Start: 2024-07-10 | End: 2024-07-10

## 2024-07-10 RX ORDER — HYDROMORPHONE HCL 0.2 MG/ML
0.5 INJECTION, SOLUTION INTRAVENOUS ONCE
Refills: 0 | Status: DISCONTINUED | OUTPATIENT
Start: 2024-07-10 | End: 2024-07-10

## 2024-07-10 RX ORDER — LIDOCAINE HCL 28 MG/G
1 GEL TOPICAL EVERY 24 HOURS
Refills: 0 | Status: DISCONTINUED | OUTPATIENT
Start: 2024-07-10 | End: 2024-07-12

## 2024-07-10 RX ORDER — HYDROMORPHONE HCL 0.2 MG/ML
0.5 INJECTION, SOLUTION INTRAVENOUS
Refills: 0 | Status: DISCONTINUED | OUTPATIENT
Start: 2024-07-10 | End: 2024-07-12

## 2024-07-10 RX ORDER — HYDROMORPHONE HCL 0.2 MG/ML
0.5 INJECTION, SOLUTION INTRAVENOUS
Refills: 0 | Status: DISCONTINUED | OUTPATIENT
Start: 2024-07-10 | End: 2024-07-10

## 2024-07-10 RX ORDER — HYDROMORPHONE HCL 0.2 MG/ML
4 INJECTION, SOLUTION INTRAVENOUS EVERY 4 HOURS
Refills: 0 | Status: DISCONTINUED | OUTPATIENT
Start: 2024-07-10 | End: 2024-07-10

## 2024-07-10 RX ORDER — KETOROLAC TROMETHAMINE 30 MG/ML
15 INJECTION, SOLUTION INTRAMUSCULAR EVERY 8 HOURS
Refills: 0 | Status: DISCONTINUED | OUTPATIENT
Start: 2024-07-10 | End: 2024-07-11

## 2024-07-10 RX ORDER — HYDROMORPHONE HCL 0.2 MG/ML
2 INJECTION, SOLUTION INTRAVENOUS EVERY 4 HOURS
Refills: 0 | Status: DISCONTINUED | OUTPATIENT
Start: 2024-07-10 | End: 2024-07-10

## 2024-07-10 RX ORDER — MORPHINE SULFATE 100 MG/1
30 TABLET, EXTENDED RELEASE ORAL
Refills: 0 | Status: DISCONTINUED | OUTPATIENT
Start: 2024-07-10 | End: 2024-07-12

## 2024-07-10 RX ADMIN — HYDROMORPHONE HCL 0.5 MILLIGRAM(S): 0.2 INJECTION, SOLUTION INTRAVENOUS at 00:06

## 2024-07-10 RX ADMIN — HYDROMORPHONE HCL 0.5 MILLIGRAM(S): 0.2 INJECTION, SOLUTION INTRAVENOUS at 06:24

## 2024-07-10 RX ADMIN — MORPHINE SULFATE 30 MILLIGRAM(S): 100 TABLET, EXTENDED RELEASE ORAL at 15:22

## 2024-07-10 RX ADMIN — PREGABALIN 75 MILLIGRAM(S): 50 CAPSULE ORAL at 11:21

## 2024-07-10 RX ADMIN — OLANZAPINE 2.5 MILLIGRAM(S): 2.5 TABLET, FILM COATED ORAL at 21:07

## 2024-07-10 RX ADMIN — Medication 280 MILLIGRAM(S): at 08:47

## 2024-07-10 RX ADMIN — Medication 3 MILLIGRAM(S): at 21:06

## 2024-07-10 RX ADMIN — HYDROMORPHONE HCL 0.5 MILLIGRAM(S): 0.2 INJECTION, SOLUTION INTRAVENOUS at 06:11

## 2024-07-10 RX ADMIN — KETOROLAC TROMETHAMINE 15 MILLIGRAM(S): 30 INJECTION, SOLUTION INTRAMUSCULAR at 21:07

## 2024-07-10 RX ADMIN — MEDROXYPROGESTERONE ACETATE 10 MILLIGRAM(S): 2.5 TABLET ORAL at 21:06

## 2024-07-10 RX ADMIN — PREGABALIN 75 MILLIGRAM(S): 50 CAPSULE ORAL at 21:06

## 2024-07-10 RX ADMIN — MORPHINE SULFATE 30 MILLIGRAM(S): 100 TABLET, EXTENDED RELEASE ORAL at 21:06

## 2024-07-10 RX ADMIN — HEPARIN SODIUM 5000 UNIT(S): 50 INJECTION, SOLUTION INTRAVENOUS at 06:10

## 2024-07-10 RX ADMIN — OXYCODONE HYDROCHLORIDE 10 MILLIGRAM(S): 100 SOLUTION ORAL at 02:30

## 2024-07-10 RX ADMIN — KETOROLAC TROMETHAMINE 15 MILLIGRAM(S): 30 INJECTION, SOLUTION INTRAMUSCULAR at 21:22

## 2024-07-10 RX ADMIN — LIDOCAINE HCL 1 PATCH: 28 GEL TOPICAL at 17:58

## 2024-07-10 RX ADMIN — SERTRALINE HYDROCHLORIDE 50 MILLIGRAM(S): 100 TABLET, FILM COATED ORAL at 11:21

## 2024-07-10 RX ADMIN — KETOROLAC TROMETHAMINE 15 MILLIGRAM(S): 30 INJECTION, SOLUTION INTRAMUSCULAR at 14:30

## 2024-07-10 RX ADMIN — OXYCODONE HYDROCHLORIDE 10 MILLIGRAM(S): 100 SOLUTION ORAL at 03:30

## 2024-07-10 RX ADMIN — KETOROLAC TROMETHAMINE 15 MILLIGRAM(S): 30 INJECTION, SOLUTION INTRAMUSCULAR at 14:06

## 2024-07-10 RX ADMIN — HYDROMORPHONE HCL 2 MILLIGRAM(S): 0.2 INJECTION, SOLUTION INTRAVENOUS at 11:22

## 2024-07-10 RX ADMIN — LIDOCAINE HCL 1 PATCH: 28 GEL TOPICAL at 19:55

## 2024-07-10 RX ADMIN — MORPHINE SULFATE 30 MILLIGRAM(S): 100 TABLET, EXTENDED RELEASE ORAL at 16:30

## 2024-07-10 RX ADMIN — HYDROMORPHONE HCL 0.5 MILLIGRAM(S): 0.2 INJECTION, SOLUTION INTRAVENOUS at 00:20

## 2024-07-10 RX ADMIN — Medication 2 TABLET(S): at 21:06

## 2024-07-10 RX ADMIN — HEPARIN SODIUM 5000 UNIT(S): 50 INJECTION, SOLUTION INTRAVENOUS at 21:07

## 2024-07-10 RX ADMIN — PANTOPRAZOLE SODIUM 40 MILLIGRAM(S): 40 INJECTION, POWDER, FOR SOLUTION INTRAVENOUS at 06:10

## 2024-07-10 RX ADMIN — MORPHINE SULFATE 30 MILLIGRAM(S): 100 TABLET, EXTENDED RELEASE ORAL at 22:00

## 2024-07-10 RX ADMIN — HYDROMORPHONE HCL 2 MILLIGRAM(S): 0.2 INJECTION, SOLUTION INTRAVENOUS at 12:30

## 2024-07-10 RX ADMIN — HEPARIN SODIUM 5000 UNIT(S): 50 INJECTION, SOLUTION INTRAVENOUS at 14:06

## 2024-07-10 RX ADMIN — Medication 700 MILLIGRAM(S): at 09:00

## 2024-07-10 RX ADMIN — LIDOCAINE HCL 1 PATCH: 28 GEL TOPICAL at 08:46

## 2024-07-10 RX ADMIN — Medication 325 MILLIGRAM(S): at 11:22

## 2024-07-10 NOTE — PROGRESS NOTE ADULT - SUBJECTIVE AND OBJECTIVE BOX
INTERVAL HPI/OVERNIGHT EVENTS:  Patient S&E at bedside.  pt s/p chest tube placement x 2 and talc pleurodesis   pt on nc, had pain that responded to dilaudid  mild output from chest tubes- 30 and 35 cc   feeling well overall     PAST MEDICAL & SURGICAL HISTORY:  Fibroid      H/O oral surgery  under general anesthesia during childhood      S/P D&C (status post dilation and curettage)  x2      S/P laparoscopy  as part of infertility work up      S/P tonsillectomy          FAMILY HISTORY:  Family history of uterine cancer (Mother)        VITAL SIGNS:  T(F): 98 (07-10-24 @ 08:16)  HR: 84 (07-10-24 @ 10:00)  BP: 131/65 (07-10-24 @ 10:00)  RR: 17 (07-10-24 @ 10:00)  SpO2: 98% (07-10-24 @ 10:00)  Wt(kg): --    PHYSICAL EXAM:    Constitutional: NAD, mild pain   Eyes: EOMI,   Respiratory: b/l chest tubes   Cardiovascular: RRR, no M/R/G  Gastrointestinal: soft, NTND,  Extremities: no c/c/e  Neurological: AAOx3      MEDICATIONS  (STANDING):  ferrous    sulfate 325 milliGRAM(s) Oral <User Schedule>  heparin   Injectable 5000 Unit(s) SubCutaneous every 8 hours  ketorolac   Injectable 15 milliGRAM(s) IV Push every 8 hours  lidocaine   4% Patch 1 Patch Transdermal every 24 hours  medroxyPROGESTERone 10 milliGRAM(s) Oral daily  OLANZapine 2.5 milliGRAM(s) Oral at bedtime  pantoprazole    Tablet 40 milliGRAM(s) Oral before breakfast  pregabalin 75 milliGRAM(s) Oral every 12 hours  senna 2 Tablet(s) Oral at bedtime  sertraline 50 milliGRAM(s) Oral daily    MEDICATIONS  (PRN):  aluminum hydroxide/magnesium hydroxide/simethicone Suspension 30 milliLiter(s) Oral every 4 hours PRN Dyspepsia  HYDROmorphone   Tablet 2 milliGRAM(s) Oral every 4 hours PRN Moderate Pain (4 - 6)  HYDROmorphone   Tablet 4 milliGRAM(s) Oral every 4 hours PRN Severe Pain (7 - 10)  HYDROmorphone  Injectable 0.5 milliGRAM(s) IV Push every 3 hours PRN Severe Pain (7 - 10)  melatonin 3 milliGRAM(s) Oral at bedtime PRN Insomnia  ondansetron Injectable 4 milliGRAM(s) IV Push every 8 hours PRN Nausea and/or Vomiting  polyethylene glycol 3350 17 Gram(s) Oral two times a day PRN Constipation  simethicone 80 milliGRAM(s) Chew every 6 hours PRN Gas      Allergies    No Known Allergies    Intolerances    shellfish (Vomiting)      LABS:                        9.9    10.54 )-----------( 328      ( 10 Jul 2024 06:02 )             31.6     07-10    140  |  105  |  14  ----------------------------<  105<H>  4.0   |  27  |  0.63    Ca    9.2      10 Jul 2024 06:02        Urinalysis Basic - ( 10 Jul 2024 06:02 )    Color: x / Appearance: x / SG: x / pH: x  Gluc: 105 mg/dL / Ketone: x  / Bili: x / Urobili: x   Blood: x / Protein: x / Nitrite: x   Leuk Esterase: x / RBC: x / WBC x   Sq Epi: x / Non Sq Epi: x / Bacteria: x        RADIOLOGY & ADDITIONAL TESTS:  Studies reviewed.

## 2024-07-10 NOTE — PROGRESS NOTE ADULT - ASSESSMENT
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 As per chart notes, 2 weeks prior to arrival went to [facility] for chemo Tx; SOB at time, imaging revealed PTX, admitted to [facility] in [facility]  At [facility] she had placement Rt lateral pigtail, then ant pigtail placed by IR, pleurodesis?     Patient arrived to  with L pigtail x 1, with air leak.  CT chest 7/8 revealing moderate multiloculated L PTX    Now s/p R VATS pleurodesis with placement 2 CT on R side 7/9    PLAN    Neuro: AAOx 3. pain uncontrolled. will adjust pain meds. patient was taking MS contin 30mg tid. [id number]. will add IV dilaudid for breakthrough. IV tylenol  CV: Normotensive. in sinus rhythm.   Pulm: on room air. 2 CT on R side - stay on suction. CXR with small opacities in RLL  GI: PO diet. PPI. bowel regimen prn  Nephro: monitor I & Os. Trend renal fxn  ID: no abx indicated at this time.   Heme: Hgb 9.9. DVT ppx - hep sq. SCDs  PT eval    Dispo: SICU. 2 CT to suction. pain control. CXR in AM    Will discuss with Dr. Calderon

## 2024-07-10 NOTE — PROGRESS NOTE ADULT - ASSESSMENT
50 y/o female with a PMHx of fibroids, Pt of [facility] [place] the [place]. s/p  IR biopsy Rt lung nodule at [facility] 2/26 .  PT currently undergoing workup for recently diagnosed endometrial cancer presents to the ED c/o left sided CP. Pt reports she had a pain in her left chest that felt like a gas bubble that was progressively worsening. Denies rash. No other complaints at this time.  Pt noted to have a moderate PTX on left side . Now s/p Left pigtail placement  3/11 , Rt Pigtail placed 3/13  s/p 3/15 Left Robot assist Pleurectomy/TALC pleurodesis wedge resection 3/20 both CT d/c  s/p biopsy of uterus 3/21/24. readmitted to [facility] with right PTX s/p anterior tube x 2 (one placed in IR as per patient) with pleurodesis  7/5 now transferred to  for further care and management  with anterior chest tube to suction 7/9 s/p Rt Robotic     Plan   maintain both chest tubes to Suction today   IS  OOB   pain mgmt   CXR/ Labs in am   Dr Alves on board   pain mgmt   ambulation  DW Dr Barreto

## 2024-07-10 NOTE — PROGRESS NOTE ADULT - SUBJECTIVE AND OBJECTIVE BOX
Patient is a 54y old  Female who presents with a chief complaint of PTX. post op VATS (10 Jul 2024 13:42)      BRIEF HOSPITAL COURSE: ***    Events last 24 hours: ***    PAST MEDICAL & SURGICAL HISTORY:  Fibroid      H/O oral surgery  under general anesthesia during childhood      S/P D&C (status post dilation and curettage)  x2      S/P laparoscopy  as part of infertility work up      S/P tonsillectomy            Medications:        HYDROmorphone  Injectable 0.5 milliGRAM(s) IV Push every 3 hours PRN  ketorolac   Injectable 15 milliGRAM(s) IV Push every 8 hours  melatonin 3 milliGRAM(s) Oral at bedtime PRN  morphine ER Tablet 30 milliGRAM(s) Oral <User Schedule>  OLANZapine 2.5 milliGRAM(s) Oral at bedtime  ondansetron Injectable 4 milliGRAM(s) IV Push every 8 hours PRN  pregabalin 75 milliGRAM(s) Oral every 12 hours  sertraline 50 milliGRAM(s) Oral daily      heparin   Injectable 5000 Unit(s) SubCutaneous every 8 hours    aluminum hydroxide/magnesium hydroxide/simethicone Suspension 30 milliLiter(s) Oral every 4 hours PRN  pantoprazole    Tablet 40 milliGRAM(s) Oral before breakfast  polyethylene glycol 3350 17 Gram(s) Oral two times a day PRN  senna 2 Tablet(s) Oral at bedtime  simethicone 80 milliGRAM(s) Chew every 6 hours PRN      medroxyPROGESTERone 10 milliGRAM(s) Oral daily    ferrous    sulfate 325 milliGRAM(s) Oral <User Schedule>      lidocaine   4% Patch 1 Patch Transdermal every 24 hours            ICU Vital Signs Last 24 Hrs  T(C): 36.7 (10 Jul 2024 12:50), Max: 36.7 (09 Jul 2024 21:47)  T(F): 98.1 (10 Jul 2024 12:50), Max: 98.1 (09 Jul 2024 21:47)  HR: 82 (10 Jul 2024 13:00) (72 - 105)  BP: 124/69 (10 Jul 2024 13:00) (92/58 - 131/65)  BP(mean): 84 (10 Jul 2024 13:00) (66 - 91)  ABP: --  ABP(mean): --  RR: 28 (10 Jul 2024 13:00) (12 - 28)  SpO2: 100% (10 Jul 2024 13:00) (92% - 100%)    O2 Parameters below as of 10 Jul 2024 13:00  Patient On (Oxygen Delivery Method): room air                I&O's Detail    09 Jul 2024 07:01  -  10 Jul 2024 07:00  --------------------------------------------------------  IN:    Oral Fluid: 100 mL    Other (mL): 900 mL  Total IN: 1000 mL    OUT:    Drain (mL): 40 mL    Drain (mL): 50 mL    Voided (mL): 480 mL  Total OUT: 570 mL    Total NET: 430 mL      10 Jul 2024 07:01  -  10 Jul 2024 14:36  --------------------------------------------------------  IN:  Total IN: 0 mL    OUT:    Voided (mL): 1025 mL  Total OUT: 1025 mL    Total NET: -1025 mL            LABS:                        9.9    10.54 )-----------( 328      ( 10 Jul 2024 06:02 )             31.6     07-10    140  |  105  |  14  ----------------------------<  105<H>  4.0   |  27  |  0.63    Ca    9.2      10 Jul 2024 06:02            CAPILLARY BLOOD GLUCOSE          Urinalysis Basic - ( 10 Jul 2024 06:02 )    Color: x / Appearance: x / SG: x / pH: x  Gluc: 105 mg/dL / Ketone: x  / Bili: x / Urobili: x   Blood: x / Protein: x / Nitrite: x   Leuk Esterase: x / RBC: x / WBC x   Sq Epi: x / Non Sq Epi: x / Bacteria: x      CULTURES:      Physical Examination:    General: No acute distress.    HEENT: Pupils equal, reactive to light.  Symmetric.    PULM: Clear to auscultation bilaterally, no significant sputum production    NECK: Supple, no lymphadenopathy, trachea midline    CVS: Regular rate and rhythm, no murmurs, rubs, or gallops    ABD: Soft, nondistended, nontender, normoactive bowel sounds, no masses    EXT: No edema, nontender    SKIN: Warm and well perfused, no rashes noted.    NEURO: Alert, oriented, interactive, nonfocal    DEVICES:     RADIOLOGY: ***    CRITICAL CARE TIME SPENT: ***   Patient is a 54y old  Female who presents with a chief complaint of PTX. 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     PAST MEDICAL & SURGICAL HISTORY:  Fibroid  H/O oral surgery  under general anesthesia during childhood  S/P D&C (status post dilation and curettage)  x2  S/P laparoscopy  as part of infertility work up  S/P tonsillectomy      Medications:  HYDROmorphone  Injectable 0.5 milliGRAM(s) IV Push every 3 hours PRN  ketorolac   Injectable 15 milliGRAM(s) IV Push every 8 hours  melatonin 3 milliGRAM(s) Oral at bedtime PRN  morphine ER Tablet 30 milliGRAM(s) Oral <User Schedule>  OLANZapine 2.5 milliGRAM(s) Oral at bedtime  ondansetron Injectable 4 milliGRAM(s) IV Push every 8 hours PRN  pregabalin 75 milliGRAM(s) Oral every 12 hours  sertraline 50 milliGRAM(s) Oral daily  heparin   Injectable 5000 Unit(s) SubCutaneous every 8 hours  aluminum hydroxide/magnesium hydroxide/simethicone Suspension 30 milliLiter(s) Oral every 4 hours PRN  pantoprazole    Tablet 40 milliGRAM(s) Oral before breakfast  polyethylene glycol 3350 17 Gram(s) Oral two times a day PRN  senna 2 Tablet(s) Oral at bedtime  simethicone 80 milliGRAM(s) Chew every 6 hours PRN  medroxyPROGESTERone 10 milliGRAM(s) Oral daily  ferrous    sulfate 325 milliGRAM(s) Oral <User Schedule>  lidocaine   4% Patch 1 Patch Transdermal every 24 hours    ICU Vital Signs Last 24 Hrs  T(C): 36.7 (10 Jul 2024 12:50), Max: 36.7 (09 Jul 2024 21:47)  T(F): 98.1 (10 Jul 2024 12:50), Max: 98.1 (09 Jul 2024 21:47)  HR: 82 (10 Jul 2024 13:00) (72 - 105)  BP: 124/69 (10 Jul 2024 13:00) (92/58 - 131/65)  BP(mean): 84 (10 Jul 2024 13:00) (66 - 91)  ABP: --  ABP(mean): --  RR: 28 (10 Jul 2024 13:00) (12 - 28)  SpO2: 100% (10 Jul 2024 13:00) (92% - 100%)    O2 Parameters below as of 10 Jul 2024 13:00  Patient On (Oxygen Delivery Method): room air      I&O's Detail    09 Jul 2024 07:01  -  10 Jul 2024 07:00  --------------------------------------------------------  IN:    Oral Fluid: 100 mL    Other (mL): 900 mL  Total IN: 1000 mL    OUT:    Drain (mL): 40 mL    Drain (mL): 50 mL    Voided (mL): 480 mL  Total OUT: 570 mL    Total NET: 430 mL      10 Jul 2024 07:01  -  10 Jul 2024 14:36  --------------------------------------------------------  IN:  Total IN: 0 mL    OUT:    Voided (mL): 1025 mL  Total OUT: 1025 mL    Total NET: -1025 mL    LABS:                        9.9    10.54 )-----------( 328      ( 10 Jul 2024 06:02 )             31.6     07-10    140  |  105  |  14  ----------------------------<  105<H>  4.0   |  27  |  0.63    Ca    9.2      10 Jul 2024 06:02      CAPILLARY BLOOD GLUCOSE  Urinalysis Basic - ( 10 Jul 2024 06:02 )    Color: x / Appearance: x / SG: x / pH: x  Gluc: 105 mg/dL / Ketone: x  / Bili: x / Urobili: x   Blood: x / Protein: x / Nitrite: x   Leuk Esterase: x / RBC: x / WBC x   Sq Epi: x / Non Sq Epi: x / Bacteria: x      CULTURES:    Physical Examination:    General: No acute distress.    HEENT: Pupils equal, reactive to light.  Symmetric.  PULM: decreased breath sounds b/l   CVS: Regular rate and rhythm, no murmurs  ABD: Soft, nondistended, nontender,  EXT: No edema, nontender  SKIN: Warm and well perfused,  NEURO: Alert, oriented, interactive    DEVICES:   2 CT on right side - no air leak     RADIOLOGY:  Patient is a 54y old  Female who presents with a chief complaint of PTX. 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 As per chart notes, 2 weeks prior to arrival went to [facility] for chemo Tx; SOB at time, imaging revealed PTX, [place] [facility] [place] [facility]  At [facility] [place] [place] she had placement Rt lateral pigtail, then ant pigtail placed by IR, pleurodesis?     Patient arrived to  with L pigtail x 1, with air leak.  CT chest 7/8 revealing moderate multiloculated L PTX    Now s/p R VATS pleurodesis with placement 2 CT on R side 7/9    7/10 pt c/o a lot of pain at CT site.     PAST MEDICAL & SURGICAL HISTORY:  Fibroid  H/O oral surgery  under general anesthesia during childhood  S/P D&C (status post dilation and curettage)  x2  S/P laparoscopy  as part of infertility work up  S/P tonsillectomy      Medications:  HYDROmorphone  Injectable 0.5 milliGRAM(s) IV Push every 3 hours PRN  ketorolac   Injectable 15 milliGRAM(s) IV Push every 8 hours  melatonin 3 milliGRAM(s) Oral at bedtime PRN  morphine ER Tablet 30 milliGRAM(s) Oral <User Schedule>  OLANZapine 2.5 milliGRAM(s) Oral at bedtime  ondansetron Injectable 4 milliGRAM(s) IV Push every 8 hours PRN  pregabalin 75 milliGRAM(s) Oral every 12 hours  sertraline 50 milliGRAM(s) Oral daily  heparin   Injectable 5000 Unit(s) SubCutaneous every 8 hours  aluminum hydroxide/magnesium hydroxide/simethicone Suspension 30 milliLiter(s) Oral every 4 hours PRN  pantoprazole    Tablet 40 milliGRAM(s) Oral before breakfast  polyethylene glycol 3350 17 Gram(s) Oral two times a day PRN  senna 2 Tablet(s) Oral at bedtime  simethicone 80 milliGRAM(s) Chew every 6 hours PRN  medroxyPROGESTERone 10 milliGRAM(s) Oral daily  ferrous    sulfate 325 milliGRAM(s) Oral <User Schedule>  lidocaine   4% Patch 1 Patch Transdermal every 24 hours    ICU Vital Signs Last 24 Hrs  T(C): 36.7 (10 Jul 2024 12:50), Max: 36.7 (09 Jul 2024 21:47)  T(F): 98.1 (10 Jul 2024 12:50), Max: 98.1 (09 Jul 2024 21:47)  HR: 82 (10 Jul 2024 13:00) (72 - 105)  BP: 124/69 (10 Jul 2024 13:00) (92/58 - 131/65)  BP(mean): 84 (10 Jul 2024 13:00) (66 - 91)  ABP: --  ABP(mean): --  RR: 28 (10 Jul 2024 13:00) (12 - 28)  SpO2: 100% (10 Jul 2024 13:00) (92% - 100%)    O2 Parameters below as of 10 Jul 2024 13:00  Patient On (Oxygen Delivery Method): room air      I&O's Detail    09 Jul 2024 07:01  -  10 Jul 2024 07:00  --------------------------------------------------------  IN:    Oral Fluid: 100 mL    Other (mL): 900 mL  Total IN: 1000 mL    OUT:    Drain (mL): 40 mL    Drain (mL): 50 mL    Voided (mL): 480 mL  Total OUT: 570 mL    Total NET: 430 mL      10 Jul 2024 07:01  -  10 Jul 2024 14:36  --------------------------------------------------------  IN:  Total IN: 0 mL    OUT:    Voided (mL): 1025 mL  Total OUT: 1025 mL    Total NET: -1025 mL    LABS:                        9.9    10.54 )-----------( 328      ( 10 Jul 2024 06:02 )             31.6     07-10    140  |  105  |  14  ----------------------------<  105<H>  4.0   |  27  |  0.63    Ca    9.2      10 Jul 2024 06:02      CAPILLARY BLOOD GLUCOSE  Urinalysis Basic - ( 10 Jul 2024 06:02 )    Color: x / Appearance: x / SG: x / pH: x  Gluc: 105 mg/dL / Ketone: x  / Bili: x / Urobili: x   Blood: x / Protein: x / Nitrite: x   Leuk Esterase: x / RBC: x / WBC x   Sq Epi: x / Non Sq Epi: x / Bacteria: x      CULTURES:    Physical Examination:    General: No acute distress.    HEENT: Pupils equal, reactive to light.  Symmetric.  PULM: decreased breath sounds b/l   CVS: Regular rate and rhythm, no murmurs  ABD: Soft, nondistended, nontender,  EXT: No edema, nontender  SKIN: Warm and well perfused,  NEURO: Alert, oriented, interactive    DEVICES:   2 CT on right side - no air leak     RADIOLOGY:   < from: CT Chest No Cont (07.08.24 @ 08:49) >  IMPRESSION:    Decreased moderate multiloculated right pleural effusion.    Decreased size of multiple pulmonary metastases.    < end of copied text >     Patient is a 54y old  Female who presents with a chief complaint of PTX. 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 As per chart notes, 2 weeks prior to arrival went to [facility] for chemo Tx; SOB at time, imaging revealed PTX, [place] [facility] [place] [facility]  [place] [facility] [place] had placement Rt lateral pigtail, then ant pigtail placed by IR, pleurodesis?     Patient arrived to  with L pigtail x 1, with air leak.  CT chest 7/8 revealing moderate multiloculated R PTX    Now s/p R VATS pleurodesis with placement 2 CT on R side 7/9    7/10 pt c/o a lot of pain at CT site.     PAST MEDICAL & SURGICAL HISTORY:  Fibroid  H/O oral surgery  under general anesthesia during childhood  S/P D&C (status post dilation and curettage)  x2  S/P laparoscopy  as part of infertility work up  S/P tonsillectomy      Medications:  HYDROmorphone  Injectable 0.5 milliGRAM(s) IV Push every 3 hours PRN  ketorolac   Injectable 15 milliGRAM(s) IV Push every 8 hours  melatonin 3 milliGRAM(s) Oral at bedtime PRN  morphine ER Tablet 30 milliGRAM(s) Oral <User Schedule>  OLANZapine 2.5 milliGRAM(s) Oral at bedtime  ondansetron Injectable 4 milliGRAM(s) IV Push every 8 hours PRN  pregabalin 75 milliGRAM(s) Oral every 12 hours  sertraline 50 milliGRAM(s) Oral daily  heparin   Injectable 5000 Unit(s) SubCutaneous every 8 hours  aluminum hydroxide/magnesium hydroxide/simethicone Suspension 30 milliLiter(s) Oral every 4 hours PRN  pantoprazole    Tablet 40 milliGRAM(s) Oral before breakfast  polyethylene glycol 3350 17 Gram(s) Oral two times a day PRN  senna 2 Tablet(s) Oral at bedtime  simethicone 80 milliGRAM(s) Chew every 6 hours PRN  medroxyPROGESTERone 10 milliGRAM(s) Oral daily  ferrous    sulfate 325 milliGRAM(s) Oral <User Schedule>  lidocaine   4% Patch 1 Patch Transdermal every 24 hours    ICU Vital Signs Last 24 Hrs  T(C): 36.7 (10 Jul 2024 12:50), Max: 36.7 (09 Jul 2024 21:47)  T(F): 98.1 (10 Jul 2024 12:50), Max: 98.1 (09 Jul 2024 21:47)  HR: 82 (10 Jul 2024 13:00) (72 - 105)  BP: 124/69 (10 Jul 2024 13:00) (92/58 - 131/65)  BP(mean): 84 (10 Jul 2024 13:00) (66 - 91)  ABP: --  ABP(mean): --  RR: 28 (10 Jul 2024 13:00) (12 - 28)  SpO2: 100% (10 Jul 2024 13:00) (92% - 100%)    O2 Parameters below as of 10 Jul 2024 13:00  Patient On (Oxygen Delivery Method): room air      I&O's Detail    09 Jul 2024 07:01  -  10 Jul 2024 07:00  --------------------------------------------------------  IN:    Oral Fluid: 100 mL    Other (mL): 900 mL  Total IN: 1000 mL    OUT:    Drain (mL): 40 mL    Drain (mL): 50 mL    Voided (mL): 480 mL  Total OUT: 570 mL    Total NET: 430 mL      10 Jul 2024 07:01  -  10 Jul 2024 14:36  --------------------------------------------------------  IN:  Total IN: 0 mL    OUT:    Voided (mL): 1025 mL  Total OUT: 1025 mL    Total NET: -1025 mL    LABS:                        9.9    10.54 )-----------( 328      ( 10 Jul 2024 06:02 )             31.6     07-10    140  |  105  |  14  ----------------------------<  105<H>  4.0   |  27  |  0.63    Ca    9.2      10 Jul 2024 06:02      CAPILLARY BLOOD GLUCOSE  Urinalysis Basic - ( 10 Jul 2024 06:02 )    Color: x / Appearance: x / SG: x / pH: x  Gluc: 105 mg/dL / Ketone: x  / Bili: x / Urobili: x   Blood: x / Protein: x / Nitrite: x   Leuk Esterase: x / RBC: x / WBC x   Sq Epi: x / Non Sq Epi: x / Bacteria: x      CULTURES:    Physical Examination:    General: No acute distress.    HEENT: Pupils equal, reactive to light.  Symmetric.  PULM: decreased breath sounds b/l   CVS: Regular rate and rhythm, no murmurs  ABD: Soft, nondistended, nontender,  EXT: No edema, nontender  SKIN: Warm and well perfused,  NEURO: Alert, oriented, interactive    DEVICES:   2 CT on right side - no air leak     RADIOLOGY:   < from: CT Chest No Cont (07.08.24 @ 08:49) >  IMPRESSION:    Decreased moderate multiloculated right pleural effusion.    Decreased size of multiple pulmonary metastases.    < end of copied text >

## 2024-07-10 NOTE — PROGRESS NOTE ADULT - ASSESSMENT
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    # h/o high grade vulvar ca   - currently on gemcitabine, docetaxel - LD 3 weeks ago- scheduled if able to be discharged from hospital   - will f/u [place] [facility] [place] for continued tx     # Pneumothorax  - recurrent PTX seen   - Rt lateral pigtail, then ant pigtail placed by IR, pleurodesis x 1  - pt was at [facility] [place] weeks- anytime tried to take off suction, ptx would worsen on scan  - repeat CT chest- with decreased moderate multiloculated right pleural effusion, decreased size of multiple pulmonary mets  - 7/9 s/p vats/pleurodesis - b/l chest tubes in place  - pain meds with po dilaudid now     due for chemo at [facility] - and will push to next week   will continue to follow and communicate with [facility]

## 2024-07-10 NOTE — PROGRESS NOTE ADULT - SUBJECTIVE AND OBJECTIVE BOX
Subjective:  Pt in bed NAD no issues overnight     T(C): 36.7 (07-10-24 @ 08:16), Max: 36.7 (07-09-24 @ 21:47)  HR: 84 (07-10-24 @ 10:00) (72 - 105)  BP: 131/65 (07-10-24 @ 10:00) (92/58 - 131/65)  ABP: --  ABP(mean): --  RR: 17 (07-10-24 @ 10:00) (12 - 23)  SpO2: 98% (07-10-24 @ 10:00) (92% - 100%) 2 L NC   Wt(kg): --  CVP(mm Hg): --  CO: --  CI: --  PA: --                                              Tele: SR     CHEST TUBE: #1 50cc #2 50cc                              OUTPUT:     per 24 hours    AIR LEAKS:  [ ] YES [ x] NO          07-10    140  |  105  |  14  ----------------------------<  105<H>  4.0   |  27  |  0.63    Ca    9.2      10 Jul 2024 06:02                                 9.9    10.54 )-----------( 328      ( 10 Jul 2024 06:02 )             31.6                 CAPILLARY BLOOD GLUCOSE               CXR: < from: CT Chest No Cont (07.08.24 @ 08:49) >  LUNGS AND LARGE AIRWAYS: Patent central airways. New dependent opacities   in the lower lobes, likely atelectasis. Numerous bilateral pulmonary   nodules and masses, some unchanged and several smaller. For example   example, a left upper lobe mass along the pleura now measures 1.6 x 1.2   cm (series 2-43), previously 2.5 x 1.6 cm. A right upper lobe 1 cm   nodule, previously solid, it is now cavitary (series 2-39).  PLEURA: Decreased size of moderate multiloculated right pneumothorax   status post chest tube insertion with pigtail catheter in the   anteromedial pleural space. Trace right pleural fluid. Interval   resolution of previously seen left pneumothorax.  VESSELS: Within normal limits.  HEART: Heart size is normal. Trace pericardial effusion. Slight mass   effect on the right atrium, as described above; unchanged from prior  MEDIASTINUM AND PEDRO LUIS: No pathologically enlarged lymph nodes  CHEST WALL AND LOWER NECK: Within normal limits.  VISUALIZED UPPER ABDOMEN: Within normal limits.  BONES: Degenerative changes.    IMPRESSION:    Decreased moderate multiloculated right pleural effusion.    Decreased size of multiple pulmonary metastases.    < end of copied text >          Exam   Neuro:  Alert Awake NAD   Pulm: decreased at bases b/l + 2 CT   CV: RRR  Abd:  soft, mild distention   Extremities:  warm  Rt Thoracic C/D/I          Assessment:  54yFemale    with PAST MEDICAL & SURGICAL HISTORY:  Fibroid      H/O oral surgery  under general anesthesia during childhood      S/P D&C (status post dilation and curettage)  x2      S/P laparoscopy  as part of infertility work up      S/P tonsillectomy

## 2024-07-11 LAB
ANION GAP SERPL CALC-SCNC: 5 MMOL/L — SIGNIFICANT CHANGE UP (ref 5–17)
BUN SERPL-MCNC: 15 MG/DL — SIGNIFICANT CHANGE UP (ref 7–23)
CALCIUM SERPL-MCNC: 9.1 MG/DL — SIGNIFICANT CHANGE UP (ref 8.5–10.1)
CHLORIDE SERPL-SCNC: 109 MMOL/L — HIGH (ref 96–108)
CO2 SERPL-SCNC: 25 MMOL/L — SIGNIFICANT CHANGE UP (ref 22–31)
CREAT SERPL-MCNC: 0.56 MG/DL — SIGNIFICANT CHANGE UP (ref 0.5–1.3)
EGFR: 107 ML/MIN/1.73M2 — SIGNIFICANT CHANGE UP
GLUCOSE SERPL-MCNC: 91 MG/DL — SIGNIFICANT CHANGE UP (ref 70–99)
HCT VFR BLD CALC: 30.6 % — LOW (ref 34.5–45)
HGB BLD-MCNC: 9.6 G/DL — LOW (ref 11.5–15.5)
MAGNESIUM SERPL-MCNC: 1.3 MG/DL — LOW (ref 1.6–2.6)
MCHC RBC-ENTMCNC: 29.4 PG — SIGNIFICANT CHANGE UP (ref 27–34)
MCHC RBC-ENTMCNC: 31.4 GM/DL — LOW (ref 32–36)
MCV RBC AUTO: 93.6 FL — SIGNIFICANT CHANGE UP (ref 80–100)
PHOSPHATE SERPL-MCNC: 4.9 MG/DL — HIGH (ref 2.5–4.5)
PLATELET # BLD AUTO: 284 K/UL — SIGNIFICANT CHANGE UP (ref 150–400)
POTASSIUM SERPL-MCNC: 3.8 MMOL/L — SIGNIFICANT CHANGE UP (ref 3.5–5.3)
POTASSIUM SERPL-SCNC: 3.8 MMOL/L — SIGNIFICANT CHANGE UP (ref 3.5–5.3)
RBC # BLD: 3.27 M/UL — LOW (ref 3.8–5.2)
RBC # FLD: 18.2 % — HIGH (ref 10.3–14.5)
SODIUM SERPL-SCNC: 139 MMOL/L — SIGNIFICANT CHANGE UP (ref 135–145)
WBC # BLD: 8.95 K/UL — SIGNIFICANT CHANGE UP (ref 3.8–10.5)
WBC # FLD AUTO: 8.95 K/UL — SIGNIFICANT CHANGE UP (ref 3.8–10.5)

## 2024-07-11 PROCEDURE — 99232 SBSQ HOSP IP/OBS MODERATE 35: CPT

## 2024-07-11 PROCEDURE — 71045 X-RAY EXAM CHEST 1 VIEW: CPT | Mod: 26

## 2024-07-11 PROCEDURE — 99024 POSTOP FOLLOW-UP VISIT: CPT

## 2024-07-11 RX ORDER — MAGNESIUM SULFATE 100 %
1 POWDER (GRAM) MISCELLANEOUS ONCE
Refills: 0 | Status: COMPLETED | OUTPATIENT
Start: 2024-07-11 | End: 2024-07-11

## 2024-07-11 RX ORDER — ACETAMINOPHEN 325 MG
700 TABLET ORAL ONCE
Refills: 0 | Status: DISCONTINUED | OUTPATIENT
Start: 2024-07-11 | End: 2024-07-12

## 2024-07-11 RX ADMIN — HEPARIN SODIUM 5000 UNIT(S): 50 INJECTION, SOLUTION INTRAVENOUS at 14:30

## 2024-07-11 RX ADMIN — MORPHINE SULFATE 30 MILLIGRAM(S): 100 TABLET, EXTENDED RELEASE ORAL at 06:18

## 2024-07-11 RX ADMIN — MORPHINE SULFATE 30 MILLIGRAM(S): 100 TABLET, EXTENDED RELEASE ORAL at 15:30

## 2024-07-11 RX ADMIN — PREGABALIN 75 MILLIGRAM(S): 50 CAPSULE ORAL at 09:26

## 2024-07-11 RX ADMIN — Medication 2 TABLET(S): at 21:15

## 2024-07-11 RX ADMIN — MORPHINE SULFATE 30 MILLIGRAM(S): 100 TABLET, EXTENDED RELEASE ORAL at 21:15

## 2024-07-11 RX ADMIN — HYDROMORPHONE HCL 0.5 MILLIGRAM(S): 0.2 INJECTION, SOLUTION INTRAVENOUS at 18:49

## 2024-07-11 RX ADMIN — MORPHINE SULFATE 30 MILLIGRAM(S): 100 TABLET, EXTENDED RELEASE ORAL at 05:22

## 2024-07-11 RX ADMIN — SERTRALINE HYDROCHLORIDE 50 MILLIGRAM(S): 100 TABLET, FILM COATED ORAL at 11:07

## 2024-07-11 RX ADMIN — KETOROLAC TROMETHAMINE 15 MILLIGRAM(S): 30 INJECTION, SOLUTION INTRAMUSCULAR at 05:22

## 2024-07-11 RX ADMIN — HEPARIN SODIUM 5000 UNIT(S): 50 INJECTION, SOLUTION INTRAVENOUS at 21:15

## 2024-07-11 RX ADMIN — KETOROLAC TROMETHAMINE 15 MILLIGRAM(S): 30 INJECTION, SOLUTION INTRAMUSCULAR at 05:37

## 2024-07-11 RX ADMIN — PANTOPRAZOLE SODIUM 40 MILLIGRAM(S): 40 INJECTION, POWDER, FOR SOLUTION INTRAVENOUS at 05:22

## 2024-07-11 RX ADMIN — OLANZAPINE 2.5 MILLIGRAM(S): 2.5 TABLET, FILM COATED ORAL at 21:29

## 2024-07-11 RX ADMIN — PREGABALIN 75 MILLIGRAM(S): 50 CAPSULE ORAL at 21:15

## 2024-07-11 RX ADMIN — MEDROXYPROGESTERONE ACETATE 10 MILLIGRAM(S): 2.5 TABLET ORAL at 21:29

## 2024-07-11 RX ADMIN — HYDROMORPHONE HCL 0.5 MILLIGRAM(S): 0.2 INJECTION, SOLUTION INTRAVENOUS at 19:30

## 2024-07-11 RX ADMIN — MORPHINE SULFATE 30 MILLIGRAM(S): 100 TABLET, EXTENDED RELEASE ORAL at 14:31

## 2024-07-11 RX ADMIN — Medication 100 GRAM(S): at 09:28

## 2024-07-11 RX ADMIN — HEPARIN SODIUM 5000 UNIT(S): 50 INJECTION, SOLUTION INTRAVENOUS at 05:21

## 2024-07-11 NOTE — PROGRESS NOTE ADULT - ASSESSMENT
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    # h/o high grade vulvar ca   - currently on gemcitabine, docetaxel - LD 3 weeks ago- scheduled if able to be discharged from hospital   - will f/u with [facility] on dc for continued tx     # Pneumothorax  - recurrent PTX seen   - Rt lateral pigtail, then ant pigtail placed by IR, pleurodesis x 1  - patient with R chest tubes scant output bilaterally   - CXR  - 7/9 s/p vats/pleurodesis - b/l chest tubes in place  - pain meds with po dilaudid now

## 2024-07-11 NOTE — PROGRESS NOTE ADULT - SUBJECTIVE AND OBJECTIVE BOX
Subjective:  Pt in bed NAD no issues overnight One CT removed without issue     T(C): 36.4 (07-11-24 @ 14:39), Max: 36.9 (07-10-24 @ 21:12)  HR: 82 (07-11-24 @ 12:00) (69 - 97)  BP: 95/53 (07-11-24 @ 12:00) (90/52 - 111/72)  ABP: --  ABP(mean): --  RR: 12 (07-11-24 @ 12:00) (9 - 26)  SpO2: 98% (07-11-24 @ 12:00) (96% - 100%) RA   Wt(kg): --  CVP(mm Hg): --  CO: --  CI: --  PA: --                                              Tele: SR     CHEST TUBE:  35cc                              OUTPUT:     per 24 hours    AIR LEAKS:  [ ] YES [x ] NO          07-11    139  |  109<H>  |  15  ----------------------------<  91  3.8   |  25  |  0.56    Ca    9.1      11 Jul 2024 05:41  Phos  4.9     07-11  Mg     1.3     07-11                                 9.6    8.95  )-----------( 284      ( 11 Jul 2024 05:41 )             30.6                 CAPILLARY BLOOD GLUCOSE               CXR:  < from: CT Chest No Cont (07.08.24 @ 08:49) >    LUNGS AND LARGE AIRWAYS: Patent central airways. New dependent opacities   in the lower lobes, likely atelectasis. Numerous bilateral pulmonary   nodules and masses, some unchanged and several smaller. For example   example, a left upper lobe mass along the pleura now measures 1.6 x 1.2   cm (series 2-43), previously 2.5 x 1.6 cm. A right upper lobe 1 cm   nodule, previously solid, it is now cavitary (series 2-39).  PLEURA: Decreased size of moderate multiloculated right pneumothorax   status post chest tube insertion with pigtail catheter in the   anteromedial pleural space. Trace right pleural fluid. Interval   resolution of previously seen left pneumothorax.  VESSELS: Within normal limits.  HEART: Heart size is normal. Trace pericardial effusion. Slight mass   effect on the right atrium, as described above; unchanged from prior  MEDIASTINUM AND PEDRO LUIS: No pathologically enlarged lymph nodes  CHEST WALL AND LOWER NECK: Within normal limits.  VISUALIZED UPPER ABDOMEN: Within normal limits.  BONES: Degenerative changes.    IMPRESSION:    Decreased moderate multiloculated right pleural effusion.    Decreased size of multiple pulmonary metastases.    < end of copied text >          Exam   Neuro:  Alert Awake NAD  Pulm: decreased b/l + Rt CT   CV: RRR  Abd:  soft   Extremities:  warm         Assessment:  54yFemale    with PAST MEDICAL & SURGICAL HISTORY:  Fibroid      H/O oral surgery  under general anesthesia during childhood      S/P D&C (status post dilation and curettage)  x2      S/P laparoscopy  as part of infertility work up      S/P tonsillectomy            Plan:

## 2024-07-11 NOTE — PROGRESS NOTE ADULT - SUBJECTIVE AND OBJECTIVE BOX
INTERVAL HPI/OVERNIGHT EVENTS:    Patient S&E at bedside. No o/n events, patient resting comfortably.    VITAL SIGNS:  T(F): 97.6 (07-11-24 @ 14:39)  HR: 82 (07-11-24 @ 12:00)  BP: 95/53 (07-11-24 @ 12:00)  RR: 12 (07-11-24 @ 12:00)  SpO2: 98% (07-11-24 @ 12:00)  Wt(kg): --    PHYSICAL EXAM:    Constitutional: NAD  Eyes: EOMI, sclera non-icteric  Neck: supple, no masses, no JVD  Respiratory: RIGHT Chest tube   Cardiovascular: RRR, no M/R/G  Gastrointestinal: soft, NTND, no masses palpable, + BS, no hepatosplenomegaly\      MEDICATIONS  (STANDING):  acetaminophen   IVPB .. 700 milliGRAM(s) IV Intermittent once  ferrous    sulfate 325 milliGRAM(s) Oral <User Schedule>  heparin   Injectable 5000 Unit(s) SubCutaneous every 8 hours  lidocaine   4% Patch 1 Patch Transdermal every 24 hours  medroxyPROGESTERone 10 milliGRAM(s) Oral daily  morphine ER Tablet 30 milliGRAM(s) Oral <User Schedule>  OLANZapine 2.5 milliGRAM(s) Oral at bedtime  pantoprazole    Tablet 40 milliGRAM(s) Oral before breakfast  pregabalin 75 milliGRAM(s) Oral every 12 hours  senna 2 Tablet(s) Oral at bedtime  sertraline 50 milliGRAM(s) Oral daily    MEDICATIONS  (PRN):  aluminum hydroxide/magnesium hydroxide/simethicone Suspension 30 milliLiter(s) Oral every 4 hours PRN Dyspepsia  HYDROmorphone  Injectable 0.5 milliGRAM(s) IV Push every 3 hours PRN breakthrough pain  melatonin 3 milliGRAM(s) Oral at bedtime PRN Insomnia  ondansetron Injectable 4 milliGRAM(s) IV Push every 8 hours PRN Nausea and/or Vomiting  polyethylene glycol 3350 17 Gram(s) Oral two times a day PRN Constipation  simethicone 80 milliGRAM(s) Chew every 6 hours PRN Gas      Allergies    No Known Allergies    Intolerances    shellfish (Vomiting)      LABS:                        9.6    8.95  )-----------( 284      ( 11 Jul 2024 05:41 )             30.6     07-11    139  |  109<H>  |  15  ----------------------------<  91  3.8   |  25  |  0.56    Ca    9.1      11 Jul 2024 05:41  Phos  4.9     07-11  Mg     1.3     07-11        Urinalysis Basic - ( 11 Jul 2024 05:41 )    Color: x / Appearance: x / SG: x / pH: x  Gluc: 91 mg/dL / Ketone: x  / Bili: x / Urobili: x   Blood: x / Protein: x / Nitrite: x   Leuk Esterase: x / RBC: x / WBC x   Sq Epi: x / Non Sq Epi: x / Bacteria: x        RADIOLOGY & ADDITIONAL TESTS:  Studies reviewed.    ASSESSMENT & PLAN:

## 2024-07-11 NOTE — PROGRESS NOTE ADULT - NS ATTEND AMEND GEN_ALL_CORE FT
agree with assessment and plan as above
55 y/o female with PMHx endometrial cancer w/ mets to lungs on chemo, recent  admission March 2024 for spontaneous b/l PTX, s/p L VATS pleurectomy, pleurodesis, SERINA wedge resection, presenting as a transfer from [facility] for recurrent R PTX.     Pt now s/p R VATS w/ placement of 2 anterior chest tubes on right side. Pt currently c/o "deep pain" to R upper back.    PLAN  -will place patient on chronic home pain meds, tylenol, add IV dilaudid prn for breakthrough  -R-sided CT x2 to suction  -AM CXR    Agree with rest of assessment and plan as above.

## 2024-07-11 NOTE — PROGRESS NOTE ADULT - ASSESSMENT
ASSESSMENT  55yo female with PMHx high grade mesenchymal neoplasm recent endometrial cancer primary with metastatic to lung on gem/doce (Last dose 3 weeks prior), recent  admission March 2024 for spontaneous b/l PTX, s/p L VATS pleurectomy, pleurodesis, SERINA wedge resection 3/15/24, presents as a transfer from [facility] for recurrent L PTX. As per chart notes, 2 weeks prior to arrival went to [facility] for chemo Tx; SOB at time, imaging revealed PTX, admitted to [facility] in [facility]  At [facility] she had placement Rt lateral pigtail, then ant pigtail placed by IR, pleurodesis?     Patient arrived to  with L pigtail x 1, with air leak.  CT chest 7/8 revealing moderate multiloculated L PTX    Now s/p R VATS pleurodesis with placement 2 CT on R side 7/9    PLAN    Neuro: AAOx 3. pain better controlled. MS contin 30mg tid. [id number]. IV tylenol  CV: Normotensive. in sinus rhythm.   Pulm: on room air. 2 CT on R side - waterseal. 1 CT to be removed today  GI: PO diet. PPI. bowel regimen prn  Nephro: monitor I & Os. Trend renal fxn  ID: no abx indicated at this time.   Heme: Hgb 9.9. DVT ppx - hep sq. SCDs  PT eval    Dispo: SICU. 2 CT to waterseal, 1 CT to be removed. pain control. PT    discussed with Dr. Calderon

## 2024-07-11 NOTE — PROGRESS NOTE ADULT - PROVIDER SPECIALTY LIST ADULT
Heme/Onc
SICU
Thoracic Surgery
Heme/Onc
SICU
Thoracic Surgery
Heme/Onc
Heme/Onc
Thoracic Surgery

## 2024-07-11 NOTE — PROGRESS NOTE ADULT - SUBJECTIVE AND OBJECTIVE BOX
Patient is a 54y old  Female who presents with a chief complaint of PTX. post op VATS (10 Jul 2024 13:42)    BRIEF HOSPITAL COURSE: 53yo female with PMHx high grade mesenchymal neoplasm recent endometrial cancer primary with metastatic to lung on gem/doce (Last dose 3 weeks prior), recent  admission March 2024 for spontaneous b/l PTX, s/p L VATS pleurectomy, pleurodesis, SERINA wedge resection 3/15/24, presents as a transfer from [facility] for recurrent R PTX. As per chart notes, 2 weeks prior to arrival went to [facility] for chemo Tx; SOB at time, imaging revealed PTX, admitted to [facility] in [facility]  At [facility] she had placement Rt lateral pigtail, then ant pigtail placed by IR, pleurodesis?     Patient arrived to  with L pigtail x 1, with air leak.  CT chest 7/8 revealing moderate multiloculated R PTX    Now s/p R VATS pleurodesis with placement 2 CT on R side 7/9    7/10 pt c/o a lot of pain at CT site.   7/11 pain much better controlled today. able take deeper breaths today.     PAST MEDICAL & SURGICAL HISTORY:  Fibroid  H/O oral surgery  under general anesthesia during childhood  S/P D&C (status post dilation and curettage)  x2  S/P laparoscopy  as part of infertility work up  S/P tonsillectomy      MEDICATIONS  (STANDING):  acetaminophen   IVPB .. 700 milliGRAM(s) IV Intermittent once  ferrous    sulfate 325 milliGRAM(s) Oral <User Schedule>  heparin   Injectable 5000 Unit(s) SubCutaneous every 8 hours  lidocaine   4% Patch 1 Patch Transdermal every 24 hours  medroxyPROGESTERone 10 milliGRAM(s) Oral daily  morphine ER Tablet 30 milliGRAM(s) Oral <User Schedule>  OLANZapine 2.5 milliGRAM(s) Oral at bedtime  pantoprazole    Tablet 40 milliGRAM(s) Oral before breakfast  pregabalin 75 milliGRAM(s) Oral every 12 hours  senna 2 Tablet(s) Oral at bedtime  sertraline 50 milliGRAM(s) Oral daily    Vital Signs Last 24 Hrs  T(C): 36.7 (11 Jul 2024 09:32), Max: 36.9 (10 Jul 2024 21:12)  T(F): 98.1 (11 Jul 2024 09:32), Max: 98.4 (10 Jul 2024 21:12)  HR: 82 (11 Jul 2024 12:00) (69 - 97)  BP: 95/53 (11 Jul 2024 12:00) (90/52 - 124/69)  BP(mean): 66 (11 Jul 2024 12:00) (63 - 94)  RR: 12 (11 Jul 2024 12:00) (9 - 28)  SpO2: 98% (11 Jul 2024 12:00) (96% - 100%)    Parameters below as of 11 Jul 2024 08:00  Patient On (Oxygen Delivery Method): room air    I&O's Detail    10 Jul 2024 07:01  -  11 Jul 2024 07:00  --------------------------------------------------------  IN:  Total IN: 0 mL    OUT:    Drain (mL): 35 mL    Drain (mL): 35 mL    Voided (mL): 1525 mL  Total OUT: 1595 mL    Total NET: -1595 mL      LABS:                        9.6    8.95  )-----------( 284      ( 11 Jul 2024 05:41 )             30.6     07-11    139  |  109<H>  |  15  ----------------------------<  91  3.8   |  25  |  0.56    Ca    9.1      11 Jul 2024 05:41  Phos  4.9     07-11  Mg     1.3     07-11    Urinalysis Basic - ( 11 Jul 2024 05:41 )    Color: x / Appearance: x / SG: x / pH: x  Gluc: 91 mg/dL / Ketone: x  / Bili: x / Urobili: x   Blood: x / Protein: x / Nitrite: x   Leuk Esterase: x / RBC: x / WBC x   Sq Epi: x / Non Sq Epi: x / Bacteria: x      CULTURES:    Physical Examination:    General: No acute distress.    HEENT: Pupils equal, reactive to light.  Symmetric.  PULM: decreased breath sounds b/l   CVS: Regular rate and rhythm, no murmurs  ABD: Soft, nondistended, nontender,  EXT: No edema, nontender  SKIN: Warm and well perfused,  NEURO: Alert, oriented, interactive    DEVICES:   2 CT on right side - no air leak, waterseal    RADIOLOGY:   < from: CT Chest No Cont (07.08.24 @ 08:49) >  IMPRESSION:    Decreased moderate multiloculated right pleural effusion.    Decreased size of multiple pulmonary metastases.    < end of copied text >

## 2024-07-11 NOTE — PROGRESS NOTE ADULT - ASSESSMENT
52 y/o female with a PMHx of fibroids, Pt of [facility] [place]. s/p  IR biopsy Rt lung nodule at [facility] 2/26 .  PT currently undergoing workup for recently diagnosed endometrial cancer presents to the ED c/o left sided CP. Pt reports she had a pain in her left chest that felt like a gas bubble that was progressively worsening. Denies rash. No other complaints at this time.  Pt noted to have a moderate PTX on left side . Now s/p Left pigtail placement  3/11 , Rt Pigtail placed 3/13  s/p 3/15 Left Robot assist Pleurectomy/TALC pleurodesis wedge resection 3/20 both CT d/c  s/p biopsy of uterus 3/21/24. readmitted to [facility] with right PTX s/p anterior tube x 2 (one placed in IR as per patient) with pleurodesis  7/5 now transferred to  for further care and management  with anterior chest tube to suction 7/9 s/p Rt Robotic     Plan   Maintain one CT to    IS  OOB   pain mgmt   CXR/ Labs in am   Dr Alves on board   pain mgmt   ambulation  Plan to d/c last CT tomorrow and discharge this weekend   BALBIR Barreto

## 2024-07-12 ENCOUNTER — TRANSCRIPTION ENCOUNTER (OUTPATIENT)
Age: 54
End: 2024-07-12

## 2024-07-12 VITALS
OXYGEN SATURATION: 94 % | HEART RATE: 81 BPM | RESPIRATION RATE: 16 BRPM | DIASTOLIC BLOOD PRESSURE: 58 MMHG | SYSTOLIC BLOOD PRESSURE: 97 MMHG

## 2024-07-12 LAB
ANION GAP SERPL CALC-SCNC: 7 MMOL/L — SIGNIFICANT CHANGE UP (ref 5–17)
BUN SERPL-MCNC: 13 MG/DL — SIGNIFICANT CHANGE UP (ref 7–23)
CALCIUM SERPL-MCNC: 9.1 MG/DL — SIGNIFICANT CHANGE UP (ref 8.5–10.1)
CHLORIDE SERPL-SCNC: 107 MMOL/L — SIGNIFICANT CHANGE UP (ref 96–108)
CO2 SERPL-SCNC: 26 MMOL/L — SIGNIFICANT CHANGE UP (ref 22–31)
CREAT SERPL-MCNC: 0.52 MG/DL — SIGNIFICANT CHANGE UP (ref 0.5–1.3)
EGFR: 109 ML/MIN/1.73M2 — SIGNIFICANT CHANGE UP
GLUCOSE SERPL-MCNC: 96 MG/DL — SIGNIFICANT CHANGE UP (ref 70–99)
HCT VFR BLD CALC: 30.5 % — LOW (ref 34.5–45)
HGB BLD-MCNC: 9.7 G/DL — LOW (ref 11.5–15.5)
MAGNESIUM SERPL-MCNC: 1.3 MG/DL — LOW (ref 1.6–2.6)
MCHC RBC-ENTMCNC: 29.7 PG — SIGNIFICANT CHANGE UP (ref 27–34)
MCHC RBC-ENTMCNC: 31.8 GM/DL — LOW (ref 32–36)
MCV RBC AUTO: 93.3 FL — SIGNIFICANT CHANGE UP (ref 80–100)
PHOSPHATE SERPL-MCNC: 5.3 MG/DL — HIGH (ref 2.5–4.5)
PLATELET # BLD AUTO: 295 K/UL — SIGNIFICANT CHANGE UP (ref 150–400)
POTASSIUM SERPL-MCNC: 4 MMOL/L — SIGNIFICANT CHANGE UP (ref 3.5–5.3)
POTASSIUM SERPL-SCNC: 4 MMOL/L — SIGNIFICANT CHANGE UP (ref 3.5–5.3)
RBC # BLD: 3.27 M/UL — LOW (ref 3.8–5.2)
RBC # FLD: 18.2 % — HIGH (ref 10.3–14.5)
SODIUM SERPL-SCNC: 140 MMOL/L — SIGNIFICANT CHANGE UP (ref 135–145)
WBC # BLD: 8.34 K/UL — SIGNIFICANT CHANGE UP (ref 3.8–10.5)
WBC # FLD AUTO: 8.34 K/UL — SIGNIFICANT CHANGE UP (ref 3.8–10.5)

## 2024-07-12 PROCEDURE — 71045 X-RAY EXAM CHEST 1 VIEW: CPT | Mod: 26

## 2024-07-12 PROCEDURE — 99238 HOSP IP/OBS DSCHRG MGMT 30/<: CPT

## 2024-07-12 RX ORDER — ACETAMINOPHEN 325 MG
700 TABLET ORAL ONCE
Refills: 0 | Status: COMPLETED | OUTPATIENT
Start: 2024-07-12 | End: 2024-07-12

## 2024-07-12 RX ORDER — LIDOCAINE HCL 28 MG/G
1 GEL TOPICAL
Refills: 0 | DISCHARGE

## 2024-07-12 RX ORDER — MAGNESIUM SULFATE 100 %
2 POWDER (GRAM) MISCELLANEOUS ONCE
Refills: 0 | Status: COMPLETED | OUTPATIENT
Start: 2024-07-12 | End: 2024-07-12

## 2024-07-12 RX ORDER — SENNOSIDES 8.6 MG
2 TABLET ORAL
Qty: 0 | Refills: 0 | DISCHARGE
Start: 2024-07-12

## 2024-07-12 RX ADMIN — Medication 325 MILLIGRAM(S): at 09:28

## 2024-07-12 RX ADMIN — HEPARIN SODIUM 5000 UNIT(S): 50 INJECTION, SOLUTION INTRAVENOUS at 05:38

## 2024-07-12 RX ADMIN — Medication 280 MILLIGRAM(S): at 12:00

## 2024-07-12 RX ADMIN — MORPHINE SULFATE 30 MILLIGRAM(S): 100 TABLET, EXTENDED RELEASE ORAL at 05:38

## 2024-07-12 RX ADMIN — MORPHINE SULFATE 30 MILLIGRAM(S): 100 TABLET, EXTENDED RELEASE ORAL at 06:00

## 2024-07-12 RX ADMIN — Medication 25 GRAM(S): at 09:32

## 2024-07-12 RX ADMIN — SERTRALINE HYDROCHLORIDE 50 MILLIGRAM(S): 100 TABLET, FILM COATED ORAL at 09:28

## 2024-07-12 RX ADMIN — PANTOPRAZOLE SODIUM 40 MILLIGRAM(S): 40 INJECTION, POWDER, FOR SOLUTION INTRAVENOUS at 07:34

## 2024-07-12 RX ADMIN — PREGABALIN 75 MILLIGRAM(S): 50 CAPSULE ORAL at 09:28

## 2024-07-12 NOTE — DISCHARGE NOTE PROVIDER - NSDCMRMEDTOKEN_GEN_ALL_CORE_FT
ferrous sulfate 324 mg (65 mg elemental iron) oral tablet: 1 tab(s) orally once a day  magnesium oxide 400 mg oral tablet: 1 tab(s) orally once a day  medroxyPROGESTERone 10 mg oral tablet: 1 tab(s) orally once a day (at bedtime)  morphine 30 mg/8 to 12 hr oral tablet, extended release: 1 tab(s) orally every 8 hours *** 6 AM, 2PM, 10PM***  OLANZapine 2.5 mg oral tablet: 1 tab(s) orally once a day (at bedtime)  pregabalin 75 mg oral capsule: 1 cap(s) orally 3 times a day  senna leaf extract oral tablet: 2 tab(s) orally once a day (at bedtime)  sertraline 50 mg oral tablet: 1 tab(s) orally once a day  simethicone 80 mg oral tablet, chewable: 1 tab(s) chewed every 6 hours as needed for

## 2024-07-12 NOTE — DISCHARGE NOTE NURSING/CASE MANAGEMENT/SOCIAL WORK - NSDCFUADDAPPT_GEN_ALL_CORE_FT
Leave dressing intact until tomorrow evening, reinforcing with tape if necessary (about 36 hours from chest tube removal). At that time you may remove the dressing and take a shower. Place clean gauze over wound if continual drainage.  Call the office if you experience any fevers, shortness of breath, chest pain or excessive drainage from the incision, day or night. Go to the emergency room if any of these symptoms are severe. Take your medications as ordered and take a stool softener if needed with the narcotic medications.      Please follow up with Dr Barreto in 2 weeks

## 2024-07-12 NOTE — DISCHARGE NOTE PROVIDER - NSDCFUADDAPPT_GEN_ALL_CORE_FT
Leave dressing intact until tomorrow evening, reinforcing with tape if necessary (about 36 hours from chest tube removal). At that time you may remove the dressing and take a shower. Place clean gauze over wound if continual drainage.  Call the office if you experience any fevers, shortness of breath, chest pain or excessive drainage from the incision, day or night. Go to the emergency room if any of these symptoms are severe. Take your medications as ordered and take a stool softener if needed with the narcotic medications.      Please follow up with Dr Barreto on  Leave dressing intact until tomorrow evening, reinforcing with tape if necessary (about 36 hours from chest tube removal). At that time you may remove the dressing and take a shower. Place clean gauze over wound if continual drainage.  Call the office if you experience any fevers, shortness of breath, chest pain or excessive drainage from the incision, day or night. Go to the emergency room if any of these symptoms are severe. Take your medications as ordered and take a stool softener if needed with the narcotic medications.      Please follow up with Dr Barreto in 2 weeks

## 2024-07-12 NOTE — DISCHARGE NOTE PROVIDER - NSDCCPTREATMENT_GEN_ALL_CORE_FT
PRINCIPAL PROCEDURE  Procedure: Thoracoscopy, video assisted  Findings and Treatment: RIGHT WITH CHEST TUBE PLACEMENT X 2

## 2024-07-12 NOTE — DISCHARGE NOTE PROVIDER - NSDCFUSCHEDAPPT_GEN_ALL_CORE_FT
Salomón Barreto  [facility]  [facility] [address]  Scheduled Appointment: 07/24/2024

## 2024-07-12 NOTE — DISCHARGE NOTE PROVIDER - NSDCPNSUBOBJ_GEN_ALL_CORE
Subjective: Pt in bed NAD  no issues overnight     T(C): 36.8 (07-12-24 @ 08:49), Max: 36.8 (07-12-24 @ 08:49)  HR: 76 (07-12-24 @ 08:00) (74 - 91)  BP: 94/53 (07-12-24 @ 08:00) (89/57 - 111/70)  ABP: --  ABP(mean): --  RR: 13 (07-12-24 @ 08:00) (10 - 21)  SpO2: 95% (07-12-24 @ 08:00) (91% - 100%) RA   Wt(kg): --  CVP(mm Hg): --  CO: --  CI: --  PA: --                                              Tele: SR     CHEST TUBE:   50cc                            OUTPUT:     per 24 hours    AIR LEAKS:  [ ] YES [ x] NO          07-12    140  |  107  |  13  ----------------------------<  96  4.0   |  26  |  0.52    Ca    9.1      12 Jul 2024 05:51  Phos  5.3     07-12  Mg     1.3     07-12                                 9.7    8.34  )-----------( 295      ( 12 Jul 2024 05:51 )             30.5                 CAPILLARY BLOOD GLUCOSE               CXR:  no PTX or effusion         Exam   Neuro:  Alert Awake   Pulm: decreased at bases b/l   CV: RRR  Abd:  soft   Extremities:  warm  Inc: Rt thorax  C/D/I   + Rt CT       Assessment:  54yFemale    with PAST MEDICAL & SURGICAL HISTORY:  Fibroid      H/O oral surgery  under general anesthesia during childhood      S/P D&C (status post dilation and curettage)  x2      S/P laparoscopy  as part of infertility work up      S/P tonsillectomy

## 2024-07-12 NOTE — DISCHARGE NOTE NURSING/CASE MANAGEMENT/SOCIAL WORK - PATIENT PORTAL LINK FT
Admission You can access the FollowMyHealth Patient Portal offered by [facility] by registering at the following website: [email or web address]. By joining [facility]’s FollowMyHealth portal, you will also be able to view your health information using other applications (apps) compatible with our system.

## 2024-07-12 NOTE — DISCHARGE NOTE PROVIDER - NSDCCPCAREPLAN_GEN_ALL_CORE_FT
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

## 2024-07-12 NOTE — DISCHARGE NOTE PROVIDER - PROVIDER TOKENS
FREE:[LAST:[Faith],FIRST:[Byron],PHONE:[(   )    -],FAX:[(   )    -],ADDRESS:[[address] [place] [address]]

## 2024-07-15 RX ORDER — SIMETHICONE 80 MG
TABLET,CHEWABLE ORAL EVERY 6 HOURS
Refills: 0 | Status: ACTIVE | COMMUNITY

## 2024-07-15 RX ORDER — SERTRALINE HYDROCHLORIDE 50 MG/1
50 TABLET, FILM COATED ORAL
Refills: 0 | Status: ACTIVE | COMMUNITY

## 2024-07-15 NOTE — ED PROVIDER NOTE - CROS ED ROS STATEMENT
OP SW left message for mother requesting call back to follow up on status of snap application.     OP SW will remain available as needed.  
all other ROS negative except as per HPI

## 2024-07-16 PROBLEM — J93.9 RECURRENT PNEUMOTHORAX: Status: ACTIVE | Noted: 2024-07-16

## 2024-07-16 PROBLEM — C55: Status: ACTIVE | Noted: 2024-07-16

## 2024-07-23 DIAGNOSIS — J93.9 PNEUMOTHORAX, UNSPECIFIED: ICD-10-CM

## 2024-07-23 DIAGNOSIS — E83.42 HYPOMAGNESEMIA: ICD-10-CM

## 2024-07-23 DIAGNOSIS — Z79.899 OTHER LONG TERM (CURRENT) DRUG THERAPY: ICD-10-CM

## 2024-07-23 DIAGNOSIS — C51.9 MALIGNANT NEOPLASM OF VULVA, UNSPECIFIED: ICD-10-CM

## 2024-07-23 DIAGNOSIS — C78.00 SECONDARY MALIGNANT NEOPLASM OF UNSPECIFIED LUNG: ICD-10-CM

## 2024-07-24 ENCOUNTER — APPOINTMENT (OUTPATIENT)
Dept: THORACIC SURGERY | Facility: CLINIC | Age: 54
End: 2024-07-24
Payer: COMMERCIAL

## 2024-07-24 ENCOUNTER — NON-APPOINTMENT (OUTPATIENT)
Age: 54
End: 2024-07-24

## 2024-07-24 DIAGNOSIS — C78.00 MALIGNANT NEOPLASM OF UTERUS, PART UNSPECIFIED: ICD-10-CM

## 2024-07-24 DIAGNOSIS — C78.00 SECONDARY MALIGNANT NEOPLASM OF UNSPECIFIED LUNG: ICD-10-CM

## 2024-07-24 DIAGNOSIS — C55 MALIGNANT NEOPLASM OF UTERUS, PART UNSPECIFIED: ICD-10-CM

## 2024-07-24 DIAGNOSIS — J93.9 PNEUMOTHORAX, UNSPECIFIED: ICD-10-CM

## 2024-07-24 PROCEDURE — 99024 POSTOP FOLLOW-UP VISIT: CPT

## 2024-07-24 NOTE — ASSESSMENT
[FreeTextEntry1] : Ms. FELICITAS MCCLELLAND, 54 year old female, never a smoker, w/ hx of TING, anxiety, uterine fibroid for which she underwent preop workup for hysterectomy (MRI abdomen) showing incidental findings of lung nodules. 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 Patient now with persistent air leaked-loculated recurrent pneumothorax.   03/15/2024: S/p FB, Robotic assisted, left thoracoscopy with SERINA wedge resection, total parietal pleurectomy, with chemical pleurodesis and intercoastal nerve block with Dr. Cuevas.  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  *Path of left parietal pleura reveals Acute fibrinous pleuritis with necrosis. Mesothelial hyperplasia.   Patient started gen/doce at [facility], complicated by bilateral pneumothorax. S/p Rt. pleurodesis (at [facility]) on 03/28/2024.   Started [facility] 04[facility]10[facility]2024 [facility] with plan for 3 cycles of chemo and then repeat CT scan.  07/04-07/12/2024: Transferred from [facility] to  for recurrent pneumothorax s/p anterior tube x 2 (one placed by IR for 3 weeks with an ongoing air leak) with pleurodesis. Now s/p Right VATS with placement of a 24 French black superior and inferior chest drains on 07/09/2024.   She presents today for post-op follow up. Overall, she reports to be feeling well. Denies any chest pain, shortness of breath, cough, hemoptysis, fever, or chills.  Surgical incision healing well, no sign of infection noted.  I have independently reviewed the medical records and imaging at the time of this office consultation. Stitches removed without any complication. She is overall doing well, remains asymptomatic. No further thoracic surgery intervention at this time. I discussed she continues care with heme/onc at [facility] and return to clinic as needed. She is agreeable.   Recommendations reviewed with patient during this office visit, and all questions answered; Patient instructed on the importance of follow up and verbalizes understanding.    I, BRIA Anthony, personally performed the evaluation and management (E/M) services for this established patient. That E/M includes conducting the examination, assessing all new/exacerbated conditions, and establishing a new plan of care. Today, my ACP, Simon Richards NP, was here to observe my evaluation and management services for this new problem/exacerbated condition to be followed going forward.

## 2024-07-24 NOTE — ASSESSMENT
[FreeTextEntry1] : Ms. FELICITAS MCCLELLAND, 54 year old female, never a smoker, w/ hx of TING, anxiety, uterine fibroid for which she underwent preop workup for hysterectomy (MRI abdomen) showing incidental findings of lung nodules. 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 Patient now with persistent air leaked-loculated recurrent pneumothorax.   03/15/2024: S/p FB, Robotic assisted, left thoracoscopy with SERINA wedge resection, total parietal pleurectomy, with chemical pleurodesis and intercoastal nerve block with Dr. Cuevas.  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  *Path of left parietal pleura reveals Acute fibrinous pleuritis with necrosis. Mesothelial hyperplasia.   Patient started gen/doce at [facility], complicated by bilateral pneumothorax. S/p Rt. pleurodesis (at [facility]) on 03/28/2024.   Started [facility] on 04[facility]10[facility]2024 [facility] with plan for 3 cycles of chemo and then repeat CT scan.  07/04-07/12/2024: Transferred from [facility] to  for recurrent pneumothorax s/p anterior tube x 2 (one placed by IR for 3 weeks with an ongoing air leak) with pleurodesis. Now s/p Right VATS with placement of a 24 French black superior and inferior chest drains on 07/09/2024.   She presents today for post-op follow up. Overall, she reports to be feeling well. Denies any chest pain, shortness of breath, cough, hemoptysis, fever, or chills.  Surgical incision healing well, no sign of infection noted.  I have independently reviewed the medical records and imaging at the time of this office consultation. Stitches removed without any complication. She is overall doing well, remains asymptomatic. No further thoracic surgery intervention at this time. I discussed she continues care with heme/onc at [facility] and return to clinic as needed. She is agreeable.   Recommendations reviewed with patient during this office visit, and all questions answered; Patient instructed on the importance of follow up and verbalizes understanding.    I, BRIA Anthony, personally performed the evaluation and management (E/M) services for this established patient. That E/M includes conducting the examination, assessing all new/exacerbated conditions, and establishing a new plan of care. Today, my ACP, Simon Richards NP, was here to observe my evaluation and management services for this new problem/exacerbated condition to be followed going forward.

## 2024-07-24 NOTE — ASSESSMENT
[FreeTextEntry1] : Ms. FELICITAS MCCLELLAND, 54 year old female, never a smoker, w/ hx of TING, anxiety, uterine fibroid for which she underwent preop workup for hysterectomy (MRI abdomen) showing incidental findings of lung nodules. 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 Patient now with persistent air leaked-loculated recurrent pneumothorax.   03/15/2024: S/p FB, Robotic assisted, left thoracoscopy with SERINA wedge resection, total parietal pleurectomy, with chemical pleurodesis and intercoastal nerve block with Dr. Cuevas.  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  *Path of left parietal pleura reveals Acute fibrinous pleuritis with necrosis. Mesothelial hyperplasia.   Patient started gen/doce at [facility], complicated by bilateral pneumothorax. S/p Rt. pleurodesis (at [facility]) on 03/28/2024.   Started [facility] 04[facility]10[facility]2024 [facility] with plan for 3 cycles of chemo and then repeat CT scan.  07/04-07/12/2024: Transferred from [facility] to  for recurrent pneumothorax s/p anterior tube x 2 (one placed by IR for 3 weeks with an ongoing air leak) with pleurodesis. Now s/p Right VATS with placement of a 24 French black superior and inferior chest drains on 07/09/2024.   She presents today for post-op follow up. Overall, she reports to be feeling well. Denies any chest pain, shortness of breath, cough, hemoptysis, fever, or chills.  Surgical incision healing well, no sign of infection noted.  I have independently reviewed the medical records and imaging at the time of this office consultation. Stitches removed without any complication. She is overall doing well, remains asymptomatic. No further thoracic surgery intervention at this time. I discussed she continues care with heme/onc at [facility] and return to clinic as needed. She is agreeable.   Recommendations reviewed with patient during this office visit, and all questions answered; Patient instructed on the importance of follow up and verbalizes understanding.    I, BRIA Anthony, personally performed the evaluation and management (E/M) services for this established patient. That E/M includes conducting the examination, assessing all new/exacerbated conditions, and establishing a new plan of care. Today, my ACP, Simon Richards NP, was here to observe my evaluation and management services for this new problem/exacerbated condition to be followed going forward.

## 2024-07-24 NOTE — REASON FOR VISIT
[de-identified] : Right VATS with placement of a 24 Salvadorean black superior and inferior chest drains [de-identified] : 07/09/2024

## 2024-07-24 NOTE — REASON FOR VISIT
[de-identified] : Right VATS with placement of a 24 Maltese black superior and inferior chest drains [de-identified] : 07/09/2024

## 2024-07-24 NOTE — REASON FOR VISIT
[de-identified] : Right VATS with placement of a 24 Yemeni black superior and inferior chest drains [de-identified] : 07/09/2024

## 2025-01-17 NOTE — H&P ADULT - VTE RISK ASSESSMENT
VTE Assessment already completed for this visit continued on preexisting infusions, and given versed 2mg IV during procedure

## 2025-02-15 ENCOUNTER — INPATIENT (INPATIENT)
Facility: HOSPITAL | Age: 55
LOS: 4 days | Discharge: HOME | DRG: 175 | End: 2025-02-20
Attending: PHYSICIAN ASSISTANT | Admitting: SURGERY
Payer: COMMERCIAL

## 2025-02-15 VITALS
DIASTOLIC BLOOD PRESSURE: 102 MMHG | TEMPERATURE: 99 F | OXYGEN SATURATION: 96 % | HEART RATE: 147 BPM | SYSTOLIC BLOOD PRESSURE: 149 MMHG | WEIGHT: 121.7 LBS | RESPIRATION RATE: 21 BRPM

## 2025-02-15 DIAGNOSIS — Z98.890 OTHER SPECIFIED POSTPROCEDURAL STATES: Chronic | ICD-10-CM

## 2025-02-15 DIAGNOSIS — Z90.89 ACQUIRED ABSENCE OF OTHER ORGANS: Chronic | ICD-10-CM

## 2025-02-15 DIAGNOSIS — I26.99 OTHER PULMONARY EMBOLISM WITHOUT ACUTE COR PULMONALE: ICD-10-CM

## 2025-02-15 LAB
ALBUMIN SERPL ELPH-MCNC: 3.6 G/DL — SIGNIFICANT CHANGE UP (ref 3.3–5)
ALP SERPL-CCNC: 83 U/L — SIGNIFICANT CHANGE UP (ref 40–120)
ALT FLD-CCNC: 31 U/L — SIGNIFICANT CHANGE UP (ref 12–78)
ANION GAP SERPL CALC-SCNC: 19 MMOL/L — HIGH (ref 5–17)
ANISOCYTOSIS BLD QL: SLIGHT — SIGNIFICANT CHANGE UP
APPEARANCE UR: CLEAR — SIGNIFICANT CHANGE UP
APTT BLD: 28.2 SEC — SIGNIFICANT CHANGE UP (ref 24.5–35.6)
AST SERPL-CCNC: 58 U/L — HIGH (ref 15–37)
BASOPHILS # BLD AUTO: 0.01 K/UL — SIGNIFICANT CHANGE UP (ref 0–0.2)
BASOPHILS NFR BLD AUTO: 0.1 % — SIGNIFICANT CHANGE UP (ref 0–2)
BILIRUB SERPL-MCNC: 0.4 MG/DL — SIGNIFICANT CHANGE UP (ref 0.2–1.2)
BILIRUB UR-MCNC: NEGATIVE — SIGNIFICANT CHANGE UP
BUN SERPL-MCNC: 14 MG/DL — SIGNIFICANT CHANGE UP (ref 7–23)
CALCIUM SERPL-MCNC: 9.6 MG/DL — SIGNIFICANT CHANGE UP (ref 8.5–10.1)
CHLORIDE SERPL-SCNC: 102 MMOL/L — SIGNIFICANT CHANGE UP (ref 96–108)
CO2 SERPL-SCNC: 15 MMOL/L — LOW (ref 22–31)
COLOR SPEC: YELLOW — SIGNIFICANT CHANGE UP
CREAT SERPL-MCNC: 0.9 MG/DL — SIGNIFICANT CHANGE UP (ref 0.5–1.3)
DIFF PNL FLD: ABNORMAL
EGFR: 75 ML/MIN/1.73M2 — SIGNIFICANT CHANGE UP
EOSINOPHIL # BLD AUTO: 0 K/UL — SIGNIFICANT CHANGE UP (ref 0–0.5)
EOSINOPHIL NFR BLD AUTO: 0 % — SIGNIFICANT CHANGE UP (ref 0–6)
FLUAV AG NPH QL: SIGNIFICANT CHANGE UP
FLUBV AG NPH QL: SIGNIFICANT CHANGE UP
GLUCOSE SERPL-MCNC: 98 MG/DL — SIGNIFICANT CHANGE UP (ref 70–99)
GLUCOSE UR QL: NEGATIVE MG/DL — SIGNIFICANT CHANGE UP
HCT VFR BLD CALC: 31.6 % — LOW (ref 34.5–45)
HGB BLD-MCNC: 9.7 G/DL — LOW (ref 11.5–15.5)
HYPOCHROMIA BLD QL: SLIGHT — SIGNIFICANT CHANGE UP
IMM GRANULOCYTES # BLD AUTO: 0.03 K/UL — SIGNIFICANT CHANGE UP (ref 0–0.07)
IMM GRANULOCYTES NFR BLD AUTO: 0.4 % — SIGNIFICANT CHANGE UP (ref 0–0.9)
INR BLD: 1.03 RATIO — SIGNIFICANT CHANGE UP (ref 0.85–1.16)
KETONES UR-MCNC: NEGATIVE MG/DL — SIGNIFICANT CHANGE UP
LEUKOCYTE ESTERASE UR-ACNC: NEGATIVE — SIGNIFICANT CHANGE UP
LYMPHOCYTES # BLD AUTO: 0.31 K/UL — LOW (ref 1–3.3)
LYMPHOCYTES NFR BLD AUTO: 4.2 % — LOW (ref 13–44)
MACROCYTES BLD QL: SLIGHT — SIGNIFICANT CHANGE UP
MAGNESIUM SERPL-MCNC: 1.7 MG/DL — SIGNIFICANT CHANGE UP (ref 1.6–2.6)
MANUAL SMEAR VERIFICATION: SIGNIFICANT CHANGE UP
MCHC RBC-ENTMCNC: 29.8 PG — SIGNIFICANT CHANGE UP (ref 27–34)
MCHC RBC-ENTMCNC: 30.7 G/DL — LOW (ref 32–36)
MCV RBC AUTO: 97.2 FL — SIGNIFICANT CHANGE UP (ref 80–100)
MONOCYTES # BLD AUTO: 0.17 K/UL — SIGNIFICANT CHANGE UP (ref 0–0.9)
MONOCYTES NFR BLD AUTO: 2.3 % — SIGNIFICANT CHANGE UP (ref 2–14)
NEUTROPHILS # BLD AUTO: 6.85 K/UL — SIGNIFICANT CHANGE UP (ref 1.8–7.4)
NEUTROPHILS NFR BLD AUTO: 93 % — HIGH (ref 43–77)
NITRITE UR-MCNC: NEGATIVE — SIGNIFICANT CHANGE UP
NRBC # BLD AUTO: 0 K/UL — SIGNIFICANT CHANGE UP (ref 0–0)
NRBC # FLD: 0 K/UL — SIGNIFICANT CHANGE UP (ref 0–0)
NRBC BLD AUTO-RTO: 0 /100 WBCS — SIGNIFICANT CHANGE UP (ref 0–0)
NT-PROBNP SERPL-SCNC: 2320 PG/ML — HIGH (ref 0–125)
NT-PROBNP SERPL-SCNC: 3893 PG/ML — HIGH (ref 0–125)
PH UR: 5.5 — SIGNIFICANT CHANGE UP (ref 5–8)
PLAT MORPH BLD: NORMAL — SIGNIFICANT CHANGE UP
PLATELET # BLD AUTO: 355 K/UL — SIGNIFICANT CHANGE UP (ref 150–400)
PLATELET COUNT - ESTIMATE: NORMAL — SIGNIFICANT CHANGE UP
PMV BLD: 9.4 FL — SIGNIFICANT CHANGE UP (ref 7–13)
POLYCHROMASIA BLD QL SMEAR: SLIGHT — SIGNIFICANT CHANGE UP
POTASSIUM SERPL-MCNC: 3.9 MMOL/L — SIGNIFICANT CHANGE UP (ref 3.5–5.3)
POTASSIUM SERPL-SCNC: 3.9 MMOL/L — SIGNIFICANT CHANGE UP (ref 3.5–5.3)
PROT SERPL-MCNC: 7.4 GM/DL — SIGNIFICANT CHANGE UP (ref 6–8.3)
PROT UR-MCNC: 30 MG/DL
PROTHROM AB SERPL-ACNC: 11.8 SEC — SIGNIFICANT CHANGE UP (ref 9.9–13.4)
RBC # BLD: 3.25 M/UL — LOW (ref 3.8–5.2)
RBC # FLD: 17.9 % — HIGH (ref 10.3–14.5)
RBC BLD AUTO: ABNORMAL
RSV RNA NPH QL NAA+NON-PROBE: SIGNIFICANT CHANGE UP
SARS-COV-2 RNA SPEC QL NAA+PROBE: SIGNIFICANT CHANGE UP
SODIUM SERPL-SCNC: 136 MMOL/L — SIGNIFICANT CHANGE UP (ref 135–145)
SP GR SPEC: 1.02 — SIGNIFICANT CHANGE UP (ref 1–1.03)
TROPONIN I, HIGH SENSITIVITY RESULT: 11.62 NG/L — SIGNIFICANT CHANGE UP
TROPONIN I, HIGH SENSITIVITY RESULT: 30.54 NG/L — SIGNIFICANT CHANGE UP
UROBILINOGEN FLD QL: 0.2 MG/DL — SIGNIFICANT CHANGE UP (ref 0.2–1)
WBC # BLD: 7.37 K/UL — SIGNIFICANT CHANGE UP (ref 3.8–10.5)
WBC # FLD AUTO: 7.37 K/UL — SIGNIFICANT CHANGE UP (ref 3.8–10.5)
WBC MORPHOLOGY: NORMAL — SIGNIFICANT CHANGE UP

## 2025-02-15 PROCEDURE — 84484 ASSAY OF TROPONIN QUANT: CPT

## 2025-02-15 PROCEDURE — 83605 ASSAY OF LACTIC ACID: CPT

## 2025-02-15 PROCEDURE — 82803 BLOOD GASES ANY COMBINATION: CPT

## 2025-02-15 PROCEDURE — 97116 GAIT TRAINING THERAPY: CPT | Mod: GP

## 2025-02-15 PROCEDURE — 83735 ASSAY OF MAGNESIUM: CPT

## 2025-02-15 PROCEDURE — 36415 COLL VENOUS BLD VENIPUNCTURE: CPT

## 2025-02-15 PROCEDURE — 81001 URINALYSIS AUTO W/SCOPE: CPT

## 2025-02-15 PROCEDURE — 80053 COMPREHEN METABOLIC PANEL: CPT

## 2025-02-15 PROCEDURE — 97162 PT EVAL MOD COMPLEX 30 MIN: CPT | Mod: GP

## 2025-02-15 PROCEDURE — 87640 STAPH A DNA AMP PROBE: CPT

## 2025-02-15 PROCEDURE — 83880 ASSAY OF NATRIURETIC PEPTIDE: CPT

## 2025-02-15 PROCEDURE — 85025 COMPLETE CBC W/AUTO DIFF WBC: CPT

## 2025-02-15 PROCEDURE — 85027 COMPLETE CBC AUTOMATED: CPT

## 2025-02-15 PROCEDURE — 93010 ELECTROCARDIOGRAM REPORT: CPT

## 2025-02-15 PROCEDURE — 93356 MYOCRD STRAIN IMG SPCKL TRCK: CPT

## 2025-02-15 PROCEDURE — 87641 MR-STAPH DNA AMP PROBE: CPT

## 2025-02-15 PROCEDURE — 80048 BASIC METABOLIC PNL TOTAL CA: CPT

## 2025-02-15 PROCEDURE — 87186 SC STD MICRODIL/AGAR DIL: CPT

## 2025-02-15 PROCEDURE — 84145 PROCALCITONIN (PCT): CPT

## 2025-02-15 PROCEDURE — 84100 ASSAY OF PHOSPHORUS: CPT

## 2025-02-15 PROCEDURE — 85730 THROMBOPLASTIN TIME PARTIAL: CPT

## 2025-02-15 PROCEDURE — 36600 WITHDRAWAL OF ARTERIAL BLOOD: CPT

## 2025-02-15 PROCEDURE — 71045 X-RAY EXAM CHEST 1 VIEW: CPT | Mod: 26

## 2025-02-15 PROCEDURE — 71275 CT ANGIOGRAPHY CHEST: CPT | Mod: 26

## 2025-02-15 PROCEDURE — 87086 URINE CULTURE/COLONY COUNT: CPT

## 2025-02-15 PROCEDURE — 93970 EXTREMITY STUDY: CPT

## 2025-02-15 PROCEDURE — 87899 AGENT NOS ASSAY W/OPTIC: CPT

## 2025-02-15 PROCEDURE — 94760 N-INVAS EAR/PLS OXIMETRY 1: CPT

## 2025-02-15 PROCEDURE — 93306 TTE W/DOPPLER COMPLETE: CPT

## 2025-02-15 PROCEDURE — 87449 NOS EACH ORGANISM AG IA: CPT

## 2025-02-15 PROCEDURE — 94660 CPAP INITIATION&MGMT: CPT

## 2025-02-15 PROCEDURE — 99291 CRITICAL CARE FIRST HOUR: CPT

## 2025-02-15 RX ORDER — OLANZAPINE 10 MG/1
1 TABLET ORAL
Refills: 0 | DISCHARGE

## 2025-02-15 RX ORDER — HEPARIN SODIUM 1000 [USP'U]/ML
3200 INJECTION INTRAVENOUS; SUBCUTANEOUS ONCE
Refills: 0 | Status: COMPLETED | OUTPATIENT
Start: 2025-02-15 | End: 2025-02-16

## 2025-02-15 RX ORDER — FUROSEMIDE 10 MG/ML
40 INJECTION INTRAMUSCULAR; INTRAVENOUS ONCE
Refills: 0 | Status: COMPLETED | OUTPATIENT
Start: 2025-02-15 | End: 2025-02-15

## 2025-02-15 RX ORDER — PEGFILGRASTIM-CBQV 6 MG/.6ML
6 INJECTION, SOLUTION SUBCUTANEOUS
Refills: 0 | DISCHARGE

## 2025-02-15 RX ORDER — HEPARIN SODIUM 1000 [USP'U]/ML
3200 INJECTION INTRAVENOUS; SUBCUTANEOUS EVERY 6 HOURS
Refills: 0 | Status: DISCONTINUED | OUTPATIENT
Start: 2025-02-15 | End: 2025-02-17

## 2025-02-15 RX ORDER — PIPERACILLIN-TAZO-DEXTROSE,ISO 3.375G/5
3.38 IV SOLUTION, PIGGYBACK PREMIX FROZEN(ML) INTRAVENOUS ONCE
Refills: 0 | Status: COMPLETED | OUTPATIENT
Start: 2025-02-15 | End: 2025-02-15

## 2025-02-15 RX ORDER — LORAZEPAM 4 MG/ML
1 VIAL (ML) INJECTION
Refills: 0 | DISCHARGE

## 2025-02-15 RX ORDER — VANCOMYCIN HCL IN 5 % DEXTROSE 1.5G/250ML
750 PLASTIC BAG, INJECTION (ML) INTRAVENOUS ONCE
Refills: 0 | Status: COMPLETED | OUTPATIENT
Start: 2025-02-15 | End: 2025-02-15

## 2025-02-15 RX ORDER — MEDROXYPROGESTERONE ACETATE 10 MG
1 TABLET ORAL
Refills: 0 | DISCHARGE

## 2025-02-15 RX ORDER — TRABECTEDIN 0.05 MG/ML
1.5 INJECTION, POWDER, LYOPHILIZED, FOR SOLUTION INTRAVENOUS
Refills: 0 | DISCHARGE

## 2025-02-15 RX ORDER — SERTRALINE 100 MG/1
1 TABLET, FILM COATED ORAL
Refills: 0 | DISCHARGE

## 2025-02-15 RX ORDER — LORATADINE 5 MG/5ML
1 SOLUTION ORAL
Refills: 0 | DISCHARGE

## 2025-02-15 RX ORDER — LORAZEPAM 4 MG/ML
0.5 VIAL (ML) INJECTION ONCE
Refills: 0 | Status: DISCONTINUED | OUTPATIENT
Start: 2025-02-15 | End: 2025-02-15

## 2025-02-15 RX ORDER — HEPARIN SODIUM 1000 [USP'U]/ML
INJECTION INTRAVENOUS; SUBCUTANEOUS
Qty: 25000 | Refills: 0 | Status: DISCONTINUED | OUTPATIENT
Start: 2025-02-15 | End: 2025-02-17

## 2025-02-15 RX ORDER — DEXMEDETOMIDINE HYDROCHLORIDE IN SODIUM CHLORIDE 4 UG/ML
0.2 INJECTION INTRAVENOUS
Qty: 400 | Refills: 0 | Status: DISCONTINUED | OUTPATIENT
Start: 2025-02-15 | End: 2025-02-16

## 2025-02-15 RX ORDER — IPRATROPIUM BROMIDE AND ALBUTEROL SULFATE .5; 2.5 MG/3ML; MG/3ML
3 SOLUTION RESPIRATORY (INHALATION) ONCE
Refills: 0 | Status: COMPLETED | OUTPATIENT
Start: 2025-02-15 | End: 2025-02-15

## 2025-02-15 RX ADMIN — Medication 200 GRAM(S): at 23:33

## 2025-02-15 RX ADMIN — IPRATROPIUM BROMIDE AND ALBUTEROL SULFATE 3 MILLILITER(S): .5; 2.5 SOLUTION RESPIRATORY (INHALATION) at 21:15

## 2025-02-15 RX ADMIN — Medication 0.5 MILLIGRAM(S): at 21:10

## 2025-02-15 RX ADMIN — FUROSEMIDE 40 MILLIGRAM(S): 10 INJECTION INTRAMUSCULAR; INTRAVENOUS at 21:44

## 2025-02-15 RX ADMIN — Medication 250 MILLIGRAM(S): at 23:57

## 2025-02-15 NOTE — ED PROVIDER NOTE - OBJECTIVE STATEMENT
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 She recently finished a 24 hr chemo treatment that finished at 2pm today. She took a nap after and began feeling SOB since waking up. Reports having the Flu last week. Denies chest pain, nausea, vomiting, diarrhea, HA, dizziness, fever/chills.

## 2025-02-15 NOTE — H&P ADULT - HISTORY OF PRESENT ILLNESS
Ms. 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 Patient w/associated tachycardia to 160s. Was placed on NIPPV Bi-Level. CTA reveals acute PE in R segmental and subsegmental, w/no evidence of RHS and negative troponin. Also reveals worsening pulmonary metastases. Patient started on heparin gtt. Blood work done and empiric abx given. Has Neupogen patch. Labs significant for AGMA, lactic acid pending. She has been having increased vaginal bleeding from endometrial mass. Discussed goals of care w/family, they want DNR/DNI, no HD. Admit to ICU for further management

## 2025-02-15 NOTE — ED PROVIDER NOTE - PROGRESS NOTE DETAILS
Patient Came back from CT and reported shortness of breath worsening.  Patient oxygen saturation at 95% on room air but with increased work of breathing with little improvement on nasal cannula O2 and nonrebreather. given nebs, Patient continues to be tachycardic and increased work of breathing.  Started on BiPAP.  CTA Chest + Pulmonary emboli in right middle lobar segmental and subsegmental   branches. No evidence of right heart strain. Extensive progression of disease with extensive worsening of metastatic lung nodules and masses and liver metastases.     Found to have elevated BNP.  2320, Given 40 units of IV Lasix.   family reported history of vaginal bleeding secondary to uterine cancer.  Risk benefits discussed for IV heparin with family. ICU PA  at bedside. patient started on IV heparin.   ICU recommended patient to be admitted under Dr. Escobedo. Patient Came back from CT and reported shortness of breath worsening.  Patient oxygen saturation at 95% on room air but with increased work of breathing with little improvement on nasal cannula O2 and nonrebreather. given nebs, Patient continues to be tachycardic and increased work of breathing.  Started on BiPAP.  CTA Chest + Pulmonary emboli in right middle lobar segmental and subsegmental   branches. No evidence of right heart strain. Extensive progression of disease with extensive worsening of metastatic lung nodules and masses and liver metastases.     Found to have elevated BNP.  2320, Given 40 units of IV Lasix. stable BP.   family reported history of vaginal bleeding secondary to uterine cancer.  Risk benefits discussed for IV heparin with family. ICU PA  at bedside. patient started on IV heparin.   ICU recommended patient to be admitted under Dr. Escobedo.

## 2025-02-15 NOTE — H&P ADULT - ASSESSMENT
Assessment:  Ms. 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 Patient w/associated tachycardia to 160s. Was placed on NIPPV Bi-Level. CTA reveals acute PE in R segmental and subsegmental, w/no evidence of RHS and negative troponin. Also reveals worsening pulmonary metastases. Patient started on heparin gtt. Blood work done and empiric abx given. Has Neupogen patch. Labs significant for AGMA, lactic acid pending. She has been having increased vaginal bleeding from endometrial mass. Discussed goals of care w/family, they want DNR/DNI, no HD. Admit to ICU for further management        Problem List:  1. Acute hypoxic respiratory failure  2. Acute PE   3. Metabolic acidosis   4. Metastatic endometrial cancer     Plan:  Neuro:   Monitor mental status, avoid deliriogenic medications. Pain & fever control as needed  Start on precedex     CV:   POCUS w/o evidence of Nguyen's sign  Unable to asses EF adequately given tachycardia   Monitor HR and BP   BP adequate  Acute PE, obtain official TTE and LED  Troponin negative, doubt intermediate  risk PE   Heparin gt     Pulm:  Respiratory failure MF in setting of worsening metastatic disease, acute PE, and possible pneumonia  Broad spectrum coverage  Actively titrating Bi-Level NIPPV, high risk for worsening respiratory failure leading to death     Renal:  Metabolic acidosis, w/elevated AG  Pending Lactic acid likely 2/2 WOB  Strict I/Os, goal UOP >0.5cc/kg/hr. Trend renal function and electrolytes, replete as needed. Avoid nephrotoxic agents     GI:   NPO on NIPPV    ID:  Full infectious w/u  Zosyn  1 time vanc  Trend WBC/fevers/procalcitonin, noted to have increased neutrophil     Heme:   [facility]K for cancer management   Prognosis poor  New chemo started  Hx of anemia, iron deficiency   Now bleeding more from vagina 2/2 endometrial mass  Closely monitor Hgb especially now on Heparin gtt for AC     Endo:  Goal blood glucose <180     CRITICAL CARE TIME SPENT: 32 minutes assessing presenting problems of acute illness, which pose high probability of life threatening deterioration or end organ damage/dysfunction, as well as medical decision making including initiating plan of care, reviewing data, reviewing radiologic exams, discussing with multidisciplinary team,  discussing goals of care with patient/family, and writing this note.  Non-inclusive of procedures performed  Date of entry of this note is equal to the date of services rendered

## 2025-02-15 NOTE — PHARMACOTHERAPY INTERVENTION NOTE - COMMENTS
Medication history complete. Medications and allergies reviewed with patient's sister, Carol at bedside and confirmed with .

## 2025-02-15 NOTE — H&P ADULT - NSHPPHYSICALEXAM_GEN_ALL_CORE
Physical Examination:  General: Significant respiratory distress   HEENT: Pupils equal, reactive to light.  Symmetric  PULM: Diminished breath sounds b/l   CVS: Sinus tachycardia, no murmurs, rubs, or gallops  ABD: Distended abdomen   EXT: LE edema b/l   SKIN: Warm and well perfused  NEURO: A&Ox3

## 2025-02-15 NOTE — ED ADULT NURSE REASSESSMENT NOTE - NS ED NURSE REASSESS COMMENT FT1
patient became increasingly tachypneic, tachycardic to the 160s, and diaphoretic. pt placed on nonrebreather by ROBER Post for comfort, and Dr. Engel called to bedside, BGM performed was 121. Duoneb administered with no improvement in symptoms. respiratory called per Dr. Engel, pt moved to trauma room and placed on BIPAP.

## 2025-02-15 NOTE — ED PROVIDER NOTE - CLINICAL SUMMARY MEDICAL DECISION MAKING FREE TEXT BOX
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 Will r/o PE vs pleural effusion vs PNA. Plan labs, imaging, meds and re-eval

## 2025-02-15 NOTE — ED ADULT NURSE NOTE - OBJECTIVE STATEMENT
54y female presented to the ED with complaints of SOB starting 40 min PTA. Pt with stage 4 uterine cancer with mets to the lungs. Pt had 24hr chemo treatment that finished 2PM today. Pt went home took a nap when she woke up she had increased SOB. 95% on room air with increased work of breathing. Denies CP, N/V/D, fevers. 54y female presented to the ED with complaints of SOB starting 40 min PTA. Pt with stage 4 uterine cancer with mets to the lungs. Pt had 24hr chemo treatment that finished 2PM today. Pt went home took a nap when she woke up she had increased SOB. 95% on room air with increased work of breathing. Pt with vaginal bleeding, Chest port not accessed.  Denies CP, N/V/D, fevers.

## 2025-02-15 NOTE — ED ADULT TRIAGE NOTE - CHIEF COMPLAINT QUOTE
Pt presenting to the ED c/o SOB that started approx 40 mins PTA. Pt reports that she has stage 4 uterine cancer that has metastasized to lung and had 24 hr chemo yesterday that was stopped today at 2pm. Pt reports that she was taking a nap and the SOB woke her up. Sp02 97% on room air in triage. Pt endorses that she had flu last week. Pt denies CP, N/V/D, fever.

## 2025-02-15 NOTE — ED PROVIDER NOTE - PHYSICAL EXAMINATION
General: Patient in no acute distress, AAOX3.   HENMT: NC/AT, no nasal congestion, MMM  Neck: supple  CVS: regular rate and rhythm, no murmur  Resp: Coarse breath sounds B/L. No wheezing.  Abd: Soft non tender, non distended, +bowel sounds, No guarding, rebound tenderness   Ext: FROM in all ext, 2+ pulses throughout, cap refill<2 sec.  BACK: no midline tenderness, no stepoffs, no CVA ttp  NEURO: no focal deficit, gross motor and sensory intact throughout, gait stable. General: Patient in no acute distress, AAOX3.   HENMT: NC/AT, no nasal congestion, MMM  Neck: supple  CVS: regular rate and rhythm, no murmur  Resp: Coarse breath sounds B/L. No wheezing.  Abd: Soft non tender, non distended, +bowel sounds, No guarding, rebound tenderness   Ext: FROM in all ext, 2+ pulses throughout, cap refill<2 sec.  BACK: no midline tenderness, no stepoffs, no CVA ttp  NEURO: no focal deficit, gross motor and sensory intact throughout

## 2025-02-15 NOTE — ED ADULT NURSE NOTE - NSFALLRISKINTERV_ED_ALL_ED

## 2025-02-16 DIAGNOSIS — R09.89 OTHER SPECIFIED SYMPTOMS AND SIGNS INVOLVING THE CIRCULATORY AND RESPIRATORY SYSTEMS: ICD-10-CM

## 2025-02-16 LAB
ALBUMIN SERPL ELPH-MCNC: 3.6 G/DL — SIGNIFICANT CHANGE UP (ref 3.3–5)
ALP SERPL-CCNC: 82 U/L — SIGNIFICANT CHANGE UP (ref 40–120)
ALT FLD-CCNC: 47 U/L — SIGNIFICANT CHANGE UP (ref 12–78)
ANION GAP SERPL CALC-SCNC: 18 MMOL/L — HIGH (ref 5–17)
ANION GAP SERPL CALC-SCNC: 19 MMOL/L — HIGH (ref 5–17)
APTT BLD: 29.6 SEC — SIGNIFICANT CHANGE UP (ref 24.5–35.6)
APTT BLD: 29.9 SEC — SIGNIFICANT CHANGE UP (ref 24.5–35.6)
APTT BLD: 30.5 SEC — SIGNIFICANT CHANGE UP (ref 24.5–35.6)
AST SERPL-CCNC: 82 U/L — HIGH (ref 15–37)
BACTERIA # UR AUTO: NEGATIVE /HPF — SIGNIFICANT CHANGE UP
BASE EXCESS BLDA CALC-SCNC: -11.4 MMOL/L — LOW (ref -2–3)
BASOPHILS # BLD AUTO: 0.03 K/UL — SIGNIFICANT CHANGE UP (ref 0–0.2)
BASOPHILS NFR BLD AUTO: 0.2 % — SIGNIFICANT CHANGE UP (ref 0–2)
BILIRUB SERPL-MCNC: 0.4 MG/DL — SIGNIFICANT CHANGE UP (ref 0.2–1.2)
BLD GP AB SCN SERPL QL: SIGNIFICANT CHANGE UP
BLOOD GAS COMMENTS ARTERIAL: SIGNIFICANT CHANGE UP
BUN SERPL-MCNC: 18 MG/DL — SIGNIFICANT CHANGE UP (ref 7–23)
BUN SERPL-MCNC: 19 MG/DL — SIGNIFICANT CHANGE UP (ref 7–23)
CALCIUM SERPL-MCNC: 9.3 MG/DL — SIGNIFICANT CHANGE UP (ref 8.5–10.1)
CALCIUM SERPL-MCNC: 9.9 MG/DL — SIGNIFICANT CHANGE UP (ref 8.5–10.1)
CAST: 2 /LPF — SIGNIFICANT CHANGE UP (ref 0–4)
CHLORIDE SERPL-SCNC: 100 MMOL/L — SIGNIFICANT CHANGE UP (ref 96–108)
CHLORIDE SERPL-SCNC: 100 MMOL/L — SIGNIFICANT CHANGE UP (ref 96–108)
CO2 SERPL-SCNC: 17 MMOL/L — LOW (ref 22–31)
CO2 SERPL-SCNC: 17 MMOL/L — LOW (ref 22–31)
CREAT SERPL-MCNC: 0.92 MG/DL — SIGNIFICANT CHANGE UP (ref 0.5–1.3)
CREAT SERPL-MCNC: 1.06 MG/DL — SIGNIFICANT CHANGE UP (ref 0.5–1.3)
EGFR: 62 ML/MIN/1.73M2 — SIGNIFICANT CHANGE UP
EGFR: 73 ML/MIN/1.73M2 — SIGNIFICANT CHANGE UP
EOSINOPHIL # BLD AUTO: 0.08 K/UL — SIGNIFICANT CHANGE UP (ref 0–0.5)
EOSINOPHIL NFR BLD AUTO: 0.5 % — SIGNIFICANT CHANGE UP (ref 0–6)
GAS PNL BLDA: SIGNIFICANT CHANGE UP
GLUCOSE SERPL-MCNC: 71 MG/DL — SIGNIFICANT CHANGE UP (ref 70–99)
GLUCOSE SERPL-MCNC: 93 MG/DL — SIGNIFICANT CHANGE UP (ref 70–99)
HCO3 BLDA-SCNC: 14 MMOL/L — LOW (ref 21–28)
HCT VFR BLD CALC: 30.7 % — LOW (ref 34.5–45)
HGB BLD-MCNC: 9.4 G/DL — LOW (ref 11.5–15.5)
IMM GRANULOCYTES # BLD AUTO: 0.14 K/UL — HIGH (ref 0–0.07)
IMM GRANULOCYTES NFR BLD AUTO: 0.9 % — SIGNIFICANT CHANGE UP (ref 0–0.9)
LACTATE SERPL-SCNC: 13 MMOL/L — CRITICAL HIGH (ref 0.7–2)
LACTATE SERPL-SCNC: 13.2 MMOL/L — CRITICAL HIGH (ref 0.7–2)
LACTATE SERPL-SCNC: 14.3 MMOL/L — CRITICAL HIGH (ref 0.7–2)
LACTATE SERPL-SCNC: 5.6 MMOL/L — CRITICAL HIGH (ref 0.7–2)
LEGIONELLA AG UR QL: NEGATIVE — SIGNIFICANT CHANGE UP
LYMPHOCYTES # BLD AUTO: 0.96 K/UL — LOW (ref 1–3.3)
LYMPHOCYTES NFR BLD AUTO: 6 % — LOW (ref 13–44)
MAGNESIUM SERPL-MCNC: 1.8 MG/DL — SIGNIFICANT CHANGE UP (ref 1.6–2.6)
MCHC RBC-ENTMCNC: 29.6 PG — SIGNIFICANT CHANGE UP (ref 27–34)
MCHC RBC-ENTMCNC: 30.6 G/DL — LOW (ref 32–36)
MCV RBC AUTO: 96.5 FL — SIGNIFICANT CHANGE UP (ref 80–100)
MONOCYTES # BLD AUTO: 1.35 K/UL — HIGH (ref 0–0.9)
MONOCYTES NFR BLD AUTO: 8.5 % — SIGNIFICANT CHANGE UP (ref 2–14)
MRSA PCR RESULT.: SIGNIFICANT CHANGE UP
NEUTROPHILS # BLD AUTO: 13.35 K/UL — HIGH (ref 1.8–7.4)
NEUTROPHILS NFR BLD AUTO: 83.9 % — HIGH (ref 43–77)
NRBC # BLD AUTO: 0 K/UL — SIGNIFICANT CHANGE UP (ref 0–0)
NRBC # FLD: 0 K/UL — SIGNIFICANT CHANGE UP (ref 0–0)
NRBC BLD AUTO-RTO: 0 /100 WBCS — SIGNIFICANT CHANGE UP (ref 0–0)
NT-PROBNP SERPL-SCNC: 8971 PG/ML — HIGH (ref 0–125)
PCO2 BLDA: 31 MMHG — LOW (ref 32–45)
PH BLDA: 7.27 — LOW (ref 7.35–7.45)
PHOSPHATE SERPL-MCNC: 4.7 MG/DL — HIGH (ref 2.5–4.5)
PLATELET # BLD AUTO: 388 K/UL — SIGNIFICANT CHANGE UP (ref 150–400)
PMV BLD: 9.4 FL — SIGNIFICANT CHANGE UP (ref 7–13)
PO2 BLDA: 265 MMHG — HIGH (ref 83–108)
POTASSIUM SERPL-MCNC: 3.5 MMOL/L — SIGNIFICANT CHANGE UP (ref 3.5–5.3)
POTASSIUM SERPL-MCNC: 3.6 MMOL/L — SIGNIFICANT CHANGE UP (ref 3.5–5.3)
POTASSIUM SERPL-SCNC: 3.5 MMOL/L — SIGNIFICANT CHANGE UP (ref 3.5–5.3)
POTASSIUM SERPL-SCNC: 3.6 MMOL/L — SIGNIFICANT CHANGE UP (ref 3.5–5.3)
PROT SERPL-MCNC: 7.3 GM/DL — SIGNIFICANT CHANGE UP (ref 6–8.3)
RBC # BLD: 3.18 M/UL — LOW (ref 3.8–5.2)
RBC # FLD: 17.6 % — HIGH (ref 10.3–14.5)
RBC CASTS # UR COMP ASSIST: 55 /HPF — HIGH (ref 0–4)
S AUREUS DNA NOSE QL NAA+PROBE: SIGNIFICANT CHANGE UP
SAO2 % BLDA: 99 % — HIGH (ref 94–98)
SODIUM SERPL-SCNC: 135 MMOL/L — SIGNIFICANT CHANGE UP (ref 135–145)
SODIUM SERPL-SCNC: 136 MMOL/L — SIGNIFICANT CHANGE UP (ref 135–145)
SQUAMOUS # UR AUTO: 1 /HPF — SIGNIFICANT CHANGE UP (ref 0–5)
TROPONIN I, HIGH SENSITIVITY RESULT: 94.6 NG/L — HIGH
WBC # BLD: 15.91 K/UL — HIGH (ref 3.8–10.5)
WBC # FLD AUTO: 15.91 K/UL — HIGH (ref 3.8–10.5)
WBC UR QL: 1 /HPF — SIGNIFICANT CHANGE UP (ref 0–5)

## 2025-02-16 PROCEDURE — 99223 1ST HOSP IP/OBS HIGH 75: CPT

## 2025-02-16 PROCEDURE — 93306 TTE W/DOPPLER COMPLETE: CPT | Mod: 26

## 2025-02-16 PROCEDURE — G0425: CPT

## 2025-02-16 PROCEDURE — 93356 MYOCRD STRAIN IMG SPCKL TRCK: CPT

## 2025-02-16 PROCEDURE — 93970 EXTREMITY STUDY: CPT | Mod: 26

## 2025-02-16 PROCEDURE — 99291 CRITICAL CARE FIRST HOUR: CPT

## 2025-02-16 RX ORDER — PIPERACILLIN-TAZO-DEXTROSE,ISO 3.375G/5
3.38 IV SOLUTION, PIGGYBACK PREMIX FROZEN(ML) INTRAVENOUS EVERY 8 HOURS
Refills: 0 | Status: DISCONTINUED | OUTPATIENT
Start: 2025-02-16 | End: 2025-02-20

## 2025-02-16 RX ORDER — LORAZEPAM 4 MG/ML
1 VIAL (ML) INJECTION ONCE
Refills: 0 | Status: DISCONTINUED | OUTPATIENT
Start: 2025-02-16 | End: 2025-02-16

## 2025-02-16 RX ORDER — LORAZEPAM 4 MG/ML
0.5 VIAL (ML) INJECTION EVERY 8 HOURS
Refills: 0 | Status: DISCONTINUED | OUTPATIENT
Start: 2025-02-16 | End: 2025-02-20

## 2025-02-16 RX ORDER — ONDANSETRON HCL/PF 4 MG/2 ML
4 VIAL (ML) INJECTION ONCE
Refills: 0 | Status: COMPLETED | OUTPATIENT
Start: 2025-02-16 | End: 2025-02-16

## 2025-02-16 RX ORDER — OLANZAPINE 10 MG/1
5 TABLET ORAL AT BEDTIME
Refills: 0 | Status: DISCONTINUED | OUTPATIENT
Start: 2025-02-16 | End: 2025-02-20

## 2025-02-16 RX ORDER — SERTRALINE 100 MG/1
50 TABLET, FILM COATED ORAL DAILY
Refills: 0 | Status: DISCONTINUED | OUTPATIENT
Start: 2025-02-16 | End: 2025-02-20

## 2025-02-16 RX ADMIN — Medication 1 MILLIGRAM(S): at 17:29

## 2025-02-16 RX ADMIN — HEPARIN SODIUM 3200 UNIT(S): 1000 INJECTION INTRAVENOUS; SUBCUTANEOUS at 14:41

## 2025-02-16 RX ADMIN — HEPARIN SODIUM 3200 UNIT(S): 1000 INJECTION INTRAVENOUS; SUBCUTANEOUS at 08:17

## 2025-02-16 RX ADMIN — Medication 40 MILLIGRAM(S): at 11:25

## 2025-02-16 RX ADMIN — Medication 4 MILLIGRAM(S): at 17:29

## 2025-02-16 RX ADMIN — Medication 25 GRAM(S): at 06:57

## 2025-02-16 RX ADMIN — Medication 25 GRAM(S): at 14:41

## 2025-02-16 RX ADMIN — Medication 0.5 MILLIGRAM(S): at 21:29

## 2025-02-16 RX ADMIN — OLANZAPINE 5 MILLIGRAM(S): 10 TABLET ORAL at 21:20

## 2025-02-16 RX ADMIN — HEPARIN SODIUM 950 UNIT(S)/HR: 1000 INJECTION INTRAVENOUS; SUBCUTANEOUS at 19:11

## 2025-02-16 RX ADMIN — SERTRALINE 50 MILLIGRAM(S): 100 TABLET, FILM COATED ORAL at 16:40

## 2025-02-16 RX ADMIN — HEPARIN SODIUM 3200 UNIT(S): 1000 INJECTION INTRAVENOUS; SUBCUTANEOUS at 21:20

## 2025-02-16 RX ADMIN — HEPARIN SODIUM 650 UNIT(S)/HR: 1000 INJECTION INTRAVENOUS; SUBCUTANEOUS at 01:36

## 2025-02-16 RX ADMIN — Medication 25 GRAM(S): at 21:20

## 2025-02-16 RX ADMIN — HEPARIN SODIUM 1100 UNIT(S)/HR: 1000 INJECTION INTRAVENOUS; SUBCUTANEOUS at 20:43

## 2025-02-16 RX ADMIN — HEPARIN SODIUM 950 UNIT(S)/HR: 1000 INJECTION INTRAVENOUS; SUBCUTANEOUS at 14:22

## 2025-02-16 RX ADMIN — HEPARIN SODIUM 800 UNIT(S)/HR: 1000 INJECTION INTRAVENOUS; SUBCUTANEOUS at 07:41

## 2025-02-16 RX ADMIN — HEPARIN SODIUM 3200 UNIT(S): 1000 INJECTION INTRAVENOUS; SUBCUTANEOUS at 01:33

## 2025-02-16 NOTE — PROVIDER CONTACT NOTE (EICU) - RECOMMENDATIONS
- goals of treatment made clear, dnr/i, not to suffer. remains on NIPPV.   - d/w CC IVY Crews  - Total CC time 36 minutes

## 2025-02-16 NOTE — CONSULT NOTE ADULT - ASSESSMENT
54-year-old female with progressive metastatic high-grade uterine sarcoma presenting with acute hypoxic respiratory failure secondary to ACUTE PE.     1. Disease Status:  Patient demonstrates aggressive disease progression through multiple lines of therapy including conventional cytotoxic agents (gemcitabine/docetaxel, doxorubicin), targeted therapy (pazopanib), and most recently trabectedin. Molecular profiling reveals [id number] fusion, which may guide future therapeutic options as patient is presntly on the waiting list for [facility] clinical trial.  Recent imaging demonstrates significant progression across all disease sites with concerning growth rates of both primary tumor and metastatic lesions.    2. Acute Respiratory Failure - Multifactorial:  a) Acute PE: Currently on therapeutic anticoagulation with heparin  b) Progressive pulmonary metastases  c) Historical pneumothoraces status post pleurodesis    3. End-Organ Dysfunction:  a) Significant lactic acidosis (14.3) suggesting tissue hypoperfusion - possible Warberg effect   b) Marked troponemia (94.4) indicating possible cardiac involvement/strain  c) Active vaginal bleeding complicating anticoagulation management    4. Treatment Planning:  Scheduled to initiate radiation therapy to primary tumor and hepatic disease, though clinical deterioration may impact feasibility.     5. Goals of Care:  patient has opted for DNR/DNI status and declined trial of hemodialysis per discussion with ICU PA- night team.   Current clinical trajectory suggests poor prognosis  yet patient would like to continue to pursue planned radiation therapy.     PLAN:   #Metastatic Uterine Sarcoma  - Hold Megace due to VTE risk    #Acute PE/Anticoagulation  - Continue therapeutic heparin drip  - Monitor vaginal bleeding  - Defer transition to oral anticoagulation will need to assess bleeding risk   - may be reasonable to transition to lovenox to avoid PTT surveillance. BID dosing     #Respiratory Status  - Oxygen titration to maintain sats >92%  - multifactorial secondary to disease and recent PE     #Disposition  - Continue ICU level care  - Daily reassessment of goals of care  - Coordinate with [facility] team regarding overall treatment strategy   54-year-old female with progressive metastatic high-grade uterine sarcoma presenting with acute hypoxic respiratory failure secondary to ACUTE PE.     1. Disease Status:  Patient demonstrates aggressive disease progression through multiple lines of therapy including conventional cytotoxic agents (gemcitabine/docetaxel, doxorubicin), targeted therapy (pazopanib), and most recently trabectedin. Molecular profiling reveals [id number] fusion, which may guide future therapeutic options as patient is presntly on the waiting list for [facility]ING clinical trial.  Recent imaging demonstrates significant progression across all disease sites with concerning growth rates of both primary tumor and metastatic lesions.    2. Acute Respiratory Failure - Multifactorial:  a) Acute PE: Currently on therapeutic anticoagulation with heparin  b) Progressive pulmonary metastases  c) Historical pneumothoraces status post pleurodesis    3. End-Organ Dysfunction:  a) Significant lactic acidosis (14.3) suggesting tissue hypoperfusion - possible Warberg effect   b) Marked troponemia (94.4) indicating possible cardiac involvement/strain  c) Active vaginal bleeding complicating anticoagulation management    4. Treatment Planning:  Scheduled to initiate radiation therapy to primary tumor and hepatic disease, though clinical deterioration may impact feasibility.     5. Goals of Care:  patient has opted for DNR/DNI status and declined trial of hemodialysis per discussion with ICU PA- night team.   Current clinical trajectory suggests poor prognosis  yet patient would like to continue to pursue planned radiation therapy.     PLAN:   #Metastatic Uterine Sarcoma  - Hold Megace due to VTE risk    #Acute PE/Anticoagulation  - Continue therapeutic heparin drip  -2D ECHO RESULTS PENDING - EF decreaserd 47% no apparent signs of RHS   - NO ACUTE LOWER EXT DVT  - Monitor vaginal bleeding  - Defer transition to oral anticoagulation will need to assess bleeding risk   - may be reasonable to transition to lovenox to avoid PTT surveillance. BID dosing     #Respiratory Status  - Oxygen titration to maintain sats >92%  - multifactorial secondary to disease and recent PE     #Disposition  - Continue ICU level care  - Daily reassessment of goals of care  - Coordinate with [facility] team regarding overall treatment strategy

## 2025-02-16 NOTE — PROVIDER CONTACT NOTE (EICU) - BACKGROUND
Idalmis is 54 with metastatic endometrial cancer, exetensive mets and complications associated with the cancer; has gone through multiple chemotherpeutic agents now on trabectedin with first dose 3 weeks ago, and 2nd dose 1 day ago.     arrived to ED with tachycardia 160, hypoxia, SOB, required NIPPV, found to have new sub-seg PEs (no right heart strain), and worsening mets. Idalmis is 54 with metastatic endometrial cancer, exetensive mets and complications associated with the cancer; has gone through multiple chemotherapeutic agents now on trabectedin with first dose 3 weeks ago, and 2nd dose 1 day ago.     arrived to ED with tachycardia 160, hypoxia, SOB, required NIPPV, found to have new sub-seg PEs (no right heart strain), and worsening mets.

## 2025-02-16 NOTE — PROGRESS NOTE ADULT - SUBJECTIVE AND OBJECTIVE BOX
Interval History:         Patient admitted with Shortness of breathe, receiving chemo for endometrial CA         on high flow with sinus tachycardia    HPI:      History of Present Illness:   Ms. 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 Patient w/associated tachycardia to 160s. Was placed on NIPPV Bi-Level. CTA reveals acute PE in R segmental and subsegmental, w/no evidence of RHS  Also reveals worsening pulmonary metastases. Patient started on heparin gtt. Labs significant for AGMA, lactic acid pending. She has been having increased vaginal bleeding from endometrial mass. Discussed goals of care w/family,     Review of Systems:  Unable to obtain due to: Shortness of breathe    MEDICATIONS  (STANDING):  chlorhexidine 2% Cloths 1 Application(s) Topical <User Schedule>  heparin  Infusion.  Unit(s)/Hr (6.5 mL/Hr) IV Continuous <Continuous>  pantoprazole    Tablet 40 milliGRAM(s) Oral before breakfast  piperacillin/tazobactam IVPB.. 3.375 Gram(s) IV Intermittent every 8 hours    MEDICATIONS  (PRN):  heparin   Injectable 3200 Unit(s) IV Push every 6 hours PRN For aPTT less than 40    Weight (kg): 55.2 (02-15 @ 19:38)    ICU Vital Signs Last 24 Hrs  T(C): 37.2 (16 Feb 2025 06:00), Max: 37.3 (16 Feb 2025 01:16)  T(F): 99 (16 Feb 2025 06:00), Max: 99.1 (16 Feb 2025 01:16)  HR: 129 (16 Feb 2025 06:00) (119 - 164)  BP: 119/86 (16 Feb 2025 06:00) (111/74 - 155/89)  BP(mean): 97 (16 Feb 2025 06:00) (86 - 115)  ABP: --  ABP(mean): --  RR: 22 (16 Feb 2025 06:00) (15 - 27)  SpO2: 100% (16 Feb 2025 06:00) (96% - 100%)    O2 Parameters below as of 16 Feb 2025 06:00  Patient On (Oxygen Delivery Method): BiPAP/CPAP    O2 Concentration (%): 60    Physical Exam    General - ill cachetic  HEENT - nc/at, bipap on   neck - supple  lung scattered rhonchi  cv - rrr sinus tachycardia  renal - voiding   - voiding  extremtieis warm    I&O's Summary                        9.4    15.91 )-----------( 388      ( 16 Feb 2025 07:00 )             30.7       02-16    135  |  100  |  18  ----------------------------<  71  3.5   |  17[L]  |  0.92    Ca    9.9      16 Feb 2025 07:00  Phos  4.7     02-16  Mg     1.8     02-16    TPro  7.3  /  Alb  3.6  /  TBili  0.4  /  DBili  x   /  AST  82[H]  /  ALT  47  /  AlkPhos  82  02-16    ABG - ( 15 Feb 2025 23:58 )  pH, Arterial: 7.27  pH, Blood: x     /  pCO2: 31    /  pO2: 265   / HCO3: 14    / Base Excess: -11.4 /  SaO2: 99.0      Urinalysis Basic - ( 16 Feb 2025 07:00 )    Color: x / Appearance: x / SG: x / pH: x  Gluc: 71 mg/dL / Ketone: x  / Bili: x / Urobili: x   Blood: x / Protein: x / Nitrite: x   Leuk Esterase: x / RBC: x / WBC x   Sq Epi: x / Non Sq Epi: x / Bacteria: x    I personally reviewed the CXR: multiple metastatic lesions    DVT Prophylaxis:   IV Heparin                                                               Advanced Directives: Full Code     Sepsis Criteria Met - no    Labs, Notes, Orders, radiologic studies reviewed and care coordinated with multidisciplinary team

## 2025-02-16 NOTE — PATIENT PROFILE ADULT - FALL HARM RISK - HARM RISK INTERVENTIONS

## 2025-02-16 NOTE — PROVIDER CONTACT NOTE (EICU) - SITUATION
Called for teleICU consultation, at this time the patient does not have access to audio/video technology. Consultation done telephonically with consulting team. Discussed case with GREER Crews consultation requested by [place]; Patient located at [facility]; TeleICU consultation provided from [address] [place] [id number]

## 2025-02-16 NOTE — PROVIDER CONTACT NOTE (EICU) - ASSESSMENT
- per report Idalmis is not suffering and is 'looking better' on NIPPV  - labs noted with slight anion gap acidosis, attempting respiratory compensation.   - possibly medication induced?

## 2025-02-16 NOTE — PROGRESS NOTE ADULT - ASSESSMENT
Ms. 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 Patient w/associated tachycardia to 160s. Was placed on NIPPV Bi-Level. CTA reveals acute PE in R segmental and subsegmental, w/no evidence of RHS and negative troponin. Also reveals worsening pulmonary metastases. Patient started on heparin gtt. Blood work done and empiric abx given. Has Neupogen patch. Labs significant for AGMA, lactic acid pending. She has been having increased vaginal bleeding from endometrial mass. Discussed goals of care w/family, they want DNR/DNI, no HD. Admit to ICU for further management        Problem List:  1. Acute hypoxic respiratory failure  2. Acute PE   3. Metabolic acidosis   4. Metastatic endometrial cancer     Plan:  Neuro:  awake and alert  CV:  sinus tachycardia, will get echo to access for RV strain, inc bnp, troponin  Respiratory on bipap will access of bipap, acute hypoxic respiratory failure, was not on 02 at home     abx for possible pneumonia but doubt work of breathing  Renal metabolic acidosis   GI - distended , lower abdominal distention  id afebrile, but would treat pneumonia zosyn  Heme IV heparin  New chemo started/ oncology consult Wayne Alves   Endo: Goal blood glucose <180

## 2025-02-16 NOTE — PATIENT PROFILE ADULT - HAS THE PATIENT RECEIVED THE INFLUENZA VACCINE THIS SEASON?
Spoke with patient this morning, she is complaining of SOB but stated that this is not abnormal for her. Per patient, she is afraid that this will be a bronchitis flare. She stated that she will go to the urgent care center to be evaluated. She will also request a COVID test at that time just to be on the safe side. She will call the office to schedule a f/u with Dr. Cid after she is evaluated by the urgent care. Patient has also rescheduled her B12 injection for today for next Tuesday 09/14/2021. She wqill call and reschedule again if the COVID test is positive. Nothing further needed at this time.      Donnie  
no...

## 2025-02-16 NOTE — CONSULT NOTE ADULT - ASSESSMENT
55 YO F w/pmhx of stage IV endometrial cancer w/mets to the lungs s/p chemotherapy, immunotherapy, spontaneous b/l PTX s/p L Vats, pleurectomy, pleurodesis, SERINA wedge resection 03/2024, and now on new chemotherapy (trabectedin). Her CT was reviewed which has distal clot present however is out of proportion to her clinical presentation without any proximal clot. Her TTE is also overall fairly normal.   -maintain on anticoagulation   -no plan for current invasive management for PE given CT scan, TTE and comorbidities  -consider transition to DOAC if no other procedures planned  -GOC discussions     Thank you for allowing me to participate in the care of your patient. Feel free to contact me if any clinical issues arise via contact information below or MS Teams.    Delgado Santana MD, FACC, [facility]   Director, Interventional Cardiology, [address], [address] [place] [address], [address] Office: [phone]  [place] Office: [phone]  Email: [email or web address]

## 2025-02-16 NOTE — PATIENT PROFILE ADULT - HISTORY OF COVID-19 VACCINATION
MRN:[id number]                      After Visit Summary   1/13/2017    Yue Valenzuela    MRN: [id number]           Visit Information        Department      1/13/2017  7:10 AM [address] [facility]          Review of your medicines      UNREVIEWED medicines. Ask your doctor about these medicines        Dose / Directions    albuterol 108 (90 BASE) MCG/ACT Inhaler   Commonly known as:  PROAIR HFA/PROVENTIL HFA/VENTOLIN HFA   Used for:  Obstructive lung disease (H)        Dose:  2 puff   Inhale 2 puffs into the lungs every 4 hours as needed for shortness of breath / dyspnea or wheezing   Quantity:  1 Inhaler   Refills:  3       amLODIPine 10 MG tablet   Commonly known as:  NORVASC        Dose:  10 mg   Take 10 mg by mouth   Refills:  0       aspirin EC 81 MG EC tablet        Dose:  81 mg   Take 81 mg by mouth   Refills:  0       budesonide-formoterol 80-4.5 MCG/ACT Inhaler   Commonly known as:  SYMBICORT   Used for:  Chronic airway obstruction, not elsewhere classified        Dose:  2 puff   Inhale 2 puffs into the lungs 2 times daily   Quantity:  3 Inhaler   Refills:  3       calcium acetate 667 MG Caps capsule   Commonly known as:  PHOSLO        Dose:  667 mg   Take 667 mg by mouth   Refills:  0       Calcium Carb-Cholecalciferol 600-1000 MG-UNIT Tabs        Refills:  0       carvedilol 3.125 MG tablet   Commonly known as:  COREG        Dose:  6.25 mg   Take 6.25 mg by mouth 2 times daily (with meals)   Refills:  0       cetirizine 5 MG Chew   Commonly known as:  zyrTEC        Dose:  5 mg   Take 5 mg by mouth daily   Refills:  0       cholecalciferol 1000 UNIT tablet   Commonly known as:  vitamin D        Dose:  1000 Units   Take 1,000 Units by mouth   Refills:  0       furosemide 20 MG tablet   Commonly known as:  LASIX        Refills:  0       GLUCAGEN 1 MG Solr injection   Generic drug:  glucagon        Dose:  1 mg   1 mg   Refills:  0       insulin aspart 100 UNITS/ML injection   Commonly known  as:  NovoLOG        Refills:  0       lisinopril 20 MG tablet   Commonly known as:  PRINIVIL/ZESTRIL        Dose:  10 mg   Take 10 mg by mouth   Refills:  0       pravastatin 40 MG tablet   Commonly known as:  PRAVACHOL        Refills:  0       TYLENOL PO        Take by mouth every 8 hours as needed for mild pain or fever   Refills:  0         CONTINUE these medicines which have NOT CHANGED        Dose / Directions    ONE TOUCH ULTRA test strip   Generic drug:  blood glucose monitoring        Refills:  0       ONETOUCH DELICA LANCETS 33G Misc        Refills:  0                Protect others around you: Learn how to safely use, store and throw away your medicines at www.disposemymeds.org.         Follow-ups after your visit         Care Instructions        Further instructions from your care team       [facility]   Interventional Radiology  Discharge Instructions Post Peripheral Angiogram     AFTER YOU GO HOME          Do relax and take it easy for 24 hours.       Do drink plenty of fluids.       Do resume your regular diet, unless otherwise instructed by your Primary Physician.       DO NOT smoke for at least 24 hours, if you were given any sedation.       DO NOT drink alcoholic beverages the day of your procedure.       Do not drive or operate machinery at home or at work for 24 hours.          DO NOT do any strenuous exercise or lifting for at least 2 days following your Procedure.       DO NOT take a bath or shower for at least 12 hours following your procedure.       DO NOT make any important or legal decisions for 24 hours following your procedure.    CALL THE PHYSICIAN IF:      - You start bleeding from the procedure site.  If you do start to bleed from the site, lie down flat and hold pressure on the site. A small lump or bruise is common at the puncture site.Your physician will tell you if you need to return to the hospital.      - You develop numbness, coolness or a change in color of  the arm or leg that was punctured.      - You experience increased pain or redness at the puncture site.      - You develop hives or a rash or unexplained itching.      - You develop a temperature of 101 degrees F or greater    Instructions for closure device:   See brochure.            [facility] INTERVENTIONAL RADIOLOGY DEPARTMENT         Procedure Physician:  Dr. Pallas                             Date of Procedure: January 13, 2017       Telephone Numbers:   [phone]......Monday-Friday 8:00 to 4:30 pm                                        [phone].....After 4:30 pm Monday-Friday, Weekends and  Holidays. Ask for the Interventional Radiologist on call. Someone is on call 24 hrs/day.              Additional Information About Your Visit        [employer] Information     [email or web address] gives you secure access to your electronic health record. If you see a primary care provider, you can also send messages to your care team and make appointments. If you have questions, please call your primary care clinic.  If you do not have a primary care provider, please call [phone] and they will assist you.        Care EveryWhere ID     This is your Care EveryWhere ID. This could be used by other organizations to access your [facility] medical records  [id number]        Your Vitals Were     Blood Pressure Pulse Respirations Pulse Oximetry          153/77 mmHg 56 16 99%         Primary Care Provider Office Phone # Fax #    Nory Elliott [phone]      Thank you!     Thank you for choosing [facility] for your care. Our goal is always to provide you with excellent care. Hearing back from our patients is one way we can continue to improve our services. Please take a few minutes to complete the written survey that you may receive in the mail after you visit with us. Thank you!             Medication List: This is a list of all your medications and when to take them. Check marks below indicate your daily home schedule. Keep  this list as a reference.      Medications           Morning Afternoon Evening Bedtime As Needed    albuterol 108 (90 BASE) MCG/ACT Inhaler   Commonly known as:  PROAIR HFA/PROVENTIL HFA/VENTOLIN HFA   Inhale 2 puffs into the lungs every 4 hours as needed for shortness of breath / dyspnea or wheezing                                amLODIPine 10 MG tablet   Commonly known as:  NORVASC   Take 10 mg by mouth                                aspirin EC 81 MG EC tablet   Take 81 mg by mouth                                budesonide-formoterol 80-4.5 MCG/ACT Inhaler   Commonly known as:  SYMBICORT   Inhale 2 puffs into the lungs 2 times daily                                calcium acetate 667 MG Caps capsule   Commonly known as:  PHOSLO   Take 667 mg by mouth                                Calcium Carb-Cholecalciferol 600-1000 MG-UNIT Tabs                                carvedilol 3.125 MG tablet   Commonly known as:  COREG   Take 6.25 mg by mouth 2 times daily (with meals)                                cetirizine 5 MG Chew   Commonly known as:  zyrTEC   Take 5 mg by mouth daily                                cholecalciferol 1000 UNIT tablet   Commonly known as:  vitamin D   Take 1,000 Units by mouth                                furosemide 20 MG tablet   Commonly known as:  LASIX                                GLUCAGEN 1 MG Solr injection   1 mg   Generic drug:  glucagon                                insulin aspart 100 UNITS/ML injection   Commonly known as:  NovoLOG                                lisinopril 20 MG tablet   Commonly known as:  PRINIVIL/ZESTRIL   Take 10 mg by mouth                                ONE TOUCH ULTRA test strip   Generic drug:  blood glucose monitoring                                ONETOUCH DELICA LANCETS 33G Misc                                pravastatin 40 MG tablet   Commonly known as:  PRAVACHOL                                TYLENOL PO   Take by mouth every 8 hours as needed for mild  pain or fever                                   Yes

## 2025-02-17 LAB
ALBUMIN SERPL ELPH-MCNC: 3.1 G/DL — LOW (ref 3.3–5)
ALP SERPL-CCNC: 65 U/L — SIGNIFICANT CHANGE UP (ref 40–120)
ALT FLD-CCNC: 55 U/L — SIGNIFICANT CHANGE UP (ref 12–78)
ANION GAP SERPL CALC-SCNC: 11 MMOL/L — SIGNIFICANT CHANGE UP (ref 5–17)
ANION GAP SERPL CALC-SCNC: 14 MMOL/L — SIGNIFICANT CHANGE UP (ref 5–17)
APTT BLD: 39 SEC — HIGH (ref 24.5–35.6)
APTT BLD: 39.3 SEC — HIGH (ref 24.5–35.6)
APTT BLD: 40.5 SEC — HIGH (ref 24.5–35.6)
AST SERPL-CCNC: 84 U/L — HIGH (ref 15–37)
BILIRUB SERPL-MCNC: 0.6 MG/DL — SIGNIFICANT CHANGE UP (ref 0.2–1.2)
BUN SERPL-MCNC: 16 MG/DL — SIGNIFICANT CHANGE UP (ref 7–23)
BUN SERPL-MCNC: 17 MG/DL — SIGNIFICANT CHANGE UP (ref 7–23)
CALCIUM SERPL-MCNC: 8.9 MG/DL — SIGNIFICANT CHANGE UP (ref 8.5–10.1)
CALCIUM SERPL-MCNC: 9.4 MG/DL — SIGNIFICANT CHANGE UP (ref 8.5–10.1)
CHLORIDE SERPL-SCNC: 101 MMOL/L — SIGNIFICANT CHANGE UP (ref 96–108)
CHLORIDE SERPL-SCNC: 98 MMOL/L — SIGNIFICANT CHANGE UP (ref 96–108)
CO2 SERPL-SCNC: 23 MMOL/L — SIGNIFICANT CHANGE UP (ref 22–31)
CO2 SERPL-SCNC: 25 MMOL/L — SIGNIFICANT CHANGE UP (ref 22–31)
CREAT SERPL-MCNC: 0.79 MG/DL — SIGNIFICANT CHANGE UP (ref 0.5–1.3)
CREAT SERPL-MCNC: 0.9 MG/DL — SIGNIFICANT CHANGE UP (ref 0.5–1.3)
EGFR: 75 ML/MIN/1.73M2 — SIGNIFICANT CHANGE UP
EGFR: 88 ML/MIN/1.73M2 — SIGNIFICANT CHANGE UP
GLUCOSE SERPL-MCNC: 75 MG/DL — SIGNIFICANT CHANGE UP (ref 70–99)
GLUCOSE SERPL-MCNC: 88 MG/DL — SIGNIFICANT CHANGE UP (ref 70–99)
HCT VFR BLD CALC: 27.4 % — LOW (ref 34.5–45)
HCT VFR BLD CALC: 31.2 % — LOW (ref 34.5–45)
HGB BLD-MCNC: 8.5 G/DL — LOW (ref 11.5–15.5)
HGB BLD-MCNC: 9.8 G/DL — LOW (ref 11.5–15.5)
LACTATE SERPL-SCNC: 9.1 MMOL/L — CRITICAL HIGH (ref 0.7–2)
MAGNESIUM SERPL-MCNC: 1.8 MG/DL — SIGNIFICANT CHANGE UP (ref 1.6–2.6)
MAGNESIUM SERPL-MCNC: 1.9 MG/DL — SIGNIFICANT CHANGE UP (ref 1.6–2.6)
MCHC RBC-ENTMCNC: 30 PG — SIGNIFICANT CHANGE UP (ref 27–34)
MCHC RBC-ENTMCNC: 30.3 PG — SIGNIFICANT CHANGE UP (ref 27–34)
MCHC RBC-ENTMCNC: 31 G/DL — LOW (ref 32–36)
MCHC RBC-ENTMCNC: 31.4 G/DL — LOW (ref 32–36)
MCV RBC AUTO: 96.6 FL — SIGNIFICANT CHANGE UP (ref 80–100)
MCV RBC AUTO: 96.8 FL — SIGNIFICANT CHANGE UP (ref 80–100)
NRBC # BLD AUTO: 0 K/UL — SIGNIFICANT CHANGE UP (ref 0–0)
NRBC # BLD AUTO: 0 K/UL — SIGNIFICANT CHANGE UP (ref 0–0)
NRBC # FLD: 0 K/UL — SIGNIFICANT CHANGE UP (ref 0–0)
NRBC # FLD: 0 K/UL — SIGNIFICANT CHANGE UP (ref 0–0)
NRBC BLD AUTO-RTO: 0 /100 WBCS — SIGNIFICANT CHANGE UP (ref 0–0)
NRBC BLD AUTO-RTO: 0 /100 WBCS — SIGNIFICANT CHANGE UP (ref 0–0)
NT-PROBNP SERPL-SCNC: HIGH PG/ML (ref 0–125)
PHOSPHATE SERPL-MCNC: 2.3 MG/DL — LOW (ref 2.5–4.5)
PHOSPHATE SERPL-MCNC: 2.6 MG/DL — SIGNIFICANT CHANGE UP (ref 2.5–4.5)
PLATELET # BLD AUTO: 343 K/UL — SIGNIFICANT CHANGE UP (ref 150–400)
PLATELET # BLD AUTO: 376 K/UL — SIGNIFICANT CHANGE UP (ref 150–400)
PMV BLD: 9.1 FL — SIGNIFICANT CHANGE UP (ref 7–13)
PMV BLD: 9.3 FL — SIGNIFICANT CHANGE UP (ref 7–13)
POTASSIUM SERPL-MCNC: 3.4 MMOL/L — LOW (ref 3.5–5.3)
POTASSIUM SERPL-MCNC: 4 MMOL/L — SIGNIFICANT CHANGE UP (ref 3.5–5.3)
POTASSIUM SERPL-SCNC: 3.4 MMOL/L — LOW (ref 3.5–5.3)
POTASSIUM SERPL-SCNC: 4 MMOL/L — SIGNIFICANT CHANGE UP (ref 3.5–5.3)
PROT SERPL-MCNC: 6.3 GM/DL — SIGNIFICANT CHANGE UP (ref 6–8.3)
RBC # BLD: 2.83 M/UL — LOW (ref 3.8–5.2)
RBC # BLD: 3.23 M/UL — LOW (ref 3.8–5.2)
RBC # FLD: 18.3 % — HIGH (ref 10.3–14.5)
RBC # FLD: 18.5 % — HIGH (ref 10.3–14.5)
S PNEUM AG UR QL: NEGATIVE — SIGNIFICANT CHANGE UP
SODIUM SERPL-SCNC: 134 MMOL/L — LOW (ref 135–145)
SODIUM SERPL-SCNC: 138 MMOL/L — SIGNIFICANT CHANGE UP (ref 135–145)
TROPONIN I, HIGH SENSITIVITY RESULT: 32.17 NG/L — SIGNIFICANT CHANGE UP
WBC # BLD: 25.4 K/UL — HIGH (ref 3.8–10.5)
WBC # BLD: 42.6 K/UL — CRITICAL HIGH (ref 3.8–10.5)
WBC # FLD AUTO: 25.4 K/UL — HIGH (ref 3.8–10.5)
WBC # FLD AUTO: 42.6 K/UL — CRITICAL HIGH (ref 3.8–10.5)

## 2025-02-17 PROCEDURE — 99233 SBSQ HOSP IP/OBS HIGH 50: CPT

## 2025-02-17 RX ORDER — ENOXAPARIN SODIUM 100 MG/ML
40 INJECTION SUBCUTANEOUS EVERY 12 HOURS
Refills: 0 | Status: DISCONTINUED | OUTPATIENT
Start: 2025-02-17 | End: 2025-02-20

## 2025-02-17 RX ORDER — FUROSEMIDE 10 MG/ML
20 INJECTION INTRAMUSCULAR; INTRAVENOUS ONCE
Refills: 0 | Status: COMPLETED | OUTPATIENT
Start: 2025-02-17 | End: 2025-02-17

## 2025-02-17 RX ORDER — ONDANSETRON HCL/PF 4 MG/2 ML
4 VIAL (ML) INJECTION EVERY 6 HOURS
Refills: 0 | Status: DISCONTINUED | OUTPATIENT
Start: 2025-02-17 | End: 2025-02-20

## 2025-02-17 RX ORDER — ENOXAPARIN SODIUM 100 MG/ML
40 INJECTION SUBCUTANEOUS EVERY 12 HOURS
Refills: 0 | Status: DISCONTINUED | OUTPATIENT
Start: 2025-02-17 | End: 2025-02-17

## 2025-02-17 RX ORDER — ENOXAPARIN SODIUM 100 MG/ML
50 INJECTION SUBCUTANEOUS EVERY 12 HOURS
Refills: 0 | Status: DISCONTINUED | OUTPATIENT
Start: 2025-02-17 | End: 2025-02-17

## 2025-02-17 RX ORDER — ONDANSETRON HCL/PF 4 MG/2 ML
4 VIAL (ML) INJECTION ONCE
Refills: 0 | Status: COMPLETED | OUTPATIENT
Start: 2025-02-17 | End: 2025-02-17

## 2025-02-17 RX ORDER — SOD PHOS DI, MONO/K PHOS MONO 250 MG
1 TABLET ORAL ONCE
Refills: 0 | Status: COMPLETED | OUTPATIENT
Start: 2025-02-17 | End: 2025-02-17

## 2025-02-17 RX ADMIN — Medication 25 GRAM(S): at 21:40

## 2025-02-17 RX ADMIN — OLANZAPINE 5 MILLIGRAM(S): 10 TABLET ORAL at 21:40

## 2025-02-17 RX ADMIN — Medication 25 GRAM(S): at 05:47

## 2025-02-17 RX ADMIN — Medication 0.5 MILLIGRAM(S): at 21:40

## 2025-02-17 RX ADMIN — Medication 25 GRAM(S): at 15:06

## 2025-02-17 RX ADMIN — HEPARIN SODIUM 1200 UNIT(S)/HR: 1000 INJECTION INTRAVENOUS; SUBCUTANEOUS at 03:46

## 2025-02-17 RX ADMIN — Medication 1 APPLICATION(S): at 06:43

## 2025-02-17 RX ADMIN — Medication 4 MILLIGRAM(S): at 18:08

## 2025-02-17 RX ADMIN — HEPARIN SODIUM 1200 UNIT(S)/HR: 1000 INJECTION INTRAVENOUS; SUBCUTANEOUS at 05:49

## 2025-02-17 RX ADMIN — Medication 40 MILLIEQUIVALENT(S): at 12:22

## 2025-02-17 RX ADMIN — ENOXAPARIN SODIUM 40 MILLIGRAM(S): 100 INJECTION SUBCUTANEOUS at 17:03

## 2025-02-17 RX ADMIN — FUROSEMIDE 20 MILLIGRAM(S): 10 INJECTION INTRAMUSCULAR; INTRAVENOUS at 12:22

## 2025-02-17 RX ADMIN — SERTRALINE 50 MILLIGRAM(S): 100 TABLET, FILM COATED ORAL at 12:22

## 2025-02-17 RX ADMIN — Medication 1 PACKET(S): at 12:23

## 2025-02-17 RX ADMIN — Medication 40 MILLIGRAM(S): at 07:22

## 2025-02-17 RX ADMIN — HEPARIN SODIUM 1350 UNIT(S)/HR: 1000 INJECTION INTRAVENOUS; SUBCUTANEOUS at 10:31

## 2025-02-17 RX ADMIN — HEPARIN SODIUM 1200 UNIT(S)/HR: 1000 INJECTION INTRAVENOUS; SUBCUTANEOUS at 07:22

## 2025-02-17 NOTE — PROGRESS NOTE ADULT - SUBJECTIVE AND OBJECTIVE BOX
Patient is a 54y old  Female who presents with a chief complaint of Shortness of Breath, metastatic endometrial CA, Pulmonary embolism (16 Feb 2025 07:56)      BRIEF HOSPITAL COURSE: 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 s/p chemotherapy, immunotherapy, spontaneous b/l PTX s/p L Vats, pleurectomy, pleurodesis, SERINA wedge resection 03/2024, and now on new chemotherapy (trabectedin), admitted on 2/15 w/complaints of acute SOB. Patient w/associated tachycardia to 160s. Was placed on NIPPV Bi-Level. CTA reveals acute PE in R segmental and subsegmental, w/no evidence of RV strain, as well as bilateral loculated pleural effusions, worsening diffuse bilateral pulmonary metastases and liver metastases. Patient started on heparin gtt. Lab work significant for severe lactemia, however hemodynamically intact. Of note, she has been experiencing increased vaginal bleeding lately related endometrial mass.    Events last 24 hours: Persistent tachycardia and lactic acidosis. Lactate 13+. BP stable. On 3L NC. TTE shows normal RV, however LVEF is newly reduced to 47% with global LV hypokinesis and moderate MR. Trops and pro-BNP have been uptrending.        PAST MEDICAL & SURGICAL HISTORY:  Fibroid      H/O oral surgery  under general anesthesia during childhood      S/P D&C (status post dilation and curettage)  x2      S/P laparoscopy  as part of infertility work up      S/P tonsillectomy          Review of Systems:  CONSTITUTIONAL: No fever, chills, or fatigue  EYES: No eye pain, visual disturbances, or discharge  ENMT:  No difficulty hearing, tinnitus, vertigo; No sinus or throat pain  NECK: No pain or stiffness  RESPIRATORY: No cough, wheezing, chills or hemoptysis; No shortness of breath  CARDIOVASCULAR: No chest pain, palpitations, dizziness, or leg swelling  GASTROINTESTINAL: No abdominal or epigastric pain. No nausea, vomiting, or hematemesis; No diarrhea or constipation. No melena or hematochezia.  GENITOURINARY: No dysuria, frequency, hematuria, or incontinence  NEUROLOGICAL: No headaches, memory loss, loss of strength, numbness, or tremors  SKIN: No itching, burning, rashes, or lesions   MUSCULOSKELETAL: No joint pain or swelling; No muscle, back, or extremity pain  PSYCHIATRIC: No depression, anxiety, mood swings, or difficulty sleeping      Medications:  piperacillin/tazobactam IVPB.. 3.375 Gram(s) IV Intermittent every 8 hours        LORazepam     Tablet 0.5 milliGRAM(s) Oral every 8 hours PRN  OLANZapine 5 milliGRAM(s) Oral at bedtime  sertraline 50 milliGRAM(s) Oral daily      heparin   Injectable 3200 Unit(s) IV Push every 6 hours PRN  heparin  Infusion.  Unit(s)/Hr IV Continuous <Continuous>    pantoprazole    Tablet 40 milliGRAM(s) Oral before breakfast            chlorhexidine 2% Cloths 1 Application(s) Topical <User Schedule>            ICU Vital Signs Last 24 Hrs  T(C): 36.6 (17 Feb 2025 00:00), Max: 37.2 (16 Feb 2025 06:00)  T(F): 97.8 (17 Feb 2025 00:00), Max: 99 (16 Feb 2025 06:00)  HR: 122 (17 Feb 2025 01:00) (119 - 140)  BP: 111/68 (17 Feb 2025 01:00) (111/68 - 130/92)  BP(mean): 81 (17 Feb 2025 01:00) (80 - 104)  ABP: --  ABP(mean): --  RR: 29 (17 Feb 2025 01:00) (15 - 35)  SpO2: 97% (17 Feb 2025 01:00) (91% - 100%)    O2 Parameters below as of 17 Feb 2025 01:00  Patient On (Oxygen Delivery Method): nasal cannula  O2 Flow (L/min): 3          ABG - ( 15 Feb 2025 23:58 )  pH, Arterial: 7.27  pH, Blood: x     /  pCO2: 31    /  pO2: 265   / HCO3: 14    / Base Excess: -11.4 /  SaO2: 99.0                I&O's Detail    16 Feb 2025 07:01  -  17 Feb 2025 02:27  --------------------------------------------------------  IN:    Heparin Infusion: 126 mL    IV PiggyBack: 100 mL    Oral Fluid: 800 mL  Total IN: 1026 mL    OUT:  Total OUT: 0 mL    Total NET: 1026 mL            LABS:                        9.4    15.91 )-----------( 388      ( 16 Feb 2025 07:00 )             30.7     02-16    136  |  100  |  19  ----------------------------<  93  3.6   |  17[L]  |  1.06    Ca    9.3      16 Feb 2025 13:49  Phos  4.7     02-16  Mg     1.8     02-16    TPro  7.3  /  Alb  3.6  /  TBili  0.4  /  DBili  x   /  AST  82[H]  /  ALT  47  /  AlkPhos  82  02-16          CAPILLARY BLOOD GLUCOSE      POCT Blood Glucose.: 121 mg/dL (15 Feb 2025 21:14)    PT/INR - ( 15 Feb 2025 22:50 )   PT: 11.8 sec;   INR: 1.03 ratio         PTT - ( 16 Feb 2025 20:08 )  PTT:30.5 sec  Urinalysis Basic - ( 16 Feb 2025 13:49 )    Color: x / Appearance: x / SG: x / pH: x  Gluc: 93 mg/dL / Ketone: x  / Bili: x / Urobili: x   Blood: x / Protein: x / Nitrite: x   Leuk Esterase: x / RBC: x / WBC x   Sq Epi: x / Non Sq Epi: x / Bacteria: x      CULTURES:        Physical Examination:    General: No acute distress.  Alert, oriented, interactive, nonfocal    HEENT: Pupils equal, reactive to light.  Symmetric.    PULM: Clear to auscultation bilaterally, no significant sputum production    CVS: tachycardic, reg rhythm, no murmurs, rubs, or gallops    ABD: Soft, distended, nontender, normoactive bowel sounds, no masses    EXT: bilateral lower extremity edema, nontender    SKIN: Warm and well perfused, no rashes noted.    NEURO: A&Ox3, strength 5/5 all extremities, cranial nerves grossly intact, no focal deficits        RADIOLOGY:     < from: CT Angio Chest PE Protocol w/ IV Cont (02.15.25 @ 20:50) >  FINDINGS:    LUNGS AND LARGE AIRWAYS: Patent central airways. Marked interval   worsening of metastatic disease compared with prior exam with numerous   new pulmonary masses and nodules and interval enlargement of previously   seen nodules. Mass/nodule in the right lower lobe measures up to 6.2 x   5.0 cm, largest mass/nodule in the right middle lobe abutting the   mediastinum measures 6.3 x 4.5 cm, largest mass in the left upper lobe   abutting the pleural surface measures 3.4 x 2.2 cm.  PLEURA: Interval resolution of loculated right-sided pneumothorax   compared with prior exam. Small bilateral loculated pleural effusions on   the current exam.  VESSELS: Adequate pulmonary arterial opacification. Filling defects in   right middle lobar segmental and subsegmental pulmonaryarteries. Main   pulmonary arteries not dilated. Thoracic aorta is normal in caliber.   Right IJ approach MediPort tip is in the right atrium.  HEART: Heart is enlarged. No pericardial effusion. No evidence of right   heart strain.  MEDIASTINUM AND PEDRO LUIS: Ill-defined mildly enlarged mediastinal and   bilateral hilar lymph nodes.  CHEST WALL AND LOWER NECK: Right chest wall MediPort. Mild generalized   soft tissue edema.  VISUALIZED UPPER ABDOMEN: Liver metastases with largest lesion measuring   upto 8.1 cm in the right hepatic lobe.  BONES: Mild degenerative changes. New indeterminate age compression   deformities of T4, T5, and T10. Mild chronic compression fractures of   T1-T3.    IMPRESSION:  Pulmonary emboli in right middle lobar segmental and subsegmental   branches. No evidence of right heart strain.    Extensive progression of disease with extensive worsening of metastatic   lung nodules and masses and liver metastases.    Interval resolution of loculated right-sided pneumothorax seen on prior   exam from 7/8/2024. New small bilateral loculated pleural effusions.    Indeterminate age mild compression deformities of T4, T5, and T10.      < end of copied text >          CRITICAL CARE TIME SPENT: 35 mins  Time spent evaluating/treating patient with medical issues that pose a high risk for life threatening deterioration and/or end-organ damage, reviewing data/labs/imaging, discussing case with multidisciplinary team, discussing plan/goals of care with patient/family. Non-inclusive of procedure time. The date of entry of this note reflects the date of services rendered.

## 2025-02-17 NOTE — PROGRESS NOTE ADULT - ASSESSMENT
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 TTE shows newly reduced LVEF to 47% with global LV hypokinesis and moderate MR (mild in 2024). Trops and pro-BNP have been uptrending. No clinical evidence of low output heart failure at this time. Needs cardiology eval.    Cautious use of heparin infusion given recurrent episodes of vaginal bleeding. Discontinue PRN boluses. Bilateral lower extremity venous dopplers are negative for DVT.    Actively titrating O2 flow rate to maintain SpO2 > 92%. Low threshold to escalate to HFNC. Continue empiric Zosyn for now. Loculated effusions may be infected.      Case discussed with the patient at the bedside. Diagnosis, prognosis, and management plan outlined. All questions answered and concerns addressed.

## 2025-02-17 NOTE — PROGRESS NOTE ADULT - SUBJECTIVE AND OBJECTIVE BOX
OVERNIGHT EVENTS / SUBJECTIVE: Patient seen and examined at bedside.     OBJECTIVE:    VITAL SIGNS:  ICU Vital Signs Last 24 Hrs  T(C): 36.7 (17 Feb 2025 08:53), Max: 36.8 (16 Feb 2025 12:00)  T(F): 98.1 (17 Feb 2025 08:53), Max: 98.3 (16 Feb 2025 12:00)  HR: 119 (17 Feb 2025 07:00) (119 - 140)  BP: 108/67 (17 Feb 2025 07:00) (108/62 - 130/92)  BP(mean): 79 (17 Feb 2025 07:00) (76 - 104)  ABP: --  ABP(mean): --  RR: 28 (17 Feb 2025 07:00) (20 - 35)  SpO2: 98% (17 Feb 2025 07:00) (91% - 100%)    O2 Parameters below as of 17 Feb 2025 06:00  Patient On (Oxygen Delivery Method): nasal cannula  O2 Flow (L/min): 3            02-16 @ 07:01  -  02-17 @ 07:00  --------------------------------------------------------  IN: 1026 mL / OUT: 0 mL / NET: 1026 mL      CAPILLARY BLOOD GLUCOSE      POCT Blood Glucose.: 121 mg/dL (15 Feb 2025 21:14)      PHYSICAL EXAM:    General: NAD  HEENT: NC/AT; PERRL, clear conjunctiva  Neck: supple  Respiratory: CTA b/l  Cardiovascular: +S1/S2; RRR  Abdomen: soft, NT/ND; +BS x4  Extremities: WWP, 2+ peripheral pulses b/l; no LE edema  Skin: normal color and turgor; no rash  Neurological:    MEDICATIONS:  MEDICATIONS  (STANDING):  chlorhexidine 2% Cloths 1 Application(s) Topical <User Schedule>  heparin  Infusion.  Unit(s)/Hr (6.5 mL/Hr) IV Continuous <Continuous>  OLANZapine 5 milliGRAM(s) Oral at bedtime  pantoprazole    Tablet 40 milliGRAM(s) Oral before breakfast  piperacillin/tazobactam IVPB.. 3.375 Gram(s) IV Intermittent every 8 hours  potassium chloride    Tablet ER 40 milliEquivalent(s) Oral once  sertraline 50 milliGRAM(s) Oral daily    MEDICATIONS  (PRN):  LORazepam     Tablet 0.5 milliGRAM(s) Oral every 8 hours PRN Anxiety      ALLERGIES:  Allergies    No Known Allergies    Intolerances    shellfish (Vomiting)      LABS:                        8.5    25.40 )-----------( 343      ( 17 Feb 2025 05:17 )             27.4     Hemoglobin: 8.5 g/dL (02-17 @ 05:17)  Hemoglobin: 9.4 g/dL (02-16 @ 07:00)  Hemoglobin: 9.7 g/dL (02-15 @ 20:27)    CBC Full  -  ( 17 Feb 2025 05:17 )  WBC Count : 25.40 K/uL  RBC Count : 2.83 M/uL  Hemoglobin : 8.5 g/dL  Hematocrit : 27.4 %  Platelet Count - Automated : 343 K/uL  Mean Cell Volume : 96.8 fl  Mean Cell Hemoglobin : 30.0 pg  Mean Cell Hemoglobin Concentration : 31.0 g/dL  Auto Neutrophil # : x  Auto Lymphocyte # : x  Auto Monocyte # : x  Auto Eosinophil # : x  Auto Basophil # : x  Auto Neutrophil % : x  Auto Lymphocyte % : x  Auto Monocyte % : x  Auto Eosinophil % : x  Auto Basophil % : x    02-17    138  |  101  |  17  ----------------------------<  75  3.4[L]   |  23  |  0.90    Ca    8.9      17 Feb 2025 05:17  Phos  2.3     02-17  Mg     1.8     02-17    TPro  6.3  /  Alb  3.1[L]  /  TBili  0.6  /  DBili  x   /  AST  84[H]  /  ALT  55  /  AlkPhos  65  02-17    Creatinine Trend: 0.90<--, 1.06<--, 0.92<--, 0.90<--  LIVER FUNCTIONS - ( 17 Feb 2025 05:17 )  Alb: 3.1 g/dL / Pro: 6.3 gm/dL / ALK PHOS: 65 U/L / ALT: 55 U/L / AST: 84 U/L / GGT: x           PT/INR - ( 15 Feb 2025 22:50 )   PT: 11.8 sec;   INR: 1.03 ratio         PTT - ( 17 Feb 2025 02:34 )  PTT:40.5 sec    hs Troponin:    ABG - ( 15 Feb 2025 23:58 )  pH, Arterial: 7.27  pH, Blood: x     /  pCO2: 31    /  pO2: 265   / HCO3: 14    / Base Excess: -11.4 /  SaO2: 99.0                    Urinalysis Basic - ( 17 Feb 2025 05:17 )    Color: x / Appearance: x / SG: x / pH: x  Gluc: 75 mg/dL / Ketone: x  / Bili: x / Urobili: x   Blood: x / Protein: x / Nitrite: x   Leuk Esterase: x / RBC: x / WBC x   Sq Epi: x / Non Sq Epi: x / Bacteria: x      CSF:                      EKG:   MICROBIOLOGY:    Culture - Urine (collected 15 Feb 2025 23:33)  Source: Catheterized Catheterized  Preliminary Report (17 Feb 2025 05:34):    10,000 - 49,000 CFU/mL Escherichia coli    <10,000 CFU/ml Normal Urogenital deb present    Culture - Blood (collected 15 Feb 2025 22:50)  Source: .Blood BLOOD  Preliminary Report (17 Feb 2025 05:01):    No growth at 24 hours    Culture - Blood (collected 15 Feb 2025 22:50)  Source: .Blood BLOOD  Preliminary Report (17 Feb 2025 05:01):    No growth at 24 hours      IMAGING:      Labs, imaging, EKG personally reviewed    RADIOLOGY & ADDITIONAL TESTS: Reviewed. OVERNIGHT EVENTS / SUBJECTIVE: Patient seen and examined at bedside.     Pt reports SOB improved today, on 3L NC. BP stable, HRs persist sinus tach 110s-130s. Lactate downtrending 13 => 9. Hgb downtended 9.4 -=> 8.5. PTT still not therapeutic. BNP 14,000. trop wnl.    OBJECTIVE:    VITAL SIGNS:  ICU Vital Signs Last 24 Hrs  T(C): 36.7 (17 Feb 2025 08:53), Max: 36.8 (16 Feb 2025 12:00)  T(F): 98.1 (17 Feb 2025 08:53), Max: 98.3 (16 Feb 2025 12:00)  HR: 119 (17 Feb 2025 07:00) (119 - 140)  BP: 108/67 (17 Feb 2025 07:00) (108/62 - 130/92)  BP(mean): 79 (17 Feb 2025 07:00) (76 - 104)  ABP: --  ABP(mean): --  RR: 28 (17 Feb 2025 07:00) (20 - 35)  SpO2: 98% (17 Feb 2025 07:00) (91% - 100%)    O2 Parameters below as of 17 Feb 2025 06:00  Patient On (Oxygen Delivery Method): nasal cannula  O2 Flow (L/min): 3            02-16 @ 07:01  -  02-17 @ 07:00  --------------------------------------------------------  IN: 1026 mL / OUT: 0 mL / NET: 1026 mL      CAPILLARY BLOOD GLUCOSE      POCT Blood Glucose.: 121 mg/dL (15 Feb 2025 21:14)      PHYSICAL EXAM:    General: NAD, frail-appearing  HEENT: NC/AT; PERRL, clear conjunctiva  Neck: supple  Respiratory: Scattered rhonchi  Cardiovascular: Tachycardic, regular rhythm  Abdomen: soft, NT/ND  Extremities: Warm, +1 LE edema  Skin: normal color and turgor; no rash  Neurological: A&Ox3, no focal deficit    MEDICATIONS:  MEDICATIONS  (STANDING):  chlorhexidine 2% Cloths 1 Application(s) Topical <User Schedule>  heparin  Infusion.  Unit(s)/Hr (6.5 mL/Hr) IV Continuous <Continuous>  OLANZapine 5 milliGRAM(s) Oral at bedtime  pantoprazole    Tablet 40 milliGRAM(s) Oral before breakfast  piperacillin/tazobactam IVPB.. 3.375 Gram(s) IV Intermittent every 8 hours  potassium chloride    Tablet ER 40 milliEquivalent(s) Oral once  sertraline 50 milliGRAM(s) Oral daily    MEDICATIONS  (PRN):  LORazepam     Tablet 0.5 milliGRAM(s) Oral every 8 hours PRN Anxiety      ALLERGIES:  Allergies    No Known Allergies    Intolerances    shellfish (Vomiting)      LABS:                        8.5    25.40 )-----------( 343      ( 17 Feb 2025 05:17 )             27.4     Hemoglobin: 8.5 g/dL (02-17 @ 05:17)  Hemoglobin: 9.4 g/dL (02-16 @ 07:00)  Hemoglobin: 9.7 g/dL (02-15 @ 20:27)    CBC Full  -  ( 17 Feb 2025 05:17 )  WBC Count : 25.40 K/uL  RBC Count : 2.83 M/uL  Hemoglobin : 8.5 g/dL  Hematocrit : 27.4 %  Platelet Count - Automated : 343 K/uL  Mean Cell Volume : 96.8 fl  Mean Cell Hemoglobin : 30.0 pg  Mean Cell Hemoglobin Concentration : 31.0 g/dL  Auto Neutrophil # : x  Auto Lymphocyte # : x  Auto Monocyte # : x  Auto Eosinophil # : x  Auto Basophil # : x  Auto Neutrophil % : x  Auto Lymphocyte % : x  Auto Monocyte % : x  Auto Eosinophil % : x  Auto Basophil % : x    02-17    138  |  101  |  17  ----------------------------<  75  3.4[L]   |  23  |  0.90    Ca    8.9      17 Feb 2025 05:17  Phos  2.3     02-17  Mg     1.8     02-17    TPro  6.3  /  Alb  3.1[L]  /  TBili  0.6  /  DBili  x   /  AST  84[H]  /  ALT  55  /  AlkPhos  65  02-17    Creatinine Trend: 0.90<--, 1.06<--, 0.92<--, 0.90<--  LIVER FUNCTIONS - ( 17 Feb 2025 05:17 )  Alb: 3.1 g/dL / Pro: 6.3 gm/dL / ALK PHOS: 65 U/L / ALT: 55 U/L / AST: 84 U/L / GGT: x           PT/INR - ( 15 Feb 2025 22:50 )   PT: 11.8 sec;   INR: 1.03 ratio         PTT - ( 17 Feb 2025 02:34 )  PTT:40.5 sec    hs Troponin:    ABG - ( 15 Feb 2025 23:58 )  pH, Arterial: 7.27  pH, Blood: x     /  pCO2: 31    /  pO2: 265   / HCO3: 14    / Base Excess: -11.4 /  SaO2: 99.0                    Urinalysis Basic - ( 17 Feb 2025 05:17 )    Color: x / Appearance: x / SG: x / pH: x  Gluc: 75 mg/dL / Ketone: x  / Bili: x / Urobili: x   Blood: x / Protein: x / Nitrite: x   Leuk Esterase: x / RBC: x / WBC x   Sq Epi: x / Non Sq Epi: x / Bacteria: x    CSF:    EKG:   MICROBIOLOGY:    Culture - Urine (collected 15 Feb 2025 23:33)  Source: Catheterized Catheterized  Preliminary Report (17 Feb 2025 05:34):    10,000 - 49,000 CFU/mL Escherichia coli    <10,000 CFU/ml Normal Urogenital deb present    Culture - Blood (collected 15 Feb 2025 22:50)  Source: .Blood BLOOD  Preliminary Report (17 Feb 2025 05:01):    No growth at 24 hours    Culture - Blood (collected 15 Feb 2025 22:50)  Source: .Blood BLOOD  Preliminary Report (17 Feb 2025 05:01):    No growth at 24 hours      IMAGING:      Labs, imaging, EKG personally reviewed    RADIOLOGY & ADDITIONAL TESTS: Reviewed.

## 2025-02-17 NOTE — PROGRESS NOTE ADULT - ASSESSMENT
54-year-old female with progressive metastatic high-grade uterine sarcoma presenting with acute hypoxic respiratory failure secondary to ACUTE PE.     1. Disease Status:  Patient demonstrates aggressive disease progression through multiple lines of therapy including conventional cytotoxic agents (gemcitabine/docetaxel, doxorubicin), targeted therapy (pazopanib), and most recently trabectedin. Molecular profiling reveals [id number] fusion, which may guide future therapeutic options as patient is presntly on the waiting list for [facility] clinical trial.  Recent imaging demonstrates significant progression across all disease sites with concerning growth rates of both primary tumor and metastatic lesions.    2. Acute Respiratory Failure - Multifactorial:  a) Acute PE: Currently on therapeutic anticoagulation with heparin  b) Progressive pulmonary metastases  c) Historical pneumothoraces status post pleurodesis    3. End-Organ Dysfunction:  a) Significant lactic acidosis (14.3-->11--->9.2 today) suggesting tissue hypoperfusion - possible Warberg effect   b) Marked troponemia (94.4) indicating possible cardiac involvement/strain- pt remains tachycardic   c) Active vaginal bleeding complicating anticoagulation management    4. Treatment Planning:  Scheduled to initiate radiation therapy to primary tumor and hepatic disease, though clinical deterioration may impact feasibility.     5. Goals of Care:  patient has opted for DNR/DNI status and declined trial of hemodialysis per discussion with ICU PA- night team.   Current clinical trajectory suggests poor prognosis  yet patient would like to continue to pursue planned radiation therapy.     PLAN:   #Metastatic Uterine Sarcoma  - Hold Megace due to VTE risk    #Acute PE/Anticoagulation  - Continue therapeutic heparin drip  -2D ECHO RESULTS PENDING - EF decreased 47% no apparent signs of RHS   - NO ACUTE LOWER EXT DVT  - Defer transition to oral anticoagulation will need to assess bleeding risk   - may be reasonable to transition to lovenox 1 mg/kg bid to avoid PTT surveillance as pt with nl kidney function vs NOAC   - no vascular intervention   - WBC 25.4, HB 8.5, plt 343, cmp unremarkable, lactate 9.1,     #Respiratory Status  - Oxygen titration to maintain sats >92%  - multifactorial secondary to disease and recent PE   - remains tachycardic/tachypneic - on zosyn     #Disposition  - Continue ICU level care  - Daily reassessment of goals of care  - Coordinate with [facility] team regarding overall treatment strategy

## 2025-02-17 NOTE — PROGRESS NOTE ADULT - SUBJECTIVE AND OBJECTIVE BOX
INTERVAL HPI/OVERNIGHT EVENTS:  Patient S&E at bedside.   Pt tachycardic with HR 130s-140s yesterday, remains on 3L NC and tachypneic.   Pt remains on hep gtt, on zosyn, ativan/sertraline.   WBC 25.4, HB 8.5, plt 343, cmp unremarkable, lactate 9.1,   HR remains 120s and on 3L NC. No pain today.     PAST MEDICAL & SURGICAL HISTORY:  Fibroid      H/O oral surgery  under general anesthesia during childhood      S/P D&C (status post dilation and curettage)  x2      S/P laparoscopy  as part of infertility work up      S/P tonsillectomy          FAMILY HISTORY:  Family history of uterine cancer (Mother)        VITAL SIGNS:  T(F): 98.1 (02-17-25 @ 08:53)  HR: 119 (02-17-25 @ 08:00)  BP: 109/64 (02-17-25 @ 08:00)  RR: 20 (02-17-25 @ 08:00)  SpO2: 97% (02-17-25 @ 08:00)  Wt(kg): --    PHYSICAL EXAM:    Constitutional: NAD  Eyes: EOMI,   dry lips  Respiratory: tachypneic on nc   Cardiovascular: tachycardic   Gastrointestinal: soft, NTND  Neurological: AAOx3      MEDICATIONS  (STANDING):  chlorhexidine 2% Cloths 1 Application(s) Topical <User Schedule>  heparin  Infusion.  Unit(s)/Hr (6.5 mL/Hr) IV Continuous <Continuous>  OLANZapine 5 milliGRAM(s) Oral at bedtime  pantoprazole    Tablet 40 milliGRAM(s) Oral before breakfast  piperacillin/tazobactam IVPB.. 3.375 Gram(s) IV Intermittent every 8 hours  potassium chloride   Powder 40 milliEquivalent(s) Oral once  sertraline 50 milliGRAM(s) Oral daily    MEDICATIONS  (PRN):  LORazepam     Tablet 0.5 milliGRAM(s) Oral every 8 hours PRN Anxiety      Allergies    No Known Allergies    Intolerances    shellfish (Vomiting)      LABS:                        8.5    25.40 )-----------( 343      ( 17 Feb 2025 05:17 )             27.4     02-17    138  |  101  |  17  ----------------------------<  75  3.4[L]   |  23  |  0.90    Ca    8.9      17 Feb 2025 05:17  Phos  2.3     02-17  Mg     1.8     02-17    TPro  6.3  /  Alb  3.1[L]  /  TBili  0.6  /  DBili  x   /  AST  84[H]  /  ALT  55  /  AlkPhos  65  02-17    PT/INR - ( 15 Feb 2025 22:50 )   PT: 11.8 sec;   INR: 1.03 ratio         PTT - ( 17 Feb 2025 09:49 )  PTT:39.0 sec  Urinalysis Basic - ( 17 Feb 2025 05:17 )    Color: x / Appearance: x / SG: x / pH: x  Gluc: 75 mg/dL / Ketone: x  / Bili: x / Urobili: x   Blood: x / Protein: x / Nitrite: x   Leuk Esterase: x / RBC: x / WBC x   Sq Epi: x / Non Sq Epi: x / Bacteria: x        RADIOLOGY & ADDITIONAL TESTS:  Studies reviewed.

## 2025-02-17 NOTE — PROGRESS NOTE ADULT - ASSESSMENT
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 s/p chemotherapy, immunotherapy, spontaneous b/l PTX s/p L Vats, pleurectomy, pleurodesis, SERINA wedge resection 03/2024, and now on new chemotherapy (trabectedin), had first dose 3 weeks ago and second infusion day prior to admission presenting with acute SOB, found to be tachycardic to 160s, slightly hypertensive, placed on bipap in ED, found to have lactic acidosis, elevated pro-BNP, and CTA chest with acute PE R segmental/subsegmental without evidence of R heart strain also with worsening pulmonary metastases, started on hep gtt, empiric abx, admitted to ICU for close hemodynamic monitoring and further management.      Problems  #Acute hypoxic respiratory failure  #Acute PE   #Metabolic acidosis 2/2 lactic acidosis  #Sinus tachycardia  #Metastatic endometrial cancer     Plan:  - Mentation intact, cont to monitor  - BP stable, HR persists 100s-130s sinus tach which pt states she always has high HR and this may be her baseline?, TTE reviewed normal RV size/fxn, mild decreased LVEF 47%, mod MR, will give gentle diuresis lasix 20mg IV x1 today given elevated BNP/echo findings/LE edema  - On 3L NC will titrate as tolerated, no longer needs NIV, c/w empiric abx for pna, hep gtt for PE, diuresis for possible volume overload  - Reg diet as tolerated  - Cr and bicarb wnl, lactate downtrending, cont to mnitor  - Afebrile, WBC uptrending but had neupogen patch? c/w empiric zosyn, blood cxs ngtd, urine culture low colony count e. coli with negative UA  - Blood glucose wnl on BMP  - PTT not therapeutic on IV heparin, will switch to therapeutic lovenox 1mg/kg BID (40mg BID), hgb uptrend on repeat today monitor hgb/vaginal bleeding closely on lovenox  - Heme/onc recs appreciated    Dispo: ICU, DNR/DNI, pt and family updated at bedside

## 2025-02-18 LAB
-  AMOXICILLIN/CLAVULANIC ACID: SIGNIFICANT CHANGE UP
-  AMPICILLIN/SULBACTAM: SIGNIFICANT CHANGE UP
-  AMPICILLIN: SIGNIFICANT CHANGE UP
-  AZTREONAM: SIGNIFICANT CHANGE UP
-  CEFAZOLIN: SIGNIFICANT CHANGE UP
-  CEFEPIME: SIGNIFICANT CHANGE UP
-  CEFOXITIN: SIGNIFICANT CHANGE UP
-  CEFTRIAXONE: SIGNIFICANT CHANGE UP
-  CEFUROXIME: SIGNIFICANT CHANGE UP
-  CIPROFLOXACIN: SIGNIFICANT CHANGE UP
-  ERTAPENEM: SIGNIFICANT CHANGE UP
-  GENTAMICIN: SIGNIFICANT CHANGE UP
-  IMIPENEM: SIGNIFICANT CHANGE UP
-  LEVOFLOXACIN: SIGNIFICANT CHANGE UP
-  MEROPENEM: SIGNIFICANT CHANGE UP
-  NITROFURANTOIN: SIGNIFICANT CHANGE UP
-  PIPERACILLIN/TAZOBACTAM: SIGNIFICANT CHANGE UP
-  TOBRAMYCIN: SIGNIFICANT CHANGE UP
-  TRIMETHOPRIM/SULFAMETHOXAZOLE: SIGNIFICANT CHANGE UP
ALBUMIN SERPL ELPH-MCNC: 3.2 G/DL — LOW (ref 3.3–5)
ALP SERPL-CCNC: 79 U/L — SIGNIFICANT CHANGE UP (ref 40–120)
ALT FLD-CCNC: 54 U/L — SIGNIFICANT CHANGE UP (ref 12–78)
ANION GAP SERPL CALC-SCNC: 13 MMOL/L — SIGNIFICANT CHANGE UP (ref 5–17)
AST SERPL-CCNC: 72 U/L — HIGH (ref 15–37)
BASOPHILS # BLD AUTO: 0.15 K/UL — SIGNIFICANT CHANGE UP (ref 0–0.2)
BASOPHILS NFR BLD AUTO: 0.4 % — SIGNIFICANT CHANGE UP (ref 0–2)
BILIRUB SERPL-MCNC: 0.5 MG/DL — SIGNIFICANT CHANGE UP (ref 0.2–1.2)
BUN SERPL-MCNC: 15 MG/DL — SIGNIFICANT CHANGE UP (ref 7–23)
CALCIUM SERPL-MCNC: 9 MG/DL — SIGNIFICANT CHANGE UP (ref 8.5–10.1)
CHLORIDE SERPL-SCNC: 100 MMOL/L — SIGNIFICANT CHANGE UP (ref 96–108)
CO2 SERPL-SCNC: 24 MMOL/L — SIGNIFICANT CHANGE UP (ref 22–31)
CREAT SERPL-MCNC: 0.74 MG/DL — SIGNIFICANT CHANGE UP (ref 0.5–1.3)
CULTURE RESULTS: ABNORMAL
EGFR: 95 ML/MIN/1.73M2 — SIGNIFICANT CHANGE UP
EOSINOPHIL # BLD AUTO: 0.03 K/UL — SIGNIFICANT CHANGE UP (ref 0–0.5)
EOSINOPHIL NFR BLD AUTO: 0.1 % — SIGNIFICANT CHANGE UP (ref 0–6)
GLUCOSE SERPL-MCNC: 74 MG/DL — SIGNIFICANT CHANGE UP (ref 70–99)
HCT VFR BLD CALC: 27.1 % — LOW (ref 34.5–45)
HGB BLD-MCNC: 8.6 G/DL — LOW (ref 11.5–15.5)
IMM GRANULOCYTES # BLD AUTO: 1.07 K/UL — HIGH (ref 0–0.07)
IMM GRANULOCYTES NFR BLD AUTO: 3 % — HIGH (ref 0–0.9)
LACTATE SERPL-SCNC: 7.6 MMOL/L — CRITICAL HIGH (ref 0.7–2)
LYMPHOCYTES # BLD AUTO: 1.12 K/UL — SIGNIFICANT CHANGE UP (ref 1–3.3)
LYMPHOCYTES NFR BLD AUTO: 3.1 % — LOW (ref 13–44)
MAGNESIUM SERPL-MCNC: 1.8 MG/DL — SIGNIFICANT CHANGE UP (ref 1.6–2.6)
MCHC RBC-ENTMCNC: 30.6 PG — SIGNIFICANT CHANGE UP (ref 27–34)
MCHC RBC-ENTMCNC: 31.7 G/DL — LOW (ref 32–36)
MCV RBC AUTO: 96.4 FL — SIGNIFICANT CHANGE UP (ref 80–100)
METHOD TYPE: SIGNIFICANT CHANGE UP
MONOCYTES # BLD AUTO: 1.23 K/UL — HIGH (ref 0–0.9)
MONOCYTES NFR BLD AUTO: 3.4 % — SIGNIFICANT CHANGE UP (ref 2–14)
NEUTROPHILS # BLD AUTO: 32.32 K/UL — HIGH (ref 1.8–7.4)
NEUTROPHILS NFR BLD AUTO: 90 % — HIGH (ref 43–77)
NEUTS VAC BLD QL SMEAR: SLIGHT — SIGNIFICANT CHANGE UP
NRBC # BLD AUTO: 0 K/UL — SIGNIFICANT CHANGE UP (ref 0–0)
NRBC # FLD: 0 K/UL — SIGNIFICANT CHANGE UP (ref 0–0)
NRBC BLD AUTO-RTO: 0 /100 WBCS — SIGNIFICANT CHANGE UP (ref 0–0)
NT-PROBNP SERPL-SCNC: 7128 PG/ML — HIGH (ref 0–125)
ORGANISM # SPEC MICROSCOPIC CNT: ABNORMAL
ORGANISM # SPEC MICROSCOPIC CNT: SIGNIFICANT CHANGE UP
PHOSPHATE SERPL-MCNC: 3 MG/DL — SIGNIFICANT CHANGE UP (ref 2.5–4.5)
PLAT MORPH BLD: NORMAL — SIGNIFICANT CHANGE UP
PLATELET # BLD AUTO: 339 K/UL — SIGNIFICANT CHANGE UP (ref 150–400)
PMV BLD: 9.2 FL — SIGNIFICANT CHANGE UP (ref 7–13)
POTASSIUM SERPL-MCNC: 3.6 MMOL/L — SIGNIFICANT CHANGE UP (ref 3.5–5.3)
POTASSIUM SERPL-SCNC: 3.6 MMOL/L — SIGNIFICANT CHANGE UP (ref 3.5–5.3)
PROCALCITONIN SERPL-MCNC: 1.14 NG/ML — HIGH (ref 0.02–0.1)
PROT SERPL-MCNC: 6.3 GM/DL — SIGNIFICANT CHANGE UP (ref 6–8.3)
RBC # BLD: 2.81 M/UL — LOW (ref 3.8–5.2)
RBC # FLD: 18.5 % — HIGH (ref 10.3–14.5)
RBC BLD AUTO: SIGNIFICANT CHANGE UP
SODIUM SERPL-SCNC: 137 MMOL/L — SIGNIFICANT CHANGE UP (ref 135–145)
SPECIMEN SOURCE: SIGNIFICANT CHANGE UP
TOXIC GRANULES BLD QL SMEAR: PRESENT
WBC # BLD: 35.92 K/UL — HIGH (ref 3.8–10.5)
WBC # FLD AUTO: 35.92 K/UL — HIGH (ref 3.8–10.5)

## 2025-02-18 PROCEDURE — 99232 SBSQ HOSP IP/OBS MODERATE 35: CPT

## 2025-02-18 RX ORDER — MAGNESIUM SULFATE 500 MG/ML
1 SYRINGE (ML) INJECTION ONCE
Refills: 0 | Status: COMPLETED | OUTPATIENT
Start: 2025-02-18 | End: 2025-02-18

## 2025-02-18 RX ORDER — FUROSEMIDE 10 MG/ML
20 INJECTION INTRAMUSCULAR; INTRAVENOUS ONCE
Refills: 0 | Status: COMPLETED | OUTPATIENT
Start: 2025-02-18 | End: 2025-02-18

## 2025-02-18 RX ADMIN — Medication 1 APPLICATION(S): at 07:56

## 2025-02-18 RX ADMIN — SERTRALINE 50 MILLIGRAM(S): 100 TABLET, FILM COATED ORAL at 10:47

## 2025-02-18 RX ADMIN — ENOXAPARIN SODIUM 40 MILLIGRAM(S): 100 INJECTION SUBCUTANEOUS at 06:41

## 2025-02-18 RX ADMIN — Medication 40 MILLIGRAM(S): at 06:41

## 2025-02-18 RX ADMIN — Medication 25 GRAM(S): at 21:30

## 2025-02-18 RX ADMIN — Medication 100 GRAM(S): at 10:46

## 2025-02-18 RX ADMIN — Medication 40 MILLIEQUIVALENT(S): at 12:02

## 2025-02-18 RX ADMIN — ENOXAPARIN SODIUM 40 MILLIGRAM(S): 100 INJECTION SUBCUTANEOUS at 19:00

## 2025-02-18 RX ADMIN — Medication 25 GRAM(S): at 06:41

## 2025-02-18 RX ADMIN — OLANZAPINE 5 MILLIGRAM(S): 10 TABLET ORAL at 21:31

## 2025-02-18 RX ADMIN — FUROSEMIDE 20 MILLIGRAM(S): 10 INJECTION INTRAMUSCULAR; INTRAVENOUS at 10:47

## 2025-02-18 RX ADMIN — Medication 25 GRAM(S): at 14:01

## 2025-02-18 RX ADMIN — Medication 40 MILLIEQUIVALENT(S): at 14:01

## 2025-02-18 NOTE — PROGRESS NOTE ADULT - SUBJECTIVE AND OBJECTIVE BOX
Patient is a 54y old  Female who presents with a chief complaint of Shortness of Breath, metastatic endometrial CA, Pulmonary embolism (16 Feb 2025 07:56)      BRIEF HOSPITAL COURSE: 55 y/o F with a h/o stage IV endometrial cancer w/mets to the lungs s/p chemotherapy, immunotherapy, spontaneous b/l PTX s/p L Vats, pleurectomy, pleurodesis, SERINA wedge resection 03/2024, and now on new chemotherapy (trabectedin), admitted on 2/15 w/complaints of acute SOB. Patient w/associated tachycardia to 160s. Was placed on NIPPV Bi-Level. CTA reveals acute PE in R segmental and subsegmental, w/no evidence of RV strain, as well as bilateral loculated pleural effusions, worsening diffuse bilateral pulmonary metastases and liver metastases. Patient started on heparin gtt. Lab work significant for severe lactemia, however hemodynamically intact. TTE shows normal RV, however LVEF is newly reduced to 47% with global LV hypokinesis and moderate MR. Of note, she has been experiencing increased vaginal bleeding lately related endometrial mass.    Events last 24 hours: Persistent tachycardia. BP stable. On 2L NC. E coli growing in urine culture.        PAST MEDICAL & SURGICAL HISTORY:  Fibroid      H/O oral surgery  under general anesthesia during childhood      S/P D&C (status post dilation and curettage)  x2      S/P laparoscopy  as part of infertility work up      S/P tonsillectomy          Review of Systems:  CONSTITUTIONAL: No fever, chills, or fatigue  EYES: No eye pain, visual disturbances, or discharge  ENMT:  No difficulty hearing, tinnitus, vertigo; No sinus or throat pain  NECK: No pain or stiffness  RESPIRATORY: No cough, wheezing, chills or hemoptysis; No shortness of breath  CARDIOVASCULAR: No chest pain, palpitations, dizziness, or leg swelling  GASTROINTESTINAL: No abdominal or epigastric pain. No nausea, vomiting, or hematemesis; No diarrhea or constipation. No melena or hematochezia.  GENITOURINARY: No dysuria, frequency, hematuria, or incontinence  NEUROLOGICAL: No headaches, memory loss, loss of strength, numbness, or tremors  SKIN: No itching, burning, rashes, or lesions   MUSCULOSKELETAL: No joint pain or swelling; No muscle, back, or extremity pain  PSYCHIATRIC: No depression, anxiety, mood swings, or difficulty sleeping      Medications:  piperacillin/tazobactam IVPB.. 3.375 Gram(s) IV Intermittent every 8 hours        LORazepam     Tablet 0.5 milliGRAM(s) Oral every 8 hours PRN  OLANZapine 5 milliGRAM(s) Oral at bedtime  ondansetron Injectable 4 milliGRAM(s) IV Push every 6 hours PRN  sertraline 50 milliGRAM(s) Oral daily      enoxaparin Injectable 40 milliGRAM(s) SubCutaneous every 12 hours    pantoprazole    Tablet 40 milliGRAM(s) Oral before breakfast            chlorhexidine 2% Cloths 1 Application(s) Topical <User Schedule>            ICU Vital Signs Last 24 Hrs  T(C): 36.7 (17 Feb 2025 20:00), Max: 36.7 (17 Feb 2025 08:53)  T(F): 98 (17 Feb 2025 20:00), Max: 98.1 (17 Feb 2025 08:53)  HR: 117 (18 Feb 2025 03:00) (114 - 129)  BP: 107/63 (18 Feb 2025 03:00) (107/63 - 126/82)  BP(mean): 77 (18 Feb 2025 03:00) (77 - 94)  ABP: --  ABP(mean): --  RR: 24 (18 Feb 2025 03:00) (15 - 33)  SpO2: 99% (18 Feb 2025 03:00) (92% - 100%)    O2 Parameters below as of 18 Feb 2025 03:00  Patient On (Oxygen Delivery Method): nasal cannula w/ humidification  O2 Flow (L/min): 2              I&O's Detail    16 Feb 2025 07:01  -  17 Feb 2025 07:00  --------------------------------------------------------  IN:    Heparin Infusion: 126 mL    IV PiggyBack: 100 mL    Oral Fluid: 800 mL  Total IN: 1026 mL    OUT:  Total OUT: 0 mL    Total NET: 1026 mL      17 Feb 2025 07:01  -  18 Feb 2025 03:21  --------------------------------------------------------  IN:    IV PiggyBack: 100 mL  Total IN: 100 mL    OUT:  Total OUT: 0 mL    Total NET: 100 mL            LABS:                        9.8    42.60 )-----------( 376      ( 17 Feb 2025 15:29 )             31.2     02-17    134[L]  |  98  |  16  ----------------------------<  88  4.0   |  25  |  0.79    Ca    9.4      17 Feb 2025 15:29  Phos  2.6     02-17  Mg     1.9     02-17    TPro  6.3  /  Alb  3.1[L]  /  TBili  0.6  /  DBili  x   /  AST  84[H]  /  ALT  55  /  AlkPhos  65  02-17          CAPILLARY BLOOD GLUCOSE        PTT - ( 17 Feb 2025 15:29 )  PTT:39.3 sec  Urinalysis Basic - ( 17 Feb 2025 15:29 )    Color: x / Appearance: x / SG: x / pH: x  Gluc: 88 mg/dL / Ketone: x  / Bili: x / Urobili: x   Blood: x / Protein: x / Nitrite: x   Leuk Esterase: x / RBC: x / WBC x   Sq Epi: x / Non Sq Epi: x / Bacteria: x      CULTURES:  Culture Results:   10,000 - 49,000 CFU/mL Escherichia coli  <10,000 CFU/ml Normal Urogenital deb present (02-15-25 @ 23:33)  Culture Results:   No growth at 24 hours (02-15-25 @ 22:50)  Culture Results:   No growth at 24 hours (02-15-25 @ 22:50)        Physical Examination:    General: No acute distress.  Alert, oriented, interactive, nonfocal    HEENT: Pupils equal, reactive to light.  Symmetric.    PULM: Clear to auscultation bilaterally, no significant sputum production    CVS: tachycardic, reg rhythm, no murmurs, rubs, or gallops    ABD: Soft, distended, nontender, normoactive bowel sounds, no masses    EXT: bilateral lower extremity edema, nontender    SKIN: Warm and well perfused, no rashes noted.    NEURO: A&Ox3, strength 5/5 all extremities, cranial nerves grossly intact, no focal deficits          RADIOLOGY:     < from: CT Angio Chest PE Protocol w/ IV Cont (02.15.25 @ 20:50) >  FINDINGS:    LUNGS AND LARGE AIRWAYS: Patent central airways. Marked interval   worsening of metastatic disease compared with prior exam with numerous   new pulmonary masses and nodules and interval enlargement of previously   seen nodules. Mass/nodule in the right lower lobe measures up to 6.2 x   5.0 cm, largest mass/nodule in the right middle lobe abutting the   mediastinum measures 6.3 x 4.5 cm, largest mass in the left upper lobe   abutting the pleural surface measures 3.4 x 2.2 cm.  PLEURA: Interval resolution of loculated right-sided pneumothorax   compared with prior exam. Small bilateral loculated pleural effusions on   the current exam.  VESSELS: Adequate pulmonary arterial opacification. Filling defects in   right middle lobar segmental and subsegmental pulmonaryarteries. Main   pulmonary arteries not dilated. Thoracic aorta is normal in caliber.   Right IJ approach MediPort tip is in the right atrium.  HEART: Heart is enlarged. No pericardial effusion. No evidence of right   heart strain.  MEDIASTINUM AND PEDRO LUIS: Ill-defined mildly enlarged mediastinal and   bilateral hilar lymph nodes.  CHEST WALL AND LOWER NECK: Right chest wall MediPort. Mild generalized   soft tissue edema.  VISUALIZED UPPER ABDOMEN: Liver metastases with largest lesion measuring   upto 8.1 cm in the right hepatic lobe.  BONES: Mild degenerative changes. New indeterminate age compression   deformities of T4, T5, and T10. Mild chronic compression fractures of   T1-T3.    IMPRESSION:  Pulmonary emboli in right middle lobar segmental and subsegmental   branches. No evidence of right heart strain.    Extensive progression of disease with extensive worsening of metastatic   lung nodules and masses and liver metastases.    Interval resolution of loculated right-sided pneumothorax seen on prior   exam from 7/8/2024. New small bilateral loculated pleural effusions.    Indeterminate age mild compression deformities of T4, T5, and T10.    < end of copied text >

## 2025-02-18 NOTE — PROGRESS NOTE ADULT - ASSESSMENT
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 s/p chemotherapy, immunotherapy, spontaneous b/l PTX s/p L Vats, pleurectomy, pleurodesis, SERINA wedge resection 03/2024, and now on new chemotherapy (trabectedin), had first dose 3 weeks ago and second infusion day prior to admission presenting with acute SOB, found to be tachycardic to 160s, slightly hypertensive, placed on bipap in ED, found to have lactic acidosis, elevated pro-BNP, and CTA chest with acute PE R segmental/subsegmental without evidence of R heart strain also with worsening pulmonary metastases, started on hep gtt, empiric abx, admitted to ICU for close hemodynamic monitoring and further management.      Problems  #Acute hypoxic respiratory failure  #Acute PE   #Metabolic acidosis 2/2 lactic acidosis  #Sinus tachycardia  #Metastatic endometrial cancer     Plan:  - Mentation intact, cont to monitor  - BP stable, HR persists 100s-130s sinus tach which pt states is her baseline, TTE reviewed normal RV size/fxn, mild decreased LVEF 47%, mod MR, will continue gentle diuresis lasix 20mg IV x1 today given elevated BNP/echo findings/LE edema, lactate downtrending  - On 2L NC will titrate as tolerated, no longer needs NIV, c/w empiric abx for pna, lovenox for PE, diuresis for mild volume overload  - Reg diet as tolerated  - Cr and bicarb wnl, cont to monitor renal indices  - Afebrile, WBC downtrending but received neulasta 2/15, now downtrending, non-toxic appearing, c/w empiric zosyn for now would favor empiric 1 week course could complete with augmentin if cultures unrevealing, blood cxs ngtd, urine culture low colony count e. coli with negative UA  - Blood glucose wnl on BMP  - C/w therapeutic lovenox 1mg/kg BID (40mg BID), hgb 8.5 => 9.8 without intervention => 8.6,  monitor hgb/vaginal bleeding closely on lovenox, heme/onc following recs appreciated    Dispo: Pt medically stable for downgrade to 1N, d/w Dr. Carpenter, DNR/DNI, pt and family updated at bedside

## 2025-02-18 NOTE — PROGRESS NOTE ADULT - SUBJECTIVE AND OBJECTIVE BOX
INTERVAL HPI/OVERNIGHT EVENTS:  Patient S&E at bedside.   ucx positive for ecoli wtih sensitivities returned   bcx ntd   wbc trending down to 35, Hb 8.6, plt 339, cmp unrearmkable, lactate 7.6   pt still with vaginal bleeding with stringing clots   now on lovenox     PAST MEDICAL & SURGICAL HISTORY:  Fibroid      H/O oral surgery  under general anesthesia during childhood      S/P D&C (status post dilation and curettage)  x2      S/P laparoscopy  as part of infertility work up      S/P tonsillectomy          FAMILY HISTORY:  Family history of uterine cancer (Mother)        VITAL SIGNS:  T(F): 98.1 (02-18-25 @ 09:00)  HR: 117 (02-18-25 @ 09:00)  BP: 117/73 (02-18-25 @ 09:00)  RR: 20 (02-18-25 @ 09:00)  SpO2: 98% (02-18-25 @ 09:00)  Wt(kg): --    PHYSICAL EXAM:    Constitutional: NAD  Eyes: EOMI,   Respiratory: on nc   Cardiovascular: tachycardic   Gastrointestinal: soft, NTND  Neurological: AAOx3      MEDICATIONS  (STANDING):  chlorhexidine 2% Cloths 1 Application(s) Topical <User Schedule>  enoxaparin Injectable 40 milliGRAM(s) SubCutaneous every 12 hours  magnesium sulfate  IVPB 1 Gram(s) IV Intermittent once  OLANZapine 5 milliGRAM(s) Oral at bedtime  pantoprazole    Tablet 40 milliGRAM(s) Oral before breakfast  piperacillin/tazobactam IVPB.. 3.375 Gram(s) IV Intermittent every 8 hours  sertraline 50 milliGRAM(s) Oral daily    MEDICATIONS  (PRN):  LORazepam     Tablet 0.5 milliGRAM(s) Oral every 8 hours PRN Anxiety  ondansetron Injectable 4 milliGRAM(s) IV Push every 6 hours PRN Nausea and/or Vomiting      Allergies    No Known Allergies    Intolerances    shellfish (Vomiting)      LABS:                        8.6    35.92 )-----------( 339      ( 18 Feb 2025 05:24 )             27.1     02-18    137  |  100  |  15  ----------------------------<  74  3.6   |  24  |  0.74    Ca    9.0      18 Feb 2025 05:24  Phos  3.0     02-18  Mg     1.8     02-18    TPro  6.3  /  Alb  3.2[L]  /  TBili  0.5  /  DBili  x   /  AST  72[H]  /  ALT  54  /  AlkPhos  79  02-18    PTT - ( 17 Feb 2025 15:29 )  PTT:39.3 sec  Urinalysis Basic - ( 18 Feb 2025 05:24 )    Color: x / Appearance: x / SG: x / pH: x  Gluc: 74 mg/dL / Ketone: x  / Bili: x / Urobili: x   Blood: x / Protein: x / Nitrite: x   Leuk Esterase: x / RBC: x / WBC x   Sq Epi: x / Non Sq Epi: x / Bacteria: x        RADIOLOGY & ADDITIONAL TESTS:  Studies reviewed.

## 2025-02-18 NOTE — PROGRESS NOTE ADULT - SUBJECTIVE AND OBJECTIVE BOX
OVERNIGHT EVENTS / SUBJECTIVE: Patient seen and examined at bedside.     OBJECTIVE:    VITAL SIGNS:  ICU Vital Signs Last 24 Hrs  T(C): 36.7 (18 Feb 2025 06:00), Max: 36.7 (17 Feb 2025 20:00)  T(F): 98 (18 Feb 2025 06:00), Max: 98 (17 Feb 2025 20:00)  HR: 115 (18 Feb 2025 08:00) (114 - 129)  BP: 115/73 (18 Feb 2025 08:00) (107/63 - 126/82)  BP(mean): 85 (18 Feb 2025 08:00) (77 - 94)  ABP: --  ABP(mean): --  RR: 22 (18 Feb 2025 08:00) (15 - 33)  SpO2: 98% (18 Feb 2025 08:00) (95% - 100%)    O2 Parameters below as of 18 Feb 2025 08:00  Patient On (Oxygen Delivery Method): nasal cannula w/ humidification  O2 Flow (L/min): 2            02-17 @ 07:01  -  02-18 @ 07:00  --------------------------------------------------------  IN: 100 mL / OUT: 400 mL / NET: -300 mL      CAPILLARY BLOOD GLUCOSE          PHYSICAL EXAM:    General: NAD  HEENT: NC/AT; PERRL, clear conjunctiva  Neck: supple  Respiratory: CTA b/l  Cardiovascular: +S1/S2; RRR  Abdomen: soft, NT/ND; +BS x4  Extremities: WWP, 2+ peripheral pulses b/l; no LE edema  Skin: normal color and turgor; no rash  Neurological:    MEDICATIONS:  MEDICATIONS  (STANDING):  chlorhexidine 2% Cloths 1 Application(s) Topical <User Schedule>  enoxaparin Injectable 40 milliGRAM(s) SubCutaneous every 12 hours  magnesium sulfate  IVPB 1 Gram(s) IV Intermittent once  OLANZapine 5 milliGRAM(s) Oral at bedtime  pantoprazole    Tablet 40 milliGRAM(s) Oral before breakfast  piperacillin/tazobactam IVPB.. 3.375 Gram(s) IV Intermittent every 8 hours  sertraline 50 milliGRAM(s) Oral daily    MEDICATIONS  (PRN):  LORazepam     Tablet 0.5 milliGRAM(s) Oral every 8 hours PRN Anxiety  ondansetron Injectable 4 milliGRAM(s) IV Push every 6 hours PRN Nausea and/or Vomiting      ALLERGIES:  Allergies    No Known Allergies    Intolerances    shellfish (Vomiting)      LABS:                        8.6    35.92 )-----------( 339      ( 18 Feb 2025 05:24 )             27.1     Hemoglobin: 8.6 g/dL (02-18 @ 05:24)  Hemoglobin: 9.8 g/dL (02-17 @ 15:29)  Hemoglobin: 8.5 g/dL (02-17 @ 05:17)  Hemoglobin: 9.4 g/dL (02-16 @ 07:00)  Hemoglobin: 9.7 g/dL (02-15 @ 20:27)    CBC Full  -  ( 18 Feb 2025 05:24 )  WBC Count : 35.92 K/uL  RBC Count : 2.81 M/uL  Hemoglobin : 8.6 g/dL  Hematocrit : 27.1 %  Platelet Count - Automated : 339 K/uL  Mean Cell Volume : 96.4 fl  Mean Cell Hemoglobin : 30.6 pg  Mean Cell Hemoglobin Concentration : 31.7 g/dL  Auto Neutrophil # : 32.32 K/uL  Auto Lymphocyte # : 1.12 K/uL  Auto Monocyte # : 1.23 K/uL  Auto Eosinophil # : 0.03 K/uL  Auto Basophil # : 0.15 K/uL  Auto Neutrophil % : 90.0 %  Auto Lymphocyte % : 3.1 %  Auto Monocyte % : 3.4 %  Auto Eosinophil % : 0.1 %  Auto Basophil % : 0.4 %    02-18    137  |  100  |  15  ----------------------------<  74  3.6   |  24  |  0.74    Ca    9.0      18 Feb 2025 05:24  Phos  3.0     02-18  Mg     1.8     02-18    TPro  6.3  /  Alb  3.2[L]  /  TBili  0.5  /  DBili  x   /  AST  72[H]  /  ALT  54  /  AlkPhos  79  02-18    Creatinine Trend: 0.74<--, 0.79<--, 0.90<--, 1.06<--, 0.92<--, 0.90<--  LIVER FUNCTIONS - ( 18 Feb 2025 05:24 )  Alb: 3.2 g/dL / Pro: 6.3 gm/dL / ALK PHOS: 79 U/L / ALT: 54 U/L / AST: 72 U/L / GGT: x           PTT - ( 17 Feb 2025 15:29 )  PTT:39.3 sec    hs Troponin:            Urinalysis Basic - ( 18 Feb 2025 05:24 )    Color: x / Appearance: x / SG: x / pH: x  Gluc: 74 mg/dL / Ketone: x  / Bili: x / Urobili: x   Blood: x / Protein: x / Nitrite: x   Leuk Esterase: x / RBC: x / WBC x   Sq Epi: x / Non Sq Epi: x / Bacteria: x      CSF:                      EKG:   MICROBIOLOGY:    Culture - Urine (collected 15 Feb 2025 23:33)  Source: Catheterized Catheterized  Final Report (18 Feb 2025 07:52):    10,000 - 49,000 CFU/mL Escherichia coli    <10,000 CFU/ml Normal Urogenital deb present  Organism: Escherichia coli (18 Feb 2025 07:52)  Organism: Escherichia coli (18 Feb 2025 07:52)    Culture - Blood (collected 15 Feb 2025 22:50)  Source: .Blood BLOOD  Preliminary Report (18 Feb 2025 05:01):    No growth at 48 Hours    Culture - Blood (collected 15 Feb 2025 22:50)  Source: .Blood BLOOD  Preliminary Report (18 Feb 2025 05:01):    No growth at 48 Hours      IMAGING:      Labs, imaging, EKG personally reviewed    RADIOLOGY & ADDITIONAL TESTS: Reviewed. OVERNIGHT EVENTS / SUBJECTIVE: Patient seen and examined at bedside.     No acute events overnight. Lactate downtrending still to 7.6. Hgb overall stable 8.5 yest AM to 8.6 this AM no pRBC received, WBC downtrending, BNP downtrending, pt reports SOB feels improved, notes vaginal bleeding about the same as yesterday.     OBJECTIVE:    VITAL SIGNS:  ICU Vital Signs Last 24 Hrs  T(C): 36.7 (18 Feb 2025 06:00), Max: 36.7 (17 Feb 2025 20:00)  T(F): 98 (18 Feb 2025 06:00), Max: 98 (17 Feb 2025 20:00)  HR: 115 (18 Feb 2025 08:00) (114 - 129)  BP: 115/73 (18 Feb 2025 08:00) (107/63 - 126/82)  BP(mean): 85 (18 Feb 2025 08:00) (77 - 94)  ABP: --  ABP(mean): --  RR: 22 (18 Feb 2025 08:00) (15 - 33)  SpO2: 98% (18 Feb 2025 08:00) (95% - 100%)    O2 Parameters below as of 18 Feb 2025 08:00  Patient On (Oxygen Delivery Method): nasal cannula w/ humidification  O2 Flow (L/min): 2            02-17 @ 07:01  -  02-18 @ 07:00  --------------------------------------------------------  IN: 100 mL / OUT: 400 mL / NET: -300 mL      CAPILLARY BLOOD GLUCOSE          PHYSICAL EXAM:      General: NAD, frail-appearing  HEENT: NC/AT; PERRL, clear conjunctiva  Neck: supple  Respiratory: Scattered rhonchi  Cardiovascular: Tachycardic, regular rhythm  Abdomen: soft, NT/ND  Extremities: Warm, +1 LE edema  Skin: normal color and turgor; no rash  Neurological: A&Ox3, no focal deficit    MEDICATIONS:  MEDICATIONS  (STANDING):  chlorhexidine 2% Cloths 1 Application(s) Topical <User Schedule>  enoxaparin Injectable 40 milliGRAM(s) SubCutaneous every 12 hours  magnesium sulfate  IVPB 1 Gram(s) IV Intermittent once  OLANZapine 5 milliGRAM(s) Oral at bedtime  pantoprazole    Tablet 40 milliGRAM(s) Oral before breakfast  piperacillin/tazobactam IVPB.. 3.375 Gram(s) IV Intermittent every 8 hours  sertraline 50 milliGRAM(s) Oral daily    MEDICATIONS  (PRN):  LORazepam     Tablet 0.5 milliGRAM(s) Oral every 8 hours PRN Anxiety  ondansetron Injectable 4 milliGRAM(s) IV Push every 6 hours PRN Nausea and/or Vomiting      ALLERGIES:  Allergies    No Known Allergies    Intolerances    shellfish (Vomiting)      LABS:                        8.6    35.92 )-----------( 339      ( 18 Feb 2025 05:24 )             27.1     Hemoglobin: 8.6 g/dL (02-18 @ 05:24)  Hemoglobin: 9.8 g/dL (02-17 @ 15:29)  Hemoglobin: 8.5 g/dL (02-17 @ 05:17)  Hemoglobin: 9.4 g/dL (02-16 @ 07:00)  Hemoglobin: 9.7 g/dL (02-15 @ 20:27)    CBC Full  -  ( 18 Feb 2025 05:24 )  WBC Count : 35.92 K/uL  RBC Count : 2.81 M/uL  Hemoglobin : 8.6 g/dL  Hematocrit : 27.1 %  Platelet Count - Automated : 339 K/uL  Mean Cell Volume : 96.4 fl  Mean Cell Hemoglobin : 30.6 pg  Mean Cell Hemoglobin Concentration : 31.7 g/dL  Auto Neutrophil # : 32.32 K/uL  Auto Lymphocyte # : 1.12 K/uL  Auto Monocyte # : 1.23 K/uL  Auto Eosinophil # : 0.03 K/uL  Auto Basophil # : 0.15 K/uL  Auto Neutrophil % : 90.0 %  Auto Lymphocyte % : 3.1 %  Auto Monocyte % : 3.4 %  Auto Eosinophil % : 0.1 %  Auto Basophil % : 0.4 %    02-18    137  |  100  |  15  ----------------------------<  74  3.6   |  24  |  0.74    Ca    9.0      18 Feb 2025 05:24  Phos  3.0     02-18  Mg     1.8     02-18    TPro  6.3  /  Alb  3.2[L]  /  TBili  0.5  /  DBili  x   /  AST  72[H]  /  ALT  54  /  AlkPhos  79  02-18    Creatinine Trend: 0.74<--, 0.79<--, 0.90<--, 1.06<--, 0.92<--, 0.90<--  LIVER FUNCTIONS - ( 18 Feb 2025 05:24 )  Alb: 3.2 g/dL / Pro: 6.3 gm/dL / ALK PHOS: 79 U/L / ALT: 54 U/L / AST: 72 U/L / GGT: x           PTT - ( 17 Feb 2025 15:29 )  PTT:39.3 sec    hs Troponin:            Urinalysis Basic - ( 18 Feb 2025 05:24 )    Color: x / Appearance: x / SG: x / pH: x  Gluc: 74 mg/dL / Ketone: x  / Bili: x / Urobili: x   Blood: x / Protein: x / Nitrite: x   Leuk Esterase: x / RBC: x / WBC x   Sq Epi: x / Non Sq Epi: x / Bacteria: x      CSF:                      EKG:   MICROBIOLOGY:    Culture - Urine (collected 15 Feb 2025 23:33)  Source: Catheterized Catheterized  Final Report (18 Feb 2025 07:52):    10,000 - 49,000 CFU/mL Escherichia coli    <10,000 CFU/ml Normal Urogenital deb present  Organism: Escherichia coli (18 Feb 2025 07:52)  Organism: Escherichia coli (18 Feb 2025 07:52)    Culture - Blood (collected 15 Feb 2025 22:50)  Source: .Blood BLOOD  Preliminary Report (18 Feb 2025 05:01):    No growth at 48 Hours    Culture - Blood (collected 15 Feb 2025 22:50)  Source: .Blood BLOOD  Preliminary Report (18 Feb 2025 05:01):    No growth at 48 Hours      IMAGING:      Labs, imaging, EKG personally reviewed    RADIOLOGY & ADDITIONAL TESTS: Reviewed.

## 2025-02-18 NOTE — PROGRESS NOTE ADULT - ASSESSMENT
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 s/p chemotherapy, immunotherapy, spontaneous b/l PTX s/p L Vats, pleurectomy, pleurodesis, SERINA wedge resection 03/2024, and now on new chemotherapy (trabectedin), with:    # Acute pulmonary embolism  # New onset cardiomyopathy  # Moderate MR  # Lactic acidosis  # Acute hypoxemic respiratory failure  # Bilateral loculated pleural effusions  # Liver metastases   # E coli UTI    Malignancy, Warburg effect and hepatic metastases may be driving lactemia (13 --> 9) as she remains hemodynamically intact and appears warm and well perfused clinically. TTE shows newly reduced LVEF to 47% with global LV hypokinesis and moderate MR (mild in 2024). No clinical evidence of low output heart failure at this time. Gently diuresed after pro-BNP increased to 14K.    Heparin infusion switched to therapeutic enoxaparin as this is a more effective anticoagulation method with malignancy and PE. Bilateral lower extremity venous dopplers are negative for DVT.    Actively titrating O2 flow rate to maintain SpO2 > 92%. Low threshold to escalate to HFNC.     E coli sensitivities are pending. Blood cultures are unremarkable x 24 hours. Continue empiric Zosyn for now.      Case discussed with the patient at the bedside. Diagnosis, prognosis, and management plan outlined. All questions answered and concerns addressed.      53 minutes accounts for the total time spent on this patient encounter, which includes assessing and addressing the problems and their associated risks as mentioned above, reviewing the medical record/laboratory data/imaging studies, interviewing and physically examining the patient, as well as coordinating care with the multidisciplinary team. The date of entry of this note reflects the date of services rendered.

## 2025-02-18 NOTE — PROGRESS NOTE ADULT - ASSESSMENT
54-year-old female with progressive metastatic high-grade uterine sarcoma presenting with acute hypoxic respiratory failure secondary to ACUTE PE.     1. Disease Status:  Patient demonstrates aggressive disease progression through multiple lines of therapy including conventional cytotoxic agents (gemcitabine/docetaxel, doxorubicin), targeted therapy (pazopanib), and most recently trabectedin. Molecular profiling reveals [id number] fusion, which may guide future therapeutic options as patient is presntly on the waiting list for [facility] clinical trial.  Recent imaging demonstrates significant progression across all disease sites with concerning growth rates of both primary tumor and metastatic lesions.     2. Acute Respiratory Failure - Multifactorial:  a) Acute PE: Currently on therapeutic anticoagulation with heparin  b) Progressive pulmonary metastases  c) Historical pneumothoraces status post pleurodesis    3. End-Organ Dysfunction:  a) Significant lactic acidosis (14.3-->11--->9.2 today) suggesting tissue hypoperfusion - possible Warberg effect   b) Marked troponemia (94.4) indicating possible cardiac involvement/strain- pt remains tachycardic   c) Active vaginal bleeding complicating anticoagulation management    4. Treatment Planning:  Scheduled to initiate radiation therapy to primary tumor and hepatic disease, though clinical deterioration may impact feasibility.     5. Goals of Care:  patient has opted for DNR/DNI status and declined trial of hemodialysis per discussion with ICU PA- night team.   Current clinical trajectory suggests poor prognosis  yet patient would like to continue to pursue planned radiation therapy.     PLAN:   #Metastatic Uterine Sarcoma  - most recently received treatment 2/13- neulasta given 2/15  - Hold Megace due to VTE risk  - plan to f/u with RT at [facility] for tx on Friday     #Acute PE/Anticoagulation  -2D ECHO RESULTS PENDING - EF decreased 47% no apparent signs of RHS   - NO ACUTE LOWER EXT DVT  - now on lovenox 1 mg/kg bid   - no vascular intervention   - will need to monitor vaginal bleeding- Hb droppped from 9.8 to 8.6 today     #Respiratory Status  - Oxygen titration to maintain sats >92%  - multifactorial secondary to disease and recent PE   - remains tachycardic/tachypneic - on zosyn   - leukocytosis likely 2/2 neulasta given on 2/15 confirmed with [facility]     #Disposition  - Continue ICU level care  - Daily reassessment of goals of care  - Coordinate with [facility] team regarding overall treatment strategy

## 2025-02-19 DIAGNOSIS — J96.01 ACUTE RESPIRATORY FAILURE WITH HYPOXIA: ICD-10-CM

## 2025-02-19 LAB
ALBUMIN SERPL ELPH-MCNC: 3.2 G/DL — LOW (ref 3.3–5)
ALP SERPL-CCNC: 92 U/L — SIGNIFICANT CHANGE UP (ref 40–120)
ALT FLD-CCNC: 46 U/L — SIGNIFICANT CHANGE UP (ref 12–78)
ANION GAP SERPL CALC-SCNC: 11 MMOL/L — SIGNIFICANT CHANGE UP (ref 5–17)
ANISOCYTOSIS BLD QL: SLIGHT — SIGNIFICANT CHANGE UP
AST SERPL-CCNC: 57 U/L — HIGH (ref 15–37)
BASOPHILS # BLD AUTO: 0.13 K/UL — SIGNIFICANT CHANGE UP (ref 0–0.2)
BASOPHILS # BLD MANUAL: 0 K/UL — SIGNIFICANT CHANGE UP (ref 0–0.2)
BASOPHILS NFR BLD AUTO: 0.3 % — SIGNIFICANT CHANGE UP (ref 0–2)
BASOPHILS NFR BLD MANUAL: 0 % — SIGNIFICANT CHANGE UP (ref 0–2)
BILIRUB SERPL-MCNC: 0.4 MG/DL — SIGNIFICANT CHANGE UP (ref 0.2–1.2)
BUN SERPL-MCNC: 15 MG/DL — SIGNIFICANT CHANGE UP (ref 7–23)
CALCIUM SERPL-MCNC: 8.9 MG/DL — SIGNIFICANT CHANGE UP (ref 8.5–10.1)
CHLORIDE SERPL-SCNC: 101 MMOL/L — SIGNIFICANT CHANGE UP (ref 96–108)
CO2 SERPL-SCNC: 26 MMOL/L — SIGNIFICANT CHANGE UP (ref 22–31)
CREAT SERPL-MCNC: 0.92 MG/DL — SIGNIFICANT CHANGE UP (ref 0.5–1.3)
EGFR: 73 ML/MIN/1.73M2 — SIGNIFICANT CHANGE UP
EOSINOPHIL # BLD AUTO: 0.12 K/UL — SIGNIFICANT CHANGE UP (ref 0–0.5)
EOSINOPHIL # BLD MANUAL: 0 K/UL — SIGNIFICANT CHANGE UP (ref 0–0.5)
EOSINOPHIL NFR BLD AUTO: 0.3 % — SIGNIFICANT CHANGE UP (ref 0–6)
EOSINOPHIL NFR BLD MANUAL: 0 % — SIGNIFICANT CHANGE UP (ref 0–6)
GLUCOSE SERPL-MCNC: 76 MG/DL — SIGNIFICANT CHANGE UP (ref 70–99)
HCT VFR BLD CALC: 27.8 % — LOW (ref 34.5–45)
HGB BLD-MCNC: 8.7 G/DL — LOW (ref 11.5–15.5)
IMM GRANULOCYTES # BLD AUTO: 1.43 K/UL — HIGH (ref 0–0.07)
IMM GRANULOCYTES NFR BLD AUTO: 3.7 % — HIGH (ref 0–0.9)
LACTATE SERPL-SCNC: 8.2 MMOL/L — CRITICAL HIGH (ref 0.7–2)
LYMPHOCYTES # BLD AUTO: 1.17 K/UL — SIGNIFICANT CHANGE UP (ref 1–3.3)
LYMPHOCYTES # BLD MANUAL: 1.08 K/UL — SIGNIFICANT CHANGE UP (ref 1–3.3)
LYMPHOCYTES NFR BLD AUTO: 3.1 % — LOW (ref 13–44)
LYMPHOCYTES NFR BLD MANUAL: 3 % — LOW (ref 13–44)
MACROCYTES BLD QL: SLIGHT — SIGNIFICANT CHANGE UP
MAGNESIUM SERPL-MCNC: 2 MG/DL — SIGNIFICANT CHANGE UP (ref 1.6–2.6)
MANUAL NEUTROPHIL BANDS #: 1.08 K/UL — HIGH (ref 0–0.84)
MANUAL SMEAR VERIFICATION: SIGNIFICANT CHANGE UP
MCHC RBC-ENTMCNC: 30 PG — SIGNIFICANT CHANGE UP (ref 27–34)
MCHC RBC-ENTMCNC: 31.3 G/DL — LOW (ref 32–36)
MCV RBC AUTO: 95.9 FL — SIGNIFICANT CHANGE UP (ref 80–100)
MICROCYTES BLD QL: SLIGHT — SIGNIFICANT CHANGE UP
MONOCYTES # BLD AUTO: 1.06 K/UL — HIGH (ref 0–0.9)
MONOCYTES # BLD MANUAL: 0.72 K/UL — SIGNIFICANT CHANGE UP (ref 0–0.9)
MONOCYTES NFR BLD AUTO: 2.8 % — SIGNIFICANT CHANGE UP (ref 2–14)
MONOCYTES NFR BLD MANUAL: 2 % — SIGNIFICANT CHANGE UP (ref 2–14)
NEUTROPHILS # BLD AUTO: 34.24 K/UL — HIGH (ref 1.8–7.4)
NEUTROPHILS # BLD MANUAL: 33.05 K/UL — HIGH (ref 1.8–7.4)
NEUTROPHILS NFR BLD AUTO: 89.8 % — HIGH (ref 43–77)
NEUTROPHILS NFR BLD MANUAL: 92 % — HIGH (ref 43–77)
NEUTS BAND # BLD: 3 % — SIGNIFICANT CHANGE UP (ref 0–8)
NEUTS BAND NFR BLD: 3 % — SIGNIFICANT CHANGE UP (ref 0–8)
NRBC # BLD AUTO: 0 K/UL — SIGNIFICANT CHANGE UP (ref 0–0)
NRBC # FLD: 0 K/UL — SIGNIFICANT CHANGE UP (ref 0–0)
NRBC BLD AUTO-RTO: 0 /100 WBCS — SIGNIFICANT CHANGE UP (ref 0–0)
NT-PROBNP SERPL-SCNC: 4599 PG/ML — HIGH (ref 0–125)
PHOSPHATE SERPL-MCNC: 3.1 MG/DL — SIGNIFICANT CHANGE UP (ref 2.5–4.5)
PLATELET # BLD AUTO: 362 K/UL — SIGNIFICANT CHANGE UP (ref 150–400)
PMV BLD: 9.4 FL — SIGNIFICANT CHANGE UP (ref 7–13)
POLYCHROMASIA BLD QL SMEAR: SLIGHT — SIGNIFICANT CHANGE UP
POTASSIUM SERPL-MCNC: 4.1 MMOL/L — SIGNIFICANT CHANGE UP (ref 3.5–5.3)
POTASSIUM SERPL-SCNC: 4.1 MMOL/L — SIGNIFICANT CHANGE UP (ref 3.5–5.3)
PROT SERPL-MCNC: 6.5 GM/DL — SIGNIFICANT CHANGE UP (ref 6–8.3)
RBC # BLD: 2.9 M/UL — LOW (ref 3.8–5.2)
RBC # FLD: 19 % — HIGH (ref 10.3–14.5)
SODIUM SERPL-SCNC: 138 MMOL/L — SIGNIFICANT CHANGE UP (ref 135–145)
WBC # BLD: 38.15 K/UL — HIGH (ref 3.8–10.5)
WBC # FLD AUTO: 38.15 K/UL — HIGH (ref 3.8–10.5)
WBC MORPHOLOGY: ABNORMAL

## 2025-02-19 PROCEDURE — 99223 1ST HOSP IP/OBS HIGH 75: CPT

## 2025-02-19 PROCEDURE — 99233 SBSQ HOSP IP/OBS HIGH 50: CPT

## 2025-02-19 RX ADMIN — ENOXAPARIN SODIUM 40 MILLIGRAM(S): 100 INJECTION SUBCUTANEOUS at 17:38

## 2025-02-19 RX ADMIN — Medication 4 MILLIGRAM(S): at 08:37

## 2025-02-19 RX ADMIN — Medication 25 GRAM(S): at 22:18

## 2025-02-19 RX ADMIN — Medication 40 MILLIGRAM(S): at 06:12

## 2025-02-19 RX ADMIN — SERTRALINE 50 MILLIGRAM(S): 100 TABLET, FILM COATED ORAL at 09:43

## 2025-02-19 RX ADMIN — OLANZAPINE 5 MILLIGRAM(S): 10 TABLET ORAL at 22:18

## 2025-02-19 RX ADMIN — Medication 25 GRAM(S): at 05:49

## 2025-02-19 RX ADMIN — Medication 25 GRAM(S): at 13:05

## 2025-02-19 RX ADMIN — ENOXAPARIN SODIUM 40 MILLIGRAM(S): 100 INJECTION SUBCUTANEOUS at 05:50

## 2025-02-19 NOTE — PROGRESS NOTE ADULT - SUBJECTIVE AND OBJECTIVE BOX
Chief Complaint: Patient is a 54y old  Female who presents with a chief complaint of Shortness of Breath, metastatic endometrial CA, Pulmonary embolism (16 Feb 2025 07:56)      Interval events:   - No acute events overnight, Remains sinus tachycardiac which is baseline per patient, rest of VSS on RA  - Feeling better, ambulating unit without significant SOB, remains on lovenox BID  - lactate remains persistently elevated, 8.2 today (peak 14.3 this admission), likely due to Warberg effect per heme/onc    ROS:   Patient denies any current chest pain, cough, f/c/n/v/d, abd pain, LE edema, myalgias, dysuria, HA, dizziness  All other review of systems is negative unless indicated above    Physical Exam:  Vital Signs Last 24 Hrs  T(C): 36.5 (19 Feb 2025 10:00), Max: 36.8 (19 Feb 2025 06:00)  T(F): 97.7 (19 Feb 2025 10:00), Max: 98.2 (19 Feb 2025 06:00)  HR: 121 (19 Feb 2025 13:00) (110 - 124)  BP: 116/76 (19 Feb 2025 13:00) (108/71 - 124/81)  BP(mean): 88 (19 Feb 2025 13:00) (76 - 93)  RR: 22 (19 Feb 2025 13:00) (17 - 30)  SpO2: 94% (19 Feb 2025 13:00) (92% - 99%)    Parameters below as of 19 Feb 2025 12:00  Patient On (Oxygen Delivery Method): room air    Constitutional: NAD, awake and alert, chronically ill appearing  HEENT: PERRLA, EOMI, MMM  Respiratory: Breath sounds are clear bilaterally, No wheezing, rales or rhonchi  Cardiovascular: S1 and S2, RRR, no murmurs, gallops or rubs  Gastrointestinal: + stiff abdomen, non-tender, +distended, no CVA tenderness  Extremities: No peripheral edema, +DP pulses b/l  Neurological: A&O x 3, no focal deficits  Musculoskeletal: 5/5 strength b/l upper and lower extremities  Skin: Normal, skin warm and dry    Labs:  02-19 @ 05:48  Glucose 76 mg/dL  HCO3 26 mmol/L  Chloride 101 mmol/L  Sodium 138 mmol/L>   Potassium 4.1 mmol/L  Creatinine 0.92 mg/dL  Calcium 8.9 mg/dL  BUN 15 mg/dL  eGFR 73 mL/min/1.73m2  Anion gap 11 mmol/L    WBC 38.15  Hemoglobin 8.7  Hemoatocrit 27.8  Platelet count 362          Cardiac testing:  Troponin I, High Sensitivity Result: 32.17 ng/L (02-17-25 @ 05:17)  Troponin I, High Sensitivity Result: 94.60 ng/L (02-16-25 @ 07:00)  Troponin I, High Sensitivity Result: 30.54 ng/L (02-15-25 @ 22:50)  Troponin I, High Sensitivity Result: 11.62 ng/L (02-15-25 @ 20:27)        12 Lead ECG:   Ventricular Rate 142 BPM    Atrial Rate 142 BPM    P-R Interval 104 ms    QRS Duration 74 ms    Q-T Interval 356 ms    QTC Calculation(Bazett) 547 ms    P Axis 46 degrees    R Axis 49 degrees    T Axis 3 degrees    Diagnosis Line *** Critical Test Result: High HR  Sinus tachycardia with short [place]  ST & T wave abnormality, consider inferolateral ischemia  Abnormal ECG  When compared with ECG of 26-APR-2024 15:52,  No significant change was found  Confirmed by BURTON SEE ([id number]) on 2/16/2025 2:02:21 PM (02-15-25 @ 19:42)        Micro:  BCx NGTD  UCx 10-49K E. Coli     Radiology:  < from: CT Angio Chest PE Protocol w/ IV Cont (02.15.25 @ 20:50) >  IMPRESSION:  Pulmonary emboli in right middle lobar segmental and subsegmental   branches. No evidence of right heart strain.    Extensive progression of disease with extensive worsening of metastatic   lung nodules and masses and liver metastases.    Interval resolution of loculated right-sided pneumothorax seen on prior   exam from 7/8/2024. New small bilateral loculated pleural effusions.    Indeterminate age mild compression deformities of T4, T5, and T10.    < end of copied text >  < from: US Duplex Venous Lower Ext Complete, Bilateral (02.16.25 @ 11:05) >  IMPRESSION:    No acute DVT of the right or left lower extremity.    < end of copied text >      Medications:  MEDICATIONS  (STANDING):  enoxaparin Injectable 40 milliGRAM(s) SubCutaneous every 12 hours  OLANZapine 5 milliGRAM(s) Oral at bedtime  pantoprazole    Tablet 40 milliGRAM(s) Oral before breakfast  piperacillin/tazobactam IVPB.. 3.375 Gram(s) IV Intermittent every 8 hours  sertraline 50 milliGRAM(s) Oral daily    MEDICATIONS  (PRN):  LORazepam     Tablet 0.5 milliGRAM(s) Oral every 8 hours PRN Anxiety  ondansetron Injectable 4 milliGRAM(s) IV Push every 6 hours PRN Nausea and/or Vomiting      Time based billing:   > 51 minutes of total time met or exceeded on this patient encounter. Time spent excludes time spent on separately reportable services/teaching during the encounter.

## 2025-02-19 NOTE — CONSULT NOTE ADULT - MOLST COMPLETED
19-Feb-2025 Cheek Interpolation Flap Text: A decision was made to reconstruct the defect utilizing an interpolation axial flap and a staged reconstruction.  A telfa template was made of the defect.  This telfa template was then used to outline the Cheek Interpolation flap.  The donor area for the pedicle flap was then injected with anesthesia.  The flap was excised through the skin and subcutaneous tissue down to the layer of the underlying musculature.  The interpolation flap was carefully excised within this deep plane to maintain its blood supply.  The edges of the donor site were undermined.   The donor site was closed in a primary fashion.  The pedicle was then rotated into position and sutured.  Once the tube was sutured into place, adequate blood supply was confirmed with blanching and refill.  The pedicle was then wrapped with xeroform gauze and dressed appropriately with a telfa and gauze bandage to ensure continued blood supply and protect the attached pedicle.

## 2025-02-19 NOTE — PROGRESS NOTE ADULT - ASSESSMENT
54-year-old female with progressive metastatic high-grade uterine sarcoma presenting with acute hypoxic respiratory failure secondary to ACUTE PE.     1. Disease Status:  Patient demonstrates aggressive disease progression through multiple lines of therapy including conventional cytotoxic agents (gemcitabine/docetaxel, doxorubicin), targeted therapy (pazopanib), and most recently trabectedin. Molecular profiling reveals [id number] fusion, which may guide future therapeutic options as patient is presntly on the waiting list for [facility] clinical trial.  Recent imaging demonstrates significant progression across all disease sites with concerning growth rates of both primary tumor and metastatic lesions.     2. Acute Respiratory Failure - Multifactorial:  a) Acute PE: Currently on therapeutic anticoagulation with heparin  b) Progressive pulmonary metastases  c) Historical pneumothoraces status post pleurodesis    3. End-Organ Dysfunction:  a) Significant lactic acidosis (14.3-->11--->9.2-->8.2 today) suggesting tissue hypoperfusion - possible Warberg effect   b) Marked troponemia (94.4) indicating possible cardiac involvement/strain- pt remains tachycardic   c) Active vaginal bleeding complicating anticoagulation management    4. Treatment Planning:  Scheduled to initiate radiation therapy to primary tumor and hepatic disease, though clinical deterioration may impact feasibility.     5. Goals of Care:  patient has opted for DNR/DNI status and declined trial of hemodialysis per discussion with ICU PA- night team.   Current clinical trajectory suggests poor prognosis  yet patient would like to continue to pursue planned radiation therapy.     PLAN:   #Metastatic Uterine Sarcoma  - most recently received treatment 2/13- neulasta given 2/15  - Hold Megace due to VTE risk  - plan to f/u with RT at [facility] [place] for tx on Friday     #Acute PE/Anticoagulation  -2D ECHO RESULTS PENDING - EF decreased 47% no apparent signs of RHS   - NO ACUTE LOWER EXT DVT  - now on lovenox 1 mg/kg bid   - no vascular intervention   - will need to monitor vaginal bleeding- Hb droppped from 9.8 to 8.6-->8.7 today and stsable    #Respiratory Status  - multifactorial secondary to disease and recent PE   - remains tachycardic/tachypneic - on zosyn   - leukocytosis likely 2/2 neulasta given on 2/15 confirmed with [facility]   - on RA sitting in chair this am     #Disposition  - Continue ICU level care  - Daily reassessment of goals of care  - Coordinate with [facility] team regarding overall treatment strategy- plan for RT fri

## 2025-02-19 NOTE — PROGRESS NOTE ADULT - ASSESSMENT
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 s/p chemotherapy, immunotherapy, spontaneous b/l PTX s/p L Vats, pleurectomy, pleurodesis, SERINA wedge resection 03/2024, and now on new chemotherapy (trabectedin), had first dose 3 weeks ago and second infusion day prior to admission presenting with acute SOB, found to be tachycardic to 160s, slightly hypertensive, placed on bipap in ED, found to have lactic acidosis, elevated pro-BNP, and CTA chest with acute PE R segmental/subsegmental without evidence of R heart strain also with worsening pulmonary metastases, started on hep gtt, empiric abx, admitted to ICU for close hemodynamic monitoring and further management.      #Acute hypoxic respiratory failure  #Acute PE   #Metabolic acidosis 2/2 lactic acidosis, likely due to Warberg effect   #Sinus tachycardia  #Worsening Metastatic endometrial cancer   #Anemia of chronic disease   - s/p ICU care requiring Bipap and close monitoring   - BP stable, HR persists 100s-130s sinus tach which pt states is her baseline, TTE reviewed normal RV size/fxn, mild decreased LVEF 47%, mod MR, will continue gentle diuresis lasix 20mg IV x1 today given elevated BNP/echo findings/LE edema, lactate downtrending  - CTA chest as above   - s/p supplemental o2, now on room air   - Afebrile, WBC rising now 38K, received neulasta 2/15, which is likely cause   - c/w empiric zosyn for now would favor empiric 1 week course could complete with augmentin if cultures unrevealing, blood cxs ngtd, urine culture low colony count e. coli with negative UA  - C/w therapeutic lovenox 1mg/kg BID (40mg BID), hgb 8.5 => 9.8 without intervention => 8.7,  monitor hgb/vaginal bleeding closely on lovenox, heme/onc following recs appreciated  - Feeling better, ambulating unit without significant SOB, remains on lovenox BID  - lactate remains persistently elevated, 8.2 today (peak 14.3 this admission), likely due to Warberg effect per heme/onc  - Plan for outpatient f/u with radiation oncology at [facility] [place]    #DNR/DNI with trial of NIV  - pt overall with poor prognosis with worsening metastatic disease, palliative care consulted to assist     #DVT ppx  - lovenox bid

## 2025-02-19 NOTE — PROGRESS NOTE ADULT - SUBJECTIVE AND OBJECTIVE BOX
INTERVAL HPI/OVERNIGHT EVENTS:  Patient S&E at bedside.   pt sitting in chair at bedside  lactate 8.2 this am   wbc 38, HB 8.7, plt 362 w/ neutrophilia  still with vaginal bleeding   plan for rt on fri   pt on ra     PAST MEDICAL & SURGICAL HISTORY:  Fibroid      H/O oral surgery  under general anesthesia during childhood      S/P D&C (status post dilation and curettage)  x2      S/P laparoscopy  as part of infertility work up      S/P tonsillectomy          FAMILY HISTORY:  Family history of uterine cancer (Mother)        VITAL SIGNS:  T(F): 98.2 (02-19-25 @ 06:00)  HR: 114 (02-19-25 @ 07:00)  BP: 119/77 (02-19-25 @ 07:00)  RR: 19 (02-19-25 @ 07:00)  SpO2: 93% (02-19-25 @ 07:00)  Wt(kg): --    PHYSICAL EXAM:  Constitutional: NAD  Eyes: EOMI,   Respiratory: on RA this am   Cardiovascular: tachycardic   Gastrointestinal: soft, NTND  Neurological: AAOx3      MEDICATIONS  (STANDING):  enoxaparin Injectable 40 milliGRAM(s) SubCutaneous every 12 hours  OLANZapine 5 milliGRAM(s) Oral at bedtime  pantoprazole    Tablet 40 milliGRAM(s) Oral before breakfast  piperacillin/tazobactam IVPB.. 3.375 Gram(s) IV Intermittent every 8 hours  sertraline 50 milliGRAM(s) Oral daily    MEDICATIONS  (PRN):  LORazepam     Tablet 0.5 milliGRAM(s) Oral every 8 hours PRN Anxiety  ondansetron Injectable 4 milliGRAM(s) IV Push every 6 hours PRN Nausea and/or Vomiting      Allergies    No Known Allergies    Intolerances    shellfish (Vomiting)      LABS:                        8.7    38.15 )-----------( 362      ( 19 Feb 2025 05:48 )             27.8     02-19    138  |  101  |  15  ----------------------------<  76  4.1   |  26  |  0.92    Ca    8.9      19 Feb 2025 05:48  Phos  3.1     02-19  Mg     2.0     02-19    TPro  6.5  /  Alb  3.2[L]  /  TBili  0.4  /  DBili  x   /  AST  57[H]  /  ALT  46  /  AlkPhos  92  02-19    PTT - ( 17 Feb 2025 15:29 )  PTT:39.3 sec  Urinalysis Basic - ( 19 Feb 2025 05:48 )    Color: x / Appearance: x / SG: x / pH: x  Gluc: 76 mg/dL / Ketone: x  / Bili: x / Urobili: x   Blood: x / Protein: x / Nitrite: x   Leuk Esterase: x / RBC: x / WBC x   Sq Epi: x / Non Sq Epi: x / Bacteria: x        RADIOLOGY & ADDITIONAL TESTS:  Studies reviewed.

## 2025-02-19 NOTE — GOALS OF CARE CONVERSATION - ADVANCED CARE PLANNING - CONVERSATION DETAILS
DNR/DNI  No HD
HPI:  Ms. 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 Patient w/associated tachycardia to 160s. Was placed on NIPPV Bi-Level. CTA reveals acute PE in R segmental and subsegmental, w/no evidence of RHS and negative troponin. Also reveals worsening pulmonary metastases. Patient started on heparin gtt. Blood work done and empiric abx given. Has Neupogen patch. Labs significant for AGMA, lactic acid pending. She has been having increased vaginal bleeding from endometrial mass. Discussed goals of care w/family, they want DNR/DNI, no HD. Admit to ICU for further management     (15 Feb 2025 22:00)      PERTINENT PMH REVIEWED:  [ x ] YES [ ] NO           Primary Contact:       Carlos, significant other, [phone]    HCP [ x ] Surrogate [   ] Guardian [   ]    Mental Status: [x  ] Alert  [x  ] Oriented [  ] Confused [  ] Lethargic  Concerns of Depression [  ] -none reported  Anxiety [   ] -hx of anxiety  Baseline ADLs (prior to admission):  Independent [ x ] moderately [ ] fully   Dependent   [  ] moderately [ ]fully    Family Meeting attendees: GOC discussed    Anticipated Grief: Patient[ x ] Family [ x ]    Caregiver [place] Assessed: Yes [ x ] No [  ]    Church: [employer]    Spiritual Concerns: Not identified,  available for support as needed.    Goals of Care: Comfort [  ] Rehabilitation [  ] Curative [ x ] Life Prolonging [  ]    Previous Services: [facility]    ADVANCE DIRECTIVES:    -Pt has capacity  -HCP names Sana as primary & Carol as alternate agent   -MOLST DNR DNI trial NIV     Anticipated D/C Plan: return home                        Summary:  Palliative team met with Pt and significant other at the bedside to discuss GOC, assist with planning and provide supportive counseling.  Palliative role explained.  Emotional support provided.  Pt known to our team from a previous admission.  Pt is alert and oriented, able to make needs known.  Prior to hospitalization, Pt resides at home with Carlos and reports she was independent with ADLs.  Significant other provides assistance as needed.  Pt shared she is scheduled to have radiation on Friday and has an appointment tomorrow at [facility].  She desires to return home this date therefore she can go to her appointments.      Advance directives reviewed.  HCP names Sana as primary agent & Carol as alternate agent.  MOLST reviewed and confirmed with Pt.  DNR/DNI/ trial NIV MOLST in place.      Plan to return home.  Emotional support provided.  Educated of palliative team availability.  Our team to continue to follow.

## 2025-02-19 NOTE — CONSULT NOTE ADULT - SUBJECTIVE AND OBJECTIVE BOX
Date of Consult: 2/16/204    CHIEF COMPLAINT: SOB    HISTORY OF PRESENT ILLNESS:  Ms. 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 Patient w/associated tachycardia to 160s. Was placed on NIPPV Bi-Level. CTA reveals acute PE in R segmental and subsegmental, w/no evidence of RHS and negative troponin. Also reveals worsening pulmonary metastases. Patient started on heparin gtt. Blood work done and empiric abx given. Has Neupogen patch. Labs significant for AGMA, lactic acid pending. She has been having increased vaginal bleeding from endometrial mass. Admitted to ICU for further management and PERT called for assessment of PE.     TTE also done this morning which was personally reviewed. Overall normal TTE, with borderline RV enlargement.     Allergies    No Known Allergies    Intolerances    shellfish (Vomiting)  	    MEDICATIONS:  heparin   Injectable 3200 Unit(s) IV Push every 6 hours PRN  heparin  Infusion.  Unit(s)/Hr IV Continuous <Continuous>  piperacillin/tazobactam IVPB.. 3.375 Gram(s) IV Intermittent every 8 hours  LORazepam     Tablet 0.5 milliGRAM(s) Oral every 8 hours PRN  OLANZapine 5 milliGRAM(s) Oral at bedtime  sertraline 50 milliGRAM(s) Oral daily  pantoprazole    Tablet 40 milliGRAM(s) Oral before breakfast  chlorhexidine 2% Cloths 1 Application(s) Topical <User Schedule>      PAST MEDICAL & SURGICAL HISTORY:  Fibroid      H/O oral surgery  under general anesthesia during childhood      S/P D&C (status post dilation and curettage)  x2      S/P laparoscopy  as part of infertility work up      S/P tonsillectomy          FAMILY HISTORY:  Family history of uterine cancer (Mother)        SOCIAL HISTORY:    [ ] Non-smoker  [ ] Smoker  [ ] Alcohol      REVIEW OF SYSTEMS:  See HPI. Otherwise, 10 point ROS done and otherwise negative.    PHYSICAL EXAM:  T(C): 36.8 (02-16-25 @ 12:00), Max: 37.3 (02-16-25 @ 01:16)  HR: 130 (02-16-25 @ 15:00) (119 - 164)  BP: 116/80 (02-16-25 @ 15:00) (111/74 - 155/89)  RR: 26 (02-16-25 @ 15:00) (15 - 30)  SpO2: 95% (02-16-25 @ 15:00) (91% - 100%)  Wt(kg): --  I&O's Summary      Appearance: Normal	  HEENT:   Normal oral mucosa, PERRL, EOMI	  Lymphatic: No lymphadenopathy  Cardiovascular: Normal S1 S2, No JVD, No murmurs, No edema  Respiratory: Lungs clear to auscultation	  Psychiatry: A & O x 3, Mood & affect appropriate  Gastrointestinal:  Soft, Non-tender, + BS	  Skin: No rashes, No ecchymoses, No cyanosis	  Neurologic: Non-focal  Extremities: Normal range of motion, No clubbing, cyanosis or edema  Vascular: Peripheral pulses palpable 2+ bilaterally        LABS:	 	    CBC Full  -  ( 16 Feb 2025 07:00 )  WBC Count : 15.91 K/uL  Hemoglobin : 9.4 g/dL  Hematocrit : 30.7 %  Platelet Count - Automated : 388 K/uL  Mean Cell Volume : 96.5 fl  Mean Cell Hemoglobin : 29.6 pg  Mean Cell Hemoglobin Concentration : 30.6 g/dL  Auto Neutrophil # : 13.35 K/uL  Auto Lymphocyte # : 0.96 K/uL  Auto Monocyte # : 1.35 K/uL  Auto Eosinophil # : 0.08 K/uL  Auto Basophil # : 0.03 K/uL  Auto Neutrophil % : 83.9 %  Auto Lymphocyte % : 6.0 %  Auto Monocyte % : 8.5 %  Auto Eosinophil % : 0.5 %  Auto Basophil % : 0.2 %    02-16    136  |  100  |  19  ----------------------------<  93  3.6   |  17[L]  |  1.06  02-16    135  |  100  |  18  ----------------------------<  71  3.5   |  17[L]  |  0.92    Ca    9.3      16 Feb 2025 13:49  Ca    9.9      16 Feb 2025 07:00  Phos  4.7     02-16  Mg     1.8     02-16  Mg     1.7     02-15    TPro  7.3  /  Alb  3.6  /  TBili  0.4  /  DBili  x   /  AST  82[H]  /  ALT  47  /  AlkPhos  82  02-16  TPro  7.4  /  Alb  3.6  /  TBili  0.4  /  DBili  x   /  AST  58[H]  /  ALT  31  /  AlkPhos  83  02-15      CARDIAC MARKERS: 90       TELEMETRY: 	    ECG:  	  RADIOLOGY:  OTHER: 	    PREVIOUS DIAGNOSTIC TESTING:    [ ] Echocardiogram:  [ ]  Catheterization:  [ ] Stress Test:  	  	  ASSESSMENT/PLAN: 	    Delgado Santana MD    
HPI:  Ms. 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 Patient w/associated tachycardia to 160s. Was placed on NIPPV Bi-Level. CTA reveals acute PE in R segmental and subsegmental, w/no evidence of RHS and negative troponin. Also reveals worsening pulmonary metastases. Patient started on heparin gtt. Blood work done and empiric abx given. Has Neupogen patch. Labs significant for AGMA, lactic acid pending. She has been having increased vaginal bleeding from endometrial mass. Discussed goals of care w/family, they want DNR/DNI, no HD. 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    Disease course notable for multiple lines of therapy including:  - Initial therapy with gemcitabine/docetaxel  - Subsequently treated with doxorubicin  - Trial of pazopanib  - Most recently initiated on trabectedin (derived from marine organism sea squirt)    Patient presented to ICU with acute hypoxic respiratory failure. CTA demonstrated multiple pulmonary emboli in right middle lobar segmental and subsegmental branches. Imaging revealed extensive progression of disease compared to July 2024 studies, with worsening metastatic pulmonary nodules and hepatic lesions.    Clinical course in ICU notable for:  - Progressive hypoxia requiring escalation to BiPAP  - Significant lactic acidosis with lactate rising from 5.6 to 14.3  - Marked troponemia (troponin 94.4)  - Recent onset of worsening vaginal bleeding  - Sinus tachycardia    Most recent imaging (1/16/2025) demonstrated:  - Primary uterine mass increased from 16 x 10.4 cm to 19.7 x 15 cm  - Progressive pulmonary metastases including new RUL 0.5 x 0.4 cm lesion, LLL increased from 0.6 to 1.6 cm, RLL increased from 1.1 to 1.7 cm  - Increased hepatic metastases with segment 7 lesion growing from 1.8 x 1.7 cm to 3.1 x 2.7 cm, and segment 4/8 lesion from 2.8 cm to 5.4 cm    Goals of care discussion conducted with family, resulting in DNR/DNI status and decision against trial of hemodialysis.    Molecular studies notable for [id number] fusion on investigational panel. Patient scheduled to begin radiation therapy to primary tumor and hepatic disease on 2/21/2025.      PAST MEDICAL & SURGICAL HISTORY:  Fibroid      H/O oral surgery  under general anesthesia during childhood      S/P D&C (status post dilation and curettage)  x2      S/P laparoscopy  as part of infertility work up      S/P tonsillectomy          Allergies    No Known Allergies    Intolerances    shellfish (Vomiting)      MEDICATIONS  (STANDING):  chlorhexidine 2% Cloths 1 Application(s) Topical <User Schedule>  heparin  Infusion.  Unit(s)/Hr (6.5 mL/Hr) IV Continuous <Continuous>  pantoprazole    Tablet 40 milliGRAM(s) Oral before breakfast  piperacillin/tazobactam IVPB.. 3.375 Gram(s) IV Intermittent every 8 hours    MEDICATIONS  (PRN):  heparin   Injectable 3200 Unit(s) IV Push every 6 hours PRN For aPTT less than 40      FAMILY HISTORY:  Family history of uterine cancer (Mother)        SOCIAL HISTORY: No EtOH, no tobacco    REVIEW OF SYSTEMS:    CONSTITUTIONAL: No weakness, fevers or chills  EYES/ENT: No visual changes;  No vertigo or throat pain   NECK: No pain or stiffness  RESPIRATORY: No cough, wheezing, hemoptysis; No shortness of breath  CARDIOVASCULAR: No chest pain or palpitations  GASTROINTESTINAL: No abdominal or epigastric pain. No nausea, vomiting, or hematemesis; No diarrhea or constipation. No melena or hematochezia.  GENITOURINARY: No dysuria, frequency or hematuria  NEUROLOGICAL: No numbness or weakness  SKIN: No itching, burning, rashes, or lesions   All other review of systems is negative unless indicated above.      Weight (kg): 55.2 (02-15 @ 19:38)    T(F): 99 (02-16-25 @ 06:00), Max: 99.1 (02-16-25 @ 01:16)  HR: 132 (02-16-25 @ 12:00)  BP: 118/82 (02-16-25 @ 12:00)  RR: 28 (02-16-25 @ 12:00)  SpO2: 94% (02-16-25 @ 12:00)  Wt(kg): --    GENERAL: Very cachetic   HEAD:  Atraumatic, Normocephalic  EYES: EOMI, PERRLA, conjunctiva and sclera clear  NECK: Supple, No JVD  CHEST/LUNG: no appreciable wheezing   HEART: Regular rate and rhythm; No murmurs, rubs, or gallops  ABDOMEN: Soft, Nontender, Nondistended; Bowel sounds present  EXTREMITIES:  2+ Peripheral Pulses, No clubbing, cyanosis, or edema  NEUROLOGY: non-focal  SKIN: No rashes or lesions                          9.4    15.91 )-----------( 388      ( 16 Feb 2025 07:00 )             30.7       02-16    135  |  100  |  18  ----------------------------<  71  3.5   |  17[L]  |  0.92    Ca    9.9      16 Feb 2025 07:00  Phos  4.7     02-16  Mg     1.8     02-16    TPro  7.3  /  Alb  3.6  /  TBili  0.4  /  DBili  x   /  AST  82[H]  /  ALT  47  /  AlkPhos  82  02-16      Magnesium: 1.8 mg/dL (02-16 @ 07:00)  Phosphorus: 4.7 mg/dL (02-16 @ 07:00)  Magnesium: 1.7 mg/dL (02-15 @ 20:27)      PT/INR - ( 15 Feb 2025 22:50 )   PT: 11.8 sec;   INR: 1.03 ratio         PTT - ( 16 Feb 2025 07:00 )  PTT:29.6 sec    
  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 Patient w/associated tachycardia to 160s. Was placed on NIPPV Bi-Level. CTA reveals acute PE in R segmental and subsegmental, w/no evidence of RHS and negative troponin. Also reveals worsening pulmonary metastases. She has been having increased vaginal bleeding from endometrial mass.   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  2/19/25 Seen and examined at bedside with SO in room. Pt OOB/chair with no C/O pain or dyspnea. Hoping for disch home to follow radiation appointments this week.     PAIN: ( )Yes   (X)No    DYSPNEA: ( ) Yes  (X ) No  S/P BiPap    PAST MEDICAL & SURGICAL HISTORY:  Fibroid  H/O oral surgery  S/P D&C (status post dilation and curettage)  x2  S/P laparoscopy  as part of infertility work up  S/P tonsillectomy    SOCIAL HX:  Lives with family  Hx opiate tolerance ( )YES  (X )NO    Baseline ADLs  (Prior to Admission)  ( X) Independent   ( )Dependent    FAMILY HISTORY:  Family history of uterine cancer (Mother)        Review of Systems:    Depression-denies  Physical Discomfort-denies  Dyspnea-denies  Anorexia-mod  Weight Loss-yes   Fatigue-mod  Weakness-mod    All other systems reviewed and negative      PHYSICAL EXAM:    Vital Signs Last 24 Hrs  T(C): 36.8 (19 Feb 2025 06:00), Max: 36.8 (19 Feb 2025 06:00)  T(F): 98.2 (19 Feb 2025 06:00), Max: 98.2 (19 Feb 2025 06:00)  HR: 114 (19 Feb 2025 07:00) (110 - 139)  BP: 119/77 (19 Feb 2025 07:00) (106/67 - 123/75)  BP(mean): 90 (19 Feb 2025 07:00) (78 - 90)  RR: 19 (19 Feb 2025 07:00) (17 - 30)  SpO2: 93% (19 Feb 2025 07:00) (93% - 100%)    Parameters below as of 19 Feb 2025 07:00  Patient On (Oxygen Delivery Method): nasal cannula  O2 Flow (L/min): 2    PPSV2:  40 %    General: Middle aged female in chair in NAD  Mental Status: A&O X3  HEENT: oral mucosa dry  Lungs: clear to auscultation raphael  Cardiac: S1S2+  GI: abd soft NT ND + BS  : voids  Ext: moves all ext  Neuro: no focal def      LABS:                        8.7    38.15 )-----------( 362      ( 19 Feb 2025 05:48 )             27.8     02-19    138  |  101  |  15  ----------------------------<  76  4.1   |  26  |  0.92    Ca    8.9      19 Feb 2025 05:48  Phos  3.1     02-19  Mg     2.0     02-19    TPro  6.5  /  Alb  3.2[L]  /  TBili  0.4  /  DBili  x   /  AST  57[H]  /  ALT  46  /  AlkPhos  92  02-19    PTT - ( 17 Feb 2025 15:29 )  PTT:39.3 sec  Albumin: Albumin: 3.2 g/dL (02-19 @ 05:48)      Allergies    No Known Allergies    Intolerances    shellfish (Vomiting)    MEDICATIONS  (STANDING):  enoxaparin Injectable 40 milliGRAM(s) SubCutaneous every 12 hours  OLANZapine 5 milliGRAM(s) Oral at bedtime  pantoprazole    Tablet 40 milliGRAM(s) Oral before breakfast  piperacillin/tazobactam IVPB.. 3.375 Gram(s) IV Intermittent every 8 hours  sertraline 50 milliGRAM(s) Oral daily    MEDICATIONS  (PRN):  LORazepam     Tablet 0.5 milliGRAM(s) Oral every 8 hours PRN Anxiety  ondansetron Injectable 4 milliGRAM(s) IV Push every 6 hours PRN Nausea and/or Vomiting      RADIOLOGY/ADDITIONAL STUDIES:  < from: US Duplex Venous Lower Ext Complete, Bilateral (02.16.25 @ 11:05) >    ACC: [id number] EXAM:  US DPLX LWR EXT VEINS COMPL BI   ORDERED BY: MABLE LYN     PROCEDURE DATE:  02/16/2025          INTERPRETATION:  CLINICAL INFORMATION: DVT / PE    COMPARISON: Bilateral lower extremity venous duplex ultrasound 3/20/2024    TECHNIQUE: Duplex sonography of the BILATERAL LOWER extremity veins with   color and spectral Doppler, with and without compression.    FINDINGS:    RIGHT:  Normal compressibility of the RIGHT common femoral, femoral and popliteal   veins. Doppler examination shows normal spontaneous and phasic flow. No   RIGHT calf vein thrombosis is detected.    LEFT:  Normal compressibility of the LEFT common femoral, femoral and popliteal   veins. Doppler examination shows normal spontaneous and phasic flow. No   LEFT calf vein thrombosis is detected.    IMPRESSION:    No acute DVT of the right or left lower extremity.      < end of copied text >    < from: CT Angio Chest PE Protocol w/ IV Cont (02.15.25 @ 20:50) >  ACC: [id number] EXAM:  CT ANGIO CHEST PULM ART WAWIC   ORDERED BY: RITESH VANESSA     PROCEDURE DATE:  02/15/2025          INTERPRETATION:  CLINICAL INFORMATION: Shortness of breath. Malignancy.    COMPARISON: 7/8/2024.    CONTRAST/COMPLICATIONS:  IV Contrast: Omnipaque 350  70 cc administered   0 cc discarded  Oral Contrast: NONE  .    PROCEDURE:  CT Angiography of the Chest.  Sagittal and coronal reformats were performed as well as 3D (MIP)   reconstructions.    FINDINGS:    LUNGS AND LARGE AIRWAYS: Patent central airways. Marked interval   worsening of metastatic disease compared with prior exam with numerous   new pulmonary masses and nodules and interval enlargement of previously   seen nodules. Mass/nodule in the right lower lobe measures up to 6.2 x   5.0 cm, largest mass/nodule in the right middle lobe abutting the   mediastinum measures 6.3 x 4.5 cm, largest mass in the left upper lobe   abutting the pleural surface measures 3.4 x 2.2 cm.  PLEURA: Interval resolution of loculated right-sided pneumothorax   compared with prior exam. Small bilateral loculated pleural effusions on   the current exam.  VESSELS: Adequate pulmonary arterial opacification. Filling defects in   right middle lobar segmental and subsegmental pulmonaryarteries. Main   pulmonary arteries not dilated. Thoracic aorta is normal in caliber.   Right IJ approach MediPort tip is in the right atrium.  HEART: Heart is enlarged. No pericardial effusion. No evidence of right   heart strain.  MEDIASTINUM AND PEDRO LUIS: Ill-defined mildly enlarged mediastinal and   bilateral hilar lymph nodes.  CHEST WALL AND LOWER NECK: Right chest wall MediPort. Mild generalized   soft tissue edema.  VISUALIZED UPPER ABDOMEN: Liver metastases with largest lesion measuring   upto 8.1 cm in the right hepatic lobe.  BONES: Mild degenerative changes. New indeterminate age compression   deformities of T4, T5, and T10. Mild chronic compression fractures of   T1-T3.    IMPRESSION:  Pulmonary emboli in right middle lobar segmental and subsegmental   branches. No evidence of right heart strain.    Extensive progression of disease with extensive worsening of metastatic   lung nodules and masses and liver metastases.    Interval resolution of loculated right-sided pneumothorax seen on prior   exam from 7/8/2024. New small bilateral loculated pleural effusions.    Indeterminate age mild compression deformities of T4, T5, and T10.    Findings were discussed with Dr. RITESH VANESSA [phone] 2/15/2025 9:27   PM by Dr. Laird with read back confirmation.      < end of copied text >  < from: Xray Chest 1 View- PORTABLE-Urgent (02.15.25 @ 20:39) >    ACC: [id number] EXAM:  XR CHEST PORTABLE URGENT 1V   ORDERED BY: RITESH VANESSA     PROCEDURE DATE:  02/15/2025          INTERPRETATION:  Chest radiograph (one view)     CPT 33761    CLINICAL INFORMATION:  Chest pain.  Short of breath.    TECHNIQUE: Single frontal view of the chest was obtained.    FINDINGS:  Prior study dated 7/12/2024 was available for review.    The lungs demonstrate multiple bilateral pulmonary masses of varying size   compatible with metastatic disease. Small bilateral pleural effusions are   seen, right right-sided central venous access catheter is noted with the   distal tip overlying the superior. The heart and mediastinum appear   intact.      IMPRESSION: Multiple pulmonary masses of varying size noted in both lung  fields compatible with metastatic disease. Small bilateral effusions   right greater than left.    < end of copied text >

## 2025-02-19 NOTE — CONSULT NOTE ADULT - ASSESSMENT
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 Patient w/associated tachycardia to 160s. Was placed on NIPPV Bi-Level. CTA reveals acute PE in R segmental and subsegmental, w/no evidence of RHS and negative troponin. Also reveals worsening pulmonary metastases. She has been having increased vaginal bleeding from endometrial mass.   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  2/19/25 Seen and examined at bedside with SO in room. Pt OOB/chair with no C/O pain or dyspnea. Hoping for disch home to follow radiation appointments this week.     Assessment and Plan:    1) Acute Resp Failure  -Now resolved  -S/P Bipap support  -Currently on room air  -CT scan chest noted/Pulm mets  -Acute PE    2) Metastatic endometrial cancer   -Follows with [facility]  -S/P chemo  -Recently on new chemo  -Radiation to start Fri    3) Debility  -Weakness  -Metastatic disease  -Poor PO intake  -Nutrition eval  -Supportive care    4) Advanced Directives  -Pt with capacity  -HCP on file naming Suzie Olguin and alternate Carol CROOKS DNR.DNI on file  -Pt confirms  -Transition home when stable    Time Spent: 75 minutes including the care, coordination and counseling of this patient, 50% of which was spent coordinating and counseling.

## 2025-02-19 NOTE — CONSULT NOTE ADULT - CONVERSATION DETAILS
Palliative team met with Pt and significant other at the bedside to discuss GOC, assist with planning and provide supportive counseling.  Palliative role explained.  Emotional support provided.  Pt known to our team from a previous admission.  Pt is alert and oriented, able to make needs known.  Prior to hospitalization, Pt resides at home with Carlos and reports she was independent with ADLs.  Significant other provides assistance as needed.  Pt shared she is scheduled to have radiation on Friday and [place] [facility] [place]  She desires to return home this date therefore she can go to her appointments.      Advance directives reviewed.  HCP names Sana as primary agent & Carol as alternate agent.  MOLST reviewed and confirmed with Pt.  DNR/DNI/ trial NIV MOLST in place.      Plan to return home.  Emotional support provided.  Educated of palliative team availability.  Our team to continue to follow.

## 2025-02-20 ENCOUNTER — TRANSCRIPTION ENCOUNTER (OUTPATIENT)
Age: 55
End: 2025-02-20

## 2025-02-20 VITALS
RESPIRATION RATE: 18 BRPM | SYSTOLIC BLOOD PRESSURE: 126 MMHG | OXYGEN SATURATION: 96 % | HEART RATE: 120 BPM | DIASTOLIC BLOOD PRESSURE: 76 MMHG | TEMPERATURE: 98 F

## 2025-02-20 LAB
ANION GAP SERPL CALC-SCNC: 12 MMOL/L — SIGNIFICANT CHANGE UP (ref 5–17)
BASOPHILS # BLD AUTO: 0.05 K/UL — SIGNIFICANT CHANGE UP (ref 0–0.2)
BASOPHILS NFR BLD AUTO: 0.1 % — SIGNIFICANT CHANGE UP (ref 0–2)
BUN SERPL-MCNC: 15 MG/DL — SIGNIFICANT CHANGE UP (ref 7–23)
CALCIUM SERPL-MCNC: 9.6 MG/DL — SIGNIFICANT CHANGE UP (ref 8.5–10.1)
CHLORIDE SERPL-SCNC: 99 MMOL/L — SIGNIFICANT CHANGE UP (ref 96–108)
CO2 SERPL-SCNC: 25 MMOL/L — SIGNIFICANT CHANGE UP (ref 22–31)
CREAT SERPL-MCNC: 0.81 MG/DL — SIGNIFICANT CHANGE UP (ref 0.5–1.3)
EGFR: 85 ML/MIN/1.73M2 — SIGNIFICANT CHANGE UP
EOSINOPHIL # BLD AUTO: 0.06 K/UL — SIGNIFICANT CHANGE UP (ref 0–0.5)
EOSINOPHIL NFR BLD AUTO: 0.1 % — SIGNIFICANT CHANGE UP (ref 0–6)
GLUCOSE SERPL-MCNC: 83 MG/DL — SIGNIFICANT CHANGE UP (ref 70–99)
HCT VFR BLD CALC: 28.9 % — LOW (ref 34.5–45)
HGB BLD-MCNC: 8.9 G/DL — LOW (ref 11.5–15.5)
IMM GRANULOCYTES # BLD AUTO: 0.56 K/UL — HIGH (ref 0–0.07)
IMM GRANULOCYTES NFR BLD AUTO: 1.4 % — HIGH (ref 0–0.9)
LYMPHOCYTES # BLD AUTO: 1.35 K/UL — SIGNIFICANT CHANGE UP (ref 1–3.3)
LYMPHOCYTES NFR BLD AUTO: 3.3 % — LOW (ref 13–44)
MANUAL SMEAR VERIFICATION: SIGNIFICANT CHANGE UP
MCHC RBC-ENTMCNC: 30.4 PG — SIGNIFICANT CHANGE UP (ref 27–34)
MCHC RBC-ENTMCNC: 30.8 G/DL — LOW (ref 32–36)
MCV RBC AUTO: 98.6 FL — SIGNIFICANT CHANGE UP (ref 80–100)
MONOCYTES # BLD AUTO: 1.1 K/UL — HIGH (ref 0–0.9)
MONOCYTES NFR BLD AUTO: 2.7 % — SIGNIFICANT CHANGE UP (ref 2–14)
NEUTROPHILS # BLD AUTO: 37.25 K/UL — HIGH (ref 1.8–7.4)
NEUTROPHILS NFR BLD AUTO: 92.4 % — HIGH (ref 43–77)
NRBC # BLD AUTO: 0 K/UL — SIGNIFICANT CHANGE UP (ref 0–0)
NRBC # FLD: 0 K/UL — SIGNIFICANT CHANGE UP (ref 0–0)
NRBC BLD AUTO-RTO: 0 /100 WBCS — SIGNIFICANT CHANGE UP (ref 0–0)
PLAT MORPH BLD: SIGNIFICANT CHANGE UP
PLATELET # BLD AUTO: 358 K/UL — SIGNIFICANT CHANGE UP (ref 150–400)
PMV BLD: 9.2 FL — SIGNIFICANT CHANGE UP (ref 7–13)
POTASSIUM SERPL-MCNC: 3.9 MMOL/L — SIGNIFICANT CHANGE UP (ref 3.5–5.3)
POTASSIUM SERPL-SCNC: 3.9 MMOL/L — SIGNIFICANT CHANGE UP (ref 3.5–5.3)
RBC # BLD: 2.93 M/UL — LOW (ref 3.8–5.2)
RBC # FLD: 19.2 % — HIGH (ref 10.3–14.5)
RBC BLD AUTO: SIGNIFICANT CHANGE UP
SODIUM SERPL-SCNC: 136 MMOL/L — SIGNIFICANT CHANGE UP (ref 135–145)
WBC # BLD: 40.37 K/UL — CRITICAL HIGH (ref 3.8–10.5)
WBC # FLD AUTO: 40.37 K/UL — CRITICAL HIGH (ref 3.8–10.5)

## 2025-02-20 PROCEDURE — 99239 HOSP IP/OBS DSCHRG MGMT >30: CPT

## 2025-02-20 PROCEDURE — 99231 SBSQ HOSP IP/OBS SF/LOW 25: CPT

## 2025-02-20 RX ORDER — ENOXAPARIN SODIUM 100 MG/ML
40 INJECTION SUBCUTANEOUS
Qty: 60 | Refills: 1
Start: 2025-02-20 | End: 2025-04-20

## 2025-02-20 RX ORDER — HEPARIN SODIUM,PORCINE/NS/PF 20/20 ML
300 SYRINGE (ML) INTRAVENOUS ONCE
Refills: 0 | Status: COMPLETED | OUTPATIENT
Start: 2025-02-20 | End: 2025-02-20

## 2025-02-20 RX ORDER — AMOXICILLIN AND CLAVULANATE POTASSIUM 500; 125 MG/1; MG/1
875 TABLET, FILM COATED ORAL
Qty: 6 | Refills: 0
Start: 2025-02-20 | End: 2025-02-22

## 2025-02-20 RX ADMIN — Medication 0.5 MILLIGRAM(S): at 00:36

## 2025-02-20 RX ADMIN — Medication 25 GRAM(S): at 05:01

## 2025-02-20 RX ADMIN — Medication 40 MILLIGRAM(S): at 05:01

## 2025-02-20 RX ADMIN — ENOXAPARIN SODIUM 40 MILLIGRAM(S): 100 INJECTION SUBCUTANEOUS at 05:01

## 2025-02-20 RX ADMIN — Medication 300 UNIT(S): at 15:38

## 2025-02-20 RX ADMIN — SERTRALINE 50 MILLIGRAM(S): 100 TABLET, FILM COATED ORAL at 10:08

## 2025-02-20 NOTE — PROGRESS NOTE ADULT - CONVERSATION DETAILS
Palliative team met with Pt and significant other at the bedside to discuss GOC, assist with planning and provide supportive counseling.  Palliative role explained.  Emotional support provided.  Pt known to our team from a previous admission.  Pt is alert and oriented, able to make needs known.  Prior to hospitalization, Pt resides at home with Carlos and reports she was independent with ADLs.  Significant other provides assistance as needed.  Pt shared she is scheduled to have radiation on Friday and [place] [facility] [place] desires to return home this date therefore she can go to her appointments.      Advance directives reviewed.  HCP names Sana as primary agent & Carol as alternate agent.  MOLST reviewed and confirmed with Pt.  DNR/DNI/ trial NIV MOLST in place.      Plan to return home.  Emotional support provided.  Educated of palliative team availability.  Our team to continue to follow.

## 2025-02-20 NOTE — DISCHARGE NOTE PROVIDER - HOSPITAL COURSE
Physical Exam:  Vital Signs Last 24 Hrs  T(C): 36.5 (20 Feb 2025 07:36), Max: 37.1 (19 Feb 2025 23:07)  T(F): 97.7 (20 Feb 2025 07:36), Max: 98.8 (19 Feb 2025 23:07)  HR: 118 (20 Feb 2025 10:38) (117 - 128)  BP: 132/81 (20 Feb 2025 07:36) (119/78 - 132/81)  BP(mean): 94 (19 Feb 2025 19:00) (90 - 94)  RR: 18 (20 Feb 2025 07:36) (18 - 30)  SpO2: 94% (20 Feb 2025 07:36) (94% - 97%)    Parameters below as of 20 Feb 2025 07:36  Patient On (Oxygen Delivery Method): room air    Constitutional: NAD, awake and alert  HEENT: PERRLA, EOMI, MMM  Respiratory: Breath sounds are clear bilaterally, No wheezing, rales or rhonchi  Cardiovascular: S1 and S2, RRR, no murmurs, gallops or rubs  Gastrointestinal: +BS, soft, non-tender, non-distended, no CVA tenderness  Extremities: No peripheral edema, +DP pulses b/l  Neurological: A&O x 3, no focal deficits  Musculoskeletal: 5/5 strength b/l upper and lower extremities  Skin: Normal, skin warm and dry    < from: CT Angio Chest PE Protocol w/ IV Cont (02.15.25 @ 20:50) >  IMPRESSION:  Pulmonary emboli in right middle lobar segmental and subsegmental   branches. No evidence of right heart strain.    Extensive progression of disease with extensive worsening of metastatic   lung nodules and masses and liver metastases.    Interval resolution of loculated right-sided pneumothorax seen on prior   exam from 7/8/2024. New small bilateral loculated pleural effusions.    Indeterminate age mild compression deformities of T4, T5, and T10.    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 s/p chemotherapy, immunotherapy, spontaneous b/l PTX s/p L Vats, pleurectomy, pleurodesis, SERINA wedge resection 03/2024, and now on new chemotherapy (trabectedin), had first dose 3 weeks ago and second infusion day prior to admission presenting with acute SOB, found to be tachycardic to 160s, slightly hypertensive, placed on bipap in ED, found to have lactic acidosis, elevated pro-BNP, and CTA chest with acute PE R segmental/subsegmental without evidence of R heart strain also with worsening pulmonary metastases, started on hep gtt, empiric abx, admitted to ICU for close hemodynamic monitoring and further management.      #Acute hypoxic respiratory failure  #Acute PE   #Metabolic acidosis 2/2 lactic acidosis, likely due to Warburg effect   #Sinus tachycardia  #Worsening Metastatic uterine sarcoma   #Anemia of chronic disease   - s/p ICU care requiring Bipap and close monitoring   - BP stable, HR persists 100s-130s sinus tach which pt states is her baseline, TTE reviewed normal RV size/fxn, mild decreased LVEF 47%, mod MR, received intermittent doses of lasix 20mg with improvement  - CTA chest as above   - s/p supplemental o2, now on room air   - Noted leukocytosis up to 40K, received neulasta 2/15, which is likely cause per heme/onc  - s/p empiric zosyn--> change to Augmentin BID to complete 7 day course upon discharge   - C/w therapeutic lovenox 1mg/kg BID (40mg BID), patient educated on how to inject herself at home and in agreement with plan   - Hgb remains low but stable, 8.9 on day of discharge. Pt endorses chronic intermittent vaginal bleeding   - heme/onc following Dr. Alves   - lactate remains persistently elevated, 8.2 (peak 14.3 this admission), likely due to Warberg effect from extensive metastatic cancer   - Plan [place] [facility] [place]       Dispo: discharge to home with home care in stable condition    Final diagnosis, treatment plan, and follow-up recommendations were discussed and explained to the patient. The patient was given an opportunity to ask questions concerning the diagnosis and treatment plan. The patient acknowledged understanding of the diagnosis, treatment, and follow-up recommendations. The patient was advised to seek urgent care upon discharge if worsening symptoms develop prior to scheduled follow-up. Time spent on discharge included time with the patient, and also coordinating discharge care as outlined below.    Total time spent: 81 min Care discussed with Kavin Mendoza and Dr. Alves. Patient was evaluated at the bedside. Care discussed with patient and  at the bedside. Patient is to follow tomorrow for further testing at [facility]. D/C note reviewed      Physical Exam:  Vital Signs Last 24 Hrs  T(C): 36.5 (20 Feb 2025 07:36), Max: 37.1 (19 Feb 2025 23:07)  T(F): 97.7 (20 Feb 2025 07:36), Max: 98.8 (19 Feb 2025 23:07)  HR: 118 (20 Feb 2025 10:38) (117 - 128)  BP: 132/81 (20 Feb 2025 07:36) (119/78 - 132/81)  BP(mean): 94 (19 Feb 2025 19:00) (90 - 94)  RR: 18 (20 Feb 2025 07:36) (18 - 30)  SpO2: 94% (20 Feb 2025 07:36) (94% - 97%)    Parameters below as of 20 Feb 2025 07:36  Patient On (Oxygen Delivery Method): room air    Constitutional: NAD, awake and alert  HEENT: PERRLA, EOMI, MMM  Respiratory: Breath sounds are clear bilaterally, No wheezing, rales or rhonchi  Cardiovascular: S1 and S2, RRR, no murmurs, gallops or rubs  Gastrointestinal: +BS, soft, non-tender, non-distended, no CVA tenderness  Extremities: No peripheral edema, +DP pulses b/l  Neurological: A&O x 3, no focal deficits  Musculoskeletal: 5/5 strength b/l upper and lower extremities  Skin: Normal, skin warm and dry    < from: CT Angio Chest PE Protocol w/ IV Cont (02.15.25 @ 20:50) >  IMPRESSION:  Pulmonary emboli in right middle lobar segmental and subsegmental   branches. No evidence of right heart strain.    Extensive progression of disease with extensive worsening of metastatic   lung nodules and masses and liver metastases.    Interval resolution of loculated right-sided pneumothorax seen on prior   exam from 7/8/2024. New small bilateral loculated pleural effusions.    Indeterminate age mild compression deformities of T4, T5, and T10.    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 s/p chemotherapy, immunotherapy, spontaneous b/l PTX s/p L Vats, pleurectomy, pleurodesis, SERINA wedge resection 03/2024, and now on new chemotherapy (trabectedin), had first dose 3 weeks ago and second infusion day prior to admission presenting with acute SOB, found to be tachycardic to 160s, slightly hypertensive, placed on bipap in ED, found to have lactic acidosis, elevated pro-BNP, and CTA chest with acute PE R segmental/subsegmental without evidence of R heart strain also with worsening pulmonary metastases, started on hep gtt, empiric abx, admitted to ICU for close hemodynamic monitoring and further management.      #Acute hypoxic respiratory failure  #Acute PE   #Metabolic acidosis 2/2 lactic acidosis, likely due to Warburg effect   #Sinus tachycardia  #Worsening Metastatic uterine sarcoma   #Anemia of chronic disease   - s/p ICU care requiring Bipap and close monitoring   - BP stable, HR persists 100s-130s sinus tach which pt states is her baseline, TTE reviewed normal RV size/fxn, mild decreased LVEF 47%, mod MR, received intermittent doses of lasix 20mg with improvement  - CTA chest as above   - s/p supplemental o2, now on room air   - Noted leukocytosis up to 40K, received neulasta 2/15, which is likely cause per heme/onc  - s/p empiric zosyn--> change to Augmentin BID to complete 7 day course upon discharge   - C/w therapeutic lovenox 1mg/kg BID (40mg BID), patient educated on how to inject herself at home and in agreement with plan   - Hgb remains low but stable, 8.9 on day of discharge. Pt endorses chronic intermittent vaginal bleeding   - heme/onc following Dr. Alves   - lactate remains persistently elevated, 8.2 (peak 14.3 this admission), likely due to Warberg effect from extensive metastatic cancer   - Plan [place] [facility] [place]       Dispo: discharge to home with home care in stable condition    Final diagnosis, treatment plan, and follow-up recommendations were discussed and explained to the patient. The patient was given an opportunity to ask questions concerning the diagnosis and treatment plan. The patient acknowledged understanding of the diagnosis, treatment, and follow-up recommendations. The patient was advised to seek urgent care upon discharge if worsening symptoms develop prior to scheduled follow-up. Time spent on discharge included time with the patient, and also coordinating discharge care as outlined below.    Total time spent: 81 min

## 2025-02-20 NOTE — PHYSICAL THERAPY INITIAL EVALUATION ADULT - PERTINENT HX OF CURRENT PROBLEM, REHAB EVAL
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 s/p chemotherapy, immunotherapy, spontaneous b/l PTX s/p L Vats, pleurectomy, pleurodesis, SERINA wedge resection 03/2024, and now on new chemotherapy (trabectedin), had first dose 3 weeks ago and second infusion day prior to admission presenting with acute SOB, found to be tachycardic to 160s, slightly hypertensive, placed on bipap in ED, found to have lactic acidosis, elevated pro-BNP, and CTA chest with acute PE R segmental/subsegmental without evidence of R heart strain also with worsening pulmonary metastases, started on hep gtt, empiric abx, admitted to ICU for close hemodynamic monitoring and further management.

## 2025-02-20 NOTE — PROGRESS NOTE ADULT - ASSESSMENT
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 Patient w/associated tachycardia to 160s. Was placed on NIPPV Bi-Level. CTA reveals acute PE in R segmental and subsegmental, w/no evidence of RHS and negative troponin. Also reveals worsening pulmonary metastases. She has been having increased vaginal bleeding from endometrial mass.   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  2/19/25 Seen and examined at bedside with SO in room. Pt OOB/chair with no C/O pain or dyspnea. Hoping for disch home to follow radiation appointments this week.     Assessment and Plan:    1) Acute Resp Failure  -Now resolved  -S/P Bipap support  -Currently on room air  -CT scan chest noted/Pulm mets  -Acute PE    2) Metastatic endometrial cancer   -Follows with [facility]  -S/P chemo  -Recently on new chemo  -Radiation to start Fri    3) Debility  -Weakness  -Metastatic disease  -Poor PO intake  -Nutrition eval  -Supportive care    4) Advanced Directives  -Pt with capacity  -HCP on file naming Suzie Olguin and alternate Carol CROOKS [email or web address] on file  -Pt confirms  -Transition home when stable    Time Spent: 35 minutes including the care, coordination and counseling of this patient, 50% of which was spent coordinating and counseling.

## 2025-02-20 NOTE — DISCHARGE NOTE NURSING/CASE MANAGEMENT/SOCIAL WORK - FINANCIAL ASSISTANCE
[facility] provides services at a reduced cost to those who are determined to be eligible through [facility]’s financial assistance program. Information regarding [facility]’s financial assistance program can be found by going to [email or web address] or by calling [phone].

## 2025-02-20 NOTE — PHYSICAL THERAPY INITIAL EVALUATION ADULT - LIVES WITH, PROFILE
daughter.  No stairs at home inside, 4 steps to enter the home without rails./children/significant other

## 2025-02-20 NOTE — DISCHARGE NOTE PROVIDER - CARE PROVIDER_API CALL
Vanessa Schaefer  [facility]  [address] [place] [id number]  Phone: [phone]  Fax: [phone]  Established Patient  Follow Up Time: 1 week

## 2025-02-20 NOTE — PHYSICAL THERAPY INITIAL EVALUATION ADULT - DIAGNOSIS, PT EVAL
Acute hypoxic respiratory failure; acute PE; Metabolic acidosis 2/2 lactic acidosis, likely due to Warberg effect ; Sinus tachycardia.

## 2025-02-20 NOTE — PROVIDER CONTACT NOTE (CRITICAL VALUE NOTIFICATION) - NAME OF MD/NP/PA/DO NOTIFIED:
Dr Calderon
JILL Roger
MD Escobedo
JILL Roger
JILL Abdalla
Dr Escobedo
JILL Nails
MD Escobedo
JILL Parish
JILL Harris

## 2025-02-20 NOTE — PROGRESS NOTE ADULT - PROVIDER SPECIALTY LIST ADULT
Critical Care
Heme/Onc
Heme/Onc
Critical Care
Heme/Onc
Hospitalist
Palliative Care

## 2025-02-20 NOTE — DISCHARGE NOTE NURSING/CASE MANAGEMENT/SOCIAL WORK - PATIENT PORTAL LINK FT
You can access the FollowMyHealth Patient Portal offered by [facility] by registering at the following website: [email or web address]. By joining "[employer]’s FollowMyHealth portal, you will also be able to view your health information using other applications (apps) compatible with our system.

## 2025-02-20 NOTE — PROVIDER CONTACT NOTE (CRITICAL VALUE NOTIFICATION) - PERSON GIVING RESULT:
Cornel Long
Fallon. Amanda
Gianna Suarez
Coleen lab
EDWARD Forte, lab
Gianna Suarez
Lab- [place]
Cristino howard
Lab- Fallon Long
renee anita

## 2025-02-20 NOTE — DISCHARGE NOTE PROVIDER - REASON FOR ADMISSION
acute hypoxic respiratory failure  acute PE  Lactic acidosis   Worsening metastatic uterine sarcoma

## 2025-02-20 NOTE — PHYSICAL THERAPY INITIAL EVALUATION ADULT - MODALITIES TREATMENT COMMENTS
Patient left in chair with CBIR and chair alarm active. Patient instructed to call for assistance back to bed or the bathroom, understands same. Patient independent in functional mobility, no skilled physical therapy need in this setting.  May ambulate per nursing.  Will d/c from PT. RN, CM informed.

## 2025-02-20 NOTE — PROGRESS NOTE ADULT - SUBJECTIVE AND OBJECTIVE BOX
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 Patient w/associated tachycardia to 160s. Was placed on NIPPV Bi-Level. CTA reveals acute PE in R segmental and subsegmental, w/no evidence of RHS and negative troponin. Also reveals worsening pulmonary metastases. She has been having increased vaginal bleeding from endometrial mass.   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  2/19/25 Seen and examined at bedside with SO in room. Pt OOB/chair with no C/O pain or dyspnea. Hoping for disch home to follow radiation appointments this week.   2/20/25 Seen and examined at bedside with no family present. Denies complaints. Hoping for disch today  PAIN: ( )Yes   (X)No    DYSPNEA: ( ) Yes  (X ) No  S/P BiPap    PAST MEDICAL & SURGICAL HISTORY:  Fibroid  H/O oral surgery  S/P D&C (status post dilation and curettage)  x2  S/P laparoscopy  as part of infertility work up  S/P tonsillectomy    SOCIAL HX:  Lives with family  Hx opiate tolerance ( )YES  (X )NO    Baseline ADLs  (Prior to Admission)  ( X) Independent   ( )Dependent    FAMILY HISTORY:  Family history of uterine cancer (Mother)        Review of Systems:    Depression-denies  Physical Discomfort-denies  Dyspnea-denies  Anorexia-mod  Weight Loss-yes   Fatigue-mod  Weakness-mod    All other systems reviewed and negative      PHYSICAL EXAM:  ICU Vital Signs Last 24 Hrs  T(C): 36.5 (20 Feb 2025 07:36), Max: 37.1 (19 Feb 2025 23:07)  T(F): 97.7 (20 Feb 2025 07:36), Max: 98.8 (19 Feb 2025 23:07)  HR: 118 (20 Feb 2025 10:38) (117 - 128)  BP: 132/81 (20 Feb 2025 07:36) (116/75 - 132/81)  BP(mean): 94 (19 Feb 2025 19:00) (87 - 94)  RR: 18 (20 Feb 2025 07:36) (18 - 30)  SpO2: 94% (20 Feb 2025 07:36) (93% - 97%)    O2 Parameters below as of 20 Feb 2025 07:36  Patient On (Oxygen Delivery Method): room air    PPSV2:  40 %    General: Middle aged female in chair in NAD  Mental Status: A&O X3  HEENT: oral mucosa dry  Lungs: clear to auscultation raphael  Cardiac: S1S2+  GI: abd soft NT ND + BS  : voids  Ext: moves all ext  Neuro: no focal def      LABS:                                 8.9    40.37 )-----------( 358      ( 20 Feb 2025 05:07 )             28.9             02-20    136  |  99  |  15  ----------------------------<  83  3.9   |  25  |  0.81    Ca    9.6      20 Feb 2025 05:07  Phos  3.1     02-19  Mg     2.0     02-19    TPro  6.5  /  Alb  3.2[L]  /  TBili  0.4  /  DBili  x   /  AST  57[H]  /  ALT  46  /  AlkPhos  92  02-19      PTT - ( 17 Feb 2025 15:29 )  PTT:39.3 sec  Albumin: Albumin: 3.2 g/dL (02-19 @ 05:48)      Allergies    No Known Allergies    Intolerances    shellfish (Vomiting)      MEDICATIONS  (STANDING):  enoxaparin Injectable 40 milliGRAM(s) SubCutaneous every 12 hours  OLANZapine 5 milliGRAM(s) Oral at bedtime  pantoprazole    Tablet 40 milliGRAM(s) Oral before breakfast  piperacillin/tazobactam IVPB.. 3.375 Gram(s) IV Intermittent every 8 hours  sertraline 50 milliGRAM(s) Oral daily    MEDICATIONS  (PRN):  LORazepam     Tablet 0.5 milliGRAM(s) Oral every 8 hours PRN Anxiety  ondansetron Injectable 4 milliGRAM(s) IV Push every 6 hours PRN Nausea and/or Vomiting      RADIOLOGY/ADDITIONAL STUDIES:

## 2025-02-20 NOTE — DISCHARGE NOTE PROVIDER - NSDCCPCAREPLAN_GEN_ALL_CORE_FT
PRINCIPAL DISCHARGE DIAGNOSIS  Diagnosis: Pulmonary embolism  Assessment and Plan of Treatment: WHAT IS A PULMONARY EMBOLISM? A pulmonary embolism (PE) is the sudden blockage of a blood vessel in the lungs by an embolus (blood clot).  THINGS TO DO: (1) Take your medications as directed (2) Take your blood thinner exactly as prescribed by your healthcare provider - Do not skip does or take less than prescribed - Tell your provider right away if you forget to take your blood thinner, or if you take too much (3) Do not smoke. Nicotine and other chemicals in cigarettes and cigars can damage blood vessels and increase your risk for another PE (4) Exercise regularly to help increase your blood flow (5) Maintain a healthy weight (6) Ask about birth control if you are a woman who takes the pill. A birth control pill increases the risk for blood clots  MONITOR THESE SIGNS OR SYMPTOMS: (1) Worsening lightheadedness (2) Worsening shortness of breath (3) New or worsening chest pain (4) Your arm or leg feels warm, tender, and swollen. If you experience any of these, DO alert your primary care provider, or return to the Emergency Department if you feel very sick.   Continue lovenox (enoxaparin) injections 40mg subcutaneously every 12 hours   Outpatient follow up with heme/onc or primary care provider for further management      SECONDARY DISCHARGE DIAGNOSES  Diagnosis: Acute respiratory failure with hypoxia  Assessment and Plan of Treatment: Due to above and worsening metastatic disease within the lungs  Received IV antibiotics while in hospital to cover for possible underlying PNA  Continue Augmentin 875-125mg twice a day for 3 additional days    Diagnosis: Lactic acidosis  Assessment and Plan of Treatment: Persistently elevated lactate, likely due to Warburg effect from underly malignancy    Diagnosis: Sarcoma of uterus metastatic to lung  Assessment and Plan of Treatment: Outpatient follow up with [facility] and radiation oncology for further management

## 2025-02-20 NOTE — PROVIDER CONTACT NOTE (CRITICAL VALUE NOTIFICATION) - TEST AND RESULT REPORTED:
lactate 13.0
Lactate 13.2
Lactate 5.6
WBC 40.37
troponin 94.60
Lact=7.6
Lactate 8.2
WBC 42.6
Lactate 14.3
lact 9.1

## 2025-02-20 NOTE — DISCHARGE NOTE PROVIDER - NSDCMRMEDTOKEN_GEN_ALL_CORE_FT
amoxicillin-clavulanate 875 mg-125 mg oral tablet: 875 milligram(s) orally 2 times a day  Claritin 10 mg oral tablet: 1 tab(s) orally once a day as needed for  enoxaparin 40 mg/0.4 mL injectable solution: 40 milligram(s) subcutaneously 2 times a day  LORazepam 0.5 mg oral tablet: 1 tab(s) orally every 8 hours as needed for  medroxyPROGESTERone 10 mg oral tablet: 1 tab(s) orally once a day  Neulasta 6 mg/0.6 mL subcutaneous solution: 6 milligram(s) subcutaneously once ***after chemo***  OLANZapine 5 mg oral tablet: 1 tab(s) orally once a day (at bedtime)  sertraline 50 mg oral tablet: 1 tab(s) orally once a day  trabectedin 1 mg intravenous injection: 1.5 mg/m2 intravenously every 3 weeks

## 2025-02-21 LAB
CULTURE RESULTS: SIGNIFICANT CHANGE UP
CULTURE RESULTS: SIGNIFICANT CHANGE UP
SPECIMEN SOURCE: SIGNIFICANT CHANGE UP
SPECIMEN SOURCE: SIGNIFICANT CHANGE UP

## 2025-02-25 DIAGNOSIS — R00.0 TACHYCARDIA, UNSPECIFIED: ICD-10-CM

## 2025-02-25 DIAGNOSIS — E87.20 ACIDOSIS, UNSPECIFIED: ICD-10-CM

## 2025-02-25 DIAGNOSIS — D63.8 ANEMIA IN OTHER CHRONIC DISEASES CLASSIFIED ELSEWHERE: ICD-10-CM

## 2025-02-25 DIAGNOSIS — I26.93 SINGLE SUBSEGMENTAL THROMBOTIC PULMONARY EMBOLISM WITHOUT ACUTE COR PULMONALE: ICD-10-CM

## 2025-02-25 DIAGNOSIS — Z66 DO NOT RESUSCITATE: ICD-10-CM

## 2025-02-25 DIAGNOSIS — J96.01 ACUTE RESPIRATORY FAILURE WITH HYPOXIA: ICD-10-CM

## 2025-02-25 DIAGNOSIS — C78.00 SECONDARY MALIGNANT NEOPLASM OF UNSPECIFIED LUNG: ICD-10-CM

## 2025-02-25 DIAGNOSIS — I26.99 OTHER PULMONARY EMBOLISM WITHOUT ACUTE COR PULMONALE: ICD-10-CM

## 2025-02-25 DIAGNOSIS — R53.81 OTHER MALAISE: ICD-10-CM

## 2025-05-23 ENCOUNTER — EMERGENCY (EMERGENCY)
Facility: HOSPITAL | Age: 55
LOS: 1 days | End: 2025-05-23
Attending: EMERGENCY MEDICINE | Admitting: EMERGENCY MEDICINE
Payer: COMMERCIAL

## 2025-05-23 VITALS
SYSTOLIC BLOOD PRESSURE: 84 MMHG | DIASTOLIC BLOOD PRESSURE: 60 MMHG | OXYGEN SATURATION: 100 % | RESPIRATION RATE: 24 BRPM | HEART RATE: 118 BPM

## 2025-05-23 VITALS
DIASTOLIC BLOOD PRESSURE: 76 MMHG | HEART RATE: 144 BPM | RESPIRATION RATE: 14 BRPM | SYSTOLIC BLOOD PRESSURE: 122 MMHG | HEIGHT: 64 IN | WEIGHT: 110.01 LBS | OXYGEN SATURATION: 66 % | TEMPERATURE: 98 F

## 2025-05-23 DIAGNOSIS — Z98.890 OTHER SPECIFIED POSTPROCEDURAL STATES: Chronic | ICD-10-CM

## 2025-05-23 DIAGNOSIS — Z90.89 ACQUIRED ABSENCE OF OTHER ORGANS: Chronic | ICD-10-CM

## 2025-05-23 LAB
ALBUMIN SERPL ELPH-MCNC: 3.1 G/DL — LOW (ref 3.3–5)
ALP SERPL-CCNC: 123 U/L — HIGH (ref 40–120)
ALT FLD-CCNC: 21 U/L — SIGNIFICANT CHANGE UP (ref 10–45)
ANION GAP SERPL CALC-SCNC: 21 MMOL/L — HIGH (ref 5–17)
APTT BLD: 29.8 SEC — SIGNIFICANT CHANGE UP (ref 26.1–36.8)
AST SERPL-CCNC: 69 U/L — HIGH (ref 10–40)
BASE EXCESS BLDV CALC-SCNC: -14.7 MMOL/L — LOW (ref -2–3)
BASOPHILS # BLD AUTO: 0.03 K/UL — SIGNIFICANT CHANGE UP (ref 0–0.2)
BASOPHILS NFR BLD AUTO: 0.3 % — SIGNIFICANT CHANGE UP (ref 0–2)
BILIRUB SERPL-MCNC: 0.7 MG/DL — SIGNIFICANT CHANGE UP (ref 0.2–1.2)
BLD GP AB SCN SERPL QL: SIGNIFICANT CHANGE UP
BUN SERPL-MCNC: 17 MG/DL — SIGNIFICANT CHANGE UP (ref 7–23)
CALCIUM SERPL-MCNC: 9.1 MG/DL — SIGNIFICANT CHANGE UP (ref 8.4–10.5)
CHLORIDE SERPL-SCNC: 102 MMOL/L — SIGNIFICANT CHANGE UP (ref 96–108)
CO2 BLDV-SCNC: 18 MMOL/L — LOW (ref 22–26)
CO2 SERPL-SCNC: 16 MMOL/L — LOW (ref 22–31)
CREAT SERPL-MCNC: 0.84 MG/DL — SIGNIFICANT CHANGE UP (ref 0.5–1.3)
EGFR: 81 ML/MIN/1.73M2 — SIGNIFICANT CHANGE UP
EGFR: 81 ML/MIN/1.73M2 — SIGNIFICANT CHANGE UP
EOSINOPHIL # BLD AUTO: 0.08 K/UL — SIGNIFICANT CHANGE UP (ref 0–0.5)
EOSINOPHIL NFR BLD AUTO: 0.9 % — SIGNIFICANT CHANGE UP (ref 0–6)
GAS PNL BLDV: SIGNIFICANT CHANGE UP
GLUCOSE SERPL-MCNC: 156 MG/DL — HIGH (ref 70–99)
HCO3 BLDV-SCNC: 16 MMOL/L — LOW (ref 22–29)
HCT VFR BLD CALC: 31.1 % — LOW (ref 34.5–45)
HGB BLD-MCNC: 9.4 G/DL — LOW (ref 11.5–15.5)
IMM GRANULOCYTES NFR BLD AUTO: 0.7 % — SIGNIFICANT CHANGE UP (ref 0–0.9)
INR BLD: 1.33 RATIO — HIGH (ref 0.85–1.16)
LIDOCAIN IGE QN: 28 U/L — SIGNIFICANT CHANGE UP (ref 16–77)
LYMPHOCYTES # BLD AUTO: 1.97 K/UL — SIGNIFICANT CHANGE UP (ref 1–3.3)
LYMPHOCYTES # BLD AUTO: 21.6 % — SIGNIFICANT CHANGE UP (ref 13–44)
MCHC RBC-ENTMCNC: 30.2 G/DL — LOW (ref 32–36)
MCHC RBC-ENTMCNC: 34.3 PG — HIGH (ref 27–34)
MCV RBC AUTO: 113.5 FL — HIGH (ref 80–100)
MONOCYTES # BLD AUTO: 1.58 K/UL — HIGH (ref 0–0.9)
MONOCYTES NFR BLD AUTO: 17.3 % — HIGH (ref 2–14)
NEUTROPHILS # BLD AUTO: 5.39 K/UL — SIGNIFICANT CHANGE UP (ref 1.8–7.4)
NEUTROPHILS NFR BLD AUTO: 59.2 % — SIGNIFICANT CHANGE UP (ref 43–77)
NRBC BLD AUTO-RTO: 0 /100 WBCS — SIGNIFICANT CHANGE UP (ref 0–0)
PCO2 BLDV: 57 MMHG — HIGH (ref 39–42)
PH BLDV: 7.05 — LOW (ref 7.32–7.43)
PLATELET # BLD AUTO: 161 K/UL — SIGNIFICANT CHANGE UP (ref 150–400)
PO2 BLDV: <35 MMHG — SIGNIFICANT CHANGE UP (ref 25–45)
POTASSIUM SERPL-MCNC: 4.2 MMOL/L — SIGNIFICANT CHANGE UP (ref 3.5–5.3)
POTASSIUM SERPL-SCNC: 4.2 MMOL/L — SIGNIFICANT CHANGE UP (ref 3.5–5.3)
PROT SERPL-MCNC: 6.5 G/DL — SIGNIFICANT CHANGE UP (ref 6–8.3)
PROTHROM AB SERPL-ACNC: 15.6 SEC — HIGH (ref 9.9–13.4)
RBC # BLD: 2.74 M/UL — LOW (ref 3.8–5.2)
RBC # FLD: 18.3 % — HIGH (ref 10.3–14.5)
SAO2 % BLDV: 21.4 % — LOW (ref 67–88)
SODIUM SERPL-SCNC: 139 MMOL/L — SIGNIFICANT CHANGE UP (ref 135–145)
WBC # BLD: 9.01 K/UL — SIGNIFICANT CHANGE UP (ref 3.8–10.5)
WBC # FLD AUTO: 9.01 K/UL — SIGNIFICANT CHANGE UP (ref 3.8–10.5)

## 2025-05-23 PROCEDURE — 83690 ASSAY OF LIPASE: CPT

## 2025-05-23 PROCEDURE — 86901 BLOOD TYPING SEROLOGIC RH(D): CPT

## 2025-05-23 PROCEDURE — 82803 BLOOD GASES ANY COMBINATION: CPT

## 2025-05-23 PROCEDURE — 96376 TX/PRO/DX INJ SAME DRUG ADON: CPT

## 2025-05-23 PROCEDURE — 85025 COMPLETE CBC W/AUTO DIFF WBC: CPT

## 2025-05-23 PROCEDURE — 36415 COLL VENOUS BLD VENIPUNCTURE: CPT

## 2025-05-23 PROCEDURE — 96374 THER/PROPH/DIAG INJ IV PUSH: CPT

## 2025-05-23 PROCEDURE — 85610 PROTHROMBIN TIME: CPT

## 2025-05-23 PROCEDURE — 85730 THROMBOPLASTIN TIME PARTIAL: CPT

## 2025-05-23 PROCEDURE — 93010 ELECTROCARDIOGRAM REPORT: CPT

## 2025-05-23 PROCEDURE — 71045 X-RAY EXAM CHEST 1 VIEW: CPT

## 2025-05-23 PROCEDURE — 96375 TX/PRO/DX INJ NEW DRUG ADDON: CPT

## 2025-05-23 PROCEDURE — 80053 COMPREHEN METABOLIC PANEL: CPT

## 2025-05-23 PROCEDURE — 86900 BLOOD TYPING SEROLOGIC ABO: CPT

## 2025-05-23 PROCEDURE — 99285 EMERGENCY DEPT VISIT HI MDM: CPT | Mod: 25

## 2025-05-23 PROCEDURE — 71045 X-RAY EXAM CHEST 1 VIEW: CPT | Mod: 26

## 2025-05-23 PROCEDURE — 93005 ELECTROCARDIOGRAM TRACING: CPT

## 2025-05-23 PROCEDURE — 99497 ADVNCD CARE PLAN 30 MIN: CPT

## 2025-05-23 PROCEDURE — 86850 RBC ANTIBODY SCREEN: CPT

## 2025-05-23 PROCEDURE — 99285 EMERGENCY DEPT VISIT HI MDM: CPT

## 2025-05-23 RX ORDER — LORAZEPAM 4 MG/ML
0.5 VIAL (ML) INJECTION ONCE
Refills: 0 | Status: DISCONTINUED | OUTPATIENT
Start: 2025-05-23 | End: 2025-05-23

## 2025-05-23 RX ADMIN — Medication 2 MILLIGRAM(S): at 21:30

## 2025-05-23 RX ADMIN — Medication 2 MILLIGRAM(S): at 20:56

## 2025-05-23 RX ADMIN — Medication 0.5 MILLIGRAM(S): at 23:40

## 2025-05-23 RX ADMIN — Medication 1000 MILLILITER(S): at 21:25

## 2025-05-23 RX ADMIN — Medication 0.5 MILLIGRAM(S): at 20:25

## 2025-05-23 RX ADMIN — Medication 2000 MILLILITER(S): at 20:25

## 2025-05-23 RX ADMIN — Medication 1000 MILLILITER(S): at 23:24

## 2025-05-23 NOTE — ED PROVIDER NOTE - CLINICAL SUMMARY MEDICAL DECISION MAKING FREE TEXT BOX
55-year-old female with hemoptysis/hematemesis, history of uterine cancer with multiple mets including lung patient wants comfort care, was accepted to hospice in, will discharge to hospice [facility] [place] 55-year-old female with hemoptysis/hematemesis, history of uterine cancer with multiple mets including lung, patient wants comfort care, was accepted to hospice in, will discharge to hospice [facility] [place]

## 2025-05-23 NOTE — ED PROVIDER NOTE - CONSTITUTIONAL, MLM
normal... acute on chronic ill appearing, awake, alert, oriented to person, place, time/situation and in moderate distress.

## 2025-05-23 NOTE — ED ADULT NURSE NOTE - OBJECTIVE STATEMENT
Pt BIB family with hemoptysis and hematemesis, family reports several instances of vomiting BRB.  Pt reports difficulty breathing, RA sats 66%.  .  Pt brought to trauma 1. Hx stage IV uterine cancer, enrolled in hospice today. Patient complains of difficulty breathing & anxiety- states she takes ativan 0.5 at home. Side rails up, call light in reach, safety maintained, comfort measures provided and MD evaluation in progress.

## 2025-05-23 NOTE — ED PROVIDER NOTE - FAMILY DETAILS FREE TEXT FOR MDM ADDL HISTORY OBTAINED FROM QUESTION
daughter, boyfriend, niece Melolabial Transposition Flap Text: The defect edges were debeveled with a #15 scalpel blade.  Given the location of the defect and the proximity to free margins a melolabial flap was deemed most appropriate.  Using a sterile surgical marker, an appropriate melolabial transposition flap was drawn incorporating the defect.    The area thus outlined was incised deep to adipose tissue with a #15 scalpel blade.  The skin margins were undermined to an appropriate distance in all directions utilizing iris scissors.

## 2025-05-23 NOTE — ED PROVIDER NOTE - TOBACCO USE
Medication chart review for [facility] services    Received referral from [facility].   Medications reviewed  compared with discharge summary if available.  Discharge summary date, if applicable:   7/20    Current medication list per [facility]     Medication list one, patient is currently taking    Current Outpatient Medications:     ondansetron, 8 mg, Oral, Q8HRS PRN    celecoxib, 100 mg, Oral, DAILY    mirtazapine, 7.5 mg, Oral, Nightly      Medication list two, drugs that the patient has been prescribed or recommended to take by their healthcare provider on discharge summary  N/a    No Known Allergies    Labs     Lab Results   Component Value Date/Time    SODIUM 141 07/21/2023 05:39 AM    POTASSIUM 3.1 (L) 07/21/2023 05:39 AM    CHLORIDE 108 07/21/2023 05:39 AM    CO2 19 (L) 07/21/2023 05:39 AM    GLUCOSE 108 (H) 07/21/2023 05:39 AM    BUN 27 (H) 07/21/2023 05:39 AM    CREATININE 1.35 07/21/2023 05:39 AM     Lab Results   Component Value Date/Time    ALKPHOSPHAT 59 07/21/2023 05:39 AM    ASTSGOT 12 07/21/2023 05:39 AM    ALTSGPT 7 07/21/2023 05:39 AM    TBILIRUBIN 0.3 07/21/2023 05:39 AM    ALBUMIN 2.8 (L) 07/21/2023 05:39 AM        Assessment for clinically significant drug interactions, drug omissions/additions, duplicative therapies.            CC   Elle Romero M.D.  [address]  [facility] [address]  Fax: [phone]    [facility]  Phone [phone] fax [phone]    This note was created using voice recognition software (Dragon). The accuracy of the dictation is limited by the abilities of the software. I have reviewed the note prior to signing, however some errors in grammar and context are still possible. If you have any questions related to this note please do not hesitate to contact our office.      Unknown if ever smoked

## 2025-05-23 NOTE — ED PROVIDER NOTE - CONVERSATION DETAILS
DNR/DNI with NIV confirmed DNR/DNI with NIV    Patient does not want to pursue any further treatment with curative purpose including bronchoscopy, wants comfort care, amenable to inpatient hospice at [place]

## 2025-05-23 NOTE — ED ADULT NURSE REASSESSMENT NOTE - NS ED NURSE REASSESS COMMENT FT1
Patient trailed off o2 & sat noted to be low-mid 90s. Increased WOB & tachypnea noted. Patient states comfort from non rebreather.

## 2025-05-23 NOTE — ED PROVIDER NOTE - PATIENT PORTAL LINK FT
You can access the FollowMyHealth Patient Portal offered by [facility] by registering at the following website: [email or web address]. By joining [facility]’s FollowMyHealth portal, you will also be able to view your health information using other applications (apps) compatible with our system.

## 2025-05-23 NOTE — ED PROVIDER NOTE - OBJECTIVE STATEMENT
55-year-old female with coughing/vomiting blood today.  History of stage IV uterine cancer with multiple mets including lungs, was enrolled in hospice today. States that patient coughed up blood yesterday, and today more bleeding started. Denies fever ,trauma. NVD. Denies any other symptoms. Patient arrived from home with her boyfriend and daughter, states that her HCP is her sisters.

## 2025-05-23 NOTE — ED ADULT TRIAGE NOTE - CHIEF COMPLAINT QUOTE
Pt BIB family with hemoptysis and hematemesis, family reports several instances of vomiting BRB.  Pt reports difficulty breathing, RA sats 66%.  .  Pt brought to trauma 1.

## 2025-05-23 NOTE — ED ADULT NURSE NOTE - NSFALLHARMRISKINTERV_ED_ALL_ED

## 2025-06-22 NOTE — H&P PST ADULT - NSALCOHOLFREQ_GEN_A_CORE_SD
Wt Readings from Last 3 Encounters:   06/19/25 63.5 kg (139 lb 15.9 oz)   06/13/25 63.5 kg (140 lb)   04/03/25 61.7 kg (136 lb)     Lab Results   Component Value Date     (H) 03/04/2025     (H) 06/04/2024    LVEF 55 03/06/2025     Lasix   2-3 times/wk